# Patient Record
Sex: FEMALE | Race: WHITE | NOT HISPANIC OR LATINO
[De-identification: names, ages, dates, MRNs, and addresses within clinical notes are randomized per-mention and may not be internally consistent; named-entity substitution may affect disease eponyms.]

---

## 2017-02-14 ENCOUNTER — CHART COPY (OUTPATIENT)
Age: 82
End: 2017-02-14

## 2017-02-17 ENCOUNTER — APPOINTMENT (OUTPATIENT)
Dept: SURGERY | Facility: CLINIC | Age: 82
End: 2017-02-17

## 2017-02-17 VITALS
DIASTOLIC BLOOD PRESSURE: 74 MMHG | OXYGEN SATURATION: 100 % | HEART RATE: 66 BPM | TEMPERATURE: 98 F | SYSTOLIC BLOOD PRESSURE: 144 MMHG | RESPIRATION RATE: 15 BRPM

## 2017-02-17 DIAGNOSIS — L91.0 HYPERTROPHIC SCAR: ICD-10-CM

## 2017-02-17 DIAGNOSIS — S51.801S UNSPECIFIED OPEN WOUND OF RIGHT FOREARM, SEQUELA: ICD-10-CM

## 2017-02-17 DIAGNOSIS — Q78.2 OSTEOPETROSIS: ICD-10-CM

## 2017-02-17 DIAGNOSIS — E03.9 HYPOTHYROIDISM, UNSPECIFIED: ICD-10-CM

## 2017-02-17 DIAGNOSIS — I10 ESSENTIAL (PRIMARY) HYPERTENSION: ICD-10-CM

## 2017-02-17 DIAGNOSIS — E78.5 HYPERLIPIDEMIA, UNSPECIFIED: ICD-10-CM

## 2017-02-21 ENCOUNTER — APPOINTMENT (OUTPATIENT)
Dept: OPHTHALMOLOGY | Facility: CLINIC | Age: 82
End: 2017-02-21

## 2017-06-02 ENCOUNTER — APPOINTMENT (OUTPATIENT)
Dept: OPHTHALMOLOGY | Facility: CLINIC | Age: 82
End: 2017-06-02

## 2017-06-02 DIAGNOSIS — H01.006 UNSPECIFIED BLEPHARITIS RIGHT EYE, UNSPECIFIED EYELID: ICD-10-CM

## 2017-06-02 DIAGNOSIS — H01.003 UNSPECIFIED BLEPHARITIS RIGHT EYE, UNSPECIFIED EYELID: ICD-10-CM

## 2017-06-07 ENCOUNTER — OUTPATIENT (OUTPATIENT)
Dept: OUTPATIENT SERVICES | Facility: HOSPITAL | Age: 82
LOS: 1 days | End: 2017-06-07
Payer: MEDICARE

## 2017-06-07 VITALS
WEIGHT: 139.99 LBS | HEIGHT: 59 IN | SYSTOLIC BLOOD PRESSURE: 116 MMHG | RESPIRATION RATE: 16 BRPM | DIASTOLIC BLOOD PRESSURE: 73 MMHG | TEMPERATURE: 98 F | OXYGEN SATURATION: 96 % | HEART RATE: 82 BPM

## 2017-06-07 DIAGNOSIS — Z01.818 ENCOUNTER FOR OTHER PREPROCEDURAL EXAMINATION: ICD-10-CM

## 2017-06-07 DIAGNOSIS — K59.4 ANAL SPASM: ICD-10-CM

## 2017-06-07 DIAGNOSIS — I10 ESSENTIAL (PRIMARY) HYPERTENSION: ICD-10-CM

## 2017-06-07 DIAGNOSIS — Z87.19 PERSONAL HISTORY OF OTHER DISEASES OF THE DIGESTIVE SYSTEM: Chronic | ICD-10-CM

## 2017-06-07 LAB
ANION GAP SERPL CALC-SCNC: 13 MMOL/L — SIGNIFICANT CHANGE UP (ref 5–17)
BUN SERPL-MCNC: 33 MG/DL — HIGH (ref 7–23)
CALCIUM SERPL-MCNC: 9.7 MG/DL — SIGNIFICANT CHANGE UP (ref 8.4–10.5)
CHLORIDE SERPL-SCNC: 101 MMOL/L — SIGNIFICANT CHANGE UP (ref 96–108)
CO2 SERPL-SCNC: 24 MMOL/L — SIGNIFICANT CHANGE UP (ref 22–31)
CREAT SERPL-MCNC: 1.22 MG/DL — SIGNIFICANT CHANGE UP (ref 0.5–1.3)
GLUCOSE SERPL-MCNC: 106 MG/DL — HIGH (ref 70–99)
HCT VFR BLD CALC: 37.9 % — SIGNIFICANT CHANGE UP (ref 34.5–45)
HGB BLD-MCNC: 12.2 G/DL — SIGNIFICANT CHANGE UP (ref 11.5–15.5)
MCHC RBC-ENTMCNC: 31.9 PG — SIGNIFICANT CHANGE UP (ref 27–34)
MCHC RBC-ENTMCNC: 32.2 GM/DL — SIGNIFICANT CHANGE UP (ref 32–36)
MCV RBC AUTO: 99 FL — SIGNIFICANT CHANGE UP (ref 80–100)
PLATELET # BLD AUTO: 283 K/UL — SIGNIFICANT CHANGE UP (ref 150–400)
POTASSIUM SERPL-MCNC: 4.7 MMOL/L — SIGNIFICANT CHANGE UP (ref 3.5–5.3)
POTASSIUM SERPL-SCNC: 4.7 MMOL/L — SIGNIFICANT CHANGE UP (ref 3.5–5.3)
RBC # BLD: 3.83 M/UL — SIGNIFICANT CHANGE UP (ref 3.8–5.2)
RBC # FLD: 14 % — SIGNIFICANT CHANGE UP (ref 10.3–14.5)
SODIUM SERPL-SCNC: 138 MMOL/L — SIGNIFICANT CHANGE UP (ref 135–145)
WBC # BLD: 7.27 K/UL — SIGNIFICANT CHANGE UP (ref 3.8–10.5)
WBC # FLD AUTO: 7.27 K/UL — SIGNIFICANT CHANGE UP (ref 3.8–10.5)

## 2017-06-07 PROCEDURE — G0463: CPT

## 2017-06-07 PROCEDURE — 85027 COMPLETE CBC AUTOMATED: CPT

## 2017-06-07 PROCEDURE — 93010 ELECTROCARDIOGRAM REPORT: CPT | Mod: NC

## 2017-06-07 PROCEDURE — 93005 ELECTROCARDIOGRAM TRACING: CPT

## 2017-06-07 PROCEDURE — 80048 BASIC METABOLIC PNL TOTAL CA: CPT

## 2017-06-07 RX ORDER — SODIUM CHLORIDE 9 MG/ML
3 INJECTION INTRAMUSCULAR; INTRAVENOUS; SUBCUTANEOUS EVERY 8 HOURS
Qty: 0 | Refills: 0 | Status: DISCONTINUED | OUTPATIENT
Start: 2017-06-28 | End: 2017-07-13

## 2017-06-07 RX ORDER — LIDOCAINE HCL 20 MG/ML
0.2 VIAL (ML) INJECTION ONCE
Qty: 0 | Refills: 0 | Status: DISCONTINUED | OUTPATIENT
Start: 2017-06-28 | End: 2017-07-13

## 2017-06-07 NOTE — H&P PST ADULT - PSH
After-Cataract of Both Eyes    Anterior Cervical discectomy    History of  Hip surgery with hardware    History of Tonsillectomy    left Elbow surgey secondary to injury    left eye Retinal tear repair After-Cataract of Both Eyes    Anterior Cervical discectomy    H/O gastroesophageal reflux (GERD)    History of  Hip surgery with hardware    History of Tonsillectomy    left Elbow surgey secondary to injury    left eye Retinal tear repair

## 2017-06-07 NOTE — H&P PST ADULT - NSANTHOSAYNRD_GEN_A_CORE
No. MERLINE screening performed.  STOP BANG Legend: 0-2 = LOW Risk; 3-4 = INTERMEDIATE Risk; 5-8 = HIGH Risk

## 2017-06-07 NOTE — H&P PST ADULT - PMH
Anal spasm    Chronic anal fissure    Dyslipidemia    H/O: Hypothyroidism    Hip fracture  Right hip- 1995  Hypertension    mild Dementia    Osteoporosis Anal spasm    Chronic anal fissure    Dyslipidemia    H/O: Hypothyroidism    Hip fracture  Right hip- 1995  Hypertension    mild Dementia    Osteoporosis    Skin cancer  lower extremities

## 2017-06-08 ENCOUNTER — APPOINTMENT (OUTPATIENT)
Dept: SURGERY | Facility: HOSPITAL | Age: 82
End: 2017-06-08

## 2017-06-28 ENCOUNTER — OUTPATIENT (OUTPATIENT)
Dept: OUTPATIENT SERVICES | Facility: HOSPITAL | Age: 82
LOS: 1 days | End: 2017-06-28
Payer: MEDICARE

## 2017-06-28 ENCOUNTER — APPOINTMENT (OUTPATIENT)
Dept: SURGERY | Facility: HOSPITAL | Age: 82
End: 2017-06-28

## 2017-06-28 ENCOUNTER — CHART COPY (OUTPATIENT)
Age: 82
End: 2017-06-28

## 2017-06-28 ENCOUNTER — TRANSCRIPTION ENCOUNTER (OUTPATIENT)
Age: 82
End: 2017-06-28

## 2017-06-28 VITALS
WEIGHT: 139.99 LBS | SYSTOLIC BLOOD PRESSURE: 165 MMHG | HEIGHT: 59 IN | RESPIRATION RATE: 16 BRPM | OXYGEN SATURATION: 98 % | TEMPERATURE: 98 F | DIASTOLIC BLOOD PRESSURE: 73 MMHG | HEART RATE: 82 BPM

## 2017-06-28 VITALS
TEMPERATURE: 97 F | RESPIRATION RATE: 14 BRPM | DIASTOLIC BLOOD PRESSURE: 76 MMHG | OXYGEN SATURATION: 100 % | HEART RATE: 44 BPM | SYSTOLIC BLOOD PRESSURE: 150 MMHG

## 2017-06-28 DIAGNOSIS — Z87.19 PERSONAL HISTORY OF OTHER DISEASES OF THE DIGESTIVE SYSTEM: Chronic | ICD-10-CM

## 2017-06-28 DIAGNOSIS — K59.4 ANAL SPASM: ICD-10-CM

## 2017-06-28 PROCEDURE — 46080 SPHNCTROTMY ANAL DIV SPHNCTR: CPT

## 2017-06-28 RX ORDER — ACETAMINOPHEN 500 MG
1000 TABLET ORAL ONCE
Qty: 0 | Refills: 0 | Status: DISCONTINUED | OUTPATIENT
Start: 2017-06-28 | End: 2017-07-13

## 2017-06-28 RX ORDER — ONDANSETRON 8 MG/1
4 TABLET, FILM COATED ORAL ONCE
Qty: 0 | Refills: 0 | Status: DISCONTINUED | OUTPATIENT
Start: 2017-06-28 | End: 2017-07-13

## 2017-06-28 RX ORDER — CELECOXIB 200 MG/1
200 CAPSULE ORAL ONCE
Qty: 0 | Refills: 0 | Status: DISCONTINUED | OUTPATIENT
Start: 2017-06-28 | End: 2017-07-13

## 2017-06-28 RX ORDER — SODIUM CHLORIDE 9 MG/ML
1000 INJECTION, SOLUTION INTRAVENOUS
Qty: 0 | Refills: 0 | Status: DISCONTINUED | OUTPATIENT
Start: 2017-06-28 | End: 2017-07-13

## 2017-06-28 NOTE — ASU PATIENT PROFILE, ADULT - VISION (WITH CORRECTIVE LENSES IF THE PATIENT USUALLY WEARS THEM):
Normal vision: sees adequately in most situations; can see medication labels, newsprint Normal vision: sees adequately in most situations; can see medication labels, newsprint/reading

## 2017-06-28 NOTE — ASU PATIENT PROFILE, ADULT - PMH
Anal spasm    Chronic anal fissure    Dyslipidemia    H/O: Hypothyroidism    Hip fracture  Right hip- 1995  Hypertension    mild Dementia    Osteoporosis    Skin cancer  lower extremities

## 2017-06-28 NOTE — ASU PATIENT PROFILE, ADULT - PSH
After-Cataract of Both Eyes    Anterior Cervical discectomy    H/O gastroesophageal reflux (GERD)    History of  Hip surgery with hardware    History of Tonsillectomy    left Elbow surgey secondary to injury    left eye Retinal tear repair

## 2017-07-14 ENCOUNTER — APPOINTMENT (OUTPATIENT)
Dept: SURGERY | Facility: CLINIC | Age: 82
End: 2017-07-14

## 2017-07-14 VITALS
TEMPERATURE: 97.8 F | HEART RATE: 62 BPM | RESPIRATION RATE: 16 BRPM | OXYGEN SATURATION: 96 % | SYSTOLIC BLOOD PRESSURE: 126 MMHG | DIASTOLIC BLOOD PRESSURE: 76 MMHG

## 2017-07-14 DIAGNOSIS — K60.1 CHRONIC ANAL FISSURE: ICD-10-CM

## 2017-07-14 RX ORDER — NYSTATIN 100000 [USP'U]/G
100000 CREAM TOPICAL
Qty: 30 | Refills: 0 | Status: DISCONTINUED | COMMUNITY
Start: 2016-10-18

## 2017-07-14 RX ORDER — MUPIROCIN 20 MG/G
2 OINTMENT TOPICAL
Qty: 22 | Refills: 0 | Status: DISCONTINUED | COMMUNITY
Start: 2017-05-27

## 2017-07-14 RX ORDER — CEFUROXIME AXETIL 250 MG/1
250 TABLET ORAL
Qty: 14 | Refills: 0 | Status: DISCONTINUED | COMMUNITY
Start: 2017-03-01

## 2017-07-22 ENCOUNTER — INPATIENT (INPATIENT)
Facility: HOSPITAL | Age: 82
LOS: 5 days | Discharge: HOME | DRG: 262 | End: 2017-07-28
Attending: INTERNAL MEDICINE | Admitting: INTERNAL MEDICINE
Payer: COMMERCIAL

## 2017-07-22 VITALS
RESPIRATION RATE: 16 BRPM | OXYGEN SATURATION: 98 % | HEART RATE: 80 BPM | SYSTOLIC BLOOD PRESSURE: 122 MMHG | DIASTOLIC BLOOD PRESSURE: 76 MMHG

## 2017-07-22 DIAGNOSIS — Z87.19 PERSONAL HISTORY OF OTHER DISEASES OF THE DIGESTIVE SYSTEM: Chronic | ICD-10-CM

## 2017-07-22 DIAGNOSIS — R55 SYNCOPE AND COLLAPSE: ICD-10-CM

## 2017-07-22 LAB
ALBUMIN SERPL ELPH-MCNC: 3.9 G/DL — SIGNIFICANT CHANGE UP (ref 3.3–5)
ALP SERPL-CCNC: 35 U/L — LOW (ref 40–120)
ALT FLD-CCNC: 26 U/L RC — SIGNIFICANT CHANGE UP (ref 10–45)
ANION GAP SERPL CALC-SCNC: 12 MMOL/L — SIGNIFICANT CHANGE UP (ref 5–17)
APPEARANCE UR: CLEAR — SIGNIFICANT CHANGE UP
AST SERPL-CCNC: 43 U/L — HIGH (ref 10–40)
BACTERIA # UR AUTO: ABNORMAL /HPF
BILIRUB SERPL-MCNC: 0.5 MG/DL — SIGNIFICANT CHANGE UP (ref 0.2–1.2)
BILIRUB UR-MCNC: NEGATIVE — SIGNIFICANT CHANGE UP
BUN SERPL-MCNC: 23 MG/DL — SIGNIFICANT CHANGE UP (ref 7–23)
CALCIUM SERPL-MCNC: 9.4 MG/DL — SIGNIFICANT CHANGE UP (ref 8.4–10.5)
CHLORIDE SERPL-SCNC: 104 MMOL/L — SIGNIFICANT CHANGE UP (ref 96–108)
CO2 SERPL-SCNC: 24 MMOL/L — SIGNIFICANT CHANGE UP (ref 22–31)
COLOR SPEC: YELLOW — SIGNIFICANT CHANGE UP
CREAT SERPL-MCNC: 1.2 MG/DL — SIGNIFICANT CHANGE UP (ref 0.5–1.3)
DIFF PNL FLD: NEGATIVE — SIGNIFICANT CHANGE UP
EPI CELLS # UR: SIGNIFICANT CHANGE UP /HPF
GLUCOSE SERPL-MCNC: 93 MG/DL — SIGNIFICANT CHANGE UP (ref 70–99)
GLUCOSE UR QL: NEGATIVE — SIGNIFICANT CHANGE UP
HCT VFR BLD CALC: 35.3 % — SIGNIFICANT CHANGE UP (ref 34.5–45)
HGB BLD-MCNC: 11.9 G/DL — SIGNIFICANT CHANGE UP (ref 11.5–15.5)
KETONES UR-MCNC: NEGATIVE — SIGNIFICANT CHANGE UP
LEUKOCYTE ESTERASE UR-ACNC: ABNORMAL
MCHC RBC-ENTMCNC: 33.7 GM/DL — SIGNIFICANT CHANGE UP (ref 32–36)
MCHC RBC-ENTMCNC: 34.7 PG — HIGH (ref 27–34)
MCV RBC AUTO: 103 FL — HIGH (ref 80–100)
NITRITE UR-MCNC: NEGATIVE — SIGNIFICANT CHANGE UP
PH UR: 6.5 — SIGNIFICANT CHANGE UP (ref 5–8)
PLATELET # BLD AUTO: 317 K/UL — SIGNIFICANT CHANGE UP (ref 150–400)
POTASSIUM SERPL-MCNC: 4.9 MMOL/L — SIGNIFICANT CHANGE UP (ref 3.5–5.3)
POTASSIUM SERPL-SCNC: 4.9 MMOL/L — SIGNIFICANT CHANGE UP (ref 3.5–5.3)
PROT SERPL-MCNC: 6.6 G/DL — SIGNIFICANT CHANGE UP (ref 6–8.3)
PROT UR-MCNC: NEGATIVE — SIGNIFICANT CHANGE UP
RBC # BLD: 3.43 M/UL — LOW (ref 3.8–5.2)
RBC # FLD: 12.1 % — SIGNIFICANT CHANGE UP (ref 10.3–14.5)
RBC CASTS # UR COMP ASSIST: SIGNIFICANT CHANGE UP /HPF (ref 0–2)
SODIUM SERPL-SCNC: 140 MMOL/L — SIGNIFICANT CHANGE UP (ref 135–145)
SP GR SPEC: 1.02 — SIGNIFICANT CHANGE UP (ref 1.01–1.02)
TROPONIN T SERPL-MCNC: <0.01 NG/ML — SIGNIFICANT CHANGE UP (ref 0–0.06)
UROBILINOGEN FLD QL: NEGATIVE — SIGNIFICANT CHANGE UP
WBC # BLD: 6.8 K/UL — SIGNIFICANT CHANGE UP (ref 3.8–10.5)
WBC # FLD AUTO: 6.8 K/UL — SIGNIFICANT CHANGE UP (ref 3.8–10.5)
WBC UR QL: ABNORMAL /HPF (ref 0–5)

## 2017-07-22 PROCEDURE — 70450 CT HEAD/BRAIN W/O DYE: CPT | Mod: 26

## 2017-07-22 PROCEDURE — 71010: CPT | Mod: 26

## 2017-07-22 PROCEDURE — 93010 ELECTROCARDIOGRAM REPORT: CPT

## 2017-07-22 PROCEDURE — 73590 X-RAY EXAM OF LOWER LEG: CPT | Mod: 26,50

## 2017-07-22 PROCEDURE — 99285 EMERGENCY DEPT VISIT HI MDM: CPT | Mod: 25

## 2017-07-22 PROCEDURE — 72170 X-RAY EXAM OF PELVIS: CPT | Mod: 26

## 2017-07-22 RX ORDER — LOSARTAN POTASSIUM 100 MG/1
50 TABLET, FILM COATED ORAL DAILY
Qty: 0 | Refills: 0 | Status: DISCONTINUED | OUTPATIENT
Start: 2017-07-22 | End: 2017-07-28

## 2017-07-22 RX ORDER — OXYCODONE AND ACETAMINOPHEN 5; 325 MG/1; MG/1
1 TABLET ORAL EVERY 4 HOURS
Qty: 0 | Refills: 0 | Status: DISCONTINUED | OUTPATIENT
Start: 2017-07-22 | End: 2017-07-28

## 2017-07-22 RX ORDER — RANITIDINE HYDROCHLORIDE 150 MG/1
0 TABLET, FILM COATED ORAL
Qty: 0 | Refills: 0 | COMMUNITY

## 2017-07-22 RX ORDER — POLYETHYLENE GLYCOL 3350 17 G/17G
17 POWDER, FOR SOLUTION ORAL DAILY
Qty: 0 | Refills: 0 | Status: DISCONTINUED | OUTPATIENT
Start: 2017-07-22 | End: 2017-07-28

## 2017-07-22 RX ORDER — TRAMADOL HYDROCHLORIDE 50 MG/1
50 TABLET ORAL ONCE
Qty: 0 | Refills: 0 | Status: DISCONTINUED | OUTPATIENT
Start: 2017-07-22 | End: 2017-07-22

## 2017-07-22 RX ORDER — TETANUS TOXOID, REDUCED DIPHTHERIA TOXOID AND ACELLULAR PERTUSSIS VACCINE, ADSORBED 5; 2.5; 8; 8; 2.5 [IU]/.5ML; [IU]/.5ML; UG/.5ML; UG/.5ML; UG/.5ML
0.5 SUSPENSION INTRAMUSCULAR ONCE
Qty: 0 | Refills: 0 | Status: COMPLETED | OUTPATIENT
Start: 2017-07-22 | End: 2017-07-22

## 2017-07-22 RX ORDER — LEVOTHYROXINE SODIUM 125 MCG
100 TABLET ORAL DAILY
Qty: 0 | Refills: 0 | Status: DISCONTINUED | OUTPATIENT
Start: 2017-07-22 | End: 2017-07-28

## 2017-07-22 RX ORDER — SIMVASTATIN 20 MG/1
40 TABLET, FILM COATED ORAL AT BEDTIME
Qty: 0 | Refills: 0 | Status: DISCONTINUED | OUTPATIENT
Start: 2017-07-22 | End: 2017-07-28

## 2017-07-22 RX ORDER — METRONIDAZOLE 500 MG
1 TABLET ORAL
Qty: 0 | Refills: 0 | COMMUNITY
Start: 2017-07-22 | End: 2017-08-01

## 2017-07-22 RX ORDER — HEPARIN SODIUM 5000 [USP'U]/ML
5000 INJECTION INTRAVENOUS; SUBCUTANEOUS EVERY 12 HOURS
Qty: 0 | Refills: 0 | Status: DISCONTINUED | OUTPATIENT
Start: 2017-07-22 | End: 2017-07-28

## 2017-07-22 RX ORDER — MOXIFLOXACIN HYDROCHLORIDE TABLETS, 400 MG 400 MG/1
1 TABLET, FILM COATED ORAL
Qty: 0 | Refills: 0 | COMMUNITY
Start: 2017-07-22 | End: 2017-08-01

## 2017-07-22 RX ORDER — SODIUM CHLORIDE 9 MG/ML
1000 INJECTION INTRAMUSCULAR; INTRAVENOUS; SUBCUTANEOUS ONCE
Qty: 0 | Refills: 0 | Status: COMPLETED | OUTPATIENT
Start: 2017-07-22 | End: 2017-07-22

## 2017-07-22 RX ORDER — ACETAMINOPHEN 500 MG
1000 TABLET ORAL ONCE
Qty: 0 | Refills: 0 | Status: COMPLETED | OUTPATIENT
Start: 2017-07-22 | End: 2017-07-22

## 2017-07-22 RX ORDER — OLMESARTAN MEDOXOMIL 5 MG/1
10 TABLET, FILM COATED ORAL
Qty: 0 | Refills: 0 | COMMUNITY

## 2017-07-22 RX ORDER — PANTOPRAZOLE SODIUM 20 MG/1
40 TABLET, DELAYED RELEASE ORAL
Qty: 0 | Refills: 0 | Status: DISCONTINUED | OUTPATIENT
Start: 2017-07-22 | End: 2017-07-28

## 2017-07-22 RX ADMIN — TRAMADOL HYDROCHLORIDE 50 MILLIGRAM(S): 50 TABLET ORAL at 16:47

## 2017-07-22 RX ADMIN — OXYCODONE AND ACETAMINOPHEN 1 TABLET(S): 5; 325 TABLET ORAL at 23:46

## 2017-07-22 RX ADMIN — SIMVASTATIN 40 MILLIGRAM(S): 20 TABLET, FILM COATED ORAL at 22:31

## 2017-07-22 RX ADMIN — Medication 400 MILLIGRAM(S): at 14:40

## 2017-07-22 RX ADMIN — Medication 1000 MILLIGRAM(S): at 15:30

## 2017-07-22 RX ADMIN — TETANUS TOXOID, REDUCED DIPHTHERIA TOXOID AND ACELLULAR PERTUSSIS VACCINE, ADSORBED 0.5 MILLILITER(S): 5; 2.5; 8; 8; 2.5 SUSPENSION INTRAMUSCULAR at 14:07

## 2017-07-22 RX ADMIN — SODIUM CHLORIDE 1000 MILLILITER(S): 9 INJECTION INTRAMUSCULAR; INTRAVENOUS; SUBCUTANEOUS at 14:08

## 2017-07-22 RX ADMIN — OXYCODONE AND ACETAMINOPHEN 1 TABLET(S): 5; 325 TABLET ORAL at 22:31
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
## 2017-07-22 NOTE — H&P ADULT - NSHPLABSRESULTS_GEN_ALL_CORE
11.9   6.8   )-----------( 317      ( 2017 14:09 )             35.3           140  |  104  |  23  ----------------------------<  93  4.9   |  24  |  1.20    Ca    9.4      2017 14:09    TPro  6.6  /  Alb  3.9  /  TBili  0.5  /  DBili  x   /  AST  43<H>  /  ALT  26  /  AlkPhos  35<L>                Urinalysis Basic - ( 2017 16:55 )    Color: Yellow / Appearance: Clear / S.020 / pH: x  Gluc: x / Ketone: Negative  / Bili: Negative / Urobili: Negative   Blood: x / Protein: Negative / Nitrite: Negative   Leuk Esterase: Small / RBC: 0-2 /HPF / WBC 5-10 /HPF   Sq Epi: x / Non Sq Epi: Few /HPF / Bacteria: Few /HPF            Lactate Trend      CARDIAC MARKERS ( 2017 14:09 )  x     / <0.01 ng/mL / 82 U/L / x     / x          < from: CT Head No Cont (17 @ 15:02) >    EXAM:  CT BRAIN                            PROCEDURE DATE:  2017            INTERPRETATION:  CLINICAL INFORMATION: Altered mental status.    TECHNIQUE: Multiple axial CT images of the head were obtained without   contrast. Coronal and sagittal reformats were obtained.    COMPARISON: Head CT on 2007.    FINDINGS:  There is no acute intracranial hemorrhage, mass effect, midline shift or   hydrocephalus. The gray-white matter differentiation is preserved.     Mild patchy hypodensities without mass effect is noted in the   periventricular white matter which most likely represents chronic   microvascular ischemic changes given the patient's age. Age appropriate   involutional changes present.     There is no displaced calvarial fracture. The visualized portions of the   paranasal sinuses and mastoid air cells are clear.     IMPRESSION:  No mass effect, hemorrhage or evidence of acute intracranial pathology.       < end of copied text > 11.9   6.8   )-----------( 317      ( 2017 14:09 )             35.3           140  |  104  |  23  ----------------------------<  93  4.9   |  24  |  1.20    Ca    9.4      2017 14:09    TPro  6.6  /  Alb  3.9  /  TBili  0.5  /  DBili  x   /  AST  43<H>  /  ALT  26  /  AlkPhos  35<L>                Urinalysis Basic - ( 2017 16:55 )    Color: Yellow / Appearance: Clear / S.020 / pH: x  Gluc: x / Ketone: Negative  / Bili: Negative / Urobili: Negative   Blood: x / Protein: Negative / Nitrite: Negative   Leuk Esterase: Small / RBC: 0-2 /HPF / WBC 5-10 /HPF   Sq Epi: x / Non Sq Epi: Few /HPF / Bacteria: Few /HPF            Lactate Trend      CARDIAC MARKERS ( 2017 14:09 )  x     / <0.01 ng/mL / 82 U/L / x     / x          < from: CT Head No Cont (17 @ 15:02) >    EXAM:  CT BRAIN                            PROCEDURE DATE:  2017            INTERPRETATION:  CLINICAL INFORMATION: Altered mental status.    TECHNIQUE: Multiple axial CT images of the head were obtained without   contrast. Coronal and sagittal reformats were obtained.    COMPARISON: Head CT on 2007.    FINDINGS:  There is no acute intracranial hemorrhage, mass effect, midline shift or   hydrocephalus. The gray-white matter differentiation is preserved.     Mild patchy hypodensities without mass effect is noted in the   periventricular white matter which most likely represents chronic   microvascular ischemic changes given the patient's age. Age appropriate   involutional changes present.     There is no displaced calvarial fracture. The visualized portions of the   paranasal sinuses and mastoid air cells are clear.     IMPRESSION:  No mass effect, hemorrhage or evidence of acute intracranial pathology.       < end of copied text >  EKG SB nstt/t
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
## 2017-07-22 NOTE — ED SUB INTERN NOTE - FAMILY HISTORY
Father  Still living? No  Family history of heart disease, Age at diagnosis: Age Unknown     Mother  Still living? Unknown  Family history of heart disease, Age at diagnosis: Age Unknown
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
## 2017-07-22 NOTE — ED SUB INTERN NOTE - PHYSICAL EXAMINATION
GENERAL: Alert, awake, no acute distress.  HEENT: Normocephalic, atraumatic.  Respiratory: Breath sounds clear to auscultation bilaterally.  Cardiovascular: Regular rate and rhythm. Normal sounding S1 and S2. No murmurs, rubs, or gallops.  Abdomen: Soft, nontender, nondistended. Bowel sounds present.  Extremities: No obvious bony deformity. Peripheral pulses full and symmetric. RLE: 3cm crescent-shaped superficial skin tear of the lateral right lower leg. LLE: 2cm skin tear of the lateral knee, 3cm skin tear of the lateral aspect of the lateral lower leg, just inferior to the skin tear on the knee. Scant bleeding.  Skin: Chronic ecchymoses on the bilateral lower extremities.  Neuro: Alert and oriented to person, place, time, and situation.

## 2017-07-22 NOTE — ED ADULT NURSE NOTE - CHPI ED SYMPTOMS NEG
no decreased eating/drinking/no headache/no disorientation/no neck tenderness/no dizziness/no back pain
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
## 2017-07-22 NOTE — H&P ADULT - NSHPSOCIALHISTORY_GEN_ALL_CORE
Social History:    Marital Status:  (   )    (   ) Single    (   )    ( x )   Occupation:   Lives with: ( x ) alone  (  ) children   (  ) spouse   (  ) parents  (  ) other    Substance Use (street drugs): (x  ) never used  (  ) other:  Tobacco Usage:  ( x  ) never smoked   (   ) former smoker   (   ) current smoker  (     ) pack years  (        ) last cigarette date  Alcohol Usage: denies    (     ) Advanced Directives: (     ) None    (      ) DNR    (     ) DNI    (     ) Health Care Proxy:

## 2017-07-22 NOTE — H&P ADULT - NSHPPHYSICALEXAM_GEN_ALL_CORE
PHYSICAL EXAMINATION:  Vital Signs Last 24 Hrs  T(C): 36.6 (22 Jul 2017 17:51), Max: 36.9 (22 Jul 2017 14:01)  T(F): 97.9 (22 Jul 2017 17:51), Max: 98.4 (22 Jul 2017 14:01)  HR: 71 (22 Jul 2017 17:51) (60 - 80)  BP: 148/71 (22 Jul 2017 17:51) (116/69 - 148/71)  BP(mean): --  RR: 20 (22 Jul 2017 17:51) (16 - 20)  SpO2: 96% (22 Jul 2017 17:51) (96% - 98%)  CAPILLARY BLOOD GLUCOSE          GENERAL: NAD, well-groomed, well-developed  HEAD:  atraumatic, normocephalic  EYES: sclera anicteric  ENMT: mucous membranes moist  NECK: supple, No JVD  CHEST/LUNG: clear to auscultation bilaterally; no rales, rhonchi, or wheezing b/l  HEART: normal S1, S2  ABDOMEN: BS+, soft, ND, NT   EXTREMITIES:  pulses palpable; no clubbing, cyanosis, or edema b/l LEs Bilateral legs abrasions  NEURO: awake, alert, interactive; moves all extremities  SKIN: no rashes or lesions
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
## 2017-07-23 DIAGNOSIS — R55 SYNCOPE AND COLLAPSE: ICD-10-CM
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
## 2017-07-23 LAB
ALBUMIN SERPL ELPH-MCNC: 3.6 G/DL — SIGNIFICANT CHANGE UP (ref 3.3–5)
ALP SERPL-CCNC: 34 U/L — LOW (ref 40–120)
ALT FLD-CCNC: 27 U/L RC — SIGNIFICANT CHANGE UP (ref 10–45)
ANION GAP SERPL CALC-SCNC: 11 MMOL/L — SIGNIFICANT CHANGE UP (ref 5–17)
AST SERPL-CCNC: 43 U/L — HIGH (ref 10–40)
BILIRUB SERPL-MCNC: 0.9 MG/DL — SIGNIFICANT CHANGE UP (ref 0.2–1.2)
BUN SERPL-MCNC: 18 MG/DL — SIGNIFICANT CHANGE UP (ref 7–23)
CALCIUM SERPL-MCNC: 8.6 MG/DL — SIGNIFICANT CHANGE UP (ref 8.4–10.5)
CHLORIDE SERPL-SCNC: 105 MMOL/L — SIGNIFICANT CHANGE UP (ref 96–108)
CK MB BLD-MCNC: 3.4 % — SIGNIFICANT CHANGE UP (ref 0–3.5)
CK MB CFR SERPL CALC: 5 NG/ML — HIGH (ref 0–3.8)
CK SERPL-CCNC: 145 U/L — SIGNIFICANT CHANGE UP (ref 25–170)
CO2 SERPL-SCNC: 22 MMOL/L — SIGNIFICANT CHANGE UP (ref 22–31)
CREAT SERPL-MCNC: 0.97 MG/DL — SIGNIFICANT CHANGE UP (ref 0.5–1.3)
ERYTHROCYTE [SEDIMENTATION RATE] IN BLOOD: 12 MM/HR — SIGNIFICANT CHANGE UP (ref 0–20)
FOLATE SERPL-MCNC: >20 NG/ML — SIGNIFICANT CHANGE UP (ref 4.8–24.2)
GLUCOSE SERPL-MCNC: 101 MG/DL — HIGH (ref 70–99)
HCT VFR BLD CALC: 34.4 % — LOW (ref 34.5–45)
HGB BLD-MCNC: 11.5 G/DL — SIGNIFICANT CHANGE UP (ref 11.5–15.5)
MCHC RBC-ENTMCNC: 33.4 GM/DL — SIGNIFICANT CHANGE UP (ref 32–36)
MCHC RBC-ENTMCNC: 34.4 PG — HIGH (ref 27–34)
MCV RBC AUTO: 103 FL — HIGH (ref 80–100)
PLATELET # BLD AUTO: 294 K/UL — SIGNIFICANT CHANGE UP (ref 150–400)
POTASSIUM SERPL-MCNC: 4.9 MMOL/L — SIGNIFICANT CHANGE UP (ref 3.5–5.3)
POTASSIUM SERPL-SCNC: 4.9 MMOL/L — SIGNIFICANT CHANGE UP (ref 3.5–5.3)
PROT SERPL-MCNC: 6.3 G/DL — SIGNIFICANT CHANGE UP (ref 6–8.3)
RBC # BLD: 3.34 M/UL — LOW (ref 3.8–5.2)
RBC # FLD: 11.9 % — SIGNIFICANT CHANGE UP (ref 10.3–14.5)
SODIUM SERPL-SCNC: 138 MMOL/L — SIGNIFICANT CHANGE UP (ref 135–145)
T3 SERPL-MCNC: 69 NG/DL — LOW (ref 80–200)
T4 AB SER-ACNC: 6.9 UG/DL — SIGNIFICANT CHANGE UP (ref 4.6–12)
TROPONIN T SERPL-MCNC: <0.01 NG/ML — SIGNIFICANT CHANGE UP (ref 0–0.06)
TROPONIN T SERPL-MCNC: <0.01 NG/ML — SIGNIFICANT CHANGE UP (ref 0–0.06)
TSH SERPL-MCNC: 2.38 UIU/ML — SIGNIFICANT CHANGE UP (ref 0.27–4.2)
TSH SERPL-MCNC: 2.59 UIU/ML — SIGNIFICANT CHANGE UP (ref 0.27–4.2)
VIT B12 SERPL-MCNC: 1112 PG/ML — HIGH (ref 243–894)
WBC # BLD: 7.8 K/UL — SIGNIFICANT CHANGE UP (ref 3.8–10.5)
WBC # FLD AUTO: 7.8 K/UL — SIGNIFICANT CHANGE UP (ref 3.8–10.5)

## 2017-07-23 RX ORDER — ACETAMINOPHEN 500 MG
650 TABLET ORAL EVERY 6 HOURS
Qty: 0 | Refills: 0 | Status: DISCONTINUED | OUTPATIENT
Start: 2017-07-23 | End: 2017-07-28

## 2017-07-23 RX ORDER — ZOLPIDEM TARTRATE 10 MG/1
5 TABLET ORAL ONCE
Qty: 0 | Refills: 0 | Status: DISCONTINUED | OUTPATIENT
Start: 2017-07-23 | End: 2017-07-23

## 2017-07-23 RX ADMIN — Medication 650 MILLIGRAM(S): at 17:24

## 2017-07-23 RX ADMIN — ZOLPIDEM TARTRATE 5 MILLIGRAM(S): 10 TABLET ORAL at 21:56

## 2017-07-23 RX ADMIN — LOSARTAN POTASSIUM 50 MILLIGRAM(S): 100 TABLET, FILM COATED ORAL at 05:35

## 2017-07-23 RX ADMIN — Medication 100 MICROGRAM(S): at 05:35

## 2017-07-23 RX ADMIN — HEPARIN SODIUM 5000 UNIT(S): 5000 INJECTION INTRAVENOUS; SUBCUTANEOUS at 05:35

## 2017-07-23 RX ADMIN — OXYCODONE AND ACETAMINOPHEN 1 TABLET(S): 5; 325 TABLET ORAL at 06:54

## 2017-07-23 RX ADMIN — HEPARIN SODIUM 5000 UNIT(S): 5000 INJECTION INTRAVENOUS; SUBCUTANEOUS at 18:21

## 2017-07-23 RX ADMIN — OXYCODONE AND ACETAMINOPHEN 1 TABLET(S): 5; 325 TABLET ORAL at 05:33

## 2017-07-23 RX ADMIN — SIMVASTATIN 40 MILLIGRAM(S): 20 TABLET, FILM COATED ORAL at 21:56

## 2017-07-23 RX ADMIN — Medication 650 MILLIGRAM(S): at 16:42

## 2017-07-23 RX ADMIN — POLYETHYLENE GLYCOL 3350 17 GRAM(S): 17 POWDER, FOR SOLUTION ORAL at 21:56

## 2017-07-23 RX ADMIN — PANTOPRAZOLE SODIUM 40 MILLIGRAM(S): 20 TABLET, DELAYED RELEASE ORAL at 05:35

## 2017-07-23 NOTE — PROGRESS NOTE ADULT - SUBJECTIVE AND OBJECTIVE BOX
Subjective: Patient seen and examined. No new events except as noted.     No new complaints since admission.    Does report that in past few weeks (at home), she has been feeling more tired and laying down on the couch to sleep in the daytime more than usual.    MEDICATIONS:  MEDICATIONS  (STANDING):  heparin  Injectable 5000 Unit(s) SubCutaneous every 12 hours  losartan 50 milliGRAM(s) Oral daily  simvastatin 40 milliGRAM(s) Oral at bedtime  polyethylene glycol 3350 17 Gram(s) Oral daily  pantoprazole    Tablet 40 milliGRAM(s) Oral before breakfast  levothyroxine 100 MICROGram(s) Oral daily      PHYSICAL EXAM:  T(F): 98.1 (07-23-17 @ 11:40), Max: 99.1 (07-23-17 @ 03:44)  HR: 61 (07-23-17 @ 11:40) (51 - 80)  BP: 125/68 (07-23-17 @ 11:40) (116/69 - 176/79)  RR: 18 (07-23-17 @ 11:40) (16 - 20)  SpO2: 97% (07-23-17 @ 11:40) (96% - 99%)  I&O's Summary    22 Jul 2017 07:01  -  23 Jul 2017 07:00  --------------------------------------------------------  IN: 240 mL / OUT: 0 mL / NET: 240 mL      Height (cm): 149.86 (07-22 @ 18:56)  Weight (kg): 63.1 (07-22 @ 18:56)  BMI (kg/m2): 28.1 (07-22 @ 18:56)  BSA (m2): 1.58 (07-22 @ 18:56)    TELEMETRY: 	 SB 50-60, up to 80s, no ectopy       Appearance: Elderly female in NAD	  HEENT:   Normal oral mucosa, PERRL, EOMI. No evidence of tongue-biting	  Lymphatic: No lymphadenopathy  Cardiovascular: Normal S1 S2, No JVD, No murmurs, No edema  Respiratory: Lungs clear to auscultation	  Psychiatry: A & O x 3, Mood & affect appropriate  Gastrointestinal:  Soft, Non-tender, + BS	  Skin: dressings C/D/I  Neurologic: Non-focal, grossly  Extremities: Normal range of motion, No clubbing, cyanosis or edema  Vascular: Peripheral pulses palpable 2+ bilaterally      LABS:    CARDIAC MARKERS:  CARDIAC MARKERS ( 23 Jul 2017 04:22 )  x     / <0.01 ng/mL / 145 U/L / x     / 5.0 ng/mL  CARDIAC MARKERS ( 23 Jul 2017 02:56 )  x     / <0.01 ng/mL / x     / x     / x      CARDIAC MARKERS ( 22 Jul 2017 14:09 )  x     / <0.01 ng/mL / 82 U/L / x     / x                                    11.5   7.8   )-----------( 294      ( 23 Jul 2017 05:54 )             34.4     07-23    138  |  105  |  18  ----------------------------<  101<H>  4.9   |  22  |  0.97    Ca    8.6      23 Jul 2017 05:54    TPro  6.3  /  Alb  3.6  /  TBili  0.9  /  DBili  x   /  AST  43<H>  /  ALT  27  /  AlkPhos  34<L>  07-23    proBNP:   Lipid Profile:   HgA1c:   TSH: Thyroid Stimulating Hormone, Serum: 2.59 uIU/mL (07-23 @ 09:11)  Thyroid Stimulating Hormone, Serum: 2.38 uIU/mL (07-23 @ 08:22)    Sedimentation Rate, Erythrocyte (07.23.17 @ 08:22)    Sedimentation Rate, Erythrocyte: 12 mm/hr    Vitamin B12, Serum: 1112 pg/mL (07.23.17 @ 09:11)      Echo: Pending
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
## 2017-07-23 NOTE — PROGRESS NOTE ADULT - PROBLEM SELECTOR PLAN 1
Await TTE to r/o structural heart disease  Monitor on tele  Cardiac enzymes negative x3  TSH, B12, and ESR all normal  Neuro consult noted, await MRI/A and EEG  Carotid Dopplers ordered

## 2017-07-23 NOTE — CONSULT NOTE ADULT - PROBLEM SELECTOR RECOMMENDATION 9
1) Continue medical/cardiac work up  2) MRI of Brain and MRA of H and N to assess for Mass or vascular lesion  3) EEG Monitoring  4)
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
## 2017-07-24 LAB — OB PNL STL: NEGATIVE — SIGNIFICANT CHANGE UP

## 2017-07-24 PROCEDURE — 93880 EXTRACRANIAL BILAT STUDY: CPT | Mod: 26

## 2017-07-24 PROCEDURE — 99223 1ST HOSP IP/OBS HIGH 75: CPT

## 2017-07-24 PROCEDURE — 70551 MRI BRAIN STEM W/O DYE: CPT | Mod: 26

## 2017-07-24 RX ORDER — ZOLPIDEM TARTRATE 10 MG/1
5 TABLET ORAL AT BEDTIME
Qty: 0 | Refills: 0 | Status: DISCONTINUED | OUTPATIENT
Start: 2017-07-24 | End: 2017-07-28

## 2017-07-24 RX ORDER — ZOLPIDEM TARTRATE 10 MG/1
5 TABLET ORAL ONCE
Qty: 0 | Refills: 0 | Status: DISCONTINUED | OUTPATIENT
Start: 2017-07-24 | End: 2017-07-24

## 2017-07-24 RX ADMIN — ZOLPIDEM TARTRATE 5 MILLIGRAM(S): 10 TABLET ORAL at 02:10

## 2017-07-24 RX ADMIN — POLYETHYLENE GLYCOL 3350 17 GRAM(S): 17 POWDER, FOR SOLUTION ORAL at 21:21

## 2017-07-24 RX ADMIN — Medication 100 MICROGRAM(S): at 08:52

## 2017-07-24 RX ADMIN — PANTOPRAZOLE SODIUM 40 MILLIGRAM(S): 20 TABLET, DELAYED RELEASE ORAL at 08:52

## 2017-07-24 RX ADMIN — Medication 650 MILLIGRAM(S): at 01:46

## 2017-07-24 RX ADMIN — HEPARIN SODIUM 5000 UNIT(S): 5000 INJECTION INTRAVENOUS; SUBCUTANEOUS at 17:06

## 2017-07-24 RX ADMIN — SIMVASTATIN 40 MILLIGRAM(S): 20 TABLET, FILM COATED ORAL at 21:21

## 2017-07-24 RX ADMIN — HEPARIN SODIUM 5000 UNIT(S): 5000 INJECTION INTRAVENOUS; SUBCUTANEOUS at 08:52

## 2017-07-24 RX ADMIN — Medication 650 MILLIGRAM(S): at 01:16

## 2017-07-24 RX ADMIN — ZOLPIDEM TARTRATE 5 MILLIGRAM(S): 10 TABLET ORAL at 21:21

## 2017-07-24 RX ADMIN — LOSARTAN POTASSIUM 50 MILLIGRAM(S): 100 TABLET, FILM COATED ORAL at 08:52

## 2017-07-24 RX ADMIN — ZOLPIDEM TARTRATE 5 MILLIGRAM(S): 10 TABLET ORAL at 22:40
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
## 2017-07-24 NOTE — PHYSICAL THERAPY INITIAL EVALUATION ADULT - CRITERIA FOR SKILLED THERAPEUTIC INTERVENTIONS
home w/ home PT for progressive ambulation training, transfers, bed mobs, and safety assessment and rolling walker aide services/impairments found/anticipated discharge recommendation

## 2017-07-24 NOTE — PROGRESS NOTE ADULT - SUBJECTIVE AND OBJECTIVE BOX
Patient seen and examined  Chart Reviewed  Patient complains of right sided headache overnight. No new episodes of syncope or dizziness    Vital Signs Last 24 Hrs  T(C): 36.6 (24 Jul 2017 04:35), Max: 37.1 (23 Jul 2017 20:44)  T(F): 97.9 (24 Jul 2017 04:35), Max: 98.7 (23 Jul 2017 20:44)  HR: 60 (24 Jul 2017 04:35) (60 - 65)  BP: 129/71 (24 Jul 2017 04:35) (125/68 - 152/71)  BP(mean): --  RR: 18 (24 Jul 2017 04:35) (18 - 18)  SpO2: 96% (24 Jul 2017 04:35) (95% - 97%)      Physical exam unchanged

## 2017-07-24 NOTE — PROGRESS NOTE ADULT - SUBJECTIVE AND OBJECTIVE BOX
Patient is a 89y old  Female who presents with a chief complaint of LOC (22 Jul 2017 18:06)      SUBJECTIVE / OVERNIGHT EVENTS: c/o cough/ irritative symptoms in pharynx area  Review of Systems  chest pain no  palpitations no  sob no  nausea no  headache no    MEDICATIONS  (STANDING):  heparin  Injectable 5000 Unit(s) SubCutaneous every 12 hours  losartan 50 milliGRAM(s) Oral daily  simvastatin 40 milliGRAM(s) Oral at bedtime  polyethylene glycol 3350 17 Gram(s) Oral daily  pantoprazole    Tablet 40 milliGRAM(s) Oral before breakfast  levothyroxine 100 MICROGram(s) Oral daily    MEDICATIONS  (PRN):  oxyCODONE    5 mG/acetaminophen 325 mG 1 Tablet(s) Oral every 4 hours PRN Severe Pain (7 - 10)  acetaminophen   Tablet. 650 milliGRAM(s) Oral every 6 hours PRN Mild Pain (1 - 3)          PHYSICAL EXAM:  GENERAL: NAD, well-developed  HEAD:  Atraumatic, Normocephalic  EYES: EOMI, PERRLA, conjunctiva and sclera clear  NECK: Supple, No JVD  CHEST/LUNG: Clear to auscultation bilaterally; No wheeze  HEART: Regular rate and rhythm; No murmurs, rubs, or gallops  ABDOMEN: Soft, Nontender, Nondistended; Bowel sounds present  EXTREMITIES:  2+ Peripheral Pulses, No clubbing, cyanosis, or edema  PSYCH: AAOx3  NEUROLOGY: non-focal  SKIN: No rashes or lesions    LABS:                        11.5   7.8   )-----------( 294      ( 23 Jul 2017 05:54 )             34.4     07-23    138  |  105  |  18  ----------------------------<  101<H>  4.9   |  22  |  0.97    Ca    8.6      23 Jul 2017 05:54    TPro  6.3  /  Alb  3.6  /  TBili  0.9  /  DBili  x   /  AST  43<H>  /  ALT  27  /  AlkPhos  34<L>  07-23      CARDIAC MARKERS ( 23 Jul 2017 04:22 )  x     / <0.01 ng/mL / 145 U/L / x     / 5.0 ng/mL  CARDIAC MARKERS ( 23 Jul 2017 02:56 )  x     / <0.01 ng/mL / x     / x     / x          Telemetry No arythmia    RADIOLOGY & ADDITIONAL TESTS:    Imaging Personally Reviewed:    Consultant(s) Notes Reviewed:      Care Discussed with Consultants/Other Providers:

## 2017-07-24 NOTE — PROGRESS NOTE ADULT - SUBJECTIVE AND OBJECTIVE BOX
Subjective: Patient seen and examined. No new events except as noted.     No new complaints.  States she is feeling better overall.    MEDICATIONS:  MEDICATIONS  (STANDING):  heparin  Injectable 5000 Unit(s) SubCutaneous every 12 hours  losartan 50 milliGRAM(s) Oral daily  simvastatin 40 milliGRAM(s) Oral at bedtime  polyethylene glycol 3350 17 Gram(s) Oral daily  pantoprazole    Tablet 40 milliGRAM(s) Oral before breakfast  levothyroxine 100 MICROGram(s) Oral daily      PHYSICAL EXAM:  T(F): 97.9 (07-24-17 @ 04:35), Max: 98.7 (07-23-17 @ 20:44)  HR: 60 (07-24-17 @ 04:35) (60 - 65)  BP: 129/71 (07-24-17 @ 04:35) (125/68 - 152/71)  RR: 18 (07-24-17 @ 04:35) (18 - 18)  SpO2: 96% (07-24-17 @ 04:35) (95% - 97%)  I&O's Summary    23 Jul 2017 07:01  -  24 Jul 2017 07:00  --------------------------------------------------------  IN: 1080 mL / OUT: 0 mL / NET: 1080 mL    TELEMETRY: SB/SR 50-80, no ectopy    Appearance: Elderly female in NAD	  HEENT:   Normal oral mucosa, PERRL, EOMI. No evidence of tongue-biting	  Lymphatic: No lymphadenopathy  Cardiovascular: Normal S1 S2, No JVD, No murmurs, No edema  Respiratory: Lungs clear to auscultation	  Psychiatry: A & O x 3, Mood & affect appropriate  Gastrointestinal:  Soft, Non-tender, + BS	  Skin: dressings C/D/I  Neurologic: Non-focal, grossly  Extremities: Normal range of motion, No clubbing, cyanosis or edema  Vascular: Peripheral pulses palpable 2+ bilaterally    LABS:    CARDIAC MARKERS:  CARDIAC MARKERS ( 23 Jul 2017 04:22 )  x     / <0.01 ng/mL / 145 U/L / x     / 5.0 ng/mL  CARDIAC MARKERS ( 23 Jul 2017 02:56 )  x     / <0.01 ng/mL / x     / x     / x      CARDIAC MARKERS ( 22 Jul 2017 14:09 )  x     / <0.01 ng/mL / 82 U/L / x     / x                                    11.5   7.8   )-----------( 294      ( 23 Jul 2017 05:54 )             34.4     07-23    138  |  105  |  18  ----------------------------<  101<H>  4.9   |  22  |  0.97    Ca    8.6      23 Jul 2017 05:54    TPro  6.3  /  Alb  3.6  /  TBili  0.9  /  DBili  x   /  AST  43<H>  /  ALT  27  /  AlkPhos  34<L>  07-23    	    ECG:  	    RADIOLOGY:     DIAGNOSTIC TESTING: All pending  Echocardiogram:   Carotid Doppler:  MRI/A:  EEG:
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
## 2017-07-24 NOTE — PROGRESS NOTE ADULT - PROBLEM SELECTOR PLAN 1
Await TTE to r/o structural heart disease  Monitor on tele  Cardiac enzymes negative x3  TSH, B12, and ESR all normal  Neuro consult noted, await MRI/A and EEG  Carotid Dopplers ordered.  Will have EPS consult to comment on work-up for symptomatic bradycardia, provided pending test results are negative.

## 2017-07-24 NOTE — PHYSICAL THERAPY INITIAL EVALUATION ADULT - GENERAL OBSERVATIONS, REHAB EVAL
Rec'd in Rec'd in sitting on edge of bed, + Iv lock, R shin and L thigh/ shin dressings w/ ecchymsis B/L LEs, NAD, agreeable to PT

## 2017-07-25 ENCOUNTER — TRANSCRIPTION ENCOUNTER (OUTPATIENT)
Age: 82
End: 2017-07-25

## 2017-07-25 DIAGNOSIS — J30.9 ALLERGIC RHINITIS, UNSPECIFIED: ICD-10-CM

## 2017-07-25 LAB
HCT VFR BLD CALC: 30.2 % — LOW (ref 34.5–45)
HGB BLD-MCNC: 10.1 G/DL — LOW (ref 11.5–15.5)
MCHC RBC-ENTMCNC: 32 PG — SIGNIFICANT CHANGE UP (ref 27–34)
MCHC RBC-ENTMCNC: 33.4 GM/DL — SIGNIFICANT CHANGE UP (ref 32–36)
MCV RBC AUTO: 95.6 FL — SIGNIFICANT CHANGE UP (ref 80–100)
PLATELET # BLD AUTO: 275 K/UL — SIGNIFICANT CHANGE UP (ref 150–400)
RBC # BLD: 3.16 M/UL — LOW (ref 3.8–5.2)
RBC # FLD: 14.3 % — SIGNIFICANT CHANGE UP (ref 10.3–14.5)
WBC # BLD: 6.54 K/UL — SIGNIFICANT CHANGE UP (ref 3.8–10.5)
WBC # FLD AUTO: 6.54 K/UL — SIGNIFICANT CHANGE UP (ref 3.8–10.5)

## 2017-07-25 PROCEDURE — 93306 TTE W/DOPPLER COMPLETE: CPT | Mod: 26

## 2017-07-25 RX ADMIN — HEPARIN SODIUM 5000 UNIT(S): 5000 INJECTION INTRAVENOUS; SUBCUTANEOUS at 17:39

## 2017-07-25 RX ADMIN — HEPARIN SODIUM 5000 UNIT(S): 5000 INJECTION INTRAVENOUS; SUBCUTANEOUS at 05:24

## 2017-07-25 RX ADMIN — SIMVASTATIN 40 MILLIGRAM(S): 20 TABLET, FILM COATED ORAL at 22:29

## 2017-07-25 RX ADMIN — POLYETHYLENE GLYCOL 3350 17 GRAM(S): 17 POWDER, FOR SOLUTION ORAL at 12:10

## 2017-07-25 RX ADMIN — LOSARTAN POTASSIUM 50 MILLIGRAM(S): 100 TABLET, FILM COATED ORAL at 05:24

## 2017-07-25 RX ADMIN — ZOLPIDEM TARTRATE 5 MILLIGRAM(S): 10 TABLET ORAL at 22:54

## 2017-07-25 RX ADMIN — Medication 100 MICROGRAM(S): at 05:24

## 2017-07-25 RX ADMIN — PANTOPRAZOLE SODIUM 40 MILLIGRAM(S): 20 TABLET, DELAYED RELEASE ORAL at 06:09

## 2017-07-25 NOTE — DISCHARGE NOTE ADULT - PATIENT PORTAL LINK FT
“You can access the [employer] Patient Portal, offered by [facility], by registering with the following website: [email or web address]”

## 2017-07-25 NOTE — PROGRESS NOTE ADULT - ASSESSMENT
89 f s/p syncopal episode  Telemetry  EPS evaluation  cardiology follow Dr. Mancera  Neurology follow Dr. Stearns group  ENT evaluation noted- Rx for allergic rhinitis with Flonase  EEG pending  PT  Art Carbajal MD pager [phone]

## 2017-07-25 NOTE — PROGRESS NOTE ADULT - SUBJECTIVE AND OBJECTIVE BOX
Patient is a 89y old  Female who presents with a chief complaint of LOC (22 Jul 2017 18:06)      NEUROLOGIC EXAM  Mental Status:     Oriented to time/place/person; Good eye contact ; follow simple commands ;  Age appropriate language  and fund of  knowledge.  speech fluent  Cranial Nerves:   PERRL, EOMI, no facial asymmetry , V1-V3 intact ,   tongue midline.   Muscle Strength:	5/5 in all extremities  Muscle Tone:	Normal tone  Deep Tendon Reflexes:         2+/4  : Biceps, Brachioradialis, Triceps Bilateral;  2+/4 : Pattelar, Ankle bilateral. No clonus.  Plantar Response:	Plantar reflexes flexion bilaterally  Sensation:		Intact to pain, light touch, temperature and vibration throughout.  Coordination/	No dysmetria in finger to nose test bilaterally  Cerebellum	  Tandem Gait/Romberg	deferred      Vital Signs Last 24 Hrs  T(C): 36.6 (25 Jul 2017 11:12), Max: 37.1 (24 Jul 2017 21:00)  T(F): 97.8 (25 Jul 2017 11:12), Max: 98.7 (24 Jul 2017 21:00)  HR: 52 (25 Jul 2017 11:12) (52 - 86)  BP: 115/73 (25 Jul 2017 11:12) (109/65 - 151/78)  BP(mean): --  RR: 18 (25 Jul 2017 11:12) (18 - 18)  SpO2: 98% (25 Jul 2017 11:12) (95% - 98%)  MEDICATIONS  (STANDING):  heparin  Injectable 5000 Unit(s) SubCutaneous every 12 hours  losartan 50 milliGRAM(s) Oral daily  simvastatin 40 milliGRAM(s) Oral at bedtime  polyethylene < from: MRI Head w/o Cont (07.24.17 @ 19:28) >  Impression: No evidence of acute hemorrhage, mass, mass effect or acute   territorial infarct seen.    < end of copied text >  glycol 3350 17 Gram(s) Oral daily  pantoprazole    Tablet 40 milliGRAM(s) Oral before breakfast  levothyroxine 100 MICROGram(s) Oral daily    MEDICATIONS  (PRN):  oxyCODONE    5 mG/acetaminophen 325 mG 1 Tablet(s) Oral every 4 hours PRN Severe Pain (7 - 10)  acetaminophen   Tablet. 650 milliGRAM(s) Oral every 6 hours PRN Mild Pain (1 - 3)  zolpidem 5 milliGRAM(s) Oral at bedtime PRN Insomnia  zolpidem 5 milliGRAM(s) Oral at bedtime PRN Insomnia
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
## 2017-07-25 NOTE — DISCHARGE NOTE ADULT - MEDICATION SUMMARY - MEDICATIONS TO TAKE
I will START or STAY ON the medications listed below when I get home from the hospital:    Vitamin B  -- 1 tab(s) by mouth once a day  -- Indication: For Supplement    Vitamin C  -- 1 tab(s) by mouth once a day  -- Indication: For Supplement    vitamin D  -- 1 tab(s) by mouth once a day  -- Indication: For Supplement    acetaminophen 325 mg oral tablet  -- 2 tab(s) by mouth every 6 hours, As needed, Mild Pain (1 - 3)  -- Indication: For pain    Percocet 5/325 oral tablet  -- 1 tab(s) by mouth , As Needed  -- Indication: For pain    losartan 50 mg oral tablet  -- 1 tab(s) by mouth once a day  -- Indication: For Hypertension    simvastatin 40 mg oral tablet  -- 1 tab(s) by mouth once a day (at bedtime)  -- Indication: For Hld    zolpidem 5 mg oral tablet  -- 1 tab(s) by mouth once a day (at bedtime), As needed, Insomnia  -- Indication: For insomnia    vitamin A & D topical ointment  -- 1 application on skin 2 times a day  -- Indication: For Skin    MiraLax oral powder for reconstitution  -- 17 gram(s) by mouth once a day  -- Indication: For gi    fluticasone 50 mcg/inh nasal spray  -- 3 spray(s) in each nostril once daily, as needed.   -- Indication: For Allergic rhinitis    PriLOSEC OTC 20 mg oral delayed release tablet  -- 1 tab(s) by mouth once a day  -- Indication: For gi    levothyroxine 100 mcg (0.1 mg) oral capsule  -- 1 cap(s) by mouth once a day  -- Indication: For Hypothyroid    multivitamin  -- 1 tab(s) by mouth once a day  -- Indication: For Supplement

## 2017-07-25 NOTE — PROGRESS NOTE ADULT - SUBJECTIVE AND OBJECTIVE BOX
Subjective: Patient seen and examined. No new events except as noted.     No new complaints    MEDICATIONS:  MEDICATIONS  (STANDING):  heparin  Injectable 5000 Unit(s) SubCutaneous every 12 hours  losartan 50 milliGRAM(s) Oral daily  simvastatin 40 milliGRAM(s) Oral at bedtime  polyethylene glycol 3350 17 Gram(s) Oral daily  pantoprazole    Tablet 40 milliGRAM(s) Oral before breakfast  levothyroxine 100 MICROGram(s) Oral daily      PHYSICAL EXAM:  T(F): 97.8 (07-25-17 @ 11:12), Max: 98.7 (07-24-17 @ 21:00)  HR: 52 (07-25-17 @ 11:12) (52 - 86)  BP: 115/73 (07-25-17 @ 11:12) (115/73 - 151/78)  RR: 18 (07-25-17 @ 11:12) (18 - 18)  SpO2: 98% (07-25-17 @ 11:12) (95% - 98%)  I&O's Summary    24 Jul 2017 07:01  -  25 Jul 2017 07:00  --------------------------------------------------------  IN: 480 mL / OUT: 0 mL / NET: 480 mL    25 Jul 2017 07:01  -  25 Jul 2017 14:16  --------------------------------------------------------  IN: 480 mL / OUT: 0 mL / NET: 480 mL    TELEMETRY: SB/SR , no ectopy    Appearance: Elderly female in NAD	  HEENT:   Normal oral mucosa, PERRL, EOMI. No evidence of tongue-biting	  Lymphatic: No lymphadenopathy  Cardiovascular: Normal S1 S2, No JVD, No murmurs, No edema  Respiratory: Lungs clear to auscultation	  Psychiatry: A & O x 3, Mood & affect appropriate  Gastrointestinal:  Soft, Non-tender, + BS	  Skin: dressings C/D/I  Neurologic: Non-focal, grossly  Extremities: Normal range of motion, No clubbing, cyanosis or edema  Vascular: Peripheral pulses palpable 2+ bilaterally    LABS:    CARDIAC MARKERS:  CARDIAC MARKERS ( 23 Jul 2017 04:22 )  x     / <0.01 ng/mL / 145 U/L / x     / 5.0 ng/mL  CARDIAC MARKERS ( 23 Jul 2017 02:56 )  x     / <0.01 ng/mL / x     / x     / x                                10.1   6.54  )-----------( 275      ( 25 Jul 2017 07:44 )             30.2     	  RADIOLOGY:    MRI Head w/o Cont (07.24.17 @ 19:28) >  Impression: No evidence of acute hemorrhage, mass, mass effect or acute   territorial infarct seen.      VA Duplex Carotid, Bilat (07.24.17 @ 15:27) >  IMPRESSION: No hemodynamically significant carotid artery stenoses.        DIAGNOSTIC TESTING:  Echocardiogram: Pending    EEG: Pending

## 2017-07-25 NOTE — DISCHARGE NOTE ADULT - PLAN OF CARE
Resolution of syncopal symptoms - Loop Recorder was placed. HOME CARE INSTRUCTIONS  Have someone stay with you until you feel stable.  Do not drive, operate machinery, or play sports until your caregiver says it is okay.  Keep all follow-up appointments as directed by your caregiver.   Lie down right away if you start feeling like you might faint. Breathe deeply and steadily. Wait until all the symptoms have passed.Drink enough fluids to keep your urine clear or pale yellow.  If you are taking blood pressure or heart medicine, get up slowly, taking several minutes to sit and then stand. This can reduce dizziness.  SEEK IMMEDIATE MEDICAL CARE IF:  You have a severe headache.  You have unusual pain in the chest, abdomen, or back.  You are bleeding from the mouth or rectum, or you have black or tarry stool.  You have an irregular or very fast heartbeat.  You have pain with breathing.  You have repeated fainting or seizure-like jerking during an episode.  You faint when sitting or lying down.  You have confusion.  You have difficulty walking.  You have severe weakness.  You have vision problems.  If you fainted, call your local emergency services (_____________________). Do not drive yourself to the hospital Take your medication as prescribed.  Follow up with your medical doctor for routine blood pressure monitoring, and to establish long term blood pressure treatment goals.  Low salt diet  Activity as tolerated.  Take all medication as prescribed.  Notify your doctor if you have any of the following symptoms:   Dizziness, Lightheadedness, Blurry vision, Headache, Chest pain, Shortness of breath Take your medication as prescribed.   Follow up with your medical doctor for routine blood  work monitoring, and to establish long term treatment goals. Follow up with your Primary Care MD within 1 week after your discharge from hospital.   Call to schedule appointment.

## 2017-07-25 NOTE — DISCHARGE NOTE ADULT - ADDITIONAL INSTRUCTIONS
Follow up with Dr. Mancera and Dr. Perales and your Primary Care MD within 1 week after your discharge from hospital. Follow up with Dr. Mancera and keep your loop recorder as directed by EP   F/U with Dr. Perales and your Primary Care MD within 1 week after your discharge from hospital.

## 2017-07-25 NOTE — DISCHARGE NOTE ADULT - CARE PROVIDER_API CALL
Víctor Mancera), Cardiology  [address]  [place] [address]  Phone: [phone]  Fax: [phone]    Howard Perales), Neurology  [address] [place] [address]  Phone: [phone]  Fax: [phone]

## 2017-07-25 NOTE — DISCHARGE NOTE ADULT - CARE PLAN
Principal Discharge DX:	Syncope, unspecified syncope type  Goal:	Resolution of syncopal symptoms - Loop Recorder was placed.  Instructions for follow-up, activity and diet:	HOME CARE INSTRUCTIONS  Have someone stay with you until you feel stable.  Do not drive, operate machinery, or play sports until your caregiver says it is okay.  Keep all follow-up appointments as directed by your caregiver.   Lie down right away if you start feeling like you might faint. Breathe deeply and steadily. Wait until all the symptoms have passed.Drink enough fluids to keep your urine clear or pale yellow.  If you are taking blood pressure or heart medicine, get up slowly, taking several minutes to sit and then stand. This can reduce dizziness.  SEEK IMMEDIATE MEDICAL CARE IF:  You have a severe headache.  You have unusual pain in the chest, abdomen, or back.  You are bleeding from the mouth or rectum, or you have black or tarry stool.  You have an irregular or very fast heartbeat.  You have pain with breathing.  You have repeated fainting or seizure-like jerking during an episode.  You faint when sitting or lying down.  You have confusion.  You have difficulty walking.  You have severe weakness.  You have vision problems.  If you fainted, call your local emergency services (_____________________). Do not drive yourself to the hospital  Secondary Diagnosis:	Hypertension  Instructions for follow-up, activity and diet:	Take your medication as prescribed.  Follow up with your medical doctor for routine blood pressure monitoring, and to establish long term blood pressure treatment goals.  Low salt diet  Activity as tolerated.  Take all medication as prescribed.  Notify your doctor if you have any of the following symptoms:   Dizziness, Lightheadedness, Blurry vision, Headache, Chest pain, Shortness of breath  Secondary Diagnosis:	Dyslipidemia  Instructions for follow-up, activity and diet:	Take your medication as prescribed.   Follow up with your medical doctor for routine blood  work monitoring, and to establish long term treatment goals.  Secondary Diagnosis:	Allergic rhinitis  Instructions for follow-up, activity and diet:	Take your medication as prescribed.   Follow up with your medical doctor for routine blood  work monitoring, and to establish long term treatment goals.  Secondary Diagnosis:	Mild dementia  Instructions for follow-up, activity and diet:	Follow up with your Primary Care MD within 1 week after your discharge from hospital.   Call to schedule appointment.
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
## 2017-07-25 NOTE — DISCHARGE NOTE ADULT - MEDICATION SUMMARY - MEDICATIONS TO STOP TAKING
I will STOP taking the medications listed below when I get home from the hospital:    Benicar 20 mg oral tablet  -- 1 tab(s) by mouth once a day    metroNIDAZOLE 250 mg oral tablet  -- 1 tab(s) by mouth every 6 hours for 10 days.    Cipro 500 mg oral tablet  -- 1 tab(s) by mouth every 12 hours for 10 days.
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
## 2017-07-25 NOTE — PROGRESS NOTE ADULT - PROBLEM SELECTOR PLAN 1
Await TTE to r/o structural heart disease  Continue to monitor on tele, might consider loop recorder  Cardiac enzymes negative x3  TSH, B12, and ESR all normal  Neuro consult noted, await  EEG, MRI and carotids negative  Noted EPS consult - favor EP study over ILR. If conduction system is diseased, PPM is indicated.
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
## 2017-07-26 LAB
HCT VFR BLD CALC: 31.9 % — LOW (ref 34.5–45)
HGB BLD-MCNC: 10.4 G/DL — LOW (ref 11.5–15.5)
MCHC RBC-ENTMCNC: 31.7 PG — SIGNIFICANT CHANGE UP (ref 27–34)
MCHC RBC-ENTMCNC: 32.6 GM/DL — SIGNIFICANT CHANGE UP (ref 32–36)
MCV RBC AUTO: 97.3 FL — SIGNIFICANT CHANGE UP (ref 80–100)
PLATELET # BLD AUTO: 304 K/UL — SIGNIFICANT CHANGE UP (ref 150–400)
RBC # BLD: 3.28 M/UL — LOW (ref 3.8–5.2)
RBC # FLD: 14.5 % — SIGNIFICANT CHANGE UP (ref 10.3–14.5)
WBC # BLD: 6.53 K/UL — SIGNIFICANT CHANGE UP (ref 3.8–10.5)
WBC # FLD AUTO: 6.53 K/UL — SIGNIFICANT CHANGE UP (ref 3.8–10.5)

## 2017-07-26 PROCEDURE — 99233 SBSQ HOSP IP/OBS HIGH 50: CPT

## 2017-07-26 PROCEDURE — 95957 EEG DIGITAL ANALYSIS: CPT | Mod: 26

## 2017-07-26 PROCEDURE — 95819 EEG AWAKE AND ASLEEP: CPT | Mod: 26

## 2017-07-26 RX ADMIN — OXYCODONE AND ACETAMINOPHEN 1 TABLET(S): 5; 325 TABLET ORAL at 20:50

## 2017-07-26 RX ADMIN — SIMVASTATIN 40 MILLIGRAM(S): 20 TABLET, FILM COATED ORAL at 21:56

## 2017-07-26 RX ADMIN — ZOLPIDEM TARTRATE 5 MILLIGRAM(S): 10 TABLET ORAL at 22:51

## 2017-07-26 RX ADMIN — HEPARIN SODIUM 5000 UNIT(S): 5000 INJECTION INTRAVENOUS; SUBCUTANEOUS at 18:10

## 2017-07-26 RX ADMIN — LOSARTAN POTASSIUM 50 MILLIGRAM(S): 100 TABLET, FILM COATED ORAL at 06:17

## 2017-07-26 RX ADMIN — Medication 1 APPLICATION(S): at 21:56

## 2017-07-26 RX ADMIN — PANTOPRAZOLE SODIUM 40 MILLIGRAM(S): 20 TABLET, DELAYED RELEASE ORAL at 06:17

## 2017-07-26 RX ADMIN — OXYCODONE AND ACETAMINOPHEN 1 TABLET(S): 5; 325 TABLET ORAL at 20:20

## 2017-07-26 RX ADMIN — Medication 100 MICROGRAM(S): at 06:17

## 2017-07-26 RX ADMIN — HEPARIN SODIUM 5000 UNIT(S): 5000 INJECTION INTRAVENOUS; SUBCUTANEOUS at 06:17

## 2017-07-26 NOTE — PROGRESS NOTE ADULT - ASSESSMENT
89 f s/p syncopal episode  Telemetry  EPS evaluation noted. For EP study.  cardiology follow Dr. Mancera  Neurology follow Dr. Stearns group  ENT evaluation noted- Rx for allergic rhinitis with Flonase  EEG pending  PT  Art Carbajal MD pager [phone]

## 2017-07-26 NOTE — PROGRESS NOTE ADULT - SUBJECTIVE AND OBJECTIVE BOX
Subjective: Patient seen and examined. No new events except as noted.     REVIEW OF SYSTEMS:    CONSTITUTIONAL: No weakness, fevers or chills  EYES/ENT: No visual changes;  No vertigo or throat pain   NECK: No pain or stiffness  RESPIRATORY: No cough, wheezing, hemoptysis; No shortness of breath  CARDIOVASCULAR: No chest pain or palpitations  GASTROINTESTINAL: No abdominal or epigastric pain. No nausea, vomiting, or hematemesis; No diarrhea or constipation. No melena or hematochezia.  GENITOURINARY: No dysuria, frequency or hematuria  NEUROLOGICAL: No numbness or weakness  SKIN: No itching, burning, rashes, or lesions   All other review of systems is negative unless indicated above.    MEDICATIONS:  MEDICATIONS  (STANDING):  heparin  Injectable 5000 Unit(s) SubCutaneous every 12 hours  losartan 50 milliGRAM(s) Oral daily  simvastatin 40 milliGRAM(s) Oral at bedtime  polyethylene glycol 3350 17 Gram(s) Oral daily  pantoprazole    Tablet 40 milliGRAM(s) Oral before breakfast  levothyroxine 100 MICROGram(s) Oral daily      PHYSICAL EXAM:  T(F): 97.7 (07-26-17 @ 05:43), Max: 98.1 (07-25-17 @ 20:58)  HR: 65 (07-26-17 @ 05:43) (62 - 65)  BP: 135/77 (07-26-17 @ 05:43) (135/77 - 159/84)  RR: 18 (07-26-17 @ 05:43) (18 - 18)  SpO2: 96% (07-26-17 @ 05:43) (96% - 98%)  I&O's Summary    25 Jul 2017 07:01  -  26 Jul 2017 07:00  --------------------------------------------------------  IN: 720 mL / OUT: 0 mL / NET: 720 mL          Appearance: Normal	  HEENT:   Normal oral mucosa, PERRL, EOMI	  Lymphatic: No lymphadenopathy , no edema  Cardiovascular: Normal S1 S2, No JVD, No murmurs , Peripheral pulses palpable 2+ bilaterally  Respiratory: Lungs clear to auscultation, normal effort 	  Gastrointestinal:  Soft, Non-tender, + BS	  Skin: No rashes, No ecchymoses, No cyanosis, warm to touch  Musculoskeletal: Normal range of motion, normal strength  Psychiatry:  Mood & affect appropriate  Ext: No edema      LABS:    CARDIAC MARKERS:                                10.4   6.53  )-----------( 304      ( 26 Jul 2017 07:14 )             31.9           proBNP:   Lipid Profile:   HgA1c:   TSH:           TELEMETRY: 	    ECG:  	  RADIOLOGY:   DIAGNOSTIC TESTING:  Echocardiogram:  Catheterization:  Stress Test:    OTHER: Subjective: Patient seen and examined. No new events except as noted.     No new complaints  Denies dizzy/LH, CP, SOB, or palpitations.    MEDICATIONS:  MEDICATIONS  (STANDING):  heparin  Injectable 5000 Unit(s) SubCutaneous every 12 hours  losartan 50 milliGRAM(s) Oral daily  simvastatin 40 milliGRAM(s) Oral at bedtime  polyethylene glycol 3350 17 Gram(s) Oral daily  pantoprazole    Tablet 40 milliGRAM(s) Oral before breakfast  levothyroxine 100 MICROGram(s) Oral daily      PHYSICAL EXAM:  T(F): 97.7 (07-26-17 @ 05:43), Max: 98.1 (07-25-17 @ 20:58)  HR: 65 (07-26-17 @ 05:43) (62 - 65)  BP: 135/77 (07-26-17 @ 05:43) (135/77 - 159/84)  RR: 18 (07-26-17 @ 05:43) (18 - 18)  SpO2: 96% (07-26-17 @ 05:43) (96% - 98%)      TELEMETRY: SB/SR , no ectopy	    Appearance: Elderly female in NAD	  HEENT:   Normal oral mucosa, PERRL, EOMI. No evidence of tongue-biting	  Lymphatic: No lymphadenopathy  Cardiovascular: Normal S1 S2, No JVD, No murmurs, No edema  Respiratory: Lungs clear to auscultation	  Psychiatry: A & O x 3, Mood & affect appropriate  Gastrointestinal:  Soft, Non-tender, + BS	  Skin: dressings C/D/I  Neurologic: Non-focal, grossly  Extremities: Normal range of motion, No clubbing, cyanosis or edema  Vascular: Peripheral pulses palpable 2+ bilaterally      LABS:                      10.4   6.53  )-----------( 304      ( 26 Jul 2017 07:14 )             31.9     RADIOLOGY:     DIAGNOSTIC TESTING:  EEG: Pending (not performed yet)    Echocardiogram:   Transthoracic Echocardiogram (07.25.17 @ 18:14) >  Conclusions:  1. Calcified trileaflet aortic valve with decreased  opening. Peak transaortic valve gradient equals 18 mm Hg,  mean transaortic valve gradient equals 7 mm Hg, estimated  aortic valve area equals 1.6 sqcm (by continuity equation),  aortic valve velocity time integral equals 43 cm,  consistent with mild aortic stenosis.  2. Normal left ventricular internal dimensions and wall  thicknesses.  3. Normal left ventricular systolic function. No segmental  wall motion abnormalities.
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
## 2017-07-26 NOTE — PROGRESS NOTE ADULT - PROBLEM SELECTOR PLAN 1
Await TTE to r/o structural heart disease  Continue to monitor on tele, might consider loop recorder  Cardiac enzymes negative x3  TSH, B12, and ESR all normal  Neuro consult noted, await  EEG, MRI and carotids negative  Noted EPS consult - favor EP study over ILR. If conduction system is diseased, PPM is indicated. TTE structurally normal heart with only mild AS.  Continue to monitor on tele  Plan for EP/conduction study, pending EEG.   would wait on EEG results on small chance that there is seizure activity on the study.
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
## 2017-07-27 LAB
ANION GAP SERPL CALC-SCNC: 16 MMOL/L — SIGNIFICANT CHANGE UP (ref 5–17)
BUN SERPL-MCNC: 17 MG/DL — SIGNIFICANT CHANGE UP (ref 7–23)
CALCIUM SERPL-MCNC: 9 MG/DL — SIGNIFICANT CHANGE UP (ref 8.4–10.5)
CHLORIDE SERPL-SCNC: 102 MMOL/L — SIGNIFICANT CHANGE UP (ref 96–108)
CO2 SERPL-SCNC: 22 MMOL/L — SIGNIFICANT CHANGE UP (ref 22–31)
CREAT SERPL-MCNC: 1.02 MG/DL — SIGNIFICANT CHANGE UP (ref 0.5–1.3)
GLUCOSE SERPL-MCNC: 151 MG/DL — HIGH (ref 70–99)
HCT VFR BLD CALC: 33.2 % — LOW (ref 34.5–45)
HGB BLD-MCNC: 10.8 G/DL — LOW (ref 11.5–15.5)
MCHC RBC-ENTMCNC: 32 PG — SIGNIFICANT CHANGE UP (ref 27–34)
MCHC RBC-ENTMCNC: 32.5 GM/DL — SIGNIFICANT CHANGE UP (ref 32–36)
MCV RBC AUTO: 98.2 FL — SIGNIFICANT CHANGE UP (ref 80–100)
PLATELET # BLD AUTO: 318 K/UL — SIGNIFICANT CHANGE UP (ref 150–400)
POTASSIUM SERPL-MCNC: 4.2 MMOL/L — SIGNIFICANT CHANGE UP (ref 3.5–5.3)
POTASSIUM SERPL-SCNC: 4.2 MMOL/L — SIGNIFICANT CHANGE UP (ref 3.5–5.3)
RBC # BLD: 3.38 M/UL — LOW (ref 3.8–5.2)
RBC # FLD: 14.8 % — HIGH (ref 10.3–14.5)
SODIUM SERPL-SCNC: 140 MMOL/L — SIGNIFICANT CHANGE UP (ref 135–145)
WBC # BLD: 5.33 K/UL — SIGNIFICANT CHANGE UP (ref 3.8–10.5)
WBC # FLD AUTO: 5.33 K/UL — SIGNIFICANT CHANGE UP (ref 3.8–10.5)

## 2017-07-27 PROCEDURE — 93620 COMP EP EVL R AT VEN PAC&REC: CPT | Mod: 26

## 2017-07-27 PROCEDURE — 33282: CPT | Mod: 59

## 2017-07-27 RX ORDER — ACETAMINOPHEN 500 MG
1 TABLET ORAL
Qty: 0 | Refills: 0 | DISCHARGE
Start: 2017-07-27

## 2017-07-27 RX ORDER — ZOLPIDEM TARTRATE 10 MG/1
1 TABLET ORAL
Qty: 0 | Refills: 0 | COMMUNITY
Start: 2017-07-27

## 2017-07-27 RX ORDER — ZOLPIDEM TARTRATE 10 MG/1
1 TABLET ORAL
Qty: 0 | Refills: 0 | COMMUNITY

## 2017-07-27 RX ORDER — LOSARTAN POTASSIUM 100 MG/1
1 TABLET, FILM COATED ORAL
Qty: 0 | Refills: 0 | COMMUNITY
Start: 2017-07-27

## 2017-07-27 RX ORDER — ACETAMINOPHEN 500 MG
2 TABLET ORAL
Qty: 0 | Refills: 0 | COMMUNITY
Start: 2017-07-27

## 2017-07-27 RX ORDER — OLMESARTAN MEDOXOMIL 5 MG/1
1 TABLET, FILM COATED ORAL
Qty: 0 | Refills: 0 | COMMUNITY

## 2017-07-27 RX ADMIN — HEPARIN SODIUM 5000 UNIT(S): 5000 INJECTION INTRAVENOUS; SUBCUTANEOUS at 06:48

## 2017-07-27 RX ADMIN — POLYETHYLENE GLYCOL 3350 17 GRAM(S): 17 POWDER, FOR SOLUTION ORAL at 17:15

## 2017-07-27 RX ADMIN — LOSARTAN POTASSIUM 50 MILLIGRAM(S): 100 TABLET, FILM COATED ORAL at 06:52

## 2017-07-27 RX ADMIN — SIMVASTATIN 40 MILLIGRAM(S): 20 TABLET, FILM COATED ORAL at 22:27

## 2017-07-27 RX ADMIN — ZOLPIDEM TARTRATE 5 MILLIGRAM(S): 10 TABLET ORAL at 22:27

## 2017-07-27 RX ADMIN — HEPARIN SODIUM 5000 UNIT(S): 5000 INJECTION INTRAVENOUS; SUBCUTANEOUS at 17:15

## 2017-07-27 RX ADMIN — PANTOPRAZOLE SODIUM 40 MILLIGRAM(S): 20 TABLET, DELAYED RELEASE ORAL at 06:48

## 2017-07-27 RX ADMIN — Medication 1 APPLICATION(S): at 06:56

## 2017-07-27 RX ADMIN — Medication 100 MICROGRAM(S): at 06:48

## 2017-07-27 RX ADMIN — Medication 1 APPLICATION(S): at 17:16

## 2017-07-27 NOTE — PROGRESS NOTE ADULT - PROBLEM SELECTOR PLAN 1
TTE reveals structurally normal heart with only mild AS.  Bradycardia noted  EEG negative for siezure activity.  EP/conduction study negative, no indication for PPM  ILR implanted  No objection to discharge home with f/u in the office in 1-2 weeks.   EP clinic f/u for loop recorder monitoring monthly.

## 2017-07-27 NOTE — PROGRESS NOTE ADULT - SUBJECTIVE AND OBJECTIVE BOX
Patient is a 89y old  Female who presents with a chief complaint of LOC (25 Jul 2017 11:53)      SUBJECTIVE / OVERNIGHT EVENTS: Comfortable without new complaints. Forgetfull.  Review of Systems  chest pain no  palpitations no  sob no  nausea no  headache no    MEDICATIONS  (STANDING):  heparin  Injectable 5000 Unit(s) SubCutaneous every 12 hours  losartan 50 milliGRAM(s) Oral daily  simvastatin 40 milliGRAM(s) Oral at bedtime  polyethylene glycol 3350 17 Gram(s) Oral daily  pantoprazole    Tablet 40 milliGRAM(s) Oral before breakfast  levothyroxine 100 MICROGram(s) Oral daily  vitamin A &amp; D Ointment 1 Application(s) Topical two times a day    MEDICATIONS  (PRN):  oxyCODONE    5 mG/acetaminophen 325 mG 1 Tablet(s) Oral every 4 hours PRN Severe Pain (7 - 10)  acetaminophen   Tablet. 650 milliGRAM(s) Oral every 6 hours PRN Mild Pain (1 - 3)  zolpidem 5 milliGRAM(s) Oral at bedtime PRN Insomnia  zolpidem 5 milliGRAM(s) Oral at bedtime PRN Insomnia          PHYSICAL EXAM:  GENERAL: NAD, well-developed  HEAD:  Atraumatic, Normocephalic  EYES: EOMI, PERRLA, conjunctiva and sclera clear  NECK: Supple, No JVD  CHEST/LUNG: Clear to auscultation bilaterally; No wheeze  HEART: Regular rate and rhythm; No murmurs, rubs, or gallops  ABDOMEN: Soft, Nontender, Nondistended; Bowel sounds present  EXTREMITIES:  2+ Peripheral Pulses, No clubbing, cyanosis, or edema  PSYCH: AAOx3, forgetfull.  NEUROLOGY: non-focal  SKIN: No rashes or lesions    LABS:                        10.4   6.53  )-----------( 304      ( 26 Jul 2017 07:14 )             31.9           EEG Classification / Summary:  Normal Routine EEG Study in the awake and drowsy states.    Clinical Impression:  There were no epileptiform abnormalities recorded.              RADIOLOGY & ADDITIONAL TESTS:    Imaging Personally Reviewed:    Consultant(s) Notes Reviewed:      Care Discussed with Consultants/Other Providers:

## 2017-07-27 NOTE — PROGRESS NOTE ADULT - SUBJECTIVE AND OBJECTIVE BOX
Subjective: Patient seen and examined. No new events except as noted.     no new complaints    MEDICATIONS:  MEDICATIONS  (STANDING):  heparin  Injectable 5000 Unit(s) SubCutaneous every 12 hours  losartan 50 milliGRAM(s) Oral daily  simvastatin 40 milliGRAM(s) Oral at bedtime  polyethylene glycol 3350 17 Gram(s) Oral daily  pantoprazole    Tablet 40 milliGRAM(s) Oral before breakfast  levothyroxine 100 MICROGram(s) Oral daily  vitamin A &amp; D Ointment 1 Application(s) Topical two times a day      PHYSICAL EXAM:  T(F): 97.6 (07-27-17 @ 11:30), Max: 98.1 (07-26-17 @ 21:20)  HR: 50 (07-27-17 @ 12:45) (50 - 59)  BP: 158/62 (07-27-17 @ 12:45) (119/66 - 158/65)  RR: 16 (07-27-17 @ 12:45) (16 - 18)  SpO2: 98% (07-27-17 @ 12:45) (97% - 98%)  I&O's Summary    26 Jul 2017 07:01  -  27 Jul 2017 07:00  --------------------------------------------------------  IN: 420 mL / OUT: 0 mL / NET: 420 mL    27 Jul 2017 07:01  -  27 Jul 2017 14:15  --------------------------------------------------------  IN: 360 mL / OUT: 0 mL / NET: 360 mL    TELEMETRY: 	SB/SR 45 - 70, no ectopy     Appearance: Elderly female in NAD	  HEENT:   Normal oral mucosa, PERRL, EOMI. No evidence of tongue-biting	  Lymphatic: No lymphadenopathy  Cardiovascular: Normal S1 S2, No JVD, No murmurs, No edema  Respiratory: Lungs clear to auscultation	  Psychiatry: A & O x 3, Mood & affect appropriate  Gastrointestinal:  Soft, Non-tender, + BS	  Skin: dressings C/D/I  Neurologic: Non-focal, grossly  Extremities: Normal range of motion, No clubbing, cyanosis or edema  Vascular: Peripheral pulses palpable 2+ bilaterally    LABS:                          10.4   6.53  )-----------( 304      ( 26 Jul 2017 07:14 )             31.9       ECG:  	    DIAGNOSTIC TESTING:     OTHER: EP study, negative for conduction disease, loop recorder implanted

## 2017-07-28 ENCOUNTER — CHART COPY (OUTPATIENT)
Age: 82
End: 2017-07-28

## 2017-07-28 ENCOUNTER — APPOINTMENT (OUTPATIENT)
Dept: SURGERY | Facility: CLINIC | Age: 82
End: 2017-07-28

## 2017-07-28 VITALS
RESPIRATION RATE: 18 BRPM | TEMPERATURE: 98 F | OXYGEN SATURATION: 95 % | HEART RATE: 68 BPM | SYSTOLIC BLOOD PRESSURE: 108 MMHG | DIASTOLIC BLOOD PRESSURE: 66 MMHG

## 2017-07-28 LAB
ANION GAP SERPL CALC-SCNC: 12 MMOL/L — SIGNIFICANT CHANGE UP (ref 5–17)
BUN SERPL-MCNC: 18 MG/DL — SIGNIFICANT CHANGE UP (ref 7–23)
CALCIUM SERPL-MCNC: 9.4 MG/DL — SIGNIFICANT CHANGE UP (ref 8.4–10.5)
CHLORIDE SERPL-SCNC: 104 MMOL/L — SIGNIFICANT CHANGE UP (ref 96–108)
CO2 SERPL-SCNC: 24 MMOL/L — SIGNIFICANT CHANGE UP (ref 22–31)
CREAT SERPL-MCNC: 0.98 MG/DL — SIGNIFICANT CHANGE UP (ref 0.5–1.3)
GLUCOSE SERPL-MCNC: 85 MG/DL — SIGNIFICANT CHANGE UP (ref 70–99)
HCT VFR BLD CALC: 34.8 % — SIGNIFICANT CHANGE UP (ref 34.5–45)
HGB BLD-MCNC: 11 G/DL — LOW (ref 11.5–15.5)
MAGNESIUM SERPL-MCNC: 2.2 MG/DL — SIGNIFICANT CHANGE UP (ref 1.6–2.6)
MCHC RBC-ENTMCNC: 31.7 GM/DL — LOW (ref 32–36)
MCHC RBC-ENTMCNC: 32.5 PG — SIGNIFICANT CHANGE UP (ref 27–34)
MCV RBC AUTO: 102 FL — HIGH (ref 80–100)
PHOSPHATE SERPL-MCNC: 3.3 MG/DL — SIGNIFICANT CHANGE UP (ref 2.5–4.5)
PLATELET # BLD AUTO: 283 K/UL — SIGNIFICANT CHANGE UP (ref 150–400)
POTASSIUM SERPL-MCNC: 4.3 MMOL/L — SIGNIFICANT CHANGE UP (ref 3.5–5.3)
POTASSIUM SERPL-SCNC: 4.3 MMOL/L — SIGNIFICANT CHANGE UP (ref 3.5–5.3)
RBC # BLD: 3.4 M/UL — LOW (ref 3.8–5.2)
RBC # FLD: 12.3 % — SIGNIFICANT CHANGE UP (ref 10.3–14.5)
SODIUM SERPL-SCNC: 140 MMOL/L — SIGNIFICANT CHANGE UP (ref 135–145)
WBC # BLD: 6.1 K/UL — SIGNIFICANT CHANGE UP (ref 3.8–10.5)
WBC # FLD AUTO: 6.1 K/UL — SIGNIFICANT CHANGE UP (ref 3.8–10.5)

## 2017-07-28 PROCEDURE — 84484 ASSAY OF TROPONIN QUANT: CPT

## 2017-07-28 PROCEDURE — 97110 THERAPEUTIC EXERCISES: CPT

## 2017-07-28 PROCEDURE — 72170 X-RAY EXAM OF PELVIS: CPT

## 2017-07-28 PROCEDURE — 84100 ASSAY OF PHOSPHORUS: CPT

## 2017-07-28 PROCEDURE — 93306 TTE W/DOPPLER COMPLETE: CPT

## 2017-07-28 PROCEDURE — 70450 CT HEAD/BRAIN W/O DYE: CPT

## 2017-07-28 PROCEDURE — 90471 IMMUNIZATION ADMIN: CPT

## 2017-07-28 PROCEDURE — 84443 ASSAY THYROID STIM HORMONE: CPT

## 2017-07-28 PROCEDURE — 84436 ASSAY OF TOTAL THYROXINE: CPT

## 2017-07-28 PROCEDURE — C1894: CPT

## 2017-07-28 PROCEDURE — 82553 CREATINE MB FRACTION: CPT

## 2017-07-28 PROCEDURE — 82746 ASSAY OF FOLIC ACID SERUM: CPT

## 2017-07-28 PROCEDURE — 95819 EEG AWAKE AND ASLEEP: CPT

## 2017-07-28 PROCEDURE — 80053 COMPREHEN METABOLIC PANEL: CPT

## 2017-07-28 PROCEDURE — 84480 ASSAY TRIIODOTHYRONINE (T3): CPT

## 2017-07-28 PROCEDURE — C1730: CPT

## 2017-07-28 PROCEDURE — 97116 GAIT TRAINING THERAPY: CPT

## 2017-07-28 PROCEDURE — 93880 EXTRACRANIAL BILAT STUDY: CPT

## 2017-07-28 PROCEDURE — 82550 ASSAY OF CK (CPK): CPT

## 2017-07-28 PROCEDURE — 33282: CPT | Mod: 59

## 2017-07-28 PROCEDURE — 80048 BASIC METABOLIC PNL TOTAL CA: CPT

## 2017-07-28 PROCEDURE — 70551 MRI BRAIN STEM W/O DYE: CPT

## 2017-07-28 PROCEDURE — 90715 TDAP VACCINE 7 YRS/> IM: CPT

## 2017-07-28 PROCEDURE — 82607 VITAMIN B-12: CPT

## 2017-07-28 PROCEDURE — 85652 RBC SED RATE AUTOMATED: CPT

## 2017-07-28 PROCEDURE — 99285 EMERGENCY DEPT VISIT HI MDM: CPT | Mod: 25

## 2017-07-28 PROCEDURE — 71045 X-RAY EXAM CHEST 1 VIEW: CPT

## 2017-07-28 PROCEDURE — 97161 PT EVAL LOW COMPLEX 20 MIN: CPT

## 2017-07-28 PROCEDURE — 95957 EEG DIGITAL ANALYSIS: CPT

## 2017-07-28 PROCEDURE — 73590 X-RAY EXAM OF LOWER LEG: CPT

## 2017-07-28 PROCEDURE — C1764: CPT

## 2017-07-28 PROCEDURE — 93620 COMP EP EVL R AT VEN PAC&REC: CPT

## 2017-07-28 PROCEDURE — 93005 ELECTROCARDIOGRAM TRACING: CPT

## 2017-07-28 PROCEDURE — 81001 URINALYSIS AUTO W/SCOPE: CPT

## 2017-07-28 PROCEDURE — 83735 ASSAY OF MAGNESIUM: CPT

## 2017-07-28 PROCEDURE — 82272 OCCULT BLD FECES 1-3 TESTS: CPT

## 2017-07-28 PROCEDURE — 85027 COMPLETE CBC AUTOMATED: CPT

## 2017-07-28 PROCEDURE — 96374 THER/PROPH/DIAG INJ IV PUSH: CPT

## 2017-07-28 RX ORDER — LOSARTAN POTASSIUM 100 MG/1
1 TABLET, FILM COATED ORAL
Qty: 30 | Refills: 0 | OUTPATIENT
Start: 2017-07-28 | End: 2017-08-27

## 2017-07-28 RX ADMIN — ZOLPIDEM TARTRATE 5 MILLIGRAM(S): 10 TABLET ORAL at 00:27

## 2017-07-28 RX ADMIN — Medication 100 MICROGRAM(S): at 05:58

## 2017-07-28 RX ADMIN — Medication 1 APPLICATION(S): at 05:58

## 2017-07-28 RX ADMIN — PANTOPRAZOLE SODIUM 40 MILLIGRAM(S): 20 TABLET, DELAYED RELEASE ORAL at 06:01

## 2017-07-28 RX ADMIN — HEPARIN SODIUM 5000 UNIT(S): 5000 INJECTION INTRAVENOUS; SUBCUTANEOUS at 05:58

## 2017-07-28 RX ADMIN — LOSARTAN POTASSIUM 50 MILLIGRAM(S): 100 TABLET, FILM COATED ORAL at 05:58
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
## 2017-07-28 NOTE — PROGRESS NOTE ADULT - PROBLEM SELECTOR PLAN 1
TTE reveals structurally normal heart with only mild AS.  Bradycardia noted  EEG negative for siezure activity.  EP/conduction study negative, no indication for PPM  ILR implanted  No significant rash or ecchymosis. No signs of infection at implant site.  No objection to discharge home with f/u in the office in 1-2 weeks.   EP clinic f/u for loop recorder monitoring monthly.

## 2017-07-28 NOTE — PROGRESS NOTE ADULT - PROBLEM SELECTOR PROBLEM 1
Syncope, unspecified syncope type

## 2017-07-28 NOTE — CHART NOTE - NSCHARTNOTEFT_GEN_A_CORE
Request from Dr. Carbajal to facilitate patient discharge to home with24 hour aide today.  Medication reconciliation reviewed, revised, and resolved with Dr Carbajal who has medically cleared patient for discharge and follow ups as advised.  Patient/family states understanding of discharge instruction and follow ups.  Please refer to discharge note for detailed hospital course.

## 2017-07-28 NOTE — PROGRESS NOTE ADULT - SUBJECTIVE AND OBJECTIVE BOX
Patient is a 89y old  Female who presents with a chief complaint of Loss of Consciousness. (25 Jul 2017 11:53)      SUBJECTIVE / OVERNIGHT EVENTS: Comfortable without new complaints.   Review of Systems  chest pain no  palpitations no  sob no  nausea no  headache no    MEDICATIONS  (STANDING):  heparin  Injectable 5000 Unit(s) SubCutaneous every 12 hours  losartan 50 milliGRAM(s) Oral daily  simvastatin 40 milliGRAM(s) Oral at bedtime  polyethylene glycol 3350 17 Gram(s) Oral daily  pantoprazole    Tablet 40 milliGRAM(s) Oral before breakfast  levothyroxine 100 MICROGram(s) Oral daily  vitamin A &amp; D Ointment 1 Application(s) Topical two times a day    MEDICATIONS  (PRN):  oxyCODONE    5 mG/acetaminophen 325 mG 1 Tablet(s) Oral every 4 hours PRN Severe Pain (7 - 10)  acetaminophen   Tablet. 650 milliGRAM(s) Oral every 6 hours PRN Mild Pain (1 - 3)  zolpidem 5 milliGRAM(s) Oral at bedtime PRN Insomnia  zolpidem 5 milliGRAM(s) Oral at bedtime PRN Insomnia          PHYSICAL EXAM:  GENERAL: NAD, well-developed  HEAD:  Atraumatic, Normocephalic  EYES: EOMI, PERRLA, conjunctiva and sclera clear  NECK: Supple, No JVD  CHEST/LUNG: Clear to auscultation bilaterally; No wheeze Loop recorder area clean.  HEART: Regular rate and rhythm; No murmurs, rubs, or gallops  ABDOMEN: Soft, Nontender, Nondistended; Bowel sounds present  EXTREMITIES:  2+ Peripheral Pulses, No clubbing, cyanosis, or edema  PSYCH: AAOx3  NEUROLOGY: non-focal  SKIN: No rashes or lesions    LABS:                        11.0   6.1   )-----------( 283      ( 28 Jul 2017 07:02 )             34.8     07-28    140  |  104  |  18  ----------------------------<  85  4.3   |  24  |  0.98    Ca    9.4      28 Jul 2017 07:02  Phos  3.3     07-28  Mg     2.2     07-28                RADIOLOGY & ADDITIONAL TESTS:    Imaging Personally Reviewed:    Consultant(s) Notes Reviewed:      Care Discussed with Consultants/Other Providers:

## 2017-07-28 NOTE — PROGRESS NOTE ADULT - ASSESSMENT
89 f s/p syncopal episode  Telemetry  EPS evaluation noted. For EP study negative. Patient with loop recorder placed.  cardiology follow Dr. Mancera  Neurology follow Dr. Jinny ruiz  ENT evaluation noted- Rx for allergic rhinitis with Flonase  PT  DC home with aid. Follow with Cardiology/PMD/EPS in 3 days. QA  Art Carbajal MD pager [phone] 89 f s/p syncopal episode  Telemetry  EPS evaluation noted. For EP study negative. Patient with loop recorder placed.  cardiology follow Dr. Mancera  Neurology follow Dr. Jinny ruiz  ENT evaluation noted- Rx for allergic rhinitis with Flonase  PT  DC home with aid. Follow with Cardiology/PMD/EPS in 3 days. QA No driving.  Art Carbajal MD pager [phone]

## 2017-07-28 NOTE — PROGRESS NOTE ADULT - SUBJECTIVE AND OBJECTIVE BOX
Subjective: Patient seen and examined. No new events except as noted.     c/o rash on anterior chest and breast    MEDICATIONS:  MEDICATIONS  (STANDING):  heparin  Injectable 5000 Unit(s) SubCutaneous every 12 hours  losartan 50 milliGRAM(s) Oral daily  simvastatin 40 milliGRAM(s) Oral at bedtime  polyethylene glycol 3350 17 Gram(s) Oral daily  pantoprazole    Tablet 40 milliGRAM(s) Oral before breakfast  levothyroxine 100 MICROGram(s) Oral daily  vitamin A &amp; D Ointment 1 Application(s) Topical two times a day      PHYSICAL EXAM:  T(F): 98.3 (07-28-17 @ 12:25), Max: 98.3 (07-28-17 @ 12:25)  HR: 68 (07-28-17 @ 12:25) (63 - 69)  BP: 108/66 (07-28-17 @ 12:25) (108/66 - 143/56)  RR: 18 (07-28-17 @ 12:25) (18 - 18)  SpO2: 95% (07-28-17 @ 12:25) (95% - 98%)  I&O's Summary    27 Jul 2017 07:01  -  28 Jul 2017 07:00  --------------------------------------------------------  IN: 890 mL / OUT: 0 mL / NET: 890 mL    28 Jul 2017 07:01  -  28 Jul 2017 14:53  --------------------------------------------------------  IN: 480 mL / OUT: 0 mL / NET: 480 mL    TELEMETRY: SB/SR 50-80, no ectopy	    Appearance: Elderly female in NAD	  HEENT:   Normal oral mucosa, PERRL, EOMI. No evidence of tongue-biting	  Lymphatic: No lymphadenopathy  Cardiovascular: Normal S1 S2, No JVD, No murmurs, No edema  Chest: Mild erythema and echymosis near implanted device site but no swelling or tenderness to touch/palpation  Respiratory: Lungs clear to auscultation	  Psychiatry: A & O x 3, Mood & affect appropriate  Gastrointestinal:  Soft, Non-tender, + BS	  Neurologic: Non-focal, grossly  Extremities: Normal range of motion, No clubbing, cyanosis or edema  Vascular: Peripheral pulses palpable 2+ bilaterally      LABS:                 11.0   6.1   )-----------( 283      ( 28 Jul 2017 07:02 )             34.8     07-28    140  |  104  |  18  ----------------------------<  85  4.3   |  24  |  0.98    Ca    9.4      28 Jul 2017 07:02  Phos  3.3     07-28  Mg     2.2     07-28

## 2017-07-28 NOTE — PROGRESS NOTE ADULT - ATTENDING COMMENTS
Víctor Mancera MD  [phone]
Víctor Mancera MD  [phone]
Víctor Mancera MD  [phone]
Víctor Mancera MD  [phone]
Víctor Mancera MD  [phone]

## 2017-08-14 ENCOUNTER — APPOINTMENT (OUTPATIENT)
Dept: ELECTROPHYSIOLOGY | Facility: CLINIC | Age: 82
End: 2017-08-14

## 2017-08-23 ENCOUNTER — APPOINTMENT (OUTPATIENT)
Dept: ELECTROPHYSIOLOGY | Facility: CLINIC | Age: 82
End: 2017-08-23

## 2017-09-18 ENCOUNTER — APPOINTMENT (OUTPATIENT)
Dept: SURGERY | Facility: CLINIC | Age: 82
End: 2017-09-18
Payer: MEDICARE

## 2017-09-18 VITALS
RESPIRATION RATE: 15 BRPM | DIASTOLIC BLOOD PRESSURE: 81 MMHG | HEART RATE: 70 BPM | TEMPERATURE: 98.8 F | OXYGEN SATURATION: 95 % | SYSTOLIC BLOOD PRESSURE: 136 MMHG

## 2017-09-18 DIAGNOSIS — Z01.818 ENCOUNTER FOR OTHER PREPROCEDURAL EXAMINATION: ICD-10-CM

## 2017-09-18 PROCEDURE — 99024 POSTOP FOLLOW-UP VISIT: CPT

## 2017-09-18 RX ORDER — OXYCODONE AND ACETAMINOPHEN 5; 325 MG/1; MG/1
5-325 TABLET ORAL
Qty: 28 | Refills: 0 | Status: DISCONTINUED | COMMUNITY
Start: 2017-06-28 | End: 2017-09-18

## 2017-09-18 RX ORDER — METRONIDAZOLE 250 MG/1
250 TABLET ORAL EVERY 6 HOURS
Qty: 40 | Refills: 3 | Status: DISCONTINUED | COMMUNITY
Start: 2017-07-14 | End: 2017-09-18

## 2017-09-18 RX ORDER — CIPROFLOXACIN HYDROCHLORIDE 500 MG/1
500 TABLET, FILM COATED ORAL
Qty: 20 | Refills: 3 | Status: DISCONTINUED | COMMUNITY
Start: 2017-07-14 | End: 2017-09-18

## 2017-09-18 RX ORDER — LOSARTAN POTASSIUM 50 MG/1
50 TABLET, FILM COATED ORAL
Qty: 30 | Refills: 0 | Status: DISCONTINUED | COMMUNITY
Start: 2017-07-28

## 2017-09-19 ENCOUNTER — OUTPATIENT (OUTPATIENT)
Dept: OUTPATIENT SERVICES | Facility: HOSPITAL | Age: 82
LOS: 1 days | End: 2017-09-19
Payer: MEDICARE

## 2017-09-19 ENCOUNTER — APPOINTMENT (OUTPATIENT)
Dept: CT IMAGING | Facility: IMAGING CENTER | Age: 82
End: 2017-09-19
Payer: MEDICARE

## 2017-09-19 DIAGNOSIS — K62.89 OTHER SPECIFIED DISEASES OF ANUS AND RECTUM: ICD-10-CM

## 2017-09-19 DIAGNOSIS — Z87.19 PERSONAL HISTORY OF OTHER DISEASES OF THE DIGESTIVE SYSTEM: Chronic | ICD-10-CM

## 2017-09-19 PROCEDURE — 82565 ASSAY OF CREATININE: CPT

## 2017-09-19 PROCEDURE — 72193 CT PELVIS W/DYE: CPT

## 2017-09-19 PROCEDURE — 72193 CT PELVIS W/DYE: CPT | Mod: 26

## 2017-09-20 ENCOUNTER — APPOINTMENT (OUTPATIENT)
Dept: CT IMAGING | Facility: IMAGING CENTER | Age: 82
End: 2017-09-20

## 2017-09-20 ENCOUNTER — CHART COPY (OUTPATIENT)
Age: 82
End: 2017-09-20

## 2017-09-20 ENCOUNTER — EMERGENCY (EMERGENCY)
Facility: HOSPITAL | Age: 82
LOS: 1 days | Discharge: HOME | End: 2017-09-20
Attending: EMERGENCY MEDICINE | Admitting: EMERGENCY MEDICINE
Payer: MEDICARE

## 2017-09-20 VITALS
HEART RATE: 77 BPM | DIASTOLIC BLOOD PRESSURE: 73 MMHG | TEMPERATURE: 97 F | RESPIRATION RATE: 15 BRPM | OXYGEN SATURATION: 100 % | SYSTOLIC BLOOD PRESSURE: 145 MMHG

## 2017-09-20 VITALS
HEART RATE: 80 BPM | TEMPERATURE: 98 F | RESPIRATION RATE: 16 BRPM | SYSTOLIC BLOOD PRESSURE: 140 MMHG | OXYGEN SATURATION: 99 % | DIASTOLIC BLOOD PRESSURE: 76 MMHG

## 2017-09-20 DIAGNOSIS — K62.89 OTHER SPECIFIED DISEASES OF ANUS AND RECTUM: ICD-10-CM

## 2017-09-20 DIAGNOSIS — Z87.19 PERSONAL HISTORY OF OTHER DISEASES OF THE DIGESTIVE SYSTEM: Chronic | ICD-10-CM

## 2017-09-20 DIAGNOSIS — K59.4 ANAL SPASM: ICD-10-CM

## 2017-09-20 PROCEDURE — 99284 EMERGENCY DEPT VISIT MOD MDM: CPT | Mod: 25

## 2017-09-20 PROCEDURE — 70450 CT HEAD/BRAIN W/O DYE: CPT | Mod: 26
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
## 2017-09-20 NOTE — ED PROVIDER NOTE - MEDICAL DECISION MAKING DETAILS
pt with mechanical fall at home from stair chair.  Head CT is negative.  Will local wound care for skin tear.  Can dc home in ambulette

## 2017-09-20 NOTE — ED PROVIDER NOTE - CARE PLAN
Principal Discharge DX:	Skin tear of forearm without complication, right, initial encounter  Secondary Diagnosis:	Fall, initial encounter
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
## 2017-09-28 ENCOUNTER — APPOINTMENT (OUTPATIENT)
Dept: ELECTROPHYSIOLOGY | Facility: CLINIC | Age: 82
End: 2017-09-28
Payer: MEDICARE

## 2017-09-28 PROCEDURE — 93298 REM INTERROG DEV EVAL SCRMS: CPT

## 2017-11-07 ENCOUNTER — APPOINTMENT (OUTPATIENT)
Dept: ELECTROPHYSIOLOGY | Facility: CLINIC | Age: 82
End: 2017-11-07

## 2017-11-15 ENCOUNTER — APPOINTMENT (OUTPATIENT)
Dept: ULTRASOUND IMAGING | Facility: IMAGING CENTER | Age: 82
End: 2017-11-15

## 2017-11-15 ENCOUNTER — APPOINTMENT (OUTPATIENT)
Dept: MAMMOGRAPHY | Facility: IMAGING CENTER | Age: 82
End: 2017-11-15

## 2018-03-19 ENCOUNTER — APPOINTMENT (OUTPATIENT)
Dept: ELECTROPHYSIOLOGY | Facility: CLINIC | Age: 83
End: 2018-03-19
Payer: MEDICARE

## 2018-03-19 PROCEDURE — 93299: CPT

## 2018-03-19 PROCEDURE — 93298 REM INTERROG DEV EVAL SCRMS: CPT

## 2018-04-04 ENCOUNTER — APPOINTMENT (OUTPATIENT)
Dept: MAMMOGRAPHY | Facility: IMAGING CENTER | Age: 83
End: 2018-04-04
Payer: MEDICARE

## 2018-04-04 ENCOUNTER — OUTPATIENT (OUTPATIENT)
Dept: OUTPATIENT SERVICES | Facility: HOSPITAL | Age: 83
LOS: 1 days | End: 2018-04-04
Payer: MEDICARE

## 2018-04-04 ENCOUNTER — APPOINTMENT (OUTPATIENT)
Dept: ULTRASOUND IMAGING | Facility: IMAGING CENTER | Age: 83
End: 2018-04-04
Payer: MEDICARE

## 2018-04-04 DIAGNOSIS — Z87.19 PERSONAL HISTORY OF OTHER DISEASES OF THE DIGESTIVE SYSTEM: Chronic | ICD-10-CM

## 2018-04-04 DIAGNOSIS — Z00.8 ENCOUNTER FOR OTHER GENERAL EXAMINATION: ICD-10-CM

## 2018-04-04 PROCEDURE — 76641 ULTRASOUND BREAST COMPLETE: CPT

## 2018-04-04 PROCEDURE — 77067 SCR MAMMO BI INCL CAD: CPT | Mod: 26

## 2018-04-04 PROCEDURE — 76641 ULTRASOUND BREAST COMPLETE: CPT | Mod: 26,50

## 2018-04-04 PROCEDURE — 77063 BREAST TOMOSYNTHESIS BI: CPT | Mod: 26

## 2018-04-04 PROCEDURE — 77067 SCR MAMMO BI INCL CAD: CPT

## 2018-04-04 PROCEDURE — 77063 BREAST TOMOSYNTHESIS BI: CPT

## 2018-04-24 ENCOUNTER — APPOINTMENT (OUTPATIENT)
Dept: ELECTROPHYSIOLOGY | Facility: CLINIC | Age: 83
End: 2018-04-24

## 2018-04-26 ENCOUNTER — RX RENEWAL (OUTPATIENT)
Age: 83
End: 2018-04-26
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
## 2018-05-25 ENCOUNTER — APPOINTMENT (OUTPATIENT)
Dept: OPHTHALMOLOGY | Facility: CLINIC | Age: 83
End: 2018-05-25
Payer: MEDICARE

## 2018-05-25 DIAGNOSIS — Z96.1 PRESENCE OF INTRAOCULAR LENS: ICD-10-CM

## 2018-05-25 DIAGNOSIS — H18.423 BAND KERATOPATHY, BILATERAL: ICD-10-CM

## 2018-05-25 DIAGNOSIS — H04.123 DRY EYE SYNDROME OF BILATERAL LACRIMAL GLANDS: ICD-10-CM

## 2018-05-25 PROCEDURE — 92014 COMPRE OPH EXAM EST PT 1/>: CPT

## 2018-06-05 ENCOUNTER — APPOINTMENT (OUTPATIENT)
Dept: ELECTROPHYSIOLOGY | Facility: CLINIC | Age: 83
End: 2018-06-05
Payer: MEDICARE

## 2018-06-05 DIAGNOSIS — Z00.00 ENCOUNTER FOR GENERAL ADULT MEDICAL EXAMINATION W/OUT ABNORMAL FINDINGS: ICD-10-CM

## 2018-06-05 PROCEDURE — 93299: CPT

## 2018-06-05 PROCEDURE — 93298 REM INTERROG DEV EVAL SCRMS: CPT

## 2018-06-11 ENCOUNTER — RX RENEWAL (OUTPATIENT)
Age: 83
End: 2018-06-11

## 2018-07-31 PROBLEM — K60.1 CHRONIC ANAL FISSURE: Chronic | Status: ACTIVE | Noted: 2017-06-07

## 2018-07-31 PROBLEM — M81.0 AGE-RELATED OSTEOPOROSIS WITHOUT CURRENT PATHOLOGICAL FRACTURE: Chronic | Status: ACTIVE | Noted: 2017-06-07

## 2018-07-31 PROBLEM — C44.90 UNSPECIFIED MALIGNANT NEOPLASM OF SKIN, UNSPECIFIED: Chronic | Status: ACTIVE | Noted: 2017-06-07

## 2018-07-31 PROBLEM — K59.4 ANAL SPASM: Chronic | Status: ACTIVE | Noted: 2017-06-07

## 2018-07-31 PROBLEM — S72.009A FRACTURE OF UNSPECIFIED PART OF NECK OF UNSPECIFIED FEMUR, INITIAL ENCOUNTER FOR CLOSED FRACTURE: Chronic | Status: ACTIVE | Noted: 2017-06-07

## 2018-08-06 ENCOUNTER — APPOINTMENT (OUTPATIENT)
Dept: ELECTROPHYSIOLOGY | Facility: CLINIC | Age: 83
End: 2018-08-06
Payer: MEDICARE

## 2018-08-06 PROCEDURE — 93299: CPT

## 2018-08-06 PROCEDURE — 93298 REM INTERROG DEV EVAL SCRMS: CPT

## 2018-08-09 ENCOUNTER — CHART COPY (OUTPATIENT)
Age: 83
End: 2018-08-09

## 2018-12-04 ENCOUNTER — APPOINTMENT (OUTPATIENT)
Dept: ELECTROPHYSIOLOGY | Facility: CLINIC | Age: 83
End: 2018-12-04
Payer: MEDICARE

## 2018-12-04 DIAGNOSIS — R55 SYNCOPE AND COLLAPSE: ICD-10-CM

## 2018-12-04 PROCEDURE — 93298 REM INTERROG DEV EVAL SCRMS: CPT

## 2018-12-04 PROCEDURE — 93299: CPT

## 2018-12-10 PROBLEM — R55 SYNCOPE: Status: ACTIVE | Noted: 2018-08-09

## 2019-01-10 ENCOUNTER — APPOINTMENT (OUTPATIENT)
Dept: ELECTROPHYSIOLOGY | Facility: CLINIC | Age: 84
End: 2019-01-10

## 2019-04-09 ENCOUNTER — OUTPATIENT (OUTPATIENT)
Dept: OUTPATIENT SERVICES | Facility: HOSPITAL | Age: 84
LOS: 1 days | End: 2019-04-09
Payer: MEDICARE

## 2019-04-09 ENCOUNTER — APPOINTMENT (OUTPATIENT)
Dept: CT IMAGING | Facility: CLINIC | Age: 84
End: 2019-04-09
Payer: MEDICARE

## 2019-04-09 DIAGNOSIS — Z87.19 PERSONAL HISTORY OF OTHER DISEASES OF THE DIGESTIVE SYSTEM: Chronic | ICD-10-CM

## 2019-04-09 DIAGNOSIS — Z00.8 ENCOUNTER FOR OTHER GENERAL EXAMINATION: ICD-10-CM

## 2019-04-09 PROCEDURE — 71275 CT ANGIOGRAPHY CHEST: CPT

## 2019-04-09 PROCEDURE — 71275 CT ANGIOGRAPHY CHEST: CPT | Mod: 26

## 2019-04-09 PROCEDURE — 82565 ASSAY OF CREATININE: CPT

## 2019-05-02 ENCOUNTER — INPATIENT (INPATIENT)
Facility: HOSPITAL | Age: 84
LOS: 3 days | Discharge: HOME | DRG: 684 | End: 2019-05-06
Attending: INTERNAL MEDICINE | Admitting: INTERNAL MEDICINE
Payer: MEDICARE

## 2019-05-02 VITALS — DIASTOLIC BLOOD PRESSURE: 60 MMHG | SYSTOLIC BLOOD PRESSURE: 110 MMHG

## 2019-05-02 DIAGNOSIS — Z87.19 PERSONAL HISTORY OF OTHER DISEASES OF THE DIGESTIVE SYSTEM: Chronic | ICD-10-CM

## 2019-05-02 DIAGNOSIS — N17.9 ACUTE KIDNEY FAILURE, UNSPECIFIED: ICD-10-CM

## 2019-05-02 LAB
ALBUMIN SERPL ELPH-MCNC: 3.5 G/DL — SIGNIFICANT CHANGE UP (ref 3.3–5)
ALP SERPL-CCNC: 50 U/L — SIGNIFICANT CHANGE UP (ref 40–120)
ALT FLD-CCNC: 19 U/L — SIGNIFICANT CHANGE UP (ref 10–45)
ANION GAP SERPL CALC-SCNC: 12 MMOL/L — SIGNIFICANT CHANGE UP (ref 5–17)
APPEARANCE UR: CLEAR — SIGNIFICANT CHANGE UP
APTT BLD: 22.4 SEC — LOW (ref 27.5–36.3)
AST SERPL-CCNC: 38 U/L — SIGNIFICANT CHANGE UP (ref 10–40)
BASOPHILS # BLD AUTO: 0 K/UL — SIGNIFICANT CHANGE UP (ref 0–0.2)
BASOPHILS NFR BLD AUTO: 0 % — SIGNIFICANT CHANGE UP (ref 0–2)
BILIRUB SERPL-MCNC: 0.6 MG/DL — SIGNIFICANT CHANGE UP (ref 0.2–1.2)
BILIRUB UR-MCNC: NEGATIVE — SIGNIFICANT CHANGE UP
BUN SERPL-MCNC: 36 MG/DL — HIGH (ref 7–23)
CALCIUM SERPL-MCNC: 9.7 MG/DL — SIGNIFICANT CHANGE UP (ref 8.4–10.5)
CHLORIDE SERPL-SCNC: 108 MMOL/L — SIGNIFICANT CHANGE UP (ref 96–108)
CK MB BLD-MCNC: 1.5 % — SIGNIFICANT CHANGE UP (ref 0–3.5)
CK MB BLD-MCNC: 1.6 % — SIGNIFICANT CHANGE UP (ref 0–3.5)
CK MB CFR SERPL CALC: 15.2 NG/ML — HIGH (ref 0–3.8)
CK MB CFR SERPL CALC: 2.9 NG/ML — SIGNIFICANT CHANGE UP (ref 0–3.8)
CK SERPL-CCNC: 193 U/L — HIGH (ref 25–170)
CK SERPL-CCNC: 926 U/L — HIGH (ref 25–170)
CO2 SERPL-SCNC: 21 MMOL/L — LOW (ref 22–31)
COLOR SPEC: YELLOW — SIGNIFICANT CHANGE UP
CREAT SERPL-MCNC: 1.58 MG/DL — HIGH (ref 0.5–1.3)
DIFF PNL FLD: NEGATIVE — SIGNIFICANT CHANGE UP
EOSINOPHIL # BLD AUTO: 0 K/UL — SIGNIFICANT CHANGE UP (ref 0–0.5)
EOSINOPHIL NFR BLD AUTO: 0.1 % — SIGNIFICANT CHANGE UP (ref 0–6)
GAS PNL BLDV: SIGNIFICANT CHANGE UP
GLUCOSE SERPL-MCNC: 149 MG/DL — HIGH (ref 70–99)
GLUCOSE UR QL: NEGATIVE — SIGNIFICANT CHANGE UP
HCT VFR BLD CALC: 31.8 % — LOW (ref 34.5–45)
HGB BLD-MCNC: 11.1 G/DL — LOW (ref 11.5–15.5)
INR BLD: 1.14 RATIO — SIGNIFICANT CHANGE UP (ref 0.88–1.16)
KETONES UR-MCNC: NEGATIVE — SIGNIFICANT CHANGE UP
LEUKOCYTE ESTERASE UR-ACNC: NEGATIVE — SIGNIFICANT CHANGE UP
LYMPHOCYTES # BLD AUTO: 0.3 K/UL — LOW (ref 1–3.3)
LYMPHOCYTES # BLD AUTO: 3.3 % — LOW (ref 13–44)
MCHC RBC-ENTMCNC: 34.8 PG — HIGH (ref 27–34)
MCHC RBC-ENTMCNC: 35.1 GM/DL — SIGNIFICANT CHANGE UP (ref 32–36)
MCV RBC AUTO: 99.2 FL — SIGNIFICANT CHANGE UP (ref 80–100)
MONOCYTES # BLD AUTO: 0.3 K/UL — SIGNIFICANT CHANGE UP (ref 0–0.9)
MONOCYTES NFR BLD AUTO: 3.3 % — SIGNIFICANT CHANGE UP (ref 2–14)
NEUTROPHILS # BLD AUTO: 9.1 K/UL — HIGH (ref 1.8–7.4)
NEUTROPHILS NFR BLD AUTO: 93.3 % — HIGH (ref 43–77)
NITRITE UR-MCNC: NEGATIVE — SIGNIFICANT CHANGE UP
PH UR: 6 — SIGNIFICANT CHANGE UP (ref 5–8)
PLATELET # BLD AUTO: 252 K/UL — SIGNIFICANT CHANGE UP (ref 150–400)
POTASSIUM SERPL-MCNC: 4.7 MMOL/L — SIGNIFICANT CHANGE UP (ref 3.5–5.3)
POTASSIUM SERPL-SCNC: 4.7 MMOL/L — SIGNIFICANT CHANGE UP (ref 3.5–5.3)
PROT SERPL-MCNC: 6.4 G/DL — SIGNIFICANT CHANGE UP (ref 6–8.3)
PROT UR-MCNC: ABNORMAL
PROTHROM AB SERPL-ACNC: 13.1 SEC — HIGH (ref 10–12.9)
RBC # BLD: 3.21 M/UL — LOW (ref 3.8–5.2)
RBC # FLD: 12.6 % — SIGNIFICANT CHANGE UP (ref 10.3–14.5)
SODIUM SERPL-SCNC: 141 MMOL/L — SIGNIFICANT CHANGE UP (ref 135–145)
SP GR SPEC: 1.03 — HIGH (ref 1.01–1.02)
T3FREE SERPL-MCNC: 1.62 PG/ML — LOW (ref 1.8–4.6)
TROPONIN T, HIGH SENSITIVITY RESULT: 40 NG/L — SIGNIFICANT CHANGE UP (ref 0–51)
TROPONIN T, HIGH SENSITIVITY RESULT: 40 NG/L — SIGNIFICANT CHANGE UP (ref 0–51)
TROPONIN T, HIGH SENSITIVITY RESULT: 46 NG/L — SIGNIFICANT CHANGE UP (ref 0–51)
TSH SERPL-MCNC: 0.62 UIU/ML — SIGNIFICANT CHANGE UP (ref 0.27–4.2)
UROBILINOGEN FLD QL: NEGATIVE — SIGNIFICANT CHANGE UP
WBC # BLD: 9.8 K/UL — SIGNIFICANT CHANGE UP (ref 3.8–10.5)
WBC # FLD AUTO: 9.8 K/UL — SIGNIFICANT CHANGE UP (ref 3.8–10.5)

## 2019-05-02 PROCEDURE — 71250 CT THORAX DX C-: CPT | Mod: 26

## 2019-05-02 PROCEDURE — 70450 CT HEAD/BRAIN W/O DYE: CPT | Mod: 26

## 2019-05-02 PROCEDURE — 72170 X-RAY EXAM OF PELVIS: CPT | Mod: 26

## 2019-05-02 PROCEDURE — 71045 X-RAY EXAM CHEST 1 VIEW: CPT | Mod: 26

## 2019-05-02 PROCEDURE — 72125 CT NECK SPINE W/O DYE: CPT | Mod: 26

## 2019-05-02 PROCEDURE — 93010 ELECTROCARDIOGRAM REPORT: CPT | Mod: GC

## 2019-05-02 PROCEDURE — 99285 EMERGENCY DEPT VISIT HI MDM: CPT | Mod: GC,25

## 2019-05-02 RX ORDER — ASPIRIN/CALCIUM CARB/MAGNESIUM 324 MG
81 TABLET ORAL DAILY
Qty: 0 | Refills: 0 | Status: DISCONTINUED | OUTPATIENT
Start: 2019-05-02 | End: 2019-05-06

## 2019-05-02 RX ORDER — LOSARTAN POTASSIUM 100 MG/1
50 TABLET, FILM COATED ORAL DAILY
Qty: 0 | Refills: 0 | Status: DISCONTINUED | OUTPATIENT
Start: 2019-05-02 | End: 2019-05-06

## 2019-05-02 RX ORDER — SODIUM CHLORIDE 9 MG/ML
1000 INJECTION INTRAMUSCULAR; INTRAVENOUS; SUBCUTANEOUS
Qty: 0 | Refills: 0 | Status: DISCONTINUED | OUTPATIENT
Start: 2019-05-02 | End: 2019-05-03

## 2019-05-02 RX ORDER — FLUTICASONE PROPIONATE 50 MCG
3 SPRAY, SUSPENSION NASAL
Qty: 0 | Refills: 0 | COMMUNITY

## 2019-05-02 RX ORDER — PANTOPRAZOLE SODIUM 20 MG/1
40 TABLET, DELAYED RELEASE ORAL
Qty: 0 | Refills: 0 | Status: DISCONTINUED | OUTPATIENT
Start: 2019-05-02 | End: 2019-05-06

## 2019-05-02 RX ORDER — ASCORBIC ACID 60 MG
1 TABLET,CHEWABLE ORAL
Qty: 0 | Refills: 0 | COMMUNITY

## 2019-05-02 RX ORDER — HEPARIN SODIUM 5000 [USP'U]/ML
5000 INJECTION INTRAVENOUS; SUBCUTANEOUS EVERY 12 HOURS
Qty: 0 | Refills: 0 | Status: DISCONTINUED | OUTPATIENT
Start: 2019-05-02 | End: 2019-05-06

## 2019-05-02 RX ORDER — FLUTICASONE PROPIONATE 50 MCG
2 SPRAY, SUSPENSION NASAL DAILY
Qty: 0 | Refills: 0 | Status: DISCONTINUED | OUTPATIENT
Start: 2019-05-02 | End: 2019-05-06

## 2019-05-02 RX ORDER — LEVOTHYROXINE SODIUM 125 MCG
100 TABLET ORAL DAILY
Qty: 0 | Refills: 0 | Status: DISCONTINUED | OUTPATIENT
Start: 2019-05-02 | End: 2019-05-06

## 2019-05-02 RX ORDER — OMEPRAZOLE 10 MG/1
1 CAPSULE, DELAYED RELEASE ORAL
Qty: 0 | Refills: 0 | COMMUNITY

## 2019-05-02 RX ORDER — SODIUM CHLORIDE 9 MG/ML
3000 INJECTION INTRAMUSCULAR; INTRAVENOUS; SUBCUTANEOUS ONCE
Qty: 0 | Refills: 0 | Status: COMPLETED | OUTPATIENT
Start: 2019-05-02 | End: 2019-05-02

## 2019-05-02 RX ORDER — SIMVASTATIN 20 MG/1
40 TABLET, FILM COATED ORAL AT BEDTIME
Qty: 0 | Refills: 0 | Status: DISCONTINUED | OUTPATIENT
Start: 2019-05-02 | End: 2019-05-06

## 2019-05-02 RX ORDER — AZTREONAM 2 G
2000 VIAL (EA) INJECTION ONCE
Qty: 0 | Refills: 0 | Status: COMPLETED | OUTPATIENT
Start: 2019-05-02 | End: 2019-05-02

## 2019-05-02 RX ORDER — LEVOTHYROXINE SODIUM 125 MCG
1 TABLET ORAL
Qty: 0 | Refills: 0 | COMMUNITY

## 2019-05-02 RX ORDER — ACETAMINOPHEN 500 MG
650 TABLET ORAL EVERY 6 HOURS
Qty: 0 | Refills: 0 | Status: DISCONTINUED | OUTPATIENT
Start: 2019-05-02 | End: 2019-05-06

## 2019-05-02 RX ORDER — POLYETHYLENE GLYCOL 3350 17 G/17G
17 POWDER, FOR SOLUTION ORAL DAILY
Qty: 0 | Refills: 0 | Status: DISCONTINUED | OUTPATIENT
Start: 2019-05-02 | End: 2019-05-06

## 2019-05-02 RX ORDER — ACETAMINOPHEN 500 MG
975 TABLET ORAL ONCE
Qty: 0 | Refills: 0 | Status: COMPLETED | OUTPATIENT
Start: 2019-05-02 | End: 2019-05-02

## 2019-05-02 RX ADMIN — SODIUM CHLORIDE 3000 MILLILITER(S): 9 INJECTION INTRAMUSCULAR; INTRAVENOUS; SUBCUTANEOUS at 17:49

## 2019-05-02 RX ADMIN — HEPARIN SODIUM 5000 UNIT(S): 5000 INJECTION INTRAVENOUS; SUBCUTANEOUS at 23:04

## 2019-05-02 RX ADMIN — SODIUM CHLORIDE 50 MILLILITER(S): 9 INJECTION INTRAMUSCULAR; INTRAVENOUS; SUBCUTANEOUS at 22:40

## 2019-05-02 RX ADMIN — SODIUM CHLORIDE 1000 MILLILITER(S): 9 INJECTION INTRAMUSCULAR; INTRAVENOUS; SUBCUTANEOUS at 14:31

## 2019-05-02 RX ADMIN — SIMVASTATIN 40 MILLIGRAM(S): 20 TABLET, FILM COATED ORAL at 23:04

## 2019-05-02 RX ADMIN — Medication 975 MILLIGRAM(S): at 17:49

## 2019-05-02 RX ADMIN — Medication 100 MILLIGRAM(S): at 18:30

## 2019-05-02 RX ADMIN — Medication 975 MILLIGRAM(S): at 14:31

## 2019-05-02 NOTE — H&P ADULT - NSHPPHYSICALEXAM_GEN_ALL_CORE
PHYSICAL EXAMINATION:  Vital Signs Last 24 Hrs  T(C): 36.4 (02 May 2019 21:07), Max: 39.1 (02 May 2019 13:49)  T(F): 97.6 (02 May 2019 21:07), Max: 102.4 (02 May 2019 13:49)  HR: 56 (02 May 2019 21:07) (56 - 91)  BP: 115/66 (02 May 2019 21:07) (101/58 - 127/54)  BP(mean): --  RR: 18 (02 May 2019 21:07) (18 - 18)  SpO2: 96% (02 May 2019 21:07) (92% - 99%)  CAPILLARY BLOOD GLUCOSE      POCT Blood Glucose.: 144 mg/dL (02 May 2019 13:50)      GENERAL: NAD, well-groomed, well-developed  HEAD:  atraumatic, normocephalic  EYES: sclera anicteric  ENMT: mucous membranes moist  NECK: supple, No JVD  CHEST/LUNG: clear to auscultation bilaterally; no rales, rhonchi, or wheezing b/l  HEART: normal S1, S2  ABDOMEN: BS+, soft, ND, NT   EXTREMITIES:  pulses palpable; no clubbing, cyanosis, or edema b/l LEs  NEURO: awake, alert, interactive; moves all extremities  SKIN: no rashes or lesions Multiple bruises arms and legs

## 2019-05-02 NOTE — H&P ADULT - NSHPLABSRESULTS_GEN_ALL_CORE
11.1   9.8   )-----------( 252      ( 02 May 2019 14:03 )             31.8           141  |  108  |  36<H>  ----------------------------<  149<H>  4.7   |  21<L>  |  1.58<H>    Ca    9.7      02 May 2019 14:03    TPro  6.4  /  Alb  3.5  /  TBili  0.6  /  DBili  x   /  AST  38  /  ALT  19  /  AlkPhos  50                Urinalysis Basic - ( 02 May 2019 16:53 )    Color: Yellow / Appearance: Clear / S.027 / pH: x  Gluc: x / Ketone: Negative  / Bili: Negative / Urobili: Negative   Blood: x / Protein: Trace / Nitrite: Negative   Leuk Esterase: Negative / RBC: 1 /hpf / WBC 1 /HPF   Sq Epi: x / Non Sq Epi: 2 /hpf / Bacteria: Negative        PT/INR - ( 02 May 2019 14:03 )   PT: 13.1 sec;   INR: 1.14 ratio         PTT - ( 02 May 2019 14:03 )  PTT:22.4 sec    Lactate Trend      CARDIAC MARKERS ( 02 May 2019 14:03 )  x     / x     / 193 U/L / x     / 2.9 ng/mL        CAPILLARY BLOOD GLUCOSE      POCT Blood Glucose.: 144 mg/dL (02 May 2019 13:50)        < from: CT Chest No Cont (19 @ 19:13) >        < end of copied text >    EKG SR NSST/T

## 2019-05-02 NOTE — H&P ADULT - ASSESSMENT
89 f with   Syncope  - telemetry  - cardiac enzymes  - echo  - cardiology evaluation Dr. Francisco    Fever  - panculture  - empiric antibiotics    Dehydration  - gentle IVF    HTN control    Hypothyroidism  - follow TSH    Abnormal C Spine  - NSx evaluation    Dementia  - supportive care   - B12, Folate    PT  Further action as per clinical course   Art Carbajal MD pager [phone]
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
## 2019-05-02 NOTE — ED ADULT NURSE REASSESSMENT NOTE - NS ED NURSE REASSESS COMMENT FT1
Straight cath performed with 2 RN's present while using sterile technique as per MD order. urine drained clear/yellow 200 cc

## 2019-05-02 NOTE — H&P ADULT - NSHPREVIEWOFSYSTEMS_GEN_ALL_CORE
REVIEW OF SYSTEMS:    CONSTITUTIONAL: + weakness, + fevers no chills  EYES/ENT: No visual changes;  No vertigo or throat pain   NECK: No pain or stiffness  RESPIRATORY: No cough, wheezing, hemoptysis; No shortness of breath  CARDIOVASCULAR: No chest pain or palpitations  GASTROINTESTINAL: No abdominal or epigastric pain. No nausea, vomiting, or hematemesis; No diarrhea or constipation. No melena or hematochezia.  GENITOURINARY: No dysuria, frequency or hematuria  NEUROLOGICAL: No numbness or weakness LOC?  SKIN: No itching, burning, rashes, or lesions   All other review of systems is negative unless indicated above.

## 2019-05-02 NOTE — ED ADULT NURSE NOTE - CARDIO ASSESSMENT
WDL DR Bloch- Patient examined,c/o chest pressure, intermittent, @ food, burping,  HTN, smoker, etoh, though denies alchoholism or DTs. WEll appearing NAD, HEENT nml Lungs clear, abd soft nontender, heart sounds nml, no CVA tenderness- denies drinking today , but seems intox- Plan GI coctail, Cardiac enzymes, ETOH level

## 2019-05-02 NOTE — H&P ADULT - NSICDXPASTMEDICALHX_GEN_ALL_CORE_FT
PAST MEDICAL HISTORY:  Anal spasm     Chronic anal fissure     Dyslipidemia     H/O: Hypothyroidism     Hip fracture Right hip- 1995    Hypertension     mild Dementia     Osteoporosis     Skin cancer lower extremities

## 2019-05-02 NOTE — ED PROVIDER NOTE - CARE PLAN
Principal Discharge DX:	ADRIANO (acute kidney injury)  Secondary Diagnosis:	Fever  Secondary Diagnosis:	Syncope and collapse

## 2019-05-02 NOTE — H&P ADULT - NSHPSOCIALHISTORY_GEN_ALL_CORE
Social History:    Marital Status:  (   )    (   ) Single    (   )    ( x )   Occupation:   Lives with: ( x ) alone  (  ) children   (  ) spouse   (  ) parents  (  ) other    Substance Use (street drugs): ( x ) never used  (  ) other:  Tobacco Usage:  (  x ) never smoked   (   ) former smoker   (   ) current smoker  (     ) pack years  (        ) last cigarette date  Alcohol Usage: denies    (     ) Advanced Directives: (     ) None    (      ) DNR    (     ) DNI    (     ) Health Care Proxy:

## 2019-05-02 NOTE — ED PROVIDER NOTE - OBJECTIVE STATEMENT
89 yo F pmh dementia, htn, hld bibems  from home with c/o fall. pt lives at home alone and receives meals on wheels, when they arrived no one answered at home, meals on wheels went to the neighbor's showing concern, neighbor called 911. EMS found patient inside home on the floor in between the toilet and door, EMS stated 'the house was very hot, about 100 degrees'. As per EMS, neighbor stated 'I saw her yesterday'. unknown LOC. pt shows no sign of distress/pain, no sign of CP, SOB, nausea/vomiting, numbness/tingling, cough, chills, dizziness, headache, blurred vision, neuro intact. pt alert to name and time, not alert to place.    pmd dr carol byers admits to dr guzman

## 2019-05-02 NOTE — H&P ADULT - NSICDXPASTSURGICALHX_GEN_ALL_CORE_FT
PAST SURGICAL HISTORY:  After-Cataract of Both Eyes     Anterior Cervical discectomy     H/O gastroesophageal reflux (GERD)     History of  Hip surgery with hardware     History of Tonsillectomy     left Elbow surgey secondary to injury     left eye Retinal tear repair

## 2019-05-02 NOTE — CHART NOTE - NSCHARTNOTEFT_GEN_A_CORE
CT cervical reviewed. No acute neurosurgical intervention given age, lack of pain on movement, lack of point tenderness.

## 2019-05-03 DIAGNOSIS — R06.00 DYSPNEA, UNSPECIFIED: ICD-10-CM

## 2019-05-03 DIAGNOSIS — R55 SYNCOPE AND COLLAPSE: ICD-10-CM

## 2019-05-03 DIAGNOSIS — R05 COUGH: ICD-10-CM

## 2019-05-03 LAB
ANION GAP SERPL CALC-SCNC: 7 MMOL/L — SIGNIFICANT CHANGE UP (ref 5–17)
BUN SERPL-MCNC: 30 MG/DL — HIGH (ref 7–23)
CALCIUM SERPL-MCNC: 7.8 MG/DL — LOW (ref 8.4–10.5)
CHLORIDE SERPL-SCNC: 114 MMOL/L — HIGH (ref 96–108)
CO2 SERPL-SCNC: 21 MMOL/L — LOW (ref 22–31)
CREAT SERPL-MCNC: 1.17 MG/DL — SIGNIFICANT CHANGE UP (ref 0.5–1.3)
CULTURE RESULTS: NO GROWTH — SIGNIFICANT CHANGE UP
FOLATE SERPL-MCNC: >20 NG/ML — SIGNIFICANT CHANGE UP
GLUCOSE SERPL-MCNC: 95 MG/DL — SIGNIFICANT CHANGE UP (ref 70–99)
HCT VFR BLD CALC: 28.1 % — LOW (ref 34.5–45)
HGB BLD-MCNC: 8.9 G/DL — LOW (ref 11.5–15.5)
MCHC RBC-ENTMCNC: 31.7 GM/DL — LOW (ref 32–36)
MCHC RBC-ENTMCNC: 32.4 PG — SIGNIFICANT CHANGE UP (ref 27–34)
MCV RBC AUTO: 102.2 FL — HIGH (ref 80–100)
PLATELET # BLD AUTO: 180 K/UL — SIGNIFICANT CHANGE UP (ref 150–400)
POTASSIUM SERPL-MCNC: 4 MMOL/L — SIGNIFICANT CHANGE UP (ref 3.5–5.3)
POTASSIUM SERPL-SCNC: 4 MMOL/L — SIGNIFICANT CHANGE UP (ref 3.5–5.3)
RBC # BLD: 2.75 M/UL — LOW (ref 3.8–5.2)
RBC # FLD: 14.3 % — SIGNIFICANT CHANGE UP (ref 10.3–14.5)
SODIUM SERPL-SCNC: 142 MMOL/L — SIGNIFICANT CHANGE UP (ref 135–145)
SPECIMEN SOURCE: SIGNIFICANT CHANGE UP
TROPONIN T, HIGH SENSITIVITY RESULT: 36 NG/L — SIGNIFICANT CHANGE UP (ref 0–51)
TSH SERPL-MCNC: 0.45 UIU/ML — SIGNIFICANT CHANGE UP (ref 0.27–4.2)
VIT B12 SERPL-MCNC: 588 PG/ML — SIGNIFICANT CHANGE UP (ref 232–1245)
WBC # BLD: 7.77 K/UL — SIGNIFICANT CHANGE UP (ref 3.8–10.5)
WBC # FLD AUTO: 7.77 K/UL — SIGNIFICANT CHANGE UP (ref 3.8–10.5)

## 2019-05-03 PROCEDURE — 93306 TTE W/DOPPLER COMPLETE: CPT | Mod: 26

## 2019-05-03 RX ORDER — ACETAMINOPHEN 500 MG
975 TABLET ORAL ONCE
Qty: 0 | Refills: 0 | Status: DISCONTINUED | OUTPATIENT
Start: 2019-05-03 | End: 2019-05-03

## 2019-05-03 RX ORDER — IPRATROPIUM/ALBUTEROL SULFATE 18-103MCG
3 AEROSOL WITH ADAPTER (GRAM) INHALATION EVERY 6 HOURS
Qty: 0 | Refills: 0 | Status: DISCONTINUED | OUTPATIENT
Start: 2019-05-03 | End: 2019-05-06

## 2019-05-03 RX ADMIN — SIMVASTATIN 40 MILLIGRAM(S): 20 TABLET, FILM COATED ORAL at 21:35

## 2019-05-03 RX ADMIN — Medication 650 MILLIGRAM(S): at 23:35

## 2019-05-03 RX ADMIN — PANTOPRAZOLE SODIUM 40 MILLIGRAM(S): 20 TABLET, DELAYED RELEASE ORAL at 06:00

## 2019-05-03 RX ADMIN — Medication 81 MILLIGRAM(S): at 12:04

## 2019-05-03 RX ADMIN — HEPARIN SODIUM 5000 UNIT(S): 5000 INJECTION INTRAVENOUS; SUBCUTANEOUS at 17:03

## 2019-05-03 RX ADMIN — Medication 100 MICROGRAM(S): at 06:00

## 2019-05-03 RX ADMIN — LOSARTAN POTASSIUM 50 MILLIGRAM(S): 100 TABLET, FILM COATED ORAL at 06:00

## 2019-05-03 RX ADMIN — HEPARIN SODIUM 5000 UNIT(S): 5000 INJECTION INTRAVENOUS; SUBCUTANEOUS at 06:00

## 2019-05-03 NOTE — CONSULT NOTE ADULT - PROBLEM SELECTOR RECOMMENDATION 9
with associated cough with white phlegm   -Lung exam is clear and CT chest without evidence of volume overload or PNA  -Duoneb PRN with associated cough with white phlegm   -Lung exam is clear and CT chest without evidence of volume overload or PNA  -Duoneb PRN for wheeze

## 2019-05-03 NOTE — CONSULT NOTE ADULT - SUBJECTIVE AND OBJECTIVE BOX
PULMONARY CONSULT    Initial HPI on admission:  HPI:  89 yo F pmh dementia, htn, hld bibems  from home with c/o fall. pt lives at home alone and receives meals on wheels, when they arrived no one answered at home, meals on wheels went to the neighbor's showing concern, neighbor called 911. EMS found patient inside home on the floor in between the toilet and door, EMS stated 'the house was very hot, about 100 degrees'. As per EMS, neighbor stated 'I saw her yesterday'. unknown LOC. pt shows no sign of distress/pain, no sign of CP, SOB, nausea/vomiting, numbness/tingling, cough, chills, dizziness, headache, blurred vision, neuro intact. pt alert to name and time, not alert to place. (02 May 2019 22:11)      BRIEF HOSPITAL COURSE: ***    PAST MEDICAL & SURGICAL HISTORY:  Skin cancer: lower extremities  Hip fracture: Right hip-   Osteoporosis  Chronic anal fissure  Anal spasm  mild Dementia  Dyslipidemia  Hypertension  H/O: Hypothyroidism  H/O gastroesophageal reflux (GERD)  Anterior Cervical discectomy  left eye Retinal tear repair  After-Cataract of Both Eyes  left Elbow surgey secondary to injury  History of Tonsillectomy  History of  Hip surgery with hardware    Allergies    penicillin (Rash)    Intolerances      FAMILY HISTORY:    Social history:     Review of Systems:  CONSTITUTIONAL: No fever, chills, or fatigue  EYES: No eye pain, visual disturbances, or discharge  ENMT:  No difficulty hearing, tinnitus, vertigo; No sinus or throat pain  NECK: No pain or stiffness  RESPIRATORY: Per above  CARDIOVASCULAR: No chest pain, palpitations, dizziness, or leg swelling  GASTROINTESTINAL: No abdominal or epigastric pain. No nausea, vomiting, or hematemesis; No diarrhea or constipation. No melena or hematochezia.  GENITOURINARY: No dysuria, frequency, hematuria, or incontinence  NEUROLOGICAL: No headaches, memory loss, loss of strength, numbness, or tremors  SKIN: No itching, burning, rashes, or lesions   MUSCULOSKELETAL: No joint pain or swelling; No muscle, back, or extremity pain  PSYCHIATRIC: No depression, anxiety, mood swings, or difficulty sleeping      Medications:  MEDICATIONS  (STANDING):  aspirin  chewable 81 milliGRAM(s) Oral daily  fluticasone propionate 50 MICROgram(s)/spray Nasal Spray 2 Spray(s) Both Nostrils daily  heparin  Injectable 5000 Unit(s) SubCutaneous every 12 hours  levothyroxine 100 MICROGram(s) Oral daily  losartan 50 milliGRAM(s) Oral daily  pantoprazole    Tablet 40 milliGRAM(s) Oral before breakfast  polyethylene glycol 3350 17 Gram(s) Oral daily  simvastatin 40 milliGRAM(s) Oral at bedtime  sodium chloride 0.9%. 1000 milliLiter(s) (50 mL/Hr) IV Continuous <Continuous>    MEDICATIONS  (PRN):  acetaminophen   Tablet .. 650 milliGRAM(s) Oral every 6 hours PRN Temp greater or equal to 38.5C (101.3F), Mild Pain (1 - 3)            Vital Signs Last 24 Hrs  T(C): 36.3 (03 May 2019 11:43), Max: 36.9 (02 May 2019 17:41)  T(F): 97.4 (03 May 2019 11:43), Max: 98.4 (02 May 2019 17:41)  HR: 55 (03 May 2019 11:43) (54 - 60)  BP: 105/56 (03 May 2019 11:43) (101/58 - 116/68)  BP(mean): --  RR: 18 (03 May 2019 11:43) (18 - 18)  SpO2: 98% (03 May 2019 13:34) (95% - 99%)      VBG pH 7.44 05-02 @ 14:00  VBG pCO2 36 05-02 @ 14:00  VBG O2 sat 86 05-02 @ 14:00  VBG lactate 1.8 05-02 @ 14:00              LABS:                        8.9    7.77  )-----------( 180      ( 03 May 2019 07:55 )             28.1     05-    142  |  114<H>  |  30<H>  ----------------------------<  95  4.0   |  21<L>  |  1.17    Ca    7.8<L>      03 May 2019 06:31    TPro  6.4  /  Alb  3.5  /  TBili  0.6  /  DBili  x   /  AST  38  /  ALT  19  /  AlkPhos  50  05-02      CARDIAC MARKERS ( 02 May 2019 23:02 )  x     / x     / 926 U/L / x     / 15.2 ng/mL  CARDIAC MARKERS ( 02 May 2019 14:03 )  x     / x     / 193 U/L / x     / 2.9 ng/mL      CAPILLARY BLOOD GLUCOSE      POCT Blood Glucose.: 144 mg/dL (02 May 2019 13:50)    PT/INR - ( 02 May 2019 14:03 )   PT: 13.1 sec;   INR: 1.14 ratio         PTT - ( 02 May 2019 14:03 )  PTT:22.4 sec  Urinalysis Basic - ( 02 May 2019 16:53 )    Color: Yellow / Appearance: Clear / S.027 / pH: x  Gluc: x / Ketone: Negative  / Bili: Negative / Urobili: Negative   Blood: x / Protein: Trace / Nitrite: Negative   Leuk Esterase: Negative / RBC: 1 /hpf / WBC 1 /HPF   Sq Epi: x / Non Sq Epi: 2 /hpf / Bacteria: Negative                    CULTURES: (if applicable)        Physical Examination:    General: No acute distress.      HEENT: Pupils equal, reactive to light.  Symmetric.    PULM: Clear to auscultation bilaterally, no significant sputum production    CVS: S1, S2    ABD: Soft, nondistended, nontender, normoactive bowel sounds, no masses    EXT: No edema, nontender    SKIN: Warm and well perfused, no rashes noted.    NEURO: Alert, oriented, interactive, nonfocal    RADIOLOGY REVIEWED  CXR:    CT chest:    TTE: PULMONARY CONSULT    HPI: 89 y/o F with PMH of dementia, htn, hld bibems  from home with c/o fall. pt lives at home alone and receives meals on wheels, when they arrived no one answered at home, meals on wheels went to the neighbor's showing concern, neighbor called 911. EMS found patient inside home on the floor in between the toilet and door, EMS stated 'the house was very hot, about 100 degrees'.       PAST MEDICAL & SURGICAL HISTORY:  Skin cancer: lower extremities  Hip fracture: Right hip-   Osteoporosis  Chronic anal fissure  Anal spasm  mild Dementia  Dyslipidemia  Hypertension  H/O: Hypothyroidism  H/O gastroesophageal reflux (GERD)  Anterior Cervical discectomy  left eye Retinal tear repair  After-Cataract of Both Eyes  left Elbow surgey secondary to injury  History of Tonsillectomy  History of  Hip surgery with hardware    Allergies    penicillin (Rash)    Intolerances      FAMILY HISTORY: Non-contributory     Social history: Former smoker     Review of Systems:  CONSTITUTIONAL: No fever, chills, or fatigue  EYES: No eye pain, visual disturbances, or discharge  ENMT:  No difficulty hearing, tinnitus, vertigo; No sinus or throat pain  NECK: No pain or stiffness  RESPIRATORY: Per above  CARDIOVASCULAR: No chest pain, palpitations, dizziness, or leg swelling  GASTROINTESTINAL: No abdominal or epigastric pain. No nausea, vomiting, or hematemesis; No diarrhea or constipation. No melena or hematochezia.  GENITOURINARY: No dysuria, frequency, hematuria, or incontinence  NEUROLOGICAL: No headaches, memory loss, loss of strength, numbness, or tremors  SKIN: No itching, burning, rashes, or lesions   MUSCULOSKELETAL: No joint pain or swelling; No muscle, back, or extremity pain  PSYCHIATRIC: No depression, anxiety, mood swings, or difficulty sleeping      Medications:  MEDICATIONS  (STANDING):  aspirin  chewable 81 milliGRAM(s) Oral daily  fluticasone propionate 50 MICROgram(s)/spray Nasal Spray 2 Spray(s) Both Nostrils daily  heparin  Injectable 5000 Unit(s) SubCutaneous every 12 hours  levothyroxine 100 MICROGram(s) Oral daily  losartan 50 milliGRAM(s) Oral daily  pantoprazole    Tablet 40 milliGRAM(s) Oral before breakfast  polyethylene glycol 3350 17 Gram(s) Oral daily  simvastatin 40 milliGRAM(s) Oral at bedtime  sodium chloride 0.9%. 1000 milliLiter(s) (50 mL/Hr) IV Continuous <Continuous>    MEDICATIONS  (PRN):  acetaminophen   Tablet .. 650 milliGRAM(s) Oral every 6 hours PRN Temp greater or equal to 38.5C (101.3F), Mild Pain (1 - 3)            Vital Signs Last 24 Hrs  T(C): 36.3 (03 May 2019 11:43), Max: 36.9 (02 May 2019 17:41)  T(F): 97.4 (03 May 2019 11:43), Max: 98.4 (02 May 2019 17:41)  HR: 55 (03 May 2019 11:43) (54 - 60)  BP: 105/56 (03 May 2019 11:43) (101/58 - 116/68)  BP(mean): --  RR: 18 (03 May 2019 11:43) (18 - 18)  SpO2: 98% (03 May 2019 13:34) (95% - 99%) on RA      VBG pH 7.44 05- @ 14:00  VBG pCO2 36 05- @ 14:00  VBG O2 sat 86 - @ 14:00  VBG lactate 1.8 - @ 14:00              LABS:                        8.9    7.77  )-----------( 180      ( 03 May 2019 07:55 )             28.1     05-03    142  |  114<H>  |  30<H>  ----------------------------<  95  4.0   |  21<L>  |  1.17    Ca    7.8<L>      03 May 2019 06:31    TPro  6.4  /  Alb  3.5  /  TBili  0.6  /  DBili  x   /  AST  38  /  ALT  19  /  AlkPhos  50  05-02      CARDIAC MARKERS ( 02 May 2019 23:02 )  x     / x     / 926 U/L / x     / 15.2 ng/mL  CARDIAC MARKERS ( 02 May 2019 14:03 )  x     / x     / 193 U/L / x     / 2.9 ng/mL      CAPILLARY BLOOD GLUCOSE      POCT Blood Glucose.: 144 mg/dL (02 May 2019 13:50)    PT/INR - ( 02 May 2019 14:03 )   PT: 13.1 sec;   INR: 1.14 ratio         PTT - ( 02 May 2019 14:03 )  PTT:22.4 sec  Urinalysis Basic - ( 02 May 2019 16:53 )    Color: Yellow / Appearance: Clear / S.027 / pH: x  Gluc: x / Ketone: Negative  / Bili: Negative / Urobili: Negative   Blood: x / Protein: Trace / Nitrite: Negative   Leuk Esterase: Negative / RBC: 1 /hpf / WBC 1 /HPF   Sq Epi: x / Non Sq Epi: 2 /hpf / Bacteria: Negative            Physical Examination:    General: No acute distress.      HEENT: Pupils equal, reactive to light.  Symmetric.    PULM: Clear to auscultation bilaterally, no significant sputum production    CVS: S1, S2    ABD: Soft, nondistended, nontender, normoactive bowel sounds, no masses    EXT: No edema, nontender    SKIN: Warm and well perfused, no rashes noted.    NEURO: Alert, oriented, interactive, nonfocal    RADIOLOGY REVIEWED  CT chest: < from: CT Chest No Cont (19 @ 19:13) >  FINDINGS:     Tubes/Lines: None.    Lungs And Airways: Unchanged bilateral calcified pulmonary nodules. The   airways are unremarkable.      Pleura: No pleural effusion.    Mediastinum: There are no enlarged chest lymph nodes. The visualized   portion of the thyroid gland is unremarkable.       Heart and Vasculature: The heart is normal in size.  There is no   pericardial effusion.   Coronary artery disease with calcified plaque involving the right and   left main coronary arteries and the left anterior descending coronary   artery. Aortic valve calcification. Atheromatous calcification of the   aorta.    Upper Abdomen: Partially imaged right renal hypodensities.    Bones And Soft Tissues: Degenerative changes of the spine. Subacute   fractures of the right fourth through sixth ribs and left fourth and   fifth ribs. C5-C6 screws with disc spacerin place. Left chest wall loop   recorder.      IMPRESSION:     1.  Unchanged bilateral calcified pulmonary nodules. Otherwise, the lungs   are clear.    < end of copied text > PULMONARY CONSULT    HPI: 89 y/o F with PMH of dementia, htn, hld bibems  from home with c/o fall. pt lives at home alone and receives meals on wheels, when they arrived no one answered at home, meals on wheels went to the neighbor's showing concern, neighbor called 911. EMS found patient inside home on the floor in between the toilet and door, EMS stated 'the house was very hot, about 100 degrees'. Called to eval for dyspnea x several days +cough with white sputum per patient, feels it is post nasal gtt. Currently 97% on RA.       PAST MEDICAL & SURGICAL HISTORY:  Skin cancer: lower extremities  Hip fracture: Right hip-   Osteoporosis  Chronic anal fissure  Anal spasm  mild Dementia  Dyslipidemia  Hypertension  H/O: Hypothyroidism  H/O gastroesophageal reflux (GERD)  Anterior Cervical discectomy  left eye Retinal tear repair  After-Cataract of Both Eyes  left Elbow surgey secondary to injury  History of Tonsillectomy  History of  Hip surgery with hardware    Allergies    penicillin (Rash)    Intolerances      FAMILY HISTORY: Non-contributory     Social history: Former smoker     Review of Systems:  CONSTITUTIONAL: No fever, chills, or fatigue  EYES: No eye pain, visual disturbances, or discharge  ENMT:  No difficulty hearing, tinnitus, vertigo; No sinus or throat pain  NECK: No pain or stiffness  RESPIRATORY: Per above  CARDIOVASCULAR: No chest pain, palpitations, dizziness, or leg swelling  GASTROINTESTINAL: No abdominal or epigastric pain. No nausea, vomiting, or hematemesis; No diarrhea or constipation. No melena or hematochezia.  GENITOURINARY: No dysuria, frequency, hematuria, or incontinence  NEUROLOGICAL: No headaches, memory loss, loss of strength, numbness, or tremors  SKIN: No itching, burning, rashes, or lesions   MUSCULOSKELETAL: No joint pain or swelling; No muscle, back, or extremity pain  PSYCHIATRIC: No depression, anxiety, mood swings, or difficulty sleeping      Medications:  MEDICATIONS  (STANDING):  aspirin  chewable 81 milliGRAM(s) Oral daily  fluticasone propionate 50 MICROgram(s)/spray Nasal Spray 2 Spray(s) Both Nostrils daily  heparin  Injectable 5000 Unit(s) SubCutaneous every 12 hours  levothyroxine 100 MICROGram(s) Oral daily  losartan 50 milliGRAM(s) Oral daily  pantoprazole    Tablet 40 milliGRAM(s) Oral before breakfast  polyethylene glycol 3350 17 Gram(s) Oral daily  simvastatin 40 milliGRAM(s) Oral at bedtime  sodium chloride 0.9%. 1000 milliLiter(s) (50 mL/Hr) IV Continuous <Continuous>    MEDICATIONS  (PRN):  acetaminophen   Tablet .. 650 milliGRAM(s) Oral every 6 hours PRN Temp greater or equal to 38.5C (101.3F), Mild Pain (1 - 3)            Vital Signs Last 24 Hrs  T(C): 36.3 (03 May 2019 11:43), Max: 36.9 (02 May 2019 17:41)  T(F): 97.4 (03 May 2019 11:43), Max: 98.4 (02 May 2019 17:41)  HR: 55 (03 May 2019 11:43) (54 - 60)  BP: 105/56 (03 May 2019 11:43) (101/58 - 116/68)  BP(mean): --  RR: 18 (03 May 2019 11:43) (18 - 18)  SpO2: 98% (03 May 2019 13:34) (95% - 99%) on RA      VBG pH 7.44 05- @ 14:00  VBG pCO2 36 05-02 @ 14:00  VBG O2 sat 86 05-02 @ 14:00  VBG lactate 1.8 05- @ 14:00              LABS:                        8.9    7.77  )-----------( 180      ( 03 May 2019 07:55 )             28.1     05-03    142  |  114<H>  |  30<H>  ----------------------------<  95  4.0   |  21<L>  |  1.17    Ca    7.8<L>      03 May 2019 06:31    TPro  6.4  /  Alb  3.5  /  TBili  0.6  /  DBili  x   /  AST  38  /  ALT  19  /  AlkPhos  50  05-02      CARDIAC MARKERS ( 02 May 2019 23:02 )  x     / x     / 926 U/L / x     / 15.2 ng/mL  CARDIAC MARKERS ( 02 May 2019 14:03 )  x     / x     / 193 U/L / x     / 2.9 ng/mL      CAPILLARY BLOOD GLUCOSE      POCT Blood Glucose.: 144 mg/dL (02 May 2019 13:50)    PT/INR - ( 02 May 2019 14:03 )   PT: 13.1 sec;   INR: 1.14 ratio         PTT - ( 02 May 2019 14:03 )  PTT:22.4 sec  Urinalysis Basic - ( 02 May 2019 16:53 )    Color: Yellow / Appearance: Clear / S.027 / pH: x  Gluc: x / Ketone: Negative  / Bili: Negative / Urobili: Negative   Blood: x / Protein: Trace / Nitrite: Negative   Leuk Esterase: Negative / RBC: 1 /hpf / WBC 1 /HPF   Sq Epi: x / Non Sq Epi: 2 /hpf / Bacteria: Negative            Physical Examination:    General: No acute distress.      HEENT: Pupils equal, reactive to light.  Symmetric.    PULM: Clear to auscultation bilaterally, no significant sputum production    CVS: S1, S2    ABD: Soft, nondistended, nontender, normoactive bowel sounds, no masses    EXT: No edema, nontender    SKIN: Warm and well perfused, no rashes noted.    NEURO: Alert, oriented, interactive, nonfocal    RADIOLOGY REVIEWED  CT chest: < from: CT Chest No Cont (19 @ 19:13) >  FINDINGS:     Tubes/Lines: None.    Lungs And Airways: Unchanged bilateral calcified pulmonary nodules. The   airways are unremarkable.      Pleura: No pleural effusion.    Mediastinum: There are no enlarged chest lymph nodes. The visualized   portion of the thyroid gland is unremarkable.       Heart and Vasculature: The heart is normal in size.  There is no   pericardial effusion.   Coronary artery disease with calcified plaque involving the right and   left main coronary arteries and the left anterior descending coronary   artery. Aortic valve calcification. Atheromatous calcification of the   aorta.    Upper Abdomen: Partially imaged right renal hypodensities.    Bones And Soft Tissues: Degenerative changes of the spine. Subacute   fractures of the right fourth through sixth ribs and left fourth and   fifth ribs. C5-C6 screws with disc spacerin place. Left chest wall loop   recorder.      IMPRESSION:     1.  Unchanged bilateral calcified pulmonary nodules. Otherwise, the lungs   are clear.    < end of copied text >

## 2019-05-03 NOTE — PROGRESS NOTE ADULT - ASSESSMENT
89 f with   Syncope  - telemetry  - echo noted  - cardiology evaluation noted  - ILR interogated    Fever  - panculture  - empiric antibiotics    SOB  - Pulmonary evaluation Dr. Haley    Dehydration  - s/p gentle IVF    HTN control    Hypothyroidism  - follow TSH    Abnormal C Spine  - NSx evaluation    Dementia  - supportive care     PT  Art Carbajal MD pager [phone] 89 f with   Syncope  - telemetry  - echo noted  - cardiology evaluation noted  - ILR interogated    Fever  - panculture  - empiric antibiotics    SOB  - Pulmonary evaluation Dr. Haley    Dehydration  - s/p gentle IVF    Rhabdomyolisis  - follow    HTN control    Hypothyroidism  - follow TSH    Abnormal C Spine  - NSx evaluation    Dementia  - supportive care     PT  Art Carbajal MD pager [phone] 89 f with   Syncope  - telemetry  - echo noted  - cardiology evaluation noted  - ILR interogated    Fever  - panculture  - observe off antibiotics    SOB  - Pulmonary evaluation Dr. Haley    Dehydration  - s/p gentle IVF    Rhabdomyolisis  - follow    HTN control    Hypothyroidism  - follow TSH    Abnormal C Spine  - NSx evaluation    Dementia  - supportive care     PT  Art Carbajal MD pager [phone]

## 2019-05-03 NOTE — CONSULT NOTE ADULT - SUBJECTIVE AND OBJECTIVE BOX
CARDIOLOGY ATTENDING    History: She is an extremely pleasant 89 year old female with a past medical history of hypertension, who was admitted with unexplained syncope; therefore, Dr. Santana Dewey implanted a Medtronic loop recorder given her underlying sinus bradycardia. Obviously there is a clinical concern for an offset pause from an atrial arrhythmia such as atrial fibrillation. Interrogation of her loop recorder in the office demonstrates no arrhythmias to date. She denies any further palpitations nor angina. Echo in Nov 2018 normal. She is now readmitted with syncope. ILR interrogation pending.    PMHX: As above, asymptomatic sick sinus syndrome, hypothyroidism, hyperlipidemia,hypertension, and GERD.  PSHX: Medtronic implantable loop recorder.  Social HX: No tobacco, alcohol, or drugs.  Allergies: She has no known drug allergies.  Home Medications: Synthroid 100 mcg daily, Zocor 40 mg daily, Benicar 10 mg daily, and Protonix 40 mg daily.  Review of Systems: No nausea, vomiting, diarrhea, fever, abdominal pain, rashes, weakness, nor angina, + syncope                          8.9    7.77  )-----------( 180      ( 03 May 2019 07:55 )             28.1       05-03    142  |  114<H>  |  30<H>  ----------------------------<  95  4.0   |  21<L>  |  1.17    Ca    7.8<L>      03 May 2019 06:31    TPro  6.4  /  Alb  3.5  /  TBili  0.6  /  DBili  x   /  AST  38  /  ALT  19  /  AlkPhos  50  05-02      CARDIAC MARKERS ( 02 May 2019 23:02 )  x     / x     / 926 U/L / x     / 15.2 ng/mL  CARDIAC MARKERS ( 02 May 2019 14:03 )  x     / x     / 193 U/L / x     / 2.9 ng/mL        T(C): 36.3 (05-03-19 @ 04:11), Max: 39.1 (05-02-19 @ 13:49)  HR: 54 (05-03-19 @ 04:11) (54 - 91)  BP: 110/63 (05-03-19 @ 04:11) (101/58 - 127/54)  RR: 18 (05-03-19 @ 04:11) (18 - 18)  SpO2: 95% (05-03-19 @ 04:11) (92% - 99%)  Wt(kg): --    no JVD  ella, no murmurs  CTAB  soft nt/nd  no c/c/e    tele/ekg sinus ella  Nov 2018 echo normal      A/P) She is an extremely pleasant 89 year old female with a past medical history of hypertension, who was admitted with unexplained syncope; therefore, Dr. Santana Dewey implanted a [employer] loop recorder given her underlying sinus bradycardia. Obviously there is a clinical concern for an offset pause from an atrial arrhythmia such as atrial fibrillation. Interrogation of her loop recorder in the office demonstrates no arrhythmias to date. She denies any further palpitations nor angina. Echo in Nov 2018 normal. She is now readmitted with syncope. ILR interrogation pending.    -have EP NPs interrogate ILR  -if no events on ILR then no further cardiac workup needed  -f/u with Jaime Mancera after discharge on Monday May 13th at 10am  -if ILR shows a malignant pause then obviously she'll need a pacemaker  -final reccs pending ILR interrogation

## 2019-05-03 NOTE — CHART NOTE - NSCHARTNOTEFT_GEN_A_CORE
Loop Recorder Interrogatin  Syncope/Fall on 5/2/19  R/O Arrhythmia  Medtronic [id number]    Battery status good  Loop recorder interrogation revealed five tachycardia episodes, 4 occurred between March 12, 2019 to April 30, 2019 and one occurred last March 12, 2018  Review of all 4 EGMs this year were c/w SVT at 171-188 bpm, duration 17-47 secs  No arrhythmia episodes to correlate with syncope from yesterday  AF burden 0%  Follow up with Dr Nathan

## 2019-05-03 NOTE — CONSULT NOTE ADULT - ASSESSMENT
89 y/o F with PMH of HTN, HLD, s/p ILR, dementia, brought in by EMS after fall at home, found down by neighbor. Called to eval for dyspnea today 89 y/o F with PMH of HTN, HLD, s/p ILR, dementia, brought in by EMS after syncopal fall at home, found down by neighbor. Called to eval for dyspnea today with productive cough, white sputum. Currently 97% on RA.

## 2019-05-04 ENCOUNTER — TRANSCRIPTION ENCOUNTER (OUTPATIENT)
Age: 84
End: 2019-05-04

## 2019-05-04 LAB
ANION GAP SERPL CALC-SCNC: 9 MMOL/L — SIGNIFICANT CHANGE UP (ref 5–17)
BUN SERPL-MCNC: 33 MG/DL — HIGH (ref 7–23)
CALCIUM SERPL-MCNC: 8.6 MG/DL — SIGNIFICANT CHANGE UP (ref 8.4–10.5)
CHLORIDE SERPL-SCNC: 112 MMOL/L — HIGH (ref 96–108)
CK SERPL-CCNC: 1427 U/L — HIGH (ref 25–170)
CO2 SERPL-SCNC: 21 MMOL/L — LOW (ref 22–31)
CREAT SERPL-MCNC: 1.12 MG/DL — SIGNIFICANT CHANGE UP (ref 0.5–1.3)
GLUCOSE SERPL-MCNC: 85 MG/DL — SIGNIFICANT CHANGE UP (ref 70–99)
HCT VFR BLD CALC: 27.2 % — LOW (ref 34.5–45)
HGB BLD-MCNC: 8.7 G/DL — LOW (ref 11.5–15.5)
MCHC RBC-ENTMCNC: 32 GM/DL — SIGNIFICANT CHANGE UP (ref 32–36)
MCHC RBC-ENTMCNC: 32.3 PG — SIGNIFICANT CHANGE UP (ref 27–34)
MCV RBC AUTO: 101.1 FL — HIGH (ref 80–100)
PLATELET # BLD AUTO: 196 K/UL — SIGNIFICANT CHANGE UP (ref 150–400)
POTASSIUM SERPL-MCNC: 4.2 MMOL/L — SIGNIFICANT CHANGE UP (ref 3.5–5.3)
POTASSIUM SERPL-SCNC: 4.2 MMOL/L — SIGNIFICANT CHANGE UP (ref 3.5–5.3)
RBC # BLD: 2.69 M/UL — LOW (ref 3.8–5.2)
RBC # FLD: 14.4 % — SIGNIFICANT CHANGE UP (ref 10.3–14.5)
SODIUM SERPL-SCNC: 142 MMOL/L — SIGNIFICANT CHANGE UP (ref 135–145)
WBC # BLD: 7.27 K/UL — SIGNIFICANT CHANGE UP (ref 3.8–10.5)
WBC # FLD AUTO: 7.27 K/UL — SIGNIFICANT CHANGE UP (ref 3.8–10.5)

## 2019-05-04 RX ORDER — ZOLPIDEM TARTRATE 10 MG/1
5 TABLET ORAL ONCE
Qty: 0 | Refills: 0 | Status: DISCONTINUED | OUTPATIENT
Start: 2019-05-04 | End: 2019-05-04

## 2019-05-04 RX ADMIN — Medication 100 MICROGRAM(S): at 06:19

## 2019-05-04 RX ADMIN — HEPARIN SODIUM 5000 UNIT(S): 5000 INJECTION INTRAVENOUS; SUBCUTANEOUS at 18:08

## 2019-05-04 RX ADMIN — SIMVASTATIN 40 MILLIGRAM(S): 20 TABLET, FILM COATED ORAL at 21:08

## 2019-05-04 RX ADMIN — Medication 2 SPRAY(S): at 13:03

## 2019-05-04 RX ADMIN — LOSARTAN POTASSIUM 50 MILLIGRAM(S): 100 TABLET, FILM COATED ORAL at 06:19

## 2019-05-04 RX ADMIN — Medication 3 MILLILITER(S): at 22:48

## 2019-05-04 RX ADMIN — ZOLPIDEM TARTRATE 5 MILLIGRAM(S): 10 TABLET ORAL at 22:00

## 2019-05-04 RX ADMIN — Medication 81 MILLIGRAM(S): at 13:03

## 2019-05-04 RX ADMIN — Medication 650 MILLIGRAM(S): at 00:05

## 2019-05-04 RX ADMIN — PANTOPRAZOLE SODIUM 40 MILLIGRAM(S): 20 TABLET, DELAYED RELEASE ORAL at 06:19

## 2019-05-04 RX ADMIN — HEPARIN SODIUM 5000 UNIT(S): 5000 INJECTION INTRAVENOUS; SUBCUTANEOUS at 06:19

## 2019-05-04 NOTE — DISCHARGE NOTE NURSING/CASE MANAGEMENT/SOCIAL WORK - NSDCDPATPORTLINK_GEN_ALL_CORE
You can access the [facility] Patient Portal, offered by [facility], by registering with the following website: [email or web address]

## 2019-05-04 NOTE — PHYSICAL THERAPY INITIAL EVALUATION ADULT - ADDITIONAL COMMENTS
Pt resides in private home, alone, 4 steps to enter with handrail, chair lift within. PTA independent with mobility and ADL's, ambulatory with RW outdoors, ambulatory without AD or occasionally cane inside home, pt also owns shower chair,

## 2019-05-04 NOTE — PROGRESS NOTE ADULT - ASSESSMENT
89 f with   Syncope  - telemetry  - echo noted  - cardiology evaluation noted  - ILR interogated    Fever  - panculture  - observe off antibiotics    SOB  - Pulmonary evaluation Dr. Haley    Dehydration  - s/p gentle IVF    Rhabdomyolisis  - follow    HTN control    Hypothyroidism  - follow TSH    Abnormal C Spine  - NSx evaluation    Dementia  - supportive care     PT    DCP in progress. Patient lives alone and requesting help. Social work evaluation.  Art Carbajal MD pager [phone]

## 2019-05-04 NOTE — PHYSICAL THERAPY INITIAL EVALUATION ADULT - GAIT DEVIATIONS NOTED, PT EVAL
decreased robert/decreased step length/increased time in double stance/decreased velocity of limb motion/decreased weight-shifting ability

## 2019-05-04 NOTE — PROGRESS NOTE ADULT - ATTENDING COMMENTS
CARDIOLOGY ATTENDING    Agree with above. Echo and ILR interrogation unremarkable. Therefore no further inpatient cardiac workup needed. Syncope is obviously not related to an underlying arrhythmia. D/C planning as per primary team, and f/u with Jaime Mancera on Monday May 13th at 10am

## 2019-05-04 NOTE — PHYSICAL THERAPY INITIAL EVALUATION ADULT - PRECAUTIONS/LIMITATIONS, REHAB EVAL
CT CHEST: Unchanged bilateral calcified pulmonary nodules. Otherwise, the lungs are clear./fall precautions

## 2019-05-04 NOTE — DISCHARGE NOTE NURSING/CASE MANAGEMENT/SOCIAL WORK - NSDPLANG ASIS_GEN_ALL_CORE
You have been given a medication that may cause drowsiness, do not drive or operate heavy machinery with this medication. Return to the Emergency department for any worsening or failure to improve, otherwise follow up with your primary care provider.    No

## 2019-05-04 NOTE — PHYSICAL THERAPY INITIAL EVALUATION ADULT - DISCHARGE DISPOSITION, PT EVAL
home w/ home PT/home w/ assist/home/home with home PT for improved strength balance & endurance for activity, return to baseline, home safety assessment; pt owns RW & cane, assist for functional mobility and ADL's as needed, pt states that dtr & son in law will be staying with her for a couple of days upon DC from hospital

## 2019-05-05 RX ORDER — SODIUM CHLORIDE 9 MG/ML
1000 INJECTION INTRAMUSCULAR; INTRAVENOUS; SUBCUTANEOUS
Qty: 0 | Refills: 0 | Status: DISCONTINUED | OUTPATIENT
Start: 2019-05-05 | End: 2019-05-06

## 2019-05-05 RX ORDER — ONDANSETRON 8 MG/1
4 TABLET, FILM COATED ORAL ONCE
Qty: 0 | Refills: 0 | Status: DISCONTINUED | OUTPATIENT
Start: 2019-05-05 | End: 2019-05-06

## 2019-05-05 RX ORDER — ACETAMINOPHEN 500 MG
650 TABLET ORAL ONCE
Qty: 0 | Refills: 0 | Status: COMPLETED | OUTPATIENT
Start: 2019-05-05 | End: 2019-05-05

## 2019-05-05 RX ORDER — ZOLPIDEM TARTRATE 10 MG/1
5 TABLET ORAL ONCE
Qty: 0 | Refills: 0 | Status: DISCONTINUED | OUTPATIENT
Start: 2019-05-05 | End: 2019-05-05

## 2019-05-05 RX ADMIN — PANTOPRAZOLE SODIUM 40 MILLIGRAM(S): 20 TABLET, DELAYED RELEASE ORAL at 05:48

## 2019-05-05 RX ADMIN — LOSARTAN POTASSIUM 50 MILLIGRAM(S): 100 TABLET, FILM COATED ORAL at 05:47

## 2019-05-05 RX ADMIN — HEPARIN SODIUM 5000 UNIT(S): 5000 INJECTION INTRAVENOUS; SUBCUTANEOUS at 17:36

## 2019-05-05 RX ADMIN — POLYETHYLENE GLYCOL 3350 17 GRAM(S): 17 POWDER, FOR SOLUTION ORAL at 12:21

## 2019-05-05 RX ADMIN — SIMVASTATIN 40 MILLIGRAM(S): 20 TABLET, FILM COATED ORAL at 21:55

## 2019-05-05 RX ADMIN — HEPARIN SODIUM 5000 UNIT(S): 5000 INJECTION INTRAVENOUS; SUBCUTANEOUS at 05:47

## 2019-05-05 RX ADMIN — Medication 2 SPRAY(S): at 12:21

## 2019-05-05 RX ADMIN — Medication 650 MILLIGRAM(S): at 21:56

## 2019-05-05 RX ADMIN — Medication 100 MICROGRAM(S): at 05:47

## 2019-05-05 RX ADMIN — Medication 81 MILLIGRAM(S): at 12:21

## 2019-05-05 NOTE — CHART NOTE - NSCHARTNOTEFT_GEN_A_CORE
CPK noted to be uptrending.   ECHO and labs reviewed.   Gentle IVF started.   Will trend CPK.     Nicole Ocasio ANP-BC  [phone].

## 2019-05-05 NOTE — PROVIDER CONTACT NOTE (OTHER) - SITUATION
pt coughing, vomiting, SOB; pt states she has so much phlegm that it is making her gag and vomit small amount of phlegm and possibly soup from last nights dinner

## 2019-05-05 NOTE — PROGRESS NOTE ADULT - ASSESSMENT
89 f with   Syncope  - telemetry  - echo noted  - cardiology evaluation noted No further cardiac work up suggested  - ILR interogated    Fever  - panculture  - observe off antibiotics    SOB  - Pulmonary evaluation Dr. Haley    Dehydration  - s/p gentle IVF    Rhabdomyolysis  - follow CPK    HTN control    Hypothyroidism  - follow TSH    Abnormal C Spine  - NSx evaluation    Dementia  - supportive care     PT    DCP in progress. Patient lives alone and requesting help. Social work evaluation.  Art Carbajal MD pager [phone]

## 2019-05-06 ENCOUNTER — TRANSCRIPTION ENCOUNTER (OUTPATIENT)
Age: 84
End: 2019-05-06

## 2019-05-06 VITALS
OXYGEN SATURATION: 95 % | DIASTOLIC BLOOD PRESSURE: 71 MMHG | RESPIRATION RATE: 17 BRPM | HEART RATE: 55 BPM | TEMPERATURE: 98 F | SYSTOLIC BLOOD PRESSURE: 134 MMHG

## 2019-05-06 DIAGNOSIS — E03.9 HYPOTHYROIDISM, UNSPECIFIED: ICD-10-CM

## 2019-05-06 LAB
ANION GAP SERPL CALC-SCNC: 11 MMOL/L — SIGNIFICANT CHANGE UP (ref 5–17)
BUN SERPL-MCNC: 26 MG/DL — HIGH (ref 7–23)
CALCIUM SERPL-MCNC: 8.1 MG/DL — LOW (ref 8.4–10.5)
CHLORIDE SERPL-SCNC: 113 MMOL/L — HIGH (ref 96–108)
CK SERPL-CCNC: 703 U/L — HIGH (ref 25–170)
CO2 SERPL-SCNC: 18 MMOL/L — LOW (ref 22–31)
CREAT SERPL-MCNC: 0.9 MG/DL — SIGNIFICANT CHANGE UP (ref 0.5–1.3)
GLUCOSE SERPL-MCNC: 79 MG/DL — SIGNIFICANT CHANGE UP (ref 70–99)
HCT VFR BLD CALC: 26.3 % — LOW (ref 34.5–45)
HGB BLD-MCNC: 9.4 G/DL — LOW (ref 11.5–15.5)
MCHC RBC-ENTMCNC: 35.9 GM/DL — SIGNIFICANT CHANGE UP (ref 32–36)
MCHC RBC-ENTMCNC: 36.6 PG — HIGH (ref 27–34)
MCV RBC AUTO: 102 FL — HIGH (ref 80–100)
PLATELET # BLD AUTO: 201 K/UL — SIGNIFICANT CHANGE UP (ref 150–400)
POTASSIUM SERPL-MCNC: 4.3 MMOL/L — SIGNIFICANT CHANGE UP (ref 3.5–5.3)
POTASSIUM SERPL-SCNC: 4.3 MMOL/L — SIGNIFICANT CHANGE UP (ref 3.5–5.3)
RBC # BLD: 2.58 M/UL — LOW (ref 3.8–5.2)
RBC # FLD: 12.6 % — SIGNIFICANT CHANGE UP (ref 10.3–14.5)
SODIUM SERPL-SCNC: 142 MMOL/L — SIGNIFICANT CHANGE UP (ref 135–145)
WBC # BLD: 5 K/UL — SIGNIFICANT CHANGE UP (ref 3.8–10.5)
WBC # FLD AUTO: 5 K/UL — SIGNIFICANT CHANGE UP (ref 3.8–10.5)

## 2019-05-06 PROCEDURE — 93005 ELECTROCARDIOGRAM TRACING: CPT | Mod: XU

## 2019-05-06 PROCEDURE — 83605 ASSAY OF LACTIC ACID: CPT

## 2019-05-06 PROCEDURE — 84132 ASSAY OF SERUM POTASSIUM: CPT

## 2019-05-06 PROCEDURE — 82435 ASSAY OF BLOOD CHLORIDE: CPT

## 2019-05-06 PROCEDURE — 84481 FREE ASSAY (FT-3): CPT

## 2019-05-06 PROCEDURE — 99285 EMERGENCY DEPT VISIT HI MDM: CPT | Mod: 25

## 2019-05-06 PROCEDURE — 85730 THROMBOPLASTIN TIME PARTIAL: CPT

## 2019-05-06 PROCEDURE — 87040 BLOOD CULTURE FOR BACTERIA: CPT

## 2019-05-06 PROCEDURE — 82746 ASSAY OF FOLIC ACID SERUM: CPT

## 2019-05-06 PROCEDURE — 82330 ASSAY OF CALCIUM: CPT

## 2019-05-06 PROCEDURE — 71250 CT THORAX DX C-: CPT

## 2019-05-06 PROCEDURE — 82607 VITAMIN B-12: CPT

## 2019-05-06 PROCEDURE — 84443 ASSAY THYROID STIM HORMONE: CPT

## 2019-05-06 PROCEDURE — 51701 INSERT BLADDER CATHETER: CPT

## 2019-05-06 PROCEDURE — 97161 PT EVAL LOW COMPLEX 20 MIN: CPT

## 2019-05-06 PROCEDURE — 94640 AIRWAY INHALATION TREATMENT: CPT

## 2019-05-06 PROCEDURE — 82947 ASSAY GLUCOSE BLOOD QUANT: CPT

## 2019-05-06 PROCEDURE — 72170 X-RAY EXAM OF PELVIS: CPT

## 2019-05-06 PROCEDURE — 71045 X-RAY EXAM CHEST 1 VIEW: CPT

## 2019-05-06 PROCEDURE — 96360 HYDRATION IV INFUSION INIT: CPT | Mod: XU

## 2019-05-06 PROCEDURE — 85014 HEMATOCRIT: CPT

## 2019-05-06 PROCEDURE — 70450 CT HEAD/BRAIN W/O DYE: CPT

## 2019-05-06 PROCEDURE — 82550 ASSAY OF CK (CPK): CPT

## 2019-05-06 PROCEDURE — 80053 COMPREHEN METABOLIC PANEL: CPT

## 2019-05-06 PROCEDURE — 87086 URINE CULTURE/COLONY COUNT: CPT

## 2019-05-06 PROCEDURE — 80048 BASIC METABOLIC PNL TOTAL CA: CPT

## 2019-05-06 PROCEDURE — 82553 CREATINE MB FRACTION: CPT

## 2019-05-06 PROCEDURE — 85610 PROTHROMBIN TIME: CPT

## 2019-05-06 PROCEDURE — 81001 URINALYSIS AUTO W/SCOPE: CPT

## 2019-05-06 PROCEDURE — 96361 HYDRATE IV INFUSION ADD-ON: CPT | Mod: XU

## 2019-05-06 PROCEDURE — 93306 TTE W/DOPPLER COMPLETE: CPT

## 2019-05-06 PROCEDURE — 84295 ASSAY OF SERUM SODIUM: CPT

## 2019-05-06 PROCEDURE — 82962 GLUCOSE BLOOD TEST: CPT

## 2019-05-06 PROCEDURE — 82803 BLOOD GASES ANY COMBINATION: CPT

## 2019-05-06 PROCEDURE — 84484 ASSAY OF TROPONIN QUANT: CPT

## 2019-05-06 PROCEDURE — 72125 CT NECK SPINE W/O DYE: CPT

## 2019-05-06 PROCEDURE — 85027 COMPLETE CBC AUTOMATED: CPT

## 2019-05-06 RX ORDER — NEOMYCIN/POLYMYXIN B/HYDROCORT
1 SUSPENSION, DROPS(FINAL DOSAGE FORM)(ML) OTIC (EAR)
Qty: 0 | Refills: 0 | COMMUNITY

## 2019-05-06 RX ORDER — FLUTICASONE PROPIONATE 50 MCG
2 SPRAY, SUSPENSION NASAL
Qty: 0 | Refills: 0 | DISCHARGE
Start: 2019-05-06

## 2019-05-06 RX ORDER — MOMETASONE FUROATE 50 UG/1
1 SPRAY NASAL
Qty: 0 | Refills: 0 | COMMUNITY

## 2019-05-06 RX ADMIN — Medication 2 SPRAY(S): at 12:01

## 2019-05-06 RX ADMIN — PANTOPRAZOLE SODIUM 40 MILLIGRAM(S): 20 TABLET, DELAYED RELEASE ORAL at 05:04

## 2019-05-06 RX ADMIN — Medication 100 MICROGRAM(S): at 05:03

## 2019-05-06 RX ADMIN — Medication 81 MILLIGRAM(S): at 12:00

## 2019-05-06 RX ADMIN — LOSARTAN POTASSIUM 50 MILLIGRAM(S): 100 TABLET, FILM COATED ORAL at 05:03

## 2019-05-06 RX ADMIN — HEPARIN SODIUM 5000 UNIT(S): 5000 INJECTION INTRAVENOUS; SUBCUTANEOUS at 05:03

## 2019-05-06 NOTE — CHART NOTE - NSCHARTNOTEFT_GEN_A_CORE
24 hour HHA arranged by family. Daughter and son in law at bedside. Request from Dr. Carbajal to facilitate patient discharge. Medication reconciliation reviewed, revised, and resolved with Dr. Carbajal who had medically cleared patient for discharge with follow-up as advised. Please refer to discharge note for detailed hospital course. Patient is currently stable for discharge to home with home care at this time.    Contact # [phone]

## 2019-05-06 NOTE — PROGRESS NOTE ADULT - SUBJECTIVE AND OBJECTIVE BOX
pt seen and examined, no complaints, ROS - .     Tele NSR . SB  50-60     acetaminophen   Tablet .. 650 milliGRAM(s) Oral every 6 hours PRN  ALBUTerol/ipratropium for Nebulization 3 milliLiter(s) Nebulizer every 6 hours PRN  aspirin  chewable 81 milliGRAM(s) Oral daily  fluticasone propionate 50 MICROgram(s)/spray Nasal Spray 2 Spray(s) Both Nostrils daily  heparin  Injectable 5000 Unit(s) SubCutaneous every 12 hours  levothyroxine 100 MICROGram(s) Oral daily  losartan 50 milliGRAM(s) Oral daily  pantoprazole    Tablet 40 milliGRAM(s) Oral before breakfast  polyethylene glycol 3350 17 Gram(s) Oral daily  simvastatin 40 milliGRAM(s) Oral at bedtime                            8.9    7.77  )-----------( 180      ( 03 May 2019 07:55 )             28.1       Hemoglobin: 8.9 g/dL (05-03 @ 07:55)  Hemoglobin: 11.1 g/dL (05-02 @ 14:03)      05-04    142  |  112<H>  |  33<H>  ----------------------------<  85  4.2   |  21<L>  |  1.12    Ca    8.6      04 May 2019 06:40    TPro  6.4  /  Alb  3.5  /  TBili  0.6  /  DBili  x   /  AST  38  /  ALT  19  /  AlkPhos  50  05-02    Creatinine Trend: 1.12<--, 1.17<--, 1.58<--    COAGS:     CARDIAC MARKERS ( 04 May 2019 06:40 )  x     / x     / 1427 U/L / x     / x      CARDIAC MARKERS ( 02 May 2019 23:02 )  x     / x     / 926 U/L / x     / 15.2 ng/mL  CARDIAC MARKERS ( 02 May 2019 14:03 )  x     / x     / 193 U/L / x     / 2.9 ng/mL        T(C): 36.3 (05-04-19 @ 04:33), Max: 37.2 (05-03-19 @ 20:55)  HR: 55 (05-04-19 @ 04:33) (55 - 58)  BP: 131/62 (05-04-19 @ 04:33) (105/56 - 131/62)  RR: 18 (05-04-19 @ 04:33) (18 - 18)  SpO2: 93% (05-04-19 @ 04:33) (93% - 98%)  Wt(kg): --    I&O's Summary    03 May 2019 07:01  -  04 May 2019 07:00  --------------------------------------------------------  IN: 540 mL / OUT: 0 mL / NET: 540 mL      no JVD  ella, no murmurs  CTAB  soft nt/nd  no c/c/e    tele/ekg sinus ella  Nov 2018 echo normal      A/P) She is an extremely pleasant 89 year old female with a past medical history of hypertension, who was admitted with unexplained syncope; therefore, Dr. Santana Dewey implanted a [employer] loop recorder given her underlying sinus bradycardia. Obviously there is a clinical concern for an offset pause from an atrial arrhythmia such as atrial fibrillation. Interrogation of her loop recorder in the office demonstrates no arrhythmias to date. She denies any further palpitations nor angina. Echo in Nov 2018 normal. She is now readmitted with syncope     - Loop recorder shows: All 4 EGMs this year were c/w SVT at 171-188 bpm, duration 17-47 secs, No arrhythmia episodes to correlate with syncope from yesterday  AF burden 0%  - ASA , statin   - BP stable on ACE   - Hold on BB at present due to marked Bradycardia on tele   -f/u with Jaime Mancera after discharge on Monday May 13th at 10am  D/W Dr Nathan
Patient is a 89y old  Female who presents with a chief complaint of syncope (03 May 2019 15:31)      SUBJECTIVE / OVERNIGHT EVENTS: feels better  Review of Systems  chest pain no  palpitations no  sob ocassional  nausea no  headache no    MEDICATIONS  (STANDING):  aspirin  chewable 81 milliGRAM(s) Oral daily  fluticasone propionate 50 MICROgram(s)/spray Nasal Spray 2 Spray(s) Both Nostrils daily  heparin  Injectable 5000 Unit(s) SubCutaneous every 12 hours  levothyroxine 100 MICROGram(s) Oral daily  losartan 50 milliGRAM(s) Oral daily  pantoprazole    Tablet 40 milliGRAM(s) Oral before breakfast  polyethylene glycol 3350 17 Gram(s) Oral daily  simvastatin 40 milliGRAM(s) Oral at bedtime  sodium chloride 0.9%. 1000 milliLiter(s) (50 mL/Hr) IV Continuous <Continuous>    MEDICATIONS  (PRN):  acetaminophen   Tablet .. 650 milliGRAM(s) Oral every 6 hours PRN Temp greater or equal to 38.5C (101.3F), Mild Pain (1 - 3)  ALBUTerol/ipratropium for Nebulization 3 milliLiter(s) Nebulizer every 6 hours PRN Shortness of Breath and/or Wheezing      Vital Signs Last 24 Hrs  T(C): 36.3 (03 May 2019 11:43), Max: 36.4 (02 May 2019 19:34)  T(F): 97.4 (03 May 2019 11:43), Max: 97.6 (02 May 2019 19:34)  HR: 55 (03 May 2019 11:43) (54 - 59)  BP: 105/56 (03 May 2019 11:43) (105/56 - 116/68)  BP(mean): --  RR: 18 (03 May 2019 11:43) (18 - 18)  SpO2: 98% (03 May 2019 13:34) (95% - 98%)    PHYSICAL EXAM:  GENERAL: NAD, well-developed  HEAD:  Atraumatic, Normocephalic  EYES: EOMI, PERRLA, conjunctiva and sclera clear  NECK: Supple, No JVD  CHEST/LUNG: Clear to auscultation bilaterally; No wheeze  HEART: Regular rate and rhythm; No murmurs, rubs, or gallops  ABDOMEN: Soft, Nontender, Nondistended; Bowel sounds present  EXTREMITIES:  2+ Peripheral Pulses, No clubbing, cyanosis, or edema  PSYCH: AAOx3  NEUROLOGY: non-focal  SKIN: No rashes or lesions multiple ecchymosis on arms and legs    LABS:                        8.9    7.77  )-----------( 180      ( 03 May 2019 07:55 )             28.1         142  |  114<H>  |  30<H>  ----------------------------<  95  4.0   |  21<L>  |  1.17    Ca    7.8<L>      03 May 2019 06:31    TPro  6.4  /  Alb  3.5  /  TBili  0.6  /  DBili  x   /  AST  38  /  ALT  19  /  AlkPhos  50  05    PT/INR - ( 02 May 2019 14:03 )   PT: 13.1 sec;   INR: 1.14 ratio         PTT - ( 02 May 2019 14:03 )  PTT:22.4 sec  CARDIAC MARKERS ( 02 May 2019 23:02 )  x     / x     / 926 U/L / x     / 15.2 ng/mL  CARDIAC MARKERS ( 02 May 2019 14:03 )  x     / x     / 193 U/L / x     / 2.9 ng/mL      Urinalysis Basic - ( 02 May 2019 16:53 )    Color: Yellow / Appearance: Clear / S.027 / pH: x  Gluc: x / Ketone: Negative  / Bili: Negative / Urobili: Negative   Blood: x / Protein: Trace / Nitrite: Negative   Leuk Esterase: Negative / RBC: 1 /hpf / WBC 1 /HPF   Sq Epi: x / Non Sq Epi: 2 /hpf / Bacteria: Negative        Culture - Urine (collected 02 May 2019 22:25)  Source: .Urine  Final Report (03 May 2019 17:50):    No growth    Culture - Blood (collected 02 May 2019 17:08)  Source: .Blood  Preliminary Report (03 May 2019 18:01):    No growth to date.    Culture - Blood (collected 02 May 2019 17:08)  Source: .Blood  Preliminary Report (03 May 2019 18:01):    No growth to date.        RADIOLOGY & ADDITIONAL TESTS:    Imaging Personally Reviewed:    Consultant(s) Notes Reviewed:      Care Discussed with Consultants/Other Providers:
Patient is a 89y old  Female who presents with a chief complaint of syncope (04 May 2019 09:11)      SUBJECTIVE / OVERNIGHT EVENTS: No new complaints.   Review of Systems  chest pain no  palpitations no  sob no  nausea no  headache no    MEDICATIONS  (STANDING):  aspirin  chewable 81 milliGRAM(s) Oral daily  fluticasone propionate 50 MICROgram(s)/spray Nasal Spray 2 Spray(s) Both Nostrils daily  heparin  Injectable 5000 Unit(s) SubCutaneous every 12 hours  levothyroxine 100 MICROGram(s) Oral daily  losartan 50 milliGRAM(s) Oral daily  pantoprazole    Tablet 40 milliGRAM(s) Oral before breakfast  polyethylene glycol 3350 17 Gram(s) Oral daily  simvastatin 40 milliGRAM(s) Oral at bedtime    MEDICATIONS  (PRN):  acetaminophen   Tablet .. 650 milliGRAM(s) Oral every 6 hours PRN Temp greater or equal to 38.5C (101.3F), Mild Pain (1 - 3)  ALBUTerol/ipratropium for Nebulization 3 milliLiter(s) Nebulizer every 6 hours PRN Shortness of Breath and/or Wheezing      Vital Signs Last 24 Hrs  T(C): 36.8 (04 May 2019 11:45), Max: 37.2 (03 May 2019 20:55)  T(F): 98.3 (04 May 2019 11:45), Max: 98.9 (03 May 2019 20:55)  HR: 50 (04 May 2019 11:45) (50 - 59)  BP: 146/66 (04 May 2019 11:45) (126/78 - 146/66)  BP(mean): --  RR: 18 (04 May 2019 11:45) (18 - 18)  SpO2: 99% (04 May 2019 11:45) (93% - 99%)    PHYSICAL EXAM:  GENERAL: NAD, well-developed  HEAD:  Atraumatic, Normocephalic  EYES: EOMI, PERRLA, conjunctiva and sclera clear  NECK: Supple, No JVD  CHEST/LUNG: Clear to auscultation bilaterally; No wheeze  HEART: Regular rate and rhythm; No murmurs, rubs, or gallops  ABDOMEN: Soft, Nontender, Nondistended; Bowel sounds present  EXTREMITIES:  2+ Peripheral Pulses, No clubbing, cyanosis, or edema  PSYCH: AAOx3  NEUROLOGY: non-focal  SKIN: No rashes or lesions Multiple ecchymosis on arms and legs.    LABS:                        8.7    7.27  )-----------( 196      ( 04 May 2019 09:28 )             27.2     05-    142  |  112<H>  |  33<H>  ----------------------------<  85  4.2   |  21<L>  |  1.12    Ca    8.6      04 May 2019 06:40        CARDIAC MARKERS ( 04 May 2019 06:40 )  x     / x     / 1427 U/L / x     / x      CARDIAC MARKERS ( 02 May 2019 23:02 )  x     / x     / 926 U/L / x     / 15.2 ng/mL      Urinalysis Basic - ( 02 May 2019 16:53 )    Color: Yellow / Appearance: Clear / S.027 / pH: x  Gluc: x / Ketone: Negative  / Bili: Negative / Urobili: Negative   Blood: x / Protein: Trace / Nitrite: Negative   Leuk Esterase: Negative / RBC: 1 /hpf / WBC 1 /HPF   Sq Epi: x / Non Sq Epi: 2 /hpf / Bacteria: Negative        Culture - Urine (collected 02 May 2019 22:25)  Source: .Urine  Final Report (03 May 2019 17:50):    No growth    Culture - Blood (collected 02 May 2019 17:08)  Source: .Blood  Preliminary Report (03 May 2019 18:01):    No growth to date.    Culture - Blood (collected 02 May 2019 17:08)  Source: .Blood  Preliminary Report (03 May 2019 18:01):    No growth to date.        RADIOLOGY & ADDITIONAL TESTS:    Imaging Personally Reviewed:    Consultant(s) Notes Reviewed:      Care Discussed with Consultants/Other Providers:
Patient is a 89y old  Female who presents with a chief complaint of syncope (05 May 2019 08:50)      SUBJECTIVE / OVERNIGHT EVENTS: No new complaints.   Review of Systems  chest pain no  palpitations no  sob no  nausea no  headache no    MEDICATIONS  (STANDING):  aspirin  chewable 81 milliGRAM(s) Oral daily  fluticasone propionate 50 MICROgram(s)/spray Nasal Spray 2 Spray(s) Both Nostrils daily  guaiFENesin  milliGRAM(s) Oral once  heparin  Injectable 5000 Unit(s) SubCutaneous every 12 hours  levothyroxine 100 MICROGram(s) Oral daily  losartan 50 milliGRAM(s) Oral daily  ondansetron Injectable 4 milliGRAM(s) IV Push once  pantoprazole    Tablet 40 milliGRAM(s) Oral before breakfast  polyethylene glycol 3350 17 Gram(s) Oral daily  simvastatin 40 milliGRAM(s) Oral at bedtime    MEDICATIONS  (PRN):  acetaminophen   Tablet .. 650 milliGRAM(s) Oral every 6 hours PRN Temp greater or equal to 38.5C (101.3F), Mild Pain (1 - 3)  ALBUTerol/ipratropium for Nebulization 3 milliLiter(s) Nebulizer every 6 hours PRN Shortness of Breath and/or Wheezing      Vital Signs Last 24 Hrs  T(C): 37.1 (05 May 2019 11:13), Max: 37.4 (05 May 2019 04:03)  T(F): 98.8 (05 May 2019 11:13), Max: 99.4 (05 May 2019 04:03)  HR: 70 (05 May 2019 11:13) (60 - 81)  BP: 155/71 (05 May 2019 11:13) (146/66 - 155/82)  BP(mean): --  RR: 17 (05 May 2019 11:13) (17 - 18)  SpO2: 95% (05 May 2019 11:13) (95% - 100%)    PHYSICAL EXAM:  GENERAL: NAD, well-developed  HEAD:  Atraumatic, Normocephalic  EYES: EOMI, PERRLA, conjunctiva and sclera clear  NECK: Supple, No JVD  CHEST/LUNG: Clear to auscultation bilaterally; No wheeze  HEART: Regular rate and rhythm; No murmurs, rubs, or gallops  ABDOMEN: Soft, Nontender, Nondistended; Bowel sounds present  EXTREMITIES:  2+ Peripheral Pulses, No clubbing, cyanosis, or edema ecchymosis improving   PSYCH: AAOx3  NEUROLOGY: non-focal  SKIN: No rashes or lesions    LABS:                        8.7    7.27  )-----------( 196      ( 04 May 2019 09:28 )             27.2     05-04    142  |  112<H>  |  33<H>  ----------------------------<  85  4.2   |  21<L>  |  1.12    Ca    8.6      04 May 2019 06:40        CARDIAC MARKERS ( 04 May 2019 06:40 )  x     / x     / 1427 U/L / x     / x              Culture - Urine (collected 02 May 2019 22:25)  Source: .Urine  Final Report (03 May 2019 17:50):    No growth        RADIOLOGY & ADDITIONAL TESTS:    Imaging Personally Reviewed:    Consultant(s) Notes Reviewed:      Care Discussed with Consultants/Other Providers:
Patient is a 89y old  Female who presents with a chief complaint of syncope (06 May 2019 13:49)      SUBJECTIVE / OVERNIGHT EVENTS: Comfortable without new complaints.   Review of Systems  chest pain no  palpitations no  sob no  nausea no  headache no    MEDICATIONS  (STANDING):  aspirin  chewable 81 milliGRAM(s) Oral daily  fluticasone propionate 50 MICROgram(s)/spray Nasal Spray 2 Spray(s) Both Nostrils daily  guaiFENesin  milliGRAM(s) Oral once  heparin  Injectable 5000 Unit(s) SubCutaneous every 12 hours  levothyroxine 100 MICROGram(s) Oral daily  losartan 50 milliGRAM(s) Oral daily  ondansetron Injectable 4 milliGRAM(s) IV Push once  pantoprazole    Tablet 40 milliGRAM(s) Oral before breakfast  polyethylene glycol 3350 17 Gram(s) Oral daily  simvastatin 40 milliGRAM(s) Oral at bedtime  sodium chloride 0.9%. 1000 milliLiter(s) (75 mL/Hr) IV Continuous <Continuous>    MEDICATIONS  (PRN):  acetaminophen   Tablet .. 650 milliGRAM(s) Oral every 6 hours PRN Temp greater or equal to 38.5C (101.3F), Mild Pain (1 - 3)  ALBUTerol/ipratropium for Nebulization 3 milliLiter(s) Nebulizer every 6 hours PRN Shortness of Breath and/or Wheezing      Vital Signs Last 24 Hrs  T(C): 36.8 (06 May 2019 10:59), Max: 36.9 (06 May 2019 04:02)  T(F): 98.3 (06 May 2019 10:59), Max: 98.4 (06 May 2019 04:02)  HR: 55 (06 May 2019 10:59) (55 - 66)  BP: 134/71 (06 May 2019 10:59) (134/71 - 153/76)  BP(mean): --  RR: 17 (06 May 2019 10:59) (17 - 18)  SpO2: 95% (06 May 2019 10:59) (94% - 98%)    PHYSICAL EXAM:  GENERAL: NAD, well-developed  HEAD:  Atraumatic, Normocephalic  EYES: EOMI, PERRLA, conjunctiva and sclera clear  NECK: Supple, No JVD  CHEST/LUNG: Clear to auscultation bilaterally; No wheeze  HEART: Regular rate and rhythm; No murmurs, rubs, or gallops  ABDOMEN: Soft, Nontender, Nondistended; Bowel sounds present  EXTREMITIES:  2+ Peripheral Pulses, No clubbing, cyanosis, or edema  PSYCH: AAOx3  NEUROLOGY: non-focal  SKIN: ecchymosis improving     LABS:                        9.4    5.0   )-----------( 201      ( 06 May 2019 06:40 )             26.3     05-06    142  |  113<H>  |  26<H>  ----------------------------<  79  4.3   |  18<L>  |  0.90    Ca    8.1<L>      06 May 2019 06:30        CARDIAC MARKERS ( 06 May 2019 06:30 )  x     / x     / 703 U/L / x     / x                RADIOLOGY & ADDITIONAL TESTS:    Imaging Personally Reviewed:    Consultant(s) Notes Reviewed:      Care Discussed with Consultants/Other Providers:
pt seen and examined, no complaints, ROS - .     Tele NSR .    acetaminophen   Tablet .. 650 milliGRAM(s) Oral every 6 hours PRN  ALBUTerol/ipratropium for Nebulization 3 milliLiter(s) Nebulizer every 6 hours PRN  aspirin  chewable 81 milliGRAM(s) Oral daily  fluticasone propionate 50 MICROgram(s)/spray Nasal Spray 2 Spray(s) Both Nostrils daily  guaiFENesin  milliGRAM(s) Oral once  heparin  Injectable 5000 Unit(s) SubCutaneous every 12 hours  levothyroxine 100 MICROGram(s) Oral daily  losartan 50 milliGRAM(s) Oral daily  ondansetron Injectable 4 milliGRAM(s) IV Push once  pantoprazole    Tablet 40 milliGRAM(s) Oral before breakfast  polyethylene glycol 3350 17 Gram(s) Oral daily  simvastatin 40 milliGRAM(s) Oral at bedtime                            8.7    7.27  )-----------( 196      ( 04 May 2019 09:28 )             27.2       Hemoglobin: 8.7 g/dL (05-04 @ 09:28)  Hemoglobin: 8.9 g/dL (05-03 @ 07:55)  Hemoglobin: 11.1 g/dL (05-02 @ 14:03)      05-04    142  |  112<H>  |  33<H>  ----------------------------<  85  4.2   |  21<L>  |  1.12    Ca    8.6      04 May 2019 06:40      Creatinine Trend: 1.12<--, 1.17<--, 1.58<--    COAGS:     CARDIAC MARKERS ( 04 May 2019 06:40 )  x     / x     / 1427 U/L / x     / x      CARDIAC MARKERS ( 02 May 2019 23:02 )  x     / x     / 926 U/L / x     / 15.2 ng/mL  CARDIAC MARKERS ( 02 May 2019 14:03 )  x     / x     / 193 U/L / x     / 2.9 ng/mL        T(C): 37.4 (05-05-19 @ 04:03), Max: 37.4 (05-05-19 @ 04:03)  HR: 81 (05-05-19 @ 04:03) (50 - 81)  BP: 146/66 (05-05-19 @ 04:03) (133/86 - 155/82)  RR: 18 (05-05-19 @ 04:03) (18 - 18)  SpO2: 96% (05-05-19 @ 04:03) (96% - 100%)  Wt(kg): --    I&O's Summary    04 May 2019 07:01  -  05 May 2019 07:00  --------------------------------------------------------  IN: 924 mL / OUT: 0 mL / NET: 924 mL        no JVD  ella, no murmurs  CTAB  soft nt/nd  no c/c/e    tele/ekg sinus ella  Nov 2018 echo normal      A/P) She is an extremely pleasant 89 year old female with a past medical history of hypertension, who was admitted with unexplained syncope; therefore, Dr. Santana Dewey implanted a [employer] loop recorder given her underlying sinus bradycardia. Obviously there is a clinical concern for an offset pause from an atrial arrhythmia such as atrial fibrillation. Interrogation of her loop recorder in the office demonstrates no arrhythmias to date. She denies any further palpitations nor angina. Echo in Nov 2018 normal. She is now readmitted with syncope     - Loop recorder interrogation noted , no further arrythmia on tele   - ASA , statin   - BP stable on ACE   - Hold on BB at present due to marked Bradycardia on tele   -f/u with Jaime Mancera after discharge on Monday May 13th at 10am  D/W Dr Nathan
pt seen and examined, no complaints, ROS - .     Tele NSR .  acetaminophen   Tablet .. 650 milliGRAM(s) Oral every 6 hours PRN  ALBUTerol/ipratropium for Nebulization 3 milliLiter(s) Nebulizer every 6 hours PRN  aspirin  chewable 81 milliGRAM(s) Oral daily  fluticasone propionate 50 MICROgram(s)/spray Nasal Spray 2 Spray(s) Both Nostrils daily  guaiFENesin  milliGRAM(s) Oral once  heparin  Injectable 5000 Unit(s) SubCutaneous every 12 hours  levothyroxine 100 MICROGram(s) Oral daily  losartan 50 milliGRAM(s) Oral daily  ondansetron Injectable 4 milliGRAM(s) IV Push once  pantoprazole    Tablet 40 milliGRAM(s) Oral before breakfast  polyethylene glycol 3350 17 Gram(s) Oral daily  simvastatin 40 milliGRAM(s) Oral at bedtime  sodium chloride 0.9%. 1000 milliLiter(s) IV Continuous <Continuous>                            8.7    7.27  )-----------( 196      ( 04 May 2019 09:28 )             27.2       Hemoglobin: 8.7 g/dL (05-04 @ 09:28)  Hemoglobin: 8.9 g/dL (05-03 @ 07:55)  Hemoglobin: 11.1 g/dL (05-02 @ 14:03)      05-04    142  |  112<H>  |  33<H>  ----------------------------<  85  4.2   |  21<L>  |  1.12    Ca    8.6      04 May 2019 06:40      Creatinine Trend: 1.12<--, 1.17<--, 1.58<--    COAGS:     CARDIAC MARKERS ( 04 May 2019 06:40 )  x     / x     / 1427 U/L / x     / x            T(C): 36.5 (05-06-19 @ 00:09), Max: 37.4 (05-05-19 @ 04:03)  HR: 57 (05-06-19 @ 00:09) (57 - 81)  BP: 150/70 (05-06-19 @ 00:09) (146/66 - 155/71)  RR: 18 (05-06-19 @ 00:09) (17 - 18)  SpO2: 95% (05-06-19 @ 00:09) (95% - 98%)  Wt(kg): --    I&O's Summary    04 May 2019 07:01  -  05 May 2019 07:00  --------------------------------------------------------  IN: 924 mL / OUT: 0 mL / NET: 924 mL    05 May 2019 07:01  -  06 May 2019 02:02  --------------------------------------------------------  IN: 1140 mL / OUT: 0 mL / NET: 1140 mL        no JVD  ella, no murmurs  CTAB  soft nt/nd  no c/c/e    ECHO : < from: Transthoracic Echocardiogram (05.03.19 @ 11:14) >  Conclusions:  Normal left ventricular systolic function. No segmental  wall motion abnormalities.  Mild pulmonary hypertension.  Atherosclerosis noted in the descending thoracic aorta.    < end of copied text >  < from: Transthoracic Echocardiogram (05.03.19 @ 11:14) >  Conclusions:  Normal left ventricular systolic function. No segmental  wall motion abnormalities.  Mild pulmonary hypertension.  Atherosclerosis noted in the descending thoracic aorta.    < end of copied text >    tele/ekg sinus ella  Nov 2018 echo normal      A/P) She is an extremely pleasant 89 year old female with a past medical history of hypertension, who was admitted with unexplained syncope; therefore, Dr. Santana Dewey implanted a [employer] loop recorder given her underlying sinus bradycardia. Obviously there is a clinical concern for an offset pause from an atrial arrhythmia such as atrial fibrillation. Interrogation of her loop recorder in the office demonstrates no arrhythmias to date. She denies any further palpitations nor angina. Echo in Nov 2018 normal. She is now readmitted with syncope     - Loop recorder interrogation noted , no further arrythmia on tele   - ASA , statin   - BP stable on ACE   - repeat echo with normal LV fx   - Hold on BB at present due to marked Bradycardia on tele   -f/u with Jaime Mancera after discharge on Monday May 13th at 10am  D/W Dr Nathan

## 2019-05-06 NOTE — DISCHARGE NOTE PROVIDER - NSDCCPTREATMENT_GEN_ALL_CORE_FT
PRINCIPAL PROCEDURE  Procedure: Transthoracic echocardiogram  Findings and Treatment: Normal left ventricular systolic function. No segmental  wall motion abnormalities.  Mild pulmonary hypertension.  Atherosclerosis noted in the descending thoracic aorta.  EF 70%

## 2019-05-06 NOTE — DISCHARGE NOTE PROVIDER - HOSPITAL COURSE
89 yo F pmh dementia, htn, hld bibems  from home with c/o fall. pt lives at home alone and receives meals on wheels, when they arrived no one answered at home, meals on wheels went to the neighbor's showing concern, neighbor called 911. EMS found patient inside home on the floor in between the toilet and door, EMS stated 'the house was very hot, about 100 degrees'. Found to be dehydration with ADRIANO and Rhabdomyolysis- s/p gentle IVF. Now improved    She denies any further palpitations nor angina. Loop recorder interrogation no further arrythmia on tele. Repeat echo with normal LV fx. Hold on BB at present due to marked Bradycardia on tele Continue ASA , statin BP stable on ACE     Follow up with cardiologist Jaime Mancera after discharge on Monday May 13th at 10am    Pulm evaluated for dyspnea with productive cough, white sputum. Currently 97% on RA. Lung exam is clear and CT chest without evidence of volume overload or PNA. No further cough. Flonase BID. 89 yo F pmh dementia, htn, hld bibems  from home with c/o fall. pt lives at home alone and receives meals on wheels, when they arrived no one answered at home, meals on wheels went to the neighbor's showing concern, neighbor called 911. EMS found patient inside home on the floor in between the toilet and door, EMS stated 'the house was very hot, about 100 degrees'. Found to be dehydration with ADRIANO and Rhabdomyolysis- s/p IVF. Now improved. Febrile on arrival - 5/2: Azectam X 1 in ED, s/p NS 3L in ED. Infectious work up negative, bloo    Pancultures negative. Lungs clear    She denies any further palpitations nor angina. Loop recorder interrogation no further arrythmia on tele. Repeat echo with normal LV fx. Hold on BB at present due to marked Bradycardia on tele Continue ASA , statin BP stable on ACE     Follow up with cardiologist Jaime Mancera after discharge on Monday May 13th at 10am    Pulm evaluated for dyspnea with productive cough, white sputum. Currently 97% on RA. Lung exam is clear and CT chest without evidence of volume overload or PNA. No further cough. Flonase BID.

## 2019-05-06 NOTE — DISCHARGE NOTE PROVIDER - NSDCCPCAREPLAN_GEN_ALL_CORE_FT
PRINCIPAL DISCHARGE DIAGNOSIS  Diagnosis: Syncope and collapse  Assessment and Plan of Treatment:       SECONDARY DISCHARGE DIAGNOSES  Diagnosis: ADRIANO (acute kidney injury)  Assessment and Plan of Treatment:     Diagnosis: Traumatic rhabdomyolysis, initial encounter  Assessment and Plan of Treatment:     Diagnosis: Cough  Assessment and Plan of Treatment: resolved    Diagnosis: Fever  Assessment and Plan of Treatment: Infection work up negative    Diagnosis: Hypertension  Assessment and Plan of Treatment:     Diagnosis: Hypothyroidism  Assessment and Plan of Treatment: PRINCIPAL DISCHARGE DIAGNOSIS  Diagnosis: Syncope and collapse  Assessment and Plan of Treatment: Farheen due to dehydration  Stay well hydrated  Make arrangement with [employer] to upgrade the system   HOME CARE INSTRUCTIONS  Have someone stay with you until you feel stable.  Do not drive, operate machinery, or play sports until your caregiver says it is okay.  Keep all follow-up appointments as directed by your caregiver.   Lie down right away if you start feeling like you might faint. Breathe deeply and steadily. Wait until all the symptoms have passed.Drink enough fluids to keep your urine clear or pale yellow.  If you are taking blood pressure or heart medicine, get up slowly, taking several minutes to sit and then stand. This can reduce dizziness.        SECONDARY DISCHARGE DIAGNOSES  Diagnosis: ADRIANO (acute kidney injury)  Assessment and Plan of Treatment: Due to dehydration. Keep well hydrated  Now resolved    Diagnosis: Traumatic rhabdomyolysis, initial encounter  Assessment and Plan of Treatment: Due to Syncope and dehydration    Diagnosis: Cough  Assessment and Plan of Treatment: resolved    Diagnosis: Fever  Assessment and Plan of Treatment: Infection work up negative    Diagnosis: Hypertension  Assessment and Plan of Treatment: Take medications for your blood pressure as recommended.  Eat a heart healthy diet that is low in saturated fats and salt, and includes whole grains, fruits, vegetables and lean protein   Exercise regularly (consult with your physician or cardiologist first); maintain a heart healthy weight.   Continue to follow with your primary physician or cardiologist.   Seek medical help for dizziness, Lightheadedness, Blurry vision, Headache, Chest pain, Shortness of breath  Follow up with your medical doctor to establish long term blood pressure treatment goals.      Diagnosis: Hypothyroidism  Assessment and Plan of Treatment: you do not make enough thyroid hormone  signs & symptoms of low levels of thyroid hormone - tired, getting cold easily, coarse or thin hair, constipation, shortness of breath, swelling, irregular periods  your doctor will do thyroid hormone blood tests at least once a year to monitor if medication dose is adequate  take your thyroid medicine as directed by your doctor & on empty stomach

## 2019-05-06 NOTE — DISCHARGE NOTE PROVIDER - CARE PROVIDER_API CALL
Víctor Mancera)  Cardiology Medicine  [address] [facility], [address]  [place] [address]  Phone: [phone]  Fax: [phone]  Follow Up Time: 1 week

## 2019-05-06 NOTE — PROGRESS NOTE ADULT - ASSESSMENT
89 f with   Syncope  - cardiology evaluation noted No further cardiac work up suggested  - ILR interogated    Fever  - observe off antibiotics    SOB  - Pulmonary evaluation Dr. Haley noted    Dehydration  - s/p gentle IVF    Rhabdomyolysis resolving    HTN control    Hypothyroidism  - follow TSH    Abnormal C Spine  - NSx evaluation noted    Dementia  - supportive care     PT    DC home. As per family patient will have 24 hours aid.    Follow with PMD/ Cardiology in 2-3 days  Art Carbajal MD pager [phone]

## 2019-05-06 NOTE — PROGRESS NOTE ADULT - ATTENDING COMMENTS
CARDIOLOGY ATTENDING    Patient seen and examined. Agree with above. Echo and ILR interrogation unremarkable. Therefore no further inpatient cardiac workup needed. Syncope is obviously not related to an underlying arrhythmia. D/C planning as per primary team, and f/u with Jaime Mancera on Monday May 13th at 10am

## 2019-05-07 LAB
CULTURE RESULTS: SIGNIFICANT CHANGE UP
CULTURE RESULTS: SIGNIFICANT CHANGE UP
SPECIMEN SOURCE: SIGNIFICANT CHANGE UP
SPECIMEN SOURCE: SIGNIFICANT CHANGE UP

## 2019-07-24 NOTE — PROGRESS NOTE ADULT - ASSESSMENT
Sent patient her FMLA forms. Never received an Authorization.   89 y/o female with no prior cardiac history admitted with syncopal episode, unwitnessed, while driving. Syncope work-up so far negative.

## 2019-08-30 NOTE — PATIENT PROFILE ADULT. - TEACHING/LEARNING FACTORS INFLUENCE READINESS TO LEARN
change and fever. HENT: Positive for sore throat. Negative for congestion, ear pain and rhinorrhea. Eyes: Negative for discharge and redness. Respiratory: Positive for cough. Negative for shortness of breath, wheezing and stridor. Cardiovascular: Negative for chest pain. Gastrointestinal: Negative for abdominal pain, blood in stool, diarrhea, nausea and vomiting. Genitourinary: Negative for difficulty urinating and dysuria. Positives and Pertinent negatives as per HPI. Except as noted abovein the ROS, all other systems were reviewed and negative. PAST MEDICAL HISTORY   History reviewed. No pertinent past medical history. SURGICAL HISTORY   History reviewed. No pertinent surgical history. CURRENTMEDICATIONS       Previous Medications    No medications on file         ALLERGIES     Patient has no known allergies. FAMILYHISTORY     History reviewed. No pertinent family history.        SOCIAL HISTORY       Social History     Socioeconomic History    Marital status: Single     Spouse name: None    Number of children: None    Years of education: None    Highest education level: None   Occupational History    None   Social Needs    Financial resource strain: None    Food insecurity:     Worry: None     Inability: None    Transportation needs:     Medical: None     Non-medical: None   Tobacco Use    Smoking status: Never Smoker    Smokeless tobacco: Never Used   Substance and Sexual Activity    Alcohol use: Never     Frequency: Never    Drug use: Never    Sexual activity: None   Lifestyle    Physical activity:     Days per week: None     Minutes per session: None    Stress: None   Relationships    Social connections:     Talks on phone: None     Gets together: None     Attends [employer] service: None     Active member of club or organization: None     Attends meetings of clubs or organizations: None     Relationship status: None    Intimate partner violence:     Fear
none

## 2020-01-27 NOTE — ED PROVIDER NOTE - NEUROLOGICAL, MLM
CC: positive BC, unknown source  HPI: This pt is a 65 yo [place] woman with DR, ESRD on HD admitted 1/21 with chief complaint of CP,SOB, pa;pitations,  feeling cold and shivering.  Her WBC was markedly elevated and lactic acid was 7.0. She was given aztreonam plus vanco in the ED.  She had had a UA on 1/17 (I am not sure why) which showed 1-5 WBC and few bacteria. The culture grew 60-100K E coli sensitive only to pip tazo, cefepime, aztreonam, meropenem, gentamicin, Cipro and bactrim. I do not know if she received any treatment for this.  On 1/21, two sets of BC were drawn, both peripherally and 1 of 2 has grown a pansensitive Klebseilla pneumoniae at 22 hours. There have been no BC drawn from graft from the right elbow.  A UA on that day showed 1-5 WBC and few bacteria. Unfortunately, there was no urine culture on admission.   The pts abx were changed to meropenem alone.   Temp on admission 101.7 and WBC 25,400 with 70S. WBC is now down to 8800 with 66S.  Other labs included troponin 0.08-0.10  PCT 1.89  US of liver/GB no acute change  CXR no PNA, but mild congestion, small effusion on L, LLL atelectasis.   PMH:DM, BDR, vitreous hemorrhage OS, cataracts, anemia, GI bleed, gastric and duodenal ulcers, hep B,hep C, CKD--> ESRD, r elbow graft for HD, proteinuria, hypertensive emergency resultant pulm edema and effusion. 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, we did not feel her graft was involved and she was rx with IV cefepime  Allergies none  Current Facility-Administered Medications   Medication   • sodium citrate anticoagulant 4 % flush 3 mL   • sodium chloride (NORMAL SALINE) 0.9 % bolus 100-200 mL   • entecavir (BARACLUDE) tablet 1 mg   • meropenem (MERREM) 1 g in sodium chloride 0.9 % 100 mL IVPB   • amLODIPine (NORVASC) tablet 5 mg   • doxazosin (CARDURA) tablet 2 mg   • ferrous sulfate (65 mg Fe per 325 mg) tablet 325 mg   • furosemide (LASIX) tablet 20 mg   • hydrALAZINE (APRESOLINE) tablet  50 mg   • isosorbide mononitrate (IMDUR) ER tablet 60 mg   • metoCLOPramide (REGLAN) tablet 5 mg   • metoPROLOL tartrate (LOPRESSOR) tablet 100 mg   • pantoprazole (PROTONIX) EC tablet 40 mg   • sucralfate (CARAFATE) tablet 1 g   • sodium chloride 0.9 % flush bag 25 mL   • sodium chloride (PF) 0.9 % injection 2 mL   • dextrose 50 % injection 25 g   • dextrose 50 % injection 12.5 g   • dextrose 5 % infusion   • glucagon (GLUCAGEN) injection 1 mg   • dextrose (GLUTOSE) 40 % gel 15 g   • dextrose (GLUTOSE) 40 % gel 30 g   • heparin (porcine) injection 5,000 Units   • insulin lispro (HumaLOG) sliding scale injection   • potassium CHLORIDE (KLOR-CON M) caleb ER tablet 20 mEq   • potassium CHLORIDE (KLOR-CON) packet 20 mEq   • potassium CHLORIDE 20 mEq/100mL IVPB premix   • potassium CHLORIDE (KLOR-CON M) caleb ER tablet 40 mEq   • potassium CHLORIDE (KLOR-CON) packet 40 mEq   • potassium CHLORIDE 20 mEq/100mL IVPB premix   • magnesium sulfate 1 g in dextrose 5% 100 mL IVPB premix   • magnesium sulfate 2 g in 50 mL premix IVPB   • magnesium sulfate 2 g in 50 mL premix IVPB   • ondansetron (ZOFRAN) injection 4 mg   • acetaminophen (TYLENOL) tablet 650 mg   • polyethylene glycol (GLYCOLAX, MIRALAX) packet 17 g   • docusate sodium-sennosides (SENOKOT S) 50-8.6 MG 2 tablet   • labetalol (NORMODYNE) injection 20 mg   SH and FH nothing to add from October 2019. Does not smoke or drink or use drugs. Lives with family.  PE: alert and answers simple question reliably in my opinion  Blood pressure 183/85, pulse 78, temperature 98.2 °F (36.8 °C), temperature source Oral, resp. rate 18, height 4' 9\" (1.448 m), weight 44.7 kg, SpO2 96 %.  Skin no rash splinters janeways oslers or petechiae  HEENT NCAT EOMI no conj hemorrhage or scleral icterus   Neck not stiff, no KELLY  Lungs few basilar rales, no wz, nl effort  Heart RRR no M  Abdomen soft pos BS NT  Ext no CCE, R elbow graft with fair thrill  CNS no focal deficits.  WBC (K/mcL)   Date  Value   01/27/2020 8.8   01/26/2020 7.7   01/25/2020 7.5      HCT (%)   Date Value   01/27/2020 27.8 (L)   01/26/2020 29.3 (L)   01/25/2020 29.2 (L)      HGB (g/dL)   Date Value   01/27/2020 8.4 (L)   01/26/2020 8.9 (L)   01/25/2020 9.0 (L)      PLT (K/mcL)   Date Value   01/27/2020 185   01/26/2020 190   01/25/2020 191      Creatinine (mg/dL)   Date Value   01/27/2020 3.29 (H)   01/26/2020 3.30 (H)   01/25/2020 2.48 (H)     C-REACTIVE PROTEIN (mg/dL)   Date Value   08/05/2019 1.7 (H)   03/20/2018 2.6 (H)      DIRECT BILIRUBIN (mg/dL)   Date Value   01/25/2020 0.1   06/21/2017 0.2   12/31/2015 0.3 (H)      Albumin (g/dL)   Date Value   01/26/2020 2.7 (L)   01/25/2020 2.7 (L)   12/31/2019 2.3 (L)     TOTAL BILIRUBIN (mg/dL)   Date Value   01/26/2020 0.3   01/25/2020 0.3   12/31/2019 1.1 (H)     AST/SGOT (Units/L)   Date Value   01/26/2020 19   01/25/2020 21   12/31/2019 39 (H)     ALT/SGPT (Units/L)   Date Value   01/26/2020 21   01/25/2020 20   12/31/2019 24     ALK PHOSPHATASE (Units/L)   Date Value   01/26/2020 157 (H)   01/25/2020 154 (H)   12/31/2019 309 (H)     Sodium (mmol/L)   Date Value   01/27/2020 138   01/26/2020 138   01/25/2020 137      Potassium (mmol/L)   Date Value   01/27/2020 5.1   01/26/2020 4.2   01/25/2020 3.8      IMP:   65 yo on HD with fever, marked leucocytosis, and Klebsiella pne in 1/2 peripheral BC. Possible sources include    UTI however there is no urine culture to confimr   Graft but appears benign on exam   Biliary tract however US neg   Lungs, not necessarily seen on admitting CXR   Unlikely to be endocarditis with a single pos BC and because this is a gram negative infection.  REC:   Repeat CXR   if negative would treat with 14 days total of abx.  Could use Cipro in renal doses to complete the 14 days ( ie through Feb 3)   Alert and oriented, no focal deficits, no motor or sensory deficits.

## 2020-06-10 ENCOUNTER — INPATIENT (INPATIENT)
Facility: HOSPITAL | Age: 85
LOS: 2 days | Discharge: HOME | DRG: 176 | End: 2020-06-13
Attending: INTERNAL MEDICINE | Admitting: INTERNAL MEDICINE
Payer: MEDICARE

## 2020-06-10 VITALS
RESPIRATION RATE: 22 BRPM | OXYGEN SATURATION: 99 % | WEIGHT: 115.08 LBS | HEART RATE: 76 BPM | HEIGHT: 56 IN | SYSTOLIC BLOOD PRESSURE: 149 MMHG | TEMPERATURE: 98 F | DIASTOLIC BLOOD PRESSURE: 73 MMHG

## 2020-06-10 DIAGNOSIS — E78.5 HYPERLIPIDEMIA, UNSPECIFIED: ICD-10-CM

## 2020-06-10 DIAGNOSIS — I10 ESSENTIAL (PRIMARY) HYPERTENSION: ICD-10-CM

## 2020-06-10 DIAGNOSIS — K21.9 GASTRO-ESOPHAGEAL REFLUX DISEASE WITHOUT ESOPHAGITIS: ICD-10-CM

## 2020-06-10 DIAGNOSIS — Z79.899 OTHER LONG TERM (CURRENT) DRUG THERAPY: ICD-10-CM

## 2020-06-10 DIAGNOSIS — I26.99 OTHER PULMONARY EMBOLISM WITHOUT ACUTE COR PULMONALE: ICD-10-CM

## 2020-06-10 DIAGNOSIS — N18.2 CHRONIC KIDNEY DISEASE, STAGE 2 (MILD): ICD-10-CM

## 2020-06-10 DIAGNOSIS — Z87.19 PERSONAL HISTORY OF OTHER DISEASES OF THE DIGESTIVE SYSTEM: Chronic | ICD-10-CM

## 2020-06-10 DIAGNOSIS — Z71.89 OTHER SPECIFIED COUNSELING: ICD-10-CM

## 2020-06-10 LAB
ALBUMIN SERPL ELPH-MCNC: 4.2 G/DL — SIGNIFICANT CHANGE UP (ref 3.3–5)
ALP SERPL-CCNC: 46 U/L — SIGNIFICANT CHANGE UP (ref 40–120)
ALT FLD-CCNC: 23 U/L — SIGNIFICANT CHANGE UP (ref 10–45)
ANION GAP SERPL CALC-SCNC: 12 MMOL/L — SIGNIFICANT CHANGE UP (ref 5–17)
APTT BLD: 26.4 SEC — LOW (ref 27.5–36.3)
AST SERPL-CCNC: 32 U/L — SIGNIFICANT CHANGE UP (ref 10–40)
BASE EXCESS BLDV CALC-SCNC: 5.3 MMOL/L — HIGH (ref -2–2)
BASOPHILS # BLD AUTO: 0.05 K/UL — SIGNIFICANT CHANGE UP (ref 0–0.2)
BASOPHILS NFR BLD AUTO: 0.5 % — SIGNIFICANT CHANGE UP (ref 0–2)
BILIRUB SERPL-MCNC: 0.4 MG/DL — SIGNIFICANT CHANGE UP (ref 0.2–1.2)
BUN SERPL-MCNC: 23 MG/DL — SIGNIFICANT CHANGE UP (ref 7–23)
CA-I SERPL-SCNC: 1.23 MMOL/L — SIGNIFICANT CHANGE UP (ref 1.12–1.3)
CALCIUM SERPL-MCNC: 9.6 MG/DL — SIGNIFICANT CHANGE UP (ref 8.4–10.5)
CHLORIDE BLDV-SCNC: 102 MMOL/L — SIGNIFICANT CHANGE UP (ref 96–108)
CHLORIDE SERPL-SCNC: 98 MMOL/L — SIGNIFICANT CHANGE UP (ref 96–108)
CO2 BLDV-SCNC: 35 MMOL/L — HIGH (ref 22–30)
CO2 SERPL-SCNC: 28 MMOL/L — SIGNIFICANT CHANGE UP (ref 22–31)
CREAT SERPL-MCNC: 1.23 MG/DL — SIGNIFICANT CHANGE UP (ref 0.5–1.3)
EOSINOPHIL # BLD AUTO: 0.04 K/UL — SIGNIFICANT CHANGE UP (ref 0–0.5)
EOSINOPHIL NFR BLD AUTO: 0.4 % — SIGNIFICANT CHANGE UP (ref 0–6)
GAS PNL BLDV: 136 MMOL/L — SIGNIFICANT CHANGE UP (ref 135–145)
GAS PNL BLDV: SIGNIFICANT CHANGE UP
GAS PNL BLDV: SIGNIFICANT CHANGE UP
GLUCOSE BLDV-MCNC: 111 MG/DL — HIGH (ref 70–99)
GLUCOSE SERPL-MCNC: 117 MG/DL — HIGH (ref 70–99)
HCO3 BLDV-SCNC: 33 MMOL/L — HIGH (ref 21–29)
HCT VFR BLD CALC: 42.1 % — SIGNIFICANT CHANGE UP (ref 34.5–45)
HCT VFR BLDA CALC: 42 % — SIGNIFICANT CHANGE UP (ref 39–50)
HGB BLD CALC-MCNC: 13.7 G/DL — SIGNIFICANT CHANGE UP (ref 11.5–15.5)
HGB BLD-MCNC: 13.3 G/DL — SIGNIFICANT CHANGE UP (ref 11.5–15.5)
HOROWITZ INDEX BLDV+IHG-RTO: SIGNIFICANT CHANGE UP
IMM GRANULOCYTES NFR BLD AUTO: 0.9 % — SIGNIFICANT CHANGE UP (ref 0–1.5)
INR BLD: 0.97 RATIO — SIGNIFICANT CHANGE UP (ref 0.88–1.16)
LACTATE BLDV-MCNC: 2 MMOL/L — SIGNIFICANT CHANGE UP (ref 0.7–2)
LYMPHOCYTES # BLD AUTO: 1.91 K/UL — SIGNIFICANT CHANGE UP (ref 1–3.3)
LYMPHOCYTES # BLD AUTO: 18 % — SIGNIFICANT CHANGE UP (ref 13–44)
MCHC RBC-ENTMCNC: 31.6 GM/DL — LOW (ref 32–36)
MCHC RBC-ENTMCNC: 32 PG — SIGNIFICANT CHANGE UP (ref 27–34)
MCV RBC AUTO: 101.4 FL — HIGH (ref 80–100)
MONOCYTES # BLD AUTO: 1.01 K/UL — HIGH (ref 0–0.9)
MONOCYTES NFR BLD AUTO: 9.5 % — SIGNIFICANT CHANGE UP (ref 2–14)
NEUTROPHILS # BLD AUTO: 7.48 K/UL — HIGH (ref 1.8–7.4)
NEUTROPHILS NFR BLD AUTO: 70.7 % — SIGNIFICANT CHANGE UP (ref 43–77)
NRBC # BLD: 0 /100 WBCS — SIGNIFICANT CHANGE UP (ref 0–0)
NT-PROBNP SERPL-SCNC: 413 PG/ML — HIGH (ref 0–300)
PCO2 BLDV: 63 MMHG — HIGH (ref 35–50)
PH BLDV: 7.34 — LOW (ref 7.35–7.45)
PLATELET # BLD AUTO: 243 K/UL — SIGNIFICANT CHANGE UP (ref 150–400)
PO2 BLDV: 33 MMHG — SIGNIFICANT CHANGE UP (ref 25–45)
POTASSIUM BLDV-SCNC: 4.5 MMOL/L — SIGNIFICANT CHANGE UP (ref 3.5–5.3)
POTASSIUM SERPL-MCNC: 4.6 MMOL/L — SIGNIFICANT CHANGE UP (ref 3.5–5.3)
POTASSIUM SERPL-SCNC: 4.6 MMOL/L — SIGNIFICANT CHANGE UP (ref 3.5–5.3)
PROT SERPL-MCNC: 6.7 G/DL — SIGNIFICANT CHANGE UP (ref 6–8.3)
PROTHROM AB SERPL-ACNC: 11.1 SEC — SIGNIFICANT CHANGE UP (ref 10–12.9)
RBC # BLD: 4.15 M/UL — SIGNIFICANT CHANGE UP (ref 3.8–5.2)
RBC # FLD: 13.8 % — SIGNIFICANT CHANGE UP (ref 10.3–14.5)
SAO2 % BLDV: 56 % — LOW (ref 67–88)
SARS-COV-2 RNA SPEC QL NAA+PROBE: SIGNIFICANT CHANGE UP
SODIUM SERPL-SCNC: 138 MMOL/L — SIGNIFICANT CHANGE UP (ref 135–145)
TROPONIN T, HIGH SENSITIVITY RESULT: 27 NG/L — SIGNIFICANT CHANGE UP (ref 0–51)
TROPONIN T, HIGH SENSITIVITY RESULT: 27 NG/L — SIGNIFICANT CHANGE UP (ref 0–51)
WBC # BLD: 10.59 K/UL — HIGH (ref 3.8–10.5)
WBC # FLD AUTO: 10.59 K/UL — HIGH (ref 3.8–10.5)

## 2020-06-10 PROCEDURE — 71275 CT ANGIOGRAPHY CHEST: CPT | Mod: 26

## 2020-06-10 PROCEDURE — 93308 TTE F-UP OR LMTD: CPT | Mod: 26

## 2020-06-10 PROCEDURE — 99497 ADVNCD CARE PLAN 30 MIN: CPT

## 2020-06-10 PROCEDURE — 99285 EMERGENCY DEPT VISIT HI MDM: CPT | Mod: CS

## 2020-06-10 PROCEDURE — 99223 1ST HOSP IP/OBS HIGH 75: CPT | Mod: 25

## 2020-06-10 PROCEDURE — 71045 X-RAY EXAM CHEST 1 VIEW: CPT | Mod: 26

## 2020-06-10 PROCEDURE — 93010 ELECTROCARDIOGRAM REPORT: CPT

## 2020-06-10 RX ORDER — LACTOBACILLUS ACIDOPHILUS 100MM CELL
1 CAPSULE ORAL
Refills: 0 | Status: DISCONTINUED | OUTPATIENT
Start: 2020-06-10 | End: 2020-06-13

## 2020-06-10 RX ORDER — LEVOTHYROXINE SODIUM 125 MCG
100 TABLET ORAL DAILY
Refills: 0 | Status: DISCONTINUED | OUTPATIENT
Start: 2020-06-10 | End: 2020-06-13

## 2020-06-10 RX ORDER — RANITIDINE HYDROCHLORIDE 150 MG/1
1 TABLET, FILM COATED ORAL
Qty: 0 | Refills: 0 | DISCHARGE

## 2020-06-10 RX ORDER — ALBUTEROL 90 UG/1
2 AEROSOL, METERED ORAL ONCE
Refills: 0 | Status: COMPLETED | OUTPATIENT
Start: 2020-06-10 | End: 2020-06-10

## 2020-06-10 RX ORDER — SIMVASTATIN 20 MG/1
1 TABLET, FILM COATED ORAL
Qty: 0 | Refills: 0 | DISCHARGE

## 2020-06-10 RX ORDER — ENOXAPARIN SODIUM 100 MG/ML
50 INJECTION SUBCUTANEOUS ONCE
Refills: 0 | Status: COMPLETED | OUTPATIENT
Start: 2020-06-10 | End: 2020-06-10

## 2020-06-10 RX ORDER — LANOLIN ALCOHOL/MO/W.PET/CERES
3 CREAM (GRAM) TOPICAL ONCE
Refills: 0 | Status: COMPLETED | OUTPATIENT
Start: 2020-06-10 | End: 2020-06-10

## 2020-06-10 RX ORDER — POLYETHYLENE GLYCOL 3350 17 G/17G
17 POWDER, FOR SOLUTION ORAL
Qty: 0 | Refills: 0 | DISCHARGE

## 2020-06-10 RX ORDER — ENOXAPARIN SODIUM 100 MG/ML
50 INJECTION SUBCUTANEOUS EVERY 12 HOURS
Refills: 0 | Status: DISCONTINUED | OUTPATIENT
Start: 2020-06-10 | End: 2020-06-10

## 2020-06-10 RX ORDER — ENOXAPARIN SODIUM 100 MG/ML
50 INJECTION SUBCUTANEOUS DAILY
Refills: 0 | Status: DISCONTINUED | OUTPATIENT
Start: 2020-06-11 | End: 2020-06-13

## 2020-06-10 RX ORDER — PANTOPRAZOLE SODIUM 20 MG/1
40 TABLET, DELAYED RELEASE ORAL
Refills: 0 | Status: DISCONTINUED | OUTPATIENT
Start: 2020-06-10 | End: 2020-06-13

## 2020-06-10 RX ORDER — CHOLECALCIFEROL (VITAMIN D3) 125 MCG
4000 CAPSULE ORAL DAILY
Refills: 0 | Status: DISCONTINUED | OUTPATIENT
Start: 2020-06-10 | End: 2020-06-13

## 2020-06-10 RX ORDER — FUROSEMIDE 40 MG
20 TABLET ORAL ONCE
Refills: 0 | Status: COMPLETED | OUTPATIENT
Start: 2020-06-10 | End: 2020-06-10

## 2020-06-10 RX ORDER — SIMVASTATIN 20 MG/1
20 TABLET, FILM COATED ORAL AT BEDTIME
Refills: 0 | Status: DISCONTINUED | OUTPATIENT
Start: 2020-06-10 | End: 2020-06-13

## 2020-06-10 RX ADMIN — ALBUTEROL 2 PUFF(S): 90 AEROSOL, METERED ORAL at 13:37

## 2020-06-10 RX ADMIN — Medication 3 MILLIGRAM(S): at 23:19

## 2020-06-10 RX ADMIN — SIMVASTATIN 20 MILLIGRAM(S): 20 TABLET, FILM COATED ORAL at 23:19

## 2020-06-10 RX ADMIN — ENOXAPARIN SODIUM 50 MILLIGRAM(S): 100 INJECTION SUBCUTANEOUS at 18:17

## 2020-06-10 RX ADMIN — Medication 20 MILLIGRAM(S): at 16:24

## 2020-06-10 NOTE — ED PROVIDER NOTE - CLINICAL SUMMARY MEDICAL DECISION MAKING FREE TEXT BOX
Attending MD Hager: 89F presenting with dyspnea x 1 day, patient arrives slightly tachypneic, bibasilar rales, borderline O2 sat in low 90s RA, exam with peripheral edema, no CHF history documented. Ddx includes PNA, CHF, ACS, less likely PE but will consider if no alternative diagnosis apparent

## 2020-06-10 NOTE — H&P ADULT - NSHPPHYSICALEXAM_GEN_ALL_CORE
PHYSICAL EXAM:    ICU Vital Signs Last 24 Hrs  T(C): 36.6 (10 Pb 2020 20:34), Max: 37.4 (10 Pb 2020 12:50)  T(F): 97.9 (10 Pb 2020 20:34), Max: 99.4 (10 Pb 2020 12:50)  HR: 61 (10 Pb 2020 20:34) (61 - 84)  BP: 141/72 (10 Pb 2020 20:34) (138/63 - 155/80)  BP(mean): 99 (10 Pb 2020 18:19) (95 - 99)  ABP: --  ABP(mean): --  RR: 18 (10 Pb 2020 20:34) (18 - 26)  SpO2: 97% (10 Pb 2020 20:34) (94% - 100%)      GENERAL: NAD, well-groomed, well-developed  HEAD:  Atraumatic, Normocephalic  EYES: EOMI, PERRLA, conjunctiva and sclera clear  ENMT: No tonsillar erythema, exudates, or enlargement; Moist mucous membranes, Good dentition, No lesions  NECK: Supple, No JVD, Normal thyroid  CHEST/LUNG: Clear to percussion bilaterally; No rales, rhonchi, wheezing, or rubs  HEART: Regular rate and rhythm; No murmurs, rubs, or gallops  ABDOMEN: Soft, Nontender, Nondistended; Bowel sounds present  EXTREMITIES:  2+ Peripheral Pulses, No clubbing, cyanosis, or edema  LYMPH: No lymphadenopathy noted  SKIN: No rashes or lesions  NERVOUS SYSTEM:  Alert & Oriented X3, Good concentration; Motor Strength 5/5 B/L upper and lower extremities; DTRs 2+ intact and symmetric PHYSICAL EXAM:    ICU Vital Signs Last 24 Hrs  T(C): 36.6 (10 Pb 2020 20:34), Max: 37.4 (10 Pb 2020 12:50)  T(F): 97.9 (10 Pb 2020 20:34), Max: 99.4 (10 Pb 2020 12:50)  HR: 61 (10 Pb 2020 20:34) (61 - 84)  BP: 141/72 (10 Pb 2020 20:34) (138/63 - 155/80)  BP(mean): 99 (10 Pb 2020 18:19) (95 - 99)  ABP: --  ABP(mean): --  RR: 18 (10 Pb 2020 20:34) (18 - 26)  SpO2: 97% (10 Pb 2020 20:34) (94% - 100%)      GENERAL: NAD, well-groomed, well-developed  HEAD:  Atraumatic, Normocephalic  EYES: EOMI, PERRLA, conjunctiva and sclera clear  ENMT: No tonsillar erythema, exudates, or enlargement; Moist mucous membranes, Good dentition, No lesions  NECK: Supple, No JVD, Normal thyroid  CHEST/LUNG: Clear to percussion bilaterally; No rales, rhonchi, wheezing, or rubs no resp distress or accessory muscle usage.   HEART: Regular rate and rhythm; No murmurs, rubs, or gallops, no sig le edema.   ABDOMEN: Soft, Nontender, Nondistended; Bowel sounds present, no rebound/guarding  EXTREMITIES:  2+ Peripheral Pulses, No clubbing, cyanosis  LYMPH: No lymphadenopathy noted, no lymphangitis  SKIN: No rashes. +large superficial ecchymosis of RUE, nontender.   NERVOUS SYSTEM:  Alert & Oriented X2-3, Good concentration; Motor Strength 5/5 B/L upper and lower extremities. no facial droop no dysarthria.

## 2020-06-10 NOTE — ED ADULT NURSE REASSESSMENT NOTE - NS ED NURSE REASSESS COMMENT FT1
Patient A7Ox3, resting comfrtbaly in bed, no complaints at this time. SPO2 99% on 2L NC. Will continue to monitor.

## 2020-06-10 NOTE — ED PROVIDER NOTE - ATTENDING CONTRIBUTION TO CARE
Attending MD Hager:   I personally have seen and examined this patient.  Physician assistant note reviewed and agree on plan of care and except where noted.  See HPI, PE, and MDM for details.

## 2020-06-10 NOTE — ED PROVIDER NOTE - OBJECTIVE STATEMENT
89 yo female PMhx HTN, HLD presents to the ED c/o shortness of breath that began this morning. Pt woke up w/ symptoms, feels short of breath at rest and w/ exertion, no change. Never had symptoms like this before. No pleuritic or exertional component. Lives at home w/ full time aid. Denies chest pain, cough, fever/chills, hemoptysis, recent sick contacts, abdominal pain, n/v/d, urinary urgency frequency, dysuria, flank/back pain. 89 yo female PMhx HTN, HLD presents to the ED c/o shortness of breath that began this morning. Pt woke up w/ symptoms, feels short of breath at rest and w/ exertion, no change. Never had symptoms like this before. No pleuritic or exertional component. Lives at home w/ full time aid. Denies chest pain, cough, fever/chills, hemoptysis, recent sick contacts, abdominal pain, n/v/d, urinary urgency frequency, dysuria, flank/back pain.  PMD: David Jacobo

## 2020-06-10 NOTE — H&P ADULT - PROBLEM SELECTOR PLAN 1
-dyspnea x few days; VSS mostly no significant O2 requirement at present. CXR with atelectasis otherwise nondx. CTA with LLL age indeterminate PE  -s/p lovenox 50 subq x 1.  Will c/w lovenox 50 bid. Pt educated on s/s of bleeding  -check TTE  -HST delta neg

## 2020-06-10 NOTE — H&P ADULT - PROBLEM SELECTOR PLAN 7
-pt unsure of all meds, pharmacy [facility] has started med rec but will try to reach out to pharmacy in am. pt denies aspirin use will hold as we are starting full dose a/c .

## 2020-06-10 NOTE — H&P ADULT - HISTORY OF PRESENT ILLNESS
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    VS: 98.4, 84, 155/80, 22, 100% 2LNC.  Labs: wbc 10.6, stable macrocytosis, plt wnl, no anemia, coags unrevealing, HST delta neg 27 --> 27, cmp with stable ckd3, lactate wnl, mild hypercapnia pco2 63, covid19 pcr neg.  CTA chest +age indeterminate LLL PE.  POCUS: no pericardia eff, preserved LVSF. CXR: +RLL atelectasis, no acute findings otherwise, +ILR in place. IN ER pt received therapeutic Lovenox 50 mg subq x 1, lasix 20 iv x 1, albuterol prior to medicine team involvement. 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 Pt says onset of dyspnea has been going on for a "few days." Lives alone, has HHA which she has asked to stay around for longer hours this past week b/c she has just not felt comfortable.  +MCKEON +sob when talking has to take breaks more often. Otherwise denies overt sob at rest. Denies LE edema. Denies orthopnea.  Denies cp, f/c, n/v/d, dysuria, cough, myalgias.  Denies pleuritic cp. No prior hx of vte, no family clotting disorders.     VS: 98.4, 84, 155/80, 22, 100% 2LNC.  Labs: wbc 10.6, stable macrocytosis, plt wnl, no anemia, coags unrevealing, HST delta neg 27 --> 27, cmp with stable ckd3, lactate wnl, mild hypercapnia pco2 63, covid19 pcr neg.  CTA chest +age indeterminate LLL PE.  POCUS: no pericardia eff, preserved LVSF. CXR: +RLL atelectasis, no acute findings otherwise, +ILR in place. IN ER pt received therapeutic Lovenox 50 mg subq x 1, lasix 20 iv x 1, albuterol prior to medicine team involvement.

## 2020-06-10 NOTE — H&P ADULT - NSHPREVIEWOFSYSTEMS_GEN_ALL_CORE
REVIEW OF SYSTEMS:  CONSTITUTIONAL: No weakness. No fevers. No chills. No weight loss. Good appetite.  EYES: No visual changes. No eye pain.  ENT: No hearing difficulty. No vertigo. No dysphagia. No Sinusitis/rhinorrhea.  NECK: No pain. No stiffness/rigidity.  CARDIAC: No chest pain. No palpitations.  RESPIRATORY: No cough. +SOB. No hemoptysis.  GASTROINTESTINAL: No abdominal pain. No nausea. No vomiting. No hematemesis. No diarrhea. No constipation. No melena. No hematochezia.  GENITOURINARY: No dysuria. No frequency. No hesitancy. No hematuria.  NEUROLOGICAL: No numbness. No focal weakness. No incontinence. No headache.  BACK: No back pain.  EXTREMITIES: No lower extremity edema. Full ROM.  SKIN: No rashes. No itching. No other lesions.  PSYCHIATRIC: No depression. No anxiety. No SI/HI.  ALLERGIC: No lip swelling. No hives.  All other review of systems is negative unless indicated above.

## 2020-06-10 NOTE — H&P ADULT - PROBLEM SELECTOR PLAN 6
-16 minutes spent clarifying advanced directives with patient  at bedside  -At this time, patient demonstrates capacity with insight into condition, and chooses to be DNR/DNI  -All questions answered at bedside.   -MOLST filled out  -order placed in [facility].

## 2020-06-10 NOTE — H&P ADULT - NSHPLABSRESULTS_GEN_ALL_CORE
Labs personally reviewed  wbc 10.6, stable macrocytosis, plt wnl, no anemia, coags unrevealing, HST delta neg 27 --> 27, cmp with stable ckd3, lactate wnl, mild hypercapnia pco2 63, covid19 pcr neg.  Imaging personally reviewed   CTA chest +age indeterminate LLL PE.  POCUS: no pericardia eff, preserved LVSF. CXR: +RLL atelectasis, no acute findings otherwise, +ILR in place.  EKG personally reviewed

## 2020-06-10 NOTE — ED ADULT NURSE NOTE - OBJECTIVE STATEMENT
89y/o female presented to the ED from home via EMS with complaint of SOB. A&Ox3, ambulatory at [facility] with assistance. PMH- HTN, HLD, Skin intact. No rash. CA. Patient reports SOB that started this morning, that has become progressively worse. Patient reports discomfort when taking in a deep breath. crackles hear bilaterally up0on ascultation. Labored breathing noted, RR 26/min,.  EMS states patient was found with labored breathing, SPO2 93% on room air. Patient placed on 3L NC SPO2 improved to 98%. Denies recent illness/sick contacts. Denies chest pain, abdominal pain, Fever, chills, N/V/D. Former smoker x20 years, stopped 15 years ago.

## 2020-06-10 NOTE — ED PROVIDER NOTE - CARE PLAN
Principal Discharge DX:	Pulmonary embolism  Secondary Diagnosis:	Shortness of breath  Secondary Diagnosis:	Hypoxia

## 2020-06-10 NOTE — H&P ADULT - NSHPSOCIALHISTORY_GEN_ALL_CORE
Social History:    Occupation: retired  Lives with: (x  ) alone  (  ) children   (  ) spouse   (  ) parents  (  ) other    Substance Use (street drugs): ( x ) never used  (  ) other:  Tobacco Usage:  (  x ) never smoked   (   ) former smoker   (   ) current smoker  (     ) pack year  (        ) last cigarette date  Alcohol Usage: denies

## 2020-06-10 NOTE — ED PROVIDER NOTE - PROGRESS NOTE DETAILS
CTA w/ age indeterminate non-occlusive left lower lobar thrombus. D/w attending, will start lovenox. Page sent out to PMD Dr. Jacobo. - Arley Grant PA-C D/w Dr. Jacobo, requested admission to Dr. Carbajal, spoke w/ Dr. Carbajal, made aware, accepted pt under his service. - IRINEO ButcherC

## 2020-06-10 NOTE — H&P ADULT - ATTENDING COMMENTS
Care to be assumed by Dr. Carbajal at 8 am.  This patient was assigned to me by the hospitalist in charge; my involvement in this case has consisted of the initial history, physical, chart review, and management plan.  This patient was previously unknown to me.

## 2020-06-11 LAB
ALBUMIN SERPL ELPH-MCNC: 3.6 G/DL — SIGNIFICANT CHANGE UP (ref 3.3–5)
ALP SERPL-CCNC: 36 U/L — LOW (ref 40–120)
ALT FLD-CCNC: 16 U/L — SIGNIFICANT CHANGE UP (ref 10–45)
ANION GAP SERPL CALC-SCNC: 11 MMOL/L — SIGNIFICANT CHANGE UP (ref 5–17)
AST SERPL-CCNC: 26 U/L — SIGNIFICANT CHANGE UP (ref 10–40)
BASOPHILS # BLD AUTO: 0.05 K/UL — SIGNIFICANT CHANGE UP (ref 0–0.2)
BASOPHILS NFR BLD AUTO: 0.6 % — SIGNIFICANT CHANGE UP (ref 0–2)
BILIRUB SERPL-MCNC: 0.4 MG/DL — SIGNIFICANT CHANGE UP (ref 0.2–1.2)
BUN SERPL-MCNC: 25 MG/DL — HIGH (ref 7–23)
CALCIUM SERPL-MCNC: 9.5 MG/DL — SIGNIFICANT CHANGE UP (ref 8.4–10.5)
CHLORIDE SERPL-SCNC: 98 MMOL/L — SIGNIFICANT CHANGE UP (ref 96–108)
CO2 SERPL-SCNC: 31 MMOL/L — SIGNIFICANT CHANGE UP (ref 22–31)
CREAT SERPL-MCNC: 1.48 MG/DL — HIGH (ref 0.5–1.3)
EOSINOPHIL # BLD AUTO: 0.04 K/UL — SIGNIFICANT CHANGE UP (ref 0–0.5)
EOSINOPHIL NFR BLD AUTO: 0.5 % — SIGNIFICANT CHANGE UP (ref 0–6)
GLUCOSE SERPL-MCNC: 82 MG/DL — SIGNIFICANT CHANGE UP (ref 70–99)
HCT VFR BLD CALC: 37.7 % — SIGNIFICANT CHANGE UP (ref 34.5–45)
HGB BLD-MCNC: 11.9 G/DL — SIGNIFICANT CHANGE UP (ref 11.5–15.5)
IMM GRANULOCYTES NFR BLD AUTO: 0.7 % — SIGNIFICANT CHANGE UP (ref 0–1.5)
LYMPHOCYTES # BLD AUTO: 2.43 K/UL — SIGNIFICANT CHANGE UP (ref 1–3.3)
LYMPHOCYTES # BLD AUTO: 30.3 % — SIGNIFICANT CHANGE UP (ref 13–44)
MAGNESIUM SERPL-MCNC: 2.1 MG/DL — SIGNIFICANT CHANGE UP (ref 1.6–2.6)
MCHC RBC-ENTMCNC: 31.6 GM/DL — LOW (ref 32–36)
MCHC RBC-ENTMCNC: 32.3 PG — SIGNIFICANT CHANGE UP (ref 27–34)
MCV RBC AUTO: 102.4 FL — HIGH (ref 80–100)
MONOCYTES # BLD AUTO: 1 K/UL — HIGH (ref 0–0.9)
MONOCYTES NFR BLD AUTO: 12.5 % — SIGNIFICANT CHANGE UP (ref 2–14)
NEUTROPHILS # BLD AUTO: 4.44 K/UL — SIGNIFICANT CHANGE UP (ref 1.8–7.4)
NEUTROPHILS NFR BLD AUTO: 55.4 % — SIGNIFICANT CHANGE UP (ref 43–77)
NRBC # BLD: 0 /100 WBCS — SIGNIFICANT CHANGE UP (ref 0–0)
PHOSPHATE SERPL-MCNC: 4.6 MG/DL — HIGH (ref 2.5–4.5)
PLATELET # BLD AUTO: 245 K/UL — SIGNIFICANT CHANGE UP (ref 150–400)
POTASSIUM SERPL-MCNC: 4 MMOL/L — SIGNIFICANT CHANGE UP (ref 3.5–5.3)
POTASSIUM SERPL-SCNC: 4 MMOL/L — SIGNIFICANT CHANGE UP (ref 3.5–5.3)
PROT SERPL-MCNC: 5.9 G/DL — LOW (ref 6–8.3)
RBC # BLD: 3.68 M/UL — LOW (ref 3.8–5.2)
RBC # FLD: 13.8 % — SIGNIFICANT CHANGE UP (ref 10.3–14.5)
SODIUM SERPL-SCNC: 140 MMOL/L — SIGNIFICANT CHANGE UP (ref 135–145)
WBC # BLD: 8.02 K/UL — SIGNIFICANT CHANGE UP (ref 3.8–10.5)
WBC # FLD AUTO: 8.02 K/UL — SIGNIFICANT CHANGE UP (ref 3.8–10.5)

## 2020-06-11 PROCEDURE — 93306 TTE W/DOPPLER COMPLETE: CPT | Mod: 26

## 2020-06-11 PROCEDURE — 93285 PRGRMG DEV EVAL SCRMS IP: CPT | Mod: 26

## 2020-06-11 RX ORDER — LANOLIN ALCOHOL/MO/W.PET/CERES
3 CREAM (GRAM) TOPICAL ONCE
Refills: 0 | Status: COMPLETED | OUTPATIENT
Start: 2020-06-11 | End: 2020-06-11

## 2020-06-11 RX ORDER — BUDESONIDE, MICRONIZED 100 %
0.5 POWDER (GRAM) MISCELLANEOUS EVERY 12 HOURS
Refills: 0 | Status: DISCONTINUED | OUTPATIENT
Start: 2020-06-11 | End: 2020-06-13

## 2020-06-11 RX ORDER — IPRATROPIUM/ALBUTEROL SULFATE 18-103MCG
3 AEROSOL WITH ADAPTER (GRAM) INHALATION EVERY 6 HOURS
Refills: 0 | Status: DISCONTINUED | OUTPATIENT
Start: 2020-06-11 | End: 2020-06-13

## 2020-06-11 RX ADMIN — Medication 1 TABLET(S): at 11:33

## 2020-06-11 RX ADMIN — Medication 1 TABLET(S): at 17:39

## 2020-06-11 RX ADMIN — Medication 100 MICROGRAM(S): at 05:16

## 2020-06-11 RX ADMIN — Medication 3 MILLILITER(S): at 17:39

## 2020-06-11 RX ADMIN — Medication 1 TABLET(S): at 05:16

## 2020-06-11 RX ADMIN — ENOXAPARIN SODIUM 50 MILLIGRAM(S): 100 INJECTION SUBCUTANEOUS at 11:32

## 2020-06-11 RX ADMIN — Medication 4000 UNIT(S): at 11:33

## 2020-06-11 RX ADMIN — Medication 3 MILLILITER(S): at 23:24

## 2020-06-11 RX ADMIN — Medication 0.5 MILLIGRAM(S): at 17:39

## 2020-06-11 RX ADMIN — SIMVASTATIN 20 MILLIGRAM(S): 20 TABLET, FILM COATED ORAL at 21:27

## 2020-06-11 RX ADMIN — Medication 3 MILLIGRAM(S): at 23:22

## 2020-06-11 RX ADMIN — PANTOPRAZOLE SODIUM 40 MILLIGRAM(S): 20 TABLET, DELAYED RELEASE ORAL at 05:16

## 2020-06-11 RX ADMIN — Medication 1 TABLET(S): at 11:34

## 2020-06-11 NOTE — PHYSICAL THERAPY INITIAL EVALUATION ADULT - STRENGTHENING, PT EVAL
Increase to 4/5 throughout to assist with safe bed mobility, transfers, amb and functional activities.

## 2020-06-11 NOTE — PHYSICAL THERAPY INITIAL EVALUATION ADULT - PLANNED THERAPY INTERVENTIONS, PT EVAL
transfer training/stairs: Pt will negotiate up/down 3 steps, w/ SUP assist, w/ Right rails and appropriate AD, w/ step-to pattern in 2 weeks/gait training/strengthening/balance training/bed mobility training

## 2020-06-11 NOTE — CHART NOTE - NSCHARTNOTEFT_GEN_A_CORE
ILR Interrogation:    Device: [employer] Reveal Linq [id number]  Battery Status: Good       -Normal device function.  No events to correlate with recent falls.   -AT/AF <0.1%   -Review of stored data revealed two atrial high rate episodes on 3/10/20 and 5/11/20, EGM consistent with AFib lasting 2minutes each episode.   -changes made: none  -Discussed findings with [facility] Medicine NP    #[id number]

## 2020-06-11 NOTE — CONSULT NOTE ADULT - SUBJECTIVE AND OBJECTIVE BOX
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 The patient comes to the ER with several weeks of worsening shortness of breath associated with at least two falls. Recent ECHO with Dr. Mancera revealed normal LV function and no valvular heart disease. The patient has been found to have an age indeterminate left lower lobe non-occlusive pulmonary embolism    PMHX:  Dementia  Hyperlipidemia  Hypertension  Chronic kidney disease  Skin cancer  Osteoporosis  Chronic anal fissure  Anal spasm  Hypothyroidism  Gastroesophageal reflux     PSHX:    Anterior Cervical discectomy  Retinal tear repair - left eye   Cataract extraction - bilateral  Elbow surgery - left  Tonsillectomy  Hip fracture repair with hardware      FAMILY HISTORY:  No pertinent family history in first degree relatives      SOCIAL HISTORY:  lives alone with HHA several hours per day    Pulmonary Medications:       Antimicrobials:      Cardiology:      Other:  calcium carbonate 1250 mG  + Vitamin D (OsCal 500 + D) 1 Tablet(s) Oral daily  cholecalciferol 4000 Unit(s) Oral daily  enoxaparin Injectable 50 milliGRAM(s) SubCutaneous daily  lactobacillus acidophilus 1 Tablet(s) Oral two times a day  levothyroxine 100 MICROGram(s) Oral daily  multivitamin 1 Tablet(s) Oral daily  pantoprazole    Tablet 40 milliGRAM(s) Oral before breakfast  simvastatin 20 milliGRAM(s) Oral at bedtime  vitamin B complex with vitamin C 1 Tablet(s) Oral daily      Prn:  MEDICATIONS  (PRN):      Allergies    penicillin (Rash)    Intolerances        HOME MEDICATIONS: see  H & P    REVIEW OF SYSTEMS:  Constitutional: As per HPI  HEENT: Within normal limits  CV: As per HPI  Resp: As per HPI  GI: Within normal limits   : Within normal limits  Musculoskeletal: Within normal limits  Skin: Within normal limits  Neurological: Within normal limits  Psychiatric: Within normal limits  Endocrine: Within normal limits  Hematologic/Lymphatic: Within normal limits  Allergic/Immunologic: Within normal limits    [x] All other systems negative    OBJECTIVE:    I&O's Detail    10 Pb 2020 07:01  -  11 Jun 2020 07:00  --------------------------------------------------------  IN:    Oral Fluid: 240 mL  Total IN: 240 mL    OUT:  Total OUT: 0 mL    Total NET: 240 mL      11 Jun 2020 07:01  -  11 Jun 2020 14:55  --------------------------------------------------------  IN:    Oral Fluid: 440 mL  Total IN: 440 mL    OUT:  Total OUT: 0 mL    Total NET: 440 mL        Daily     Daily       PHYSICAL EXAM:  ICU Vital Signs Last 24 Hrs  T(C): 36.6 (11 Jun 2020 12:26), Max: 37.1 (10 Bp 2020 16:26)  T(F): 97.9 (11 Jun 2020 12:26), Max: 98.7 (10 Pb 2020 16:26)  HR: 63 (11 Jun 2020 12:26) (56 - 84)  BP: 108/70 (11 Jun 2020 14:51) (108/70 - 155/80)  BP(mean): 99 (10 Pb 2020 18:19) (95 - 99)  ABP: --  ABP(mean): --  RR: 19 (11 Jun 2020 12:26) (18 - 24)  SpO2: 100% (11 Jun 2020 14:51) (97% - 100%)    General: Awake. Alert. Cooperative. No distress. Appears stated age 	  HEENT:   Atraumatic. Normocephalic. Anicteric. Normal oral mucosa. PERRL. EOMI.  Neck: Supple. Trachea midline. Thyroid without enlargement/tenderness/nodules. No carotid bruit. No JVD.	  Cardiovascular: Regular rate and rhythm. S1 S2 normal. No murmurs, rubs or gallops.  Respiratory: Respirations unlabored. Clear to auscultation and percussion bilaterally. No curvature.  Abdomen: Soft. Non-tender. Non-distended. No organomegaly. No masses. Normal bowel sounds.  Extremities: Warm to touch. No clubbing or cyanosis. No pedal edema.  Pulses: 2+ peripheral pulses all extremities.	  Skin: Normal skin color. No rashes or lesions. No ecchymoses. No cyanosis. Warm to touch.  Lymph Nodes: Cervical, supraclavicular and axillary nodes normal  Neurological: Motor and sensory examination equal and normal. A and O x 3  Psychiatry: Appropriate mood and affect.      LABS:                          11.9   8.02  )-----------( 245      ( 11 Jun 2020 06:47 )             37.7     CBC    WBC  8.02 <==, 10.59 <==    Hemoglobin  11.9 <<==, 13.3 <<==    Hematocrit  37.7 <==, 42.1 <==    Platelets  245 <==, 243 <==      140  |  98  |  25<H>  ----------------------------<  82    06-11  4.0   |  31  |  1.48<H>    LYTES    sodium  140 <==, 138 <==    potassium   4.0 <==, 4.6 <==    chloride  98 <==, 98 <==    carbon dioxide  31 <==, 28 <==    =============================================================================================  RENAL FUNCTION:    Creatinine:   1.48  <<==, 1.23  <<==    BUN:   25 <==, 23 <==    ============================================================================================    calcium   9.5 <==, 9.6 <==    phos   4.6 <==    mag   2.1 <==    ============================================================================================  LFTs    AST:   26 <== , 32 <==     ALT:  16  <== , 23  <==     AP:  36  <=, 46  <=    Bili:  0.4  <=, 0.4  <=    PT/INR - ( 10 Pb 2020 13:03 )   PT: 11.1 sec;   INR: 0.97 ratio      PTT - ( 10 Pb 2020 13:03 )  PTT: 26.4 sec    Venous Blood Gas:  06-10 @ 13:03  7.34/63/33/33/56  VBG Lactate: 2.0    Serum Pro-Brain Natriuretic Peptide: 413 pg/mL (06-10 @ 13:03)    CARDIAC MARKERS ( 10 Pb 2020 16:41 )  CPK x     /CKMB x     /CKMB Units x        troponin 27 ng/L    CARDIAC MARKERS ( 10 Pb 2020 13:03 )  CPK x     /CKMB x     /CKMB Units x        troponin 27 ng/L        MICROBIOLOGY:       RADIOLOGY:  [x ] Chest radiographs reviewed and interpreted by me [facility] - [facility] - CONSULT NOTE    HPI: 89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 The patient comes to the ER with several weeks of worsening shortness of breath associated with at least two falls. She has no cough or sputum production but reports chest congestion and wheeze. No fevers, chills or sweats. No chest pain/pressure or palpitations. Recent ECHO with Dr. Mancera revealed normal LV function and no valvular heart disease. The patient has been found to have an age indeterminate left lower lobe non-occlusive pulmonary embolism. Her left leg is swollen. Asked to evaluate.    PMHX:  Dementia  Hyperlipidemia  Hypertension  Chronic kidney disease  Skin cancer  Osteoporosis  Chronic anal fissure  Anal spasm  Hypothyroidism  Gastroesophageal reflux     PSHX:    Anterior Cervical discectomy  Retinal tear repair - left eye   Cataract extraction - bilateral  Elbow surgery - left  Tonsillectomy  Hip fracture repair with hardware      FAMILY HISTORY:  No pertinent family history in first degree relatives      SOCIAL HISTORY:  lives alone with HHA several hours per day; former smoker;     Pulmonary Medications:       Antimicrobials:      Cardiology:      Other:  calcium carbonate 1250 mG  + Vitamin D (OsCal 500 + D) 1 Tablet(s) Oral daily  cholecalciferol 4000 Unit(s) Oral daily  enoxaparin Injectable 50 milliGRAM(s) SubCutaneous daily  lactobacillus acidophilus 1 Tablet(s) Oral two times a day  levothyroxine 100 MICROGram(s) Oral daily  multivitamin 1 Tablet(s) Oral daily  pantoprazole    Tablet 40 milliGRAM(s) Oral before breakfast  simvastatin 20 milliGRAM(s) Oral at bedtime  vitamin B complex with vitamin C 1 Tablet(s) Oral daily      Prn:  MEDICATIONS  (PRN):      Allergies    penicillin (Rash)    HOME MEDICATIONS: see  H & P    REVIEW OF SYSTEMS:  Constitutional: As per HPI  HEENT: Within normal limits  CV: As per HPI  Resp: As per HPI  GI: Within normal limits   : Within normal limits  Musculoskeletal: Within normal limits  Skin: Within normal limits  Neurological: dementia  Psychiatric: Within normal limits  Endocrine: Within normal limits  Hematologic/Lymphatic: Within normal limits  Allergic/Immunologic: Within normal limits    [x] All other systems negative    OBJECTIVE:    I&O's Detail    10 Pb 2020 07:01  -  11 Jun 2020 07:00  --------------------------------------------------------  IN:    Oral Fluid: 240 mL  Total IN: 240 mL    OUT:  Total OUT: 0 mL    Total NET: 240 mL      11 Jun 2020 07:01  -  11 Jun 2020 14:55  --------------------------------------------------------  IN:    Oral Fluid: 440 mL  Total IN: 440 mL    OUT:  Total OUT: 0 mL    Total NET: 440 mL    PHYSICAL EXAM:  ICU Vital Signs Last 24 Hrs  T(C): 36.6 (11 Jun 2020 12:26), Max: 37.1 (10 Pb 2020 16:26)  T(F): 97.9 (11 Jun 2020 12:26), Max: 98.7 (10 Pb 2020 16:26)  HR: 63 (11 Jun 2020 12:26) (56 - 84)  BP: 108/70 (11 Jun 2020 14:51) (108/70 - 155/80)  BP(mean): 99 (10 Pb 2020 18:19) (95 - 99)  ABP: --  ABP(mean): --  RR: 19 (11 Jun 2020 12:26) (18 - 24)  SpO2: 100% (11 Jun 2020 14:51) (97% - 100%) on room air    General: Awake. Alert. Cooperative. No distress. Appears stated age 	  HEENT: Atraumatic. Normocephalic. Anicteric. Normal oral mucosa. PERRL. EOMI.  Neck: Supple. Trachea midline. Thyroid without enlargement/tenderness/nodules. No carotid bruit. No JVD.	  Cardiovascular: Regular rate and rhythm. S1 S2 normal. No murmurs, rubs or gallops.  Respiratory: Respirations unlabored. Mild rhonchi and wheeze. Wheeze.  Abdomen: Soft. Non-tender. Non-distended. No organomegaly. No masses. Normal bowel sounds.  Extremities: Warm to touch. No clubbing or cyanosis. No pedal edema. LLE girth > RLE girth  Pulses: 2+ peripheral pulses all extremities.	  Skin: Multiple ecchymoses with venous stasis changes on both legs  Lymph Nodes: Cervical, supraclavicular and axillary nodes normal  Neurological: Motor and sensory examination equal and normal. A and O x 2  Psychiatry: Appropriate mood and affect.      LABS:                          11.9   8.02  )-----------( 245      ( 11 Jun 2020 06:47 )             37.7     CBC    WBC  8.02 <==, 10.59 <==    Hemoglobin  11.9 <<==, 13.3 <<==    Hematocrit  37.7 <==, 42.1 <==    Platelets  245 <==, 243 <==      140  |  98  |  25<H>  ----------------------------<  82    06-11  4.0   |  31  |  1.48<H>    LYTES    sodium  140 <==, 138 <==    potassium   4.0 <==, 4.6 <==    chloride  98 <==, 98 <==    carbon dioxide  31 <==, 28 <==    =============================================================================================  RENAL FUNCTION:    Creatinine:   1.48  <<==, 1.23  <<==    BUN:   25 <==, 23 <==    ============================================================================================    calcium   9.5 <==, 9.6 <==    phos   4.6 <==    mag   2.1 <==    ============================================================================================  LFTs    AST:   26 <== , 32 <==     ALT:  16  <== , 23  <==     AP:  36  <=, 46  <=    Bili:  0.4  <=, 0.4  <=    PT/INR - ( 10 Pb 2020 13:03 )   PT: 11.1 sec;   INR: 0.97 ratio      PTT - ( 10 Pb 2020 13:03 )  PTT: 26.4 sec    Venous Blood Gas:  06-10 @ 13:03  7.34/63/33/33/56  VBG Lactate: 2.0    Serum Pro-Brain Natriuretic Peptide: 413 pg/mL (06-10 @ 13:03)    CARDIAC MARKERS ( 10 Pb 2020 16:41 )  CPK x     /CKMB x     /CKMB Units x        troponin 27 ng/L    CARDIAC MARKERS ( 10 Pb 2020 13:03 )  CPK x     /CKMB x     /CKMB Units x        troponin 27 ng/L    MICROBIOLOGY:     COVID-19 PCR . (06.10.20 @ 13:37)    COVID-19 PCR: NotDetec: This test has been validated by [employer] to be accurate;  though it has not been FDA cleared/approved by the usual pathway.  As with all laboratory tests, results should be correlated with clinical  findings.  https://www.fda.gov/media/691796/download  https://www.fda.gov/media/236194/download    RADIOLOGY:  [x ] Chest radiographs reviewed and interpreted by me      EXAM:  XR CHEST PORTABLE URGENT 1V                          PROCEDURE DATE:  06/10/2020      INTERPRETATION:  A single chest x-ray was obtained on Ana 10, 2020.    Indication: Dyspnea.    Impression:    The heart is normal in size. Small right pleural effusion. It like atelectasis right lower lobe. Otherwise the lungs appear to be clear. No pneumothorax. A loop recorder is seen overlying the left hemithorax. No pneumothorax. No acute bony pathology could be identified. Calcified aortic knob.    PADMINI JUNIOR M.D., ATTENDING RADIOLOGIST  This document has been electronically signed. Pb 10 2020  1:52PM  ---------------------------------------------------------------------------------------------------------------    EXAM:  CT ANGIO CHEST (W)AW IC                          PROCEDURE DATE:  06/10/2020      INTERPRETATION:  CLINICAL INFORMATION: Dyspnea. Evaluate for pulmonary embolus.     COMPARISON: CT chest from 5/2/2019.    PROCEDURE:   CT Angiography of the Chest.  90 ml of Omnipaque 350 was injected intravenously. 10 ml were discarded.  Sagittal and coronal reformats were performed as well as 3D (MIP) reconstructions.    FINDINGS:    LUNGS AND AIRWAYS: Patent central airways.  Unchanged multiple bilateral calcified pulmonary nodules. Right middle and right lower lobe linear atelectasis.  PLEURA: No pleural effusion.  MEDIASTINUM AND MIGUEL A: No lymphadenopathy.  VESSELS: A focal filling defect within the left lower lobar branch (6:276). No main, central, right lobar, segmental or subsegmental pulmonary embolus. Atherosclerotic changes.  HEART: Heart size is normal. No pericardial effusion. Coronary artery calcifications.  CHEST WALL AND LOWER NECK: A loop recorder within the left chest wall.  VISUALIZED UPPER ABDOMEN: Cholelithiasis. Right renal cyst. Additional bilateral subcentimeter hypodense foci, small to characterize.  BONES: Anterior cervical spinal fixation. Degenerative changes. Bilateral healed chronic rib fracture deformities.    IMPRESSION:     Age-indeterminate left lower lobar nonocclusive thrombus.    Dr. Keller discussed these findings with Dr. Hughes on 6/10/2020 4:41 PM, with read back.    CHEY KELLER M.D., RADIOLOGY RESIDENT  This document has been electronically signed.  ZACK MEDINA M.D., ATTENDING RADIOLOGIST  This document has been electronically signed. Pb 10 2020  4:52PM    ---------------------------------------------------------------------------------------------------------------

## 2020-06-11 NOTE — CONSULT NOTE ADULT - SUBJECTIVE AND OBJECTIVE BOX
HISTORY OF PRESENT ILLNESS: HPI:    89 y.o. female, known to our office follows with Dr. 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 Pt says onset of dyspnea has been going on for a "few days." Per patient, she has been experiencing progressive sob over the past two weeks and at least 2 falls within the last two weeks.  Lives alone, has HHA which she has asked to stay around for longer hours this past week b/c she has just not felt comfortable.  +LYN +sob when talking has to take breaks more often. Otherwise denies overt sob at rest. Denies LE edema. Patient with negative delta trop 27 x2. CTA chest with age indeterminant LLL PE on lovenox.  POCUS: no pericardia eff, preserved LVSF. CXR: +RLL atelectasis, no acute findings otherwise, +ILR in place. Cardiology now called for further work up.       PAST MEDICAL & SURGICAL HISTORY:  Skin cancer: lower extremities  Hip fracture: Right hip- 1995  Osteoporosis  Chronic anal fissure  Anal spasm  mild Dementia  Dyslipidemia  Hypertension  H/O: Hypothyroidism  H/O gastroesophageal reflux (GERD)  Anterior Cervical discectomy  left eye Retinal tear repair  After-Cataract of Both Eyes  left Elbow surgey secondary to injury  History of Tonsillectomy  History of  Hip surgery with hardware      MEDICATIONS:  MEDICATIONS  (STANDING):  calcium carbonate 1250 mG  + Vitamin D (OsCal 500 + D) 1 Tablet(s) Oral daily  cholecalciferol 4000 Unit(s) Oral daily  enoxaparin Injectable 50 milliGRAM(s) SubCutaneous daily  lactobacillus acidophilus 1 Tablet(s) Oral two times a day  levothyroxine 100 MICROGram(s) Oral daily  multivitamin 1 Tablet(s) Oral daily  pantoprazole    Tablet 40 milliGRAM(s) Oral before breakfast  simvastatin 20 milliGRAM(s) Oral at bedtime  vitamin B complex with vitamin C 1 Tablet(s) Oral daily      Allergies    penicillin (Rash)    Intolerances        FAMILY HISTORY:  No pertinent family history in first degree relatives    Non-contributary for premature coronary disease or sudden cardiac death    SOCIAL HISTORY:    [ X] Non-smoker  [ ] Smoker  [ ] Alcohol    FLU VACCINE THIS YEAR STARTS IN AUGUST:  [ ] Yes    [ ] No    IF OVER 65 HAVE YOU EVER HAD A PNA VACCINE:  [ ] Yes    [ ] No       [X ] N/A      REVIEW OF SYSTEMS:  [ ]chest pain  [  X]shortness of breath   [  ]palpitations  [  ]syncope  [ ]near syncope [ ]upper extremity weakness   [X ] lower extremity weakness  [X] Falls  [  ]diplopia  [  ]altered mental status   [  ]fevers  [ ]chills [ ]nausea  [ ]vomitting  [  ]dysphagia    [ ]abdominal pain  [ ]melena  [ ]BRBPR    [  ]epistaxis  [  ]rash    [ ]lower extremity edema        [X ] All others negative	  [ ] Unable to obtain      LABS:	 	    CARDIAC MARKERS:                        11.9   8.02  )-----------( 245      ( 11 Jun 2020 06:47 )             37.7     Hb Trend: 11.9<--, 13.3<--    06-11    140  |  98  |  25<H>  ----------------------------<  82  4.0   |  31  |  1.48<H>    Ca    9.5      11 Jun 2020 06:46  Phos  4.6     06-11  Mg     2.1     06-11    TPro  5.9<L>  /  Alb  3.6  /  TBili  0.4  /  DBili  x   /  AST  26  /  ALT  16  /  AlkPhos  36<L>  06-11    Creatinine Trend: 1.48<--, 1.23<--    Coags:      proBNP: Serum Pro-Brain Natriuretic Peptide: 413 pg/mL (06-10 @ 13:03)    Lipid Profile:   HgA1c:   TSH:         PHYSICAL EXAM:  T(C): 36.6 (06-11-20 @ 04:42), Max: 37.4 (06-10-20 @ 12:50)  HR: 56 (06-11-20 @ 04:42) (56 - 84)  BP: 153/79 (06-11-20 @ 04:42) (138/63 - 155/80)  RR: 24 (06-11-20 @ 04:42) (18 - 26)  SpO2: 98% (06-11-20 @ 04:42) (94% - 100%)  Wt(kg): --   BMI (kg/m2): 25.8 (06-10-20 @ 12:24)  I&O's Summary    10 Pb 2020 07:01  -  11 Jun 2020 07:00  --------------------------------------------------------  IN: 240 mL / OUT: 0 mL / NET: 240 mL      Gen: Appears well in NAD  HEENT:  (-)icterus (-)pallor  CV: N S1 S2 1/6 KIN (+)2 Pulses B/l  Resp:  Clear to ausculatation B/L, normal effort  GI: (+) BS Soft, NT, ND  Lymph:  (-)Edema, (-)obvious lymphadenopathy  Skin: Warm to touch, Normal turgor  Psych: Appropriate mood and affect      TELEMETRY: 	Not on telemetry       ECG:  	< from: 12 Lead ECG (06.10.20 @ 12:51) >  NORMAL SINUS RHYTHM  NORMAL ECG    Confirmed by MARIELA NICOLE ([id number]) on 6/11/2020 11:10:55 AM    < end of copied text >      RADIOLOGY:         CXR:     < from: Xray Chest 1 View- PORTABLE-Urgent (06.10.20 @ 12:53) >  Impression:    The heart is normal in size. Small right pleural effusion. It like atelectasis right lower lobe. Otherwise the lungs appear to be clear. No pneumothorax. A loop recorder is seen overlying the left hemithorax. No pneumothorax. No acute bony pathology could be identified. Calcified aorticknob.    PADMINI JUNIOR M.D., ATTENDING RADIOLOGIST  This document has been electronically signed. Pb 10 2020  1:52PM        < end of copied text >    ECHO PENDING RESULTS TODAY       ASSESSMENT/PLAN: 	    89 y.o. female, known to our office follows with Dr. 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 Pt says onset of dyspnea has been going on for a "few days." Per patient, she has been experiencing progressive sob over the past two weeks and at least 2 falls within the last two weeks. She is found to have LLL PE on admit. We are following for further work up of patients sob/lyn.       -- pt without cp and improved sob on NC   -- echo done today, pending results  -- pt reports at least 2 falls last two weeks, check loop recorder, discussed with EP   -- check orthostatics given recent falls  -- +PE on CTA noted, was started on lovenox now d/c 2/2 cr clearance, consider pulm f/u   -- would start AC if no contraindications, and risk of bleeding is acceptable  -- check LE doppler r/o DVT   -- PT f/u apprecaited   -- further work up pending above   -- above discussed with covering PA        Thank you for allowing us to participate in the care of our mutual patient. Please do not hesitate to call with any questions.     Debo Felix, [facility]-BC   HISTORY OF PRESENT ILLNESS: HPI:    89 y.o. female, known to our office follows with Dr. 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 Pt says onset of dyspnea has been going on for a "few days." Per patient, she has been experiencing progressive sob over the past two weeks and at least 2 falls within the last two weeks.  Recent echo in our office March 2020 with normal LV systolic function, EF 60%. Lives alone, has HHA which she has asked to stay around for longer hours this past week b/c she has just not felt comfortable.  +LYN +sob when talking has to take breaks more often. Otherwise denies overt sob at rest. Denies LE edema. Patient with negative delta trop 27 x2. CTA chest with age indeterminant LLL PE on lovenox.  POCUS: no pericardia eff, preserved LVSF. CXR: +RLL atelectasis, no acute findings otherwise, +ILR in place. Cardiology now called for further work up.       PAST MEDICAL & SURGICAL HISTORY:  Skin cancer: lower extremities  Hip fracture: Right hip- 1995  Osteoporosis  Chronic anal fissure  Anal spasm  mild Dementia  Dyslipidemia  Hypertension  H/O: Hypothyroidism  H/O gastroesophageal reflux (GERD)  Anterior Cervical discectomy  left eye Retinal tear repair  After-Cataract of Both Eyes  left Elbow surgey secondary to injury  History of Tonsillectomy  History of  Hip surgery with hardware      MEDICATIONS:  MEDICATIONS  (STANDING):  calcium carbonate 1250 mG  + Vitamin D (OsCal 500 + D) 1 Tablet(s) Oral daily  cholecalciferol 4000 Unit(s) Oral daily  enoxaparin Injectable 50 milliGRAM(s) SubCutaneous daily  lactobacillus acidophilus 1 Tablet(s) Oral two times a day  levothyroxine 100 MICROGram(s) Oral daily  multivitamin 1 Tablet(s) Oral daily  pantoprazole    Tablet 40 milliGRAM(s) Oral before breakfast  simvastatin 20 milliGRAM(s) Oral at bedtime  vitamin B complex with vitamin C 1 Tablet(s) Oral daily      Allergies    penicillin (Rash)    Intolerances        FAMILY HISTORY:  No pertinent family history in first degree relatives    Non-contributary for premature coronary disease or sudden cardiac death    SOCIAL HISTORY:    [ X] Non-smoker  [ ] Smoker  [ ] Alcohol    FLU VACCINE THIS YEAR STARTS IN AUGUST:  [ ] Yes    [ ] No    IF OVER 65 HAVE YOU EVER HAD A PNA VACCINE:  [ ] Yes    [ ] No       [X ] N/A      REVIEW OF SYSTEMS:  [ ]chest pain  [  X]shortness of breath   [  ]palpitations  [  ]syncope  [ ]near syncope [ ]upper extremity weakness   [X ] lower extremity weakness  [X] Falls  [  ]diplopia  [  ]altered mental status   [  ]fevers  [ ]chills [ ]nausea  [ ]vomitting  [  ]dysphagia    [ ]abdominal pain  [ ]melena  [ ]BRBPR    [  ]epistaxis  [  ]rash    [ ]lower extremity edema        [X ] All others negative	  [ ] Unable to obtain      LABS:	 	    CARDIAC MARKERS:                        11.9   8.02  )-----------( 245      ( 11 Jun 2020 06:47 )             37.7     Hb Trend: 11.9<--, 13.3<--    06-11    140  |  98  |  25<H>  ----------------------------<  82  4.0   |  31  |  1.48<H>    Ca    9.5      11 Jun 2020 06:46  Phos  4.6     06-11  Mg     2.1     06-11    TPro  5.9<L>  /  Alb  3.6  /  TBili  0.4  /  DBili  x   /  AST  26  /  ALT  16  /  AlkPhos  36<L>  06-11    Creatinine Trend: 1.48<--, 1.23<--    Coags:      proBNP: Serum Pro-Brain Natriuretic Peptide: 413 pg/mL (06-10 @ 13:03)    Lipid Profile:   HgA1c:   TSH:         PHYSICAL EXAM:  T(C): 36.6 (06-11-20 @ 04:42), Max: 37.4 (06-10-20 @ 12:50)  HR: 56 (06-11-20 @ 04:42) (56 - 84)  BP: 153/79 (06-11-20 @ 04:42) (138/63 - 155/80)  RR: 24 (06-11-20 @ 04:42) (18 - 26)  SpO2: 98% (06-11-20 @ 04:42) (94% - 100%)  Wt(kg): --   BMI (kg/m2): 25.8 (06-10-20 @ 12:24)  I&O's Summary    10 Pb 2020 07:01  -  11 Jun 2020 07:00  --------------------------------------------------------  IN: 240 mL / OUT: 0 mL / NET: 240 mL      Gen: Appears well in NAD  HEENT:  (-)icterus (-)pallor  CV: N S1 S2 1/6 KIN (+)2 Pulses B/l  Resp:  Clear to ausculatation B/L, normal effort  GI: (+) BS Soft, NT, ND  Lymph:  (-)Edema, (-)obvious lymphadenopathy  Skin: Warm to touch, Normal turgor  Psych: Appropriate mood and affect      TELEMETRY: 	Not on telemetry       ECG:  	< from: 12 Lead ECG (06.10.20 @ 12:51) >  NORMAL SINUS RHYTHM  NORMAL ECG    Confirmed by MARIELA NICOLE ([id number]) on 6/11/2020 11:10:55 AM    < end of copied text >      RADIOLOGY:         CXR:     < from: Xray Chest 1 View- PORTABLE-Urgent (06.10.20 @ 12:53) >  Impression:    The heart is normal in size. Small right pleural effusion. It like atelectasis right lower lobe. Otherwise the lungs appear to be clear. No pneumothorax. A loop recorder is seen overlying the left hemithorax. No pneumothorax. No acute bony pathology could be identified. Calcified aorticknob.    PADMINI JUNIOR M.D., ATTENDING RADIOLOGIST  This document has been electronically signed. Pb 10 2020  1:52PM        < end of copied text >    ECHO PENDING RESULTS TODAY       ASSESSMENT/PLAN: 	    89 y.o. female, known to our office follows with Dr. 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 Pt says onset of dyspnea has been going on for a "few days." Per patient, she has been experiencing progressive sob over the past two weeks and at least 2 falls within the last two weeks. She is found to have LLL PE on admit. We are following for further work up of patients sob/lyn.       -- pt without cp and improved sob on NC   -- echo in our office March 2020 with normal LV systolic function, EF 60   -- echo done today, pending results  -- pt reports at least 2 falls last two weeks, check loop recorder, discussed with EP   -- check orthostatics given recent falls  -- +PE on CTA noted, was started on lovenox now d/c 2/2 cr clearance, consider pulm f/u   -- would start AC if no contraindications, and risk of bleeding is acceptable  -- check LE doppler r/o DVT   -- PT f/u apprecaited   -- further work up pending above   -- above discussed with covering PA        Thank you for allowing us to participate in the care of our mutual patient. Please do not hesitate to call with any questions.     Debo Felix, [facility]-BC

## 2020-06-11 NOTE — PROGRESS NOTE ADULT - ASSESSMENT
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     Pulmonary embolism, unspecified chronicity, unspecified pulmonary embolism type, unspecified whether acute cor pulmonale present.  - dyspnea x few days; VSS mostly no significant O2 requirement at present. CXR with atelectasis otherwise nondx. CTA with LLL age indeterminate PE  - AC with lovenox 50 subq   - check TTE  - Pulmonary evaluation Dr. Fuentes  - Cardiology evaluation  - PPM check     Essential hypertension.   - sbp at goal monitor off arb given meagan/ckd.    CKD (chronic kidney disease) stage 2, GFR 60-89 ml/min.  - stable ckd, trend bmp, avoid nephrotoxins.     Hyperlipidemia, unspecified hyperlipidemia type.    - c/w simvastatin 20.     Gastroesophageal reflux disease, esophagitis presence not specified.   - ppi.     DNR/DNI  -MOLST in chart Non-Graft Cartilage Fenestration Text: The cartilage was fenestrated with a 2mm punch biopsy to help facilitate healing.

## 2020-06-11 NOTE — PHYSICAL THERAPY INITIAL EVALUATION ADULT - PERTINENT HX OF CURRENT PROBLEM, REHAB EVAL
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 CT Angio Chest: Age-indeterminate left lower lobar nonocclusive thrombus.

## 2020-06-11 NOTE — PROGRESS NOTE ADULT - SUBJECTIVE AND OBJECTIVE BOX
Patient is a 89y old  Female who presents with a chief complaint of pulmonary embolism, dyspnea (11 Jun 2020 11:44)      SUBJECTIVE / OVERNIGHT EVENTS: Comfortable   Review of Systems  chest pain no  palpitations no  sob no  nausea no  headache no    MEDICATIONS  (STANDING):  albuterol/ipratropium for Nebulization 3 milliLiter(s) Nebulizer every 6 hours  buDESOnide    Inhalation Suspension 0.5 milliGRAM(s) Inhalation every 12 hours  calcium carbonate 1250 mG  + Vitamin D (OsCal 500 + D) 1 Tablet(s) Oral daily  cholecalciferol 4000 Unit(s) Oral daily  enoxaparin Injectable 50 milliGRAM(s) SubCutaneous daily  lactobacillus acidophilus 1 Tablet(s) Oral two times a day  levothyroxine 100 MICROGram(s) Oral daily  multivitamin 1 Tablet(s) Oral daily  pantoprazole    Tablet 40 milliGRAM(s) Oral before breakfast  simvastatin 20 milliGRAM(s) Oral at bedtime  vitamin B complex with vitamin C 1 Tablet(s) Oral daily    MEDICATIONS  (PRN):      Vital Signs Last 24 Hrs  T(C): 36.6 (11 Jun 2020 12:26), Max: 36.6 (10 Pb 2020 20:34)  T(F): 97.9 (11 Jun 2020 12:26), Max: 97.9 (10 Pb 2020 20:34)  HR: 63 (11 Jun 2020 12:26) (56 - 63)  BP: 108/70 (11 Jun 2020 14:51) (108/70 - 153/79)  BP(mean): --  RR: 19 (11 Jun 2020 12:26) (18 - 24)  SpO2: 100% (11 Jun 2020 14:51) (97% - 100%)    PHYSICAL EXAM:  GENERAL: NAD  HEAD:  Atraumatic, Normocephalic  EYES: EOMI, PERRLA, conjunctiva and sclera clear  NECK: Supple, No JVD  CHEST/LUNG: Clear to auscultation bilaterally; No wheeze  HEART: Regular rate and rhythm; No murmurs, rubs, or gallops  ABDOMEN: Soft, Nontender, Nondistended; Bowel sounds present  EXTREMITIES:  2+ Peripheral Pulses, No clubbing, cyanosis, or edema  PSYCH: AAOx3  NEUROLOGY: non-focal  SKIN: No rashes or lesions    LABS:                        11.9   8.02  )-----------( 245      ( 11 Jun 2020 06:47 )             37.7     06-11    140  |  98  |  25<H>  ----------------------------<  82  4.0   |  31  |  1.48<H>    Ca    9.5      11 Jun 2020 06:46  Phos  4.6     06-11  Mg     2.1     06-11    TPro  5.9<L>  /  Alb  3.6  /  TBili  0.4  /  DBili  x   /  AST  26  /  ALT  16  /  AlkPhos  36<L>  06-11    PT/INR - ( 10 Pb 2020 13:03 )   PT: 11.1 sec;   INR: 0.97 ratio         PTT - ( 10 Pb 2020 13:03 )  PTT:26.4 sec            RADIOLOGY & ADDITIONAL TESTS:    Imaging Personally Reviewed:    Consultant(s) Notes Reviewed:      Care Discussed with Consultants/Other Providers:

## 2020-06-11 NOTE — CONSULT NOTE ADULT - ATTENDING COMMENTS
Patient seen and examined, agree with above assessment and plan as transcribed above.    - echo  - No pertinent events on ILR  - AC per primary team    Mumtaz Dixon MD, [facility]  BEEPER [phone]

## 2020-06-11 NOTE — PHYSICAL THERAPY INITIAL EVALUATION ADULT - DISCHARGE DISPOSITION, PT EVAL
Home with Home PT to assess safety, increase strength and endurance assisting with functional activities and ADLs. Resume assist with mobility/ADLs from HHA.

## 2020-06-11 NOTE — CONSULT NOTE ADULT - ASSESSMENT
ASSESSMENT:    dyspnea without hypoxemia - evidence of hypercapnia on VBG    1) non-occlusive left lower lobe pulmonary embolism  2) likely underlying COPD with mild bronchospasm  3) restrictive lung disease due to kyphoscoliosis and respiratory muscle weakness -> atelectasis    PLAN/RECOMMENDATIONS:    stable oxygenation on room air  await ECHO  await lower extremity Duplex  lovenox 1mg/kg 2 times daily  albuterol/atrovent nebs q6h  pulmicort 0.5mg nebs q12h - give after duoneb - rinse mouth after use  GI/DVT prophylaxis    Thank you for the courtesy of this referral. Plan of care discussed with the patient at bedside     Ryan Fuentes MD, [facility]  [phone]  Pulmonary Medicine ASSESSMENT:    dyspnea without hypoxemia - evidence of hypercapnia on VBG    1) non-occlusive left lower lobe pulmonary embolism  2) likely underlying COPD with mild bronchospasm  3) restrictive lung disease due to kyphoscoliosis and respiratory muscle weakness -> atelectasis    PLAN/RECOMMENDATIONS:    stable oxygenation on room air  ABG  await ECHO  await lower extremity Duplex  lovenox 1mg/kg 2 times daily  albuterol/atrovent nebs q6h  pulmicort 0.5mg nebs q12h - give after duoneb - rinse mouth after use  GI/DVT prophylaxis    Thank you for the courtesy of this referral. Plan of care discussed with the patient at bedside     Rayn Fuentes MD, [facility]  [phone]  Pulmonary Medicine

## 2020-06-11 NOTE — PHYSICAL THERAPY INITIAL EVALUATION ADULT - BALANCE TRAINING, PT EVAL
Pt will demonstrate improved static/dynamic balance to fair+, in order to improve stability, decrease fall risk and increase independence with ADLs within 2 weeks

## 2020-06-11 NOTE — PHYSICAL THERAPY INITIAL EVALUATION ADULT - ADDITIONAL COMMENTS
Pt reports she lives in a  home with 3 MERVIN. Pt reports having a live in [facility] to assist with ADLs. PTA pt amb via RW and uses shower bench for bathing.

## 2020-06-12 ENCOUNTER — TRANSCRIPTION ENCOUNTER (OUTPATIENT)
Age: 85
End: 2020-06-12

## 2020-06-12 LAB
BASE EXCESS BLDA CALC-SCNC: 6.7 MMOL/L — HIGH (ref -2–2)
CO2 BLDA-SCNC: 33 MMOL/L — HIGH (ref 22–30)
GAS PNL BLDA: SIGNIFICANT CHANGE UP
HCO3 BLDA-SCNC: 32 MMOL/L — HIGH (ref 21–29)
PCO2 BLDA: 47 MMHG — HIGH (ref 32–46)
PH BLDA: 7.44 — SIGNIFICANT CHANGE UP (ref 7.35–7.45)
PO2 BLDA: 58 MMHG — LOW (ref 74–108)
SAO2 % BLDA: 90 % — LOW (ref 92–96)

## 2020-06-12 PROCEDURE — 93970 EXTREMITY STUDY: CPT | Mod: 26

## 2020-06-12 RX ORDER — APIXABAN 2.5 MG/1
1 TABLET, FILM COATED ORAL
Qty: 1 | Refills: 0
Start: 2020-06-12 | End: 2020-07-11

## 2020-06-12 RX ADMIN — Medication 4000 UNIT(S): at 12:41

## 2020-06-12 RX ADMIN — Medication 100 MICROGRAM(S): at 06:05

## 2020-06-12 RX ADMIN — Medication 3 MILLILITER(S): at 12:41

## 2020-06-12 RX ADMIN — Medication 1 TABLET(S): at 18:06

## 2020-06-12 RX ADMIN — Medication 1 TABLET(S): at 12:42

## 2020-06-12 RX ADMIN — Medication 1 TABLET(S): at 06:04

## 2020-06-12 RX ADMIN — Medication 3 MILLILITER(S): at 22:45

## 2020-06-12 RX ADMIN — Medication 0.5 MILLIGRAM(S): at 18:07

## 2020-06-12 RX ADMIN — Medication 0.5 MILLIGRAM(S): at 06:28

## 2020-06-12 RX ADMIN — ENOXAPARIN SODIUM 50 MILLIGRAM(S): 100 INJECTION SUBCUTANEOUS at 12:42

## 2020-06-12 RX ADMIN — Medication 3 MILLILITER(S): at 06:05

## 2020-06-12 RX ADMIN — PANTOPRAZOLE SODIUM 40 MILLIGRAM(S): 20 TABLET, DELAYED RELEASE ORAL at 06:05

## 2020-06-12 RX ADMIN — SIMVASTATIN 20 MILLIGRAM(S): 20 TABLET, FILM COATED ORAL at 22:44

## 2020-06-12 RX ADMIN — Medication 3 MILLILITER(S): at 18:06

## 2020-06-12 NOTE — PROGRESS NOTE ADULT - SUBJECTIVE AND OBJECTIVE BOX
CARDIOLOGY ATTENDING    no tele    she denies palpitations, syncope, nor angina, ROS otherwise -    albuterol/ipratropium for Nebulization 3 milliLiter(s) Nebulizer every 6 hours  buDESOnide    Inhalation Suspension 0.5 milliGRAM(s) Inhalation every 12 hours  calcium carbonate 1250 mG  + Vitamin D (OsCal 500 + D) 1 Tablet(s) Oral daily  cholecalciferol 4000 Unit(s) Oral daily  enoxaparin Injectable 50 milliGRAM(s) SubCutaneous daily  lactobacillus acidophilus 1 Tablet(s) Oral two times a day  levothyroxine 100 MICROGram(s) Oral daily  multivitamin 1 Tablet(s) Oral daily  pantoprazole    Tablet 40 milliGRAM(s) Oral before breakfast  simvastatin 20 milliGRAM(s) Oral at bedtime  vitamin B complex with vitamin C 1 Tablet(s) Oral daily                            11.9   8.02  )-----------( 245      ( 11 Jun 2020 06:47 )             37.7       06-11    140  |  98  |  25<H>  ----------------------------<  82  4.0   |  31  |  1.48<H>    Ca    9.5      11 Jun 2020 06:46  Phos  4.6     06-11  Mg     2.1     06-11    TPro  5.9<L>  /  Alb  3.6  /  TBili  0.4  /  DBili  x   /  AST  26  /  ALT  16  /  AlkPhos  36<L>  06-11      T(C): 36.7 (06-12-20 @ 05:30), Max: 36.7 (06-12-20 @ 05:30)  HR: 80 (06-11-20 @ 20:27) (63 - 80)  BP: 116/77 (06-11-20 @ 20:27) (108/70 - 150/73)  RR: 20 (06-12-20 @ 05:30) (18 - 20)  SpO2: 94% (06-12-20 @ 05:30) (94% - 100%)  Wt(kg): --    A&O x 3  neurologically intact  no JVD  RRR, no murmurs  CTAB  soft nt/nd  no c/c/e    echo normal  ILR: NSR, known PAF, no other events      A/P) 89 y/o female PMH PAF managed with eliquis, HTN, hyperlipidemia, CKD admitted with dyspnea and found to have a PE. Echo and ILR interrogation unremarkable (other than known PAF).    -please restart anticoagulation, dosing for PE as per medicine  -no further cardiac workup needed  -f/u with Dr. Mancera on Monday June 29th at 12:30pm

## 2020-06-12 NOTE — DISCHARGE NOTE PROVIDER - CARE PROVIDER_API CALL
carol byers  [address] [place] [address]  Phone: [phone]  Fax: (   )    -  Follow Up Time:     Víctor Mancera  CARDIOLOGY  [address]  [place] [address]  Phone: [phone]  Fax: [phone]  Scheduled Appointment: 06/29/2020 12:30 PM

## 2020-06-12 NOTE — PROGRESS NOTE ADULT - ASSESSMENT
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     Pulmonary embolism, unspecified chronicity, unspecified pulmonary embolism type, unspecified whether acute cor pulmonale present.  - dyspnea x few days; VSS mostly no significant O2 requirement at present. CXR with atelectasis otherwise nondx. CTA with LLL age indeterminate PE  - AC with lovenox 50 subq as per pharmacy. Change to Eliquis 2.5 bid in am.  - check TTE  - Pulmonary evaluation Dr. Fuentes noted  - Cardiology evaluation noted  - PPM check     Essential hypertension.   - sbp at goal monitor off arb given meagan/ckd.    CKD (chronic kidney disease) stage 2, GFR 60-89 ml/min.  - stable ckd, trend bmp, avoid nephrotoxins.     Hyperlipidemia, unspecified hyperlipidemia type.    - c/w simvastatin 20.     Gastroesophageal reflux disease, esophagitis presence not specified.   - ppi.     DNR/DNI  -MOLST in chart    DCP home    Art Carbajal MD pager [phone]

## 2020-06-12 NOTE — PROGRESS NOTE ADULT - ASSESSMENT
ASSESSMENT:    dyspnea without hypoxemia - evidence of mild hypercapnia on ABG    1) non-occlusive left lower lobe pulmonary embolism  2) likely underlying COPD with mild bronchospasm  3) restrictive lung disease due to kyphoscoliosis and respiratory muscle weakness -> atelectasis    PLAN/RECOMMENDATIONS:    stable oxygenation on room air  ECHO without RV/LV dysfunction or valvular heart disease  await lower extremity Duplex  lovenox 1mg/kg 2 times daily -> would switch to DOAC  albuterol/atrovent nebs q6h  pulmicort 0.5mg nebs q12h - give after duoneb - rinse mouth after use  GI/DVT prophylaxis    Will follow with you. Plan of care discussed with the patient at bedside    Please call over the weekend with questions or clinical changes. Dr. Clinton Mosley will be covering [facility] for our practice.    Ryan Fuentes MD  Pulmonary Medicine  [phone]

## 2020-06-12 NOTE — DISCHARGE NOTE PROVIDER - NSDCMRMEDTOKEN_GEN_ALL_CORE_FT
aspirin: prn  calcium + vitamin d3:   gabapentin 300 mg oral capsule: 1 cap(s) orally once a day (in the evening)  levothyroxine 100 mcg (0.1 mg) oral tablet: 1 tab(s) orally once a day  losartan 25 mg oral tablet: 1 tab(s) orally once a day  multivitamin: 1 tab(s) orally once a day  omeprazole 40 mg oral delayed release capsule: 1 cap(s) orally once a day  probiotic:   raNITIdine 150 mg oral tablet: 1 tab(s) orally 2 times a day  vitamin b-complex:   Vitamin C 1000 mg oral tablet:   Vitamin D3 5000 intl units (125 mcg) oral capsule: aspirin: prn  calcium + vitamin d3:   Eliquis Starter Pack for Treatment of DVT and PE 5 mg oral tablet: Take as directed per starter pack  [id number]  gabapentin 300 mg oral capsule: 1 cap(s) orally once a day (in the evening)  levothyroxine 100 mcg (0.1 mg) oral tablet: 1 tab(s) orally once a day  losartan 25 mg oral tablet: 1 tab(s) orally once a day  multivitamin: 1 tab(s) orally once a day  omeprazole 40 mg oral delayed release capsule: 1 cap(s) orally once a day  probiotic:   raNITIdine 150 mg oral tablet: 1 tab(s) orally 2 times a day  vitamin b-complex:   Vitamin C 1000 mg oral tablet:   Vitamin D3 5000 intl units (125 mcg) oral capsule: aspirin: prn  calcium + vitamin d3:   Eliquis Starter Pack for Treatment of DVT and PE 5 mg oral tablet: Take as directed per starter pack  [id number]  gabapentin 300 mg oral capsule: 1 cap(s) orally once a day (in the evening)  levothyroxine 100 mcg (0.1 mg) oral tablet: 1 tab(s) orally once a day  losartan 25 mg oral tablet: 1 tab(s) orally once a day  multivitamin: 1 tab(s) orally once a day  omeprazole 40 mg oral delayed release capsule: 1 cap(s) orally once a day  probiotic:   raNITIdine 150 mg oral tablet: 1 tab(s) orally 2 times a day  vitamin b-complex:   Vitamin C 1000 mg oral tablet:   Vitamin D3 5000 intl units (125 mcg) oral capsule: aspirin: prn  calcium + vitamin d3:   Eliquis Starter Pack for Treatment of DVT and PE 5 mg oral tablet: Take as directed per starter pack  [id number]  gabapentin 300 mg oral capsule: 1 cap(s) orally once a day (in the evening)  levothyroxine 100 mcg (0.1 mg) oral tablet: 1 tab(s) orally once a day  losartan 25 mg oral tablet: 1 tab(s) orally once a day  multivitamin: 1 tab(s) orally once a day  omeprazole 40 mg oral delayed release capsule: 1 cap(s) orally once a day  probiotic:   raNITIdine 150 mg oral tablet: 1 tab(s) orally 2 times a day  vitamin b-complex:   Vitamin C 1000 mg oral tablet:   Vitamin D3 5000 intl units (125 mcg) oral capsule: budesonide 0.5 mg/2 mL inhalation suspension: 2 milliliter(s) by nebulizer 2 times a day MDD:1 month supply  Eliquis Starter Pack for Treatment of DVT and PE 5 mg oral tablet: Take as directed per starter pack  [id number]  levothyroxine 100 mcg (0.1 mg) oral tablet: 1 tab(s) orally once a day  multivitamin: 1 tab(s) orally once a day  omeprazole 40 mg oral delayed release capsule: 1 cap(s) orally once a day  probiotic: 1 tab(s) orally 2 times a day  simvastatin 20 mg oral tablet: 1 tab(s) orally once a day (at bedtime)  Vitamin C 1000 mg oral tablet:   Vitamin D3 5000 intl units (125 mcg) oral capsule: budesonide 0.5 mg/2 mL inhalation suspension: 2 milliliter(s) by nebulizer 2 times a day MDD:1 month supply  Eliquis Starter Pack for Treatment of DVT and PE 5 mg oral tablet: Take as directed per starter pack  [id number]  levothyroxine 100 mcg (0.1 mg) oral tablet: 1 tab(s) orally once a day  multivitamin: 1 tab(s) orally once a day  omeprazole 40 mg oral delayed release capsule: 1 cap(s) orally once a day  probiotic: 1 tab(s) orally 2 times a day  simvastatin 20 mg oral tablet: 1 tab(s) orally once a day (at bedtime)  Vitamin C 1000 mg oral tablet:   Vitamin D3 5000 intl units (125 mcg) oral capsule: Eliquis Starter Pack for Treatment of DVT and PE 5 mg oral tablet: Take as directed per starter pack  [id number]  levothyroxine 100 mcg (0.1 mg) oral tablet: 1 tab(s) orally once a day  multivitamin: 1 tab(s) orally once a day  omeprazole 40 mg oral delayed release capsule: 1 cap(s) orally once a day  probiotic: 1 tab(s) orally 2 times a day  simvastatin 20 mg oral tablet: 1 tab(s) orally once a day (at bedtime)  Vitamin C 1000 mg oral tablet:   Vitamin D3 5000 intl units (125 mcg) oral capsule: Eliquis Starter Pack for Treatment of DVT and PE 5 mg oral tablet: Take as directed per starter pack  [id number]  levothyroxine 100 mcg (0.1 mg) oral tablet: 1 tab(s) orally once a day  multivitamin: 1 tab(s) orally once a day  omeprazole 40 mg oral delayed release capsule: 1 cap(s) orally once a day  probiotic: 1 tab(s) orally 2 times a day  simvastatin 20 mg oral tablet: 1 tab(s) orally once a day (at bedtime)  Vitamin C 1000 mg oral tablet:   Vitamin D3 5000 intl units (125 mcg) oral capsule: Eliquis Starter Pack for Treatment of DVT and PE 5 mg oral tablet: Take as directed per starter pack  [id number]  levothyroxine 100 mcg (0.1 mg) oral tablet: 1 tab(s) orally once a day  multivitamin: 1 tab(s) orally once a day  omeprazole 40 mg oral delayed release capsule: 1 cap(s) orally once a day  probiotic: 1 tab(s) orally 2 times a day  simvastatin 20 mg oral tablet: 1 tab(s) orally once a day (at bedtime)  Symbicort 80 mcg-4.5 mcg/inh inhalation aerosol: 2 puff(s) inhaled 2 times a day   Vitamin C 1000 mg oral tablet:   Vitamin D3 5000 intl units (125 mcg) oral capsule: Eliquis Starter Pack for Treatment of DVT and PE 5 mg oral tablet: Take as directed per starter pack  [id number]  levothyroxine 100 mcg (0.1 mg) oral tablet: 1 tab(s) orally once a day  multivitamin: 1 tab(s) orally once a day  omeprazole 40 mg oral delayed release capsule: 1 cap(s) orally once a day  probiotic: 1 tab(s) orally 2 times a day  simvastatin 20 mg oral tablet: 1 tab(s) orally once a day (at bedtime)  Vitamin C 1000 mg oral tablet:   Vitamin D3 5000 intl units (125 mcg) oral capsule:

## 2020-06-12 NOTE — PROGRESS NOTE ADULT - SUBJECTIVE AND OBJECTIVE BOX
[facility] - [facility] - PROGRESS NOTE    CHIEF COMPLAINT: pulmonary embolism; bronchospasm; dyspnea    INTERVAL HISTORY: still reports shortness of breath although looks comfortable sitting in the chair on room air; no cough, sputum production, chest congestion or wheeze; no fevers, chills or sweats; no chest pain/pressure or palpitations    REVIEW OF SYSTEMS:  Constitutional: As per interval history  HEENT: Within normal limits  CV: As per interval history  Resp: As per interval history  GI: Within normal limits   : Within normal limits  Musculoskeletal: Within normal limits  Skin: Within normal limits  Neurological: mild dementia  Psychiatric: Within normal limits  Endocrine: Within normal limits  Hematologic/Lymphatic: Within normal limits  Allergic/Immunologic: Within normal limits      MEDICATIONS:     Pulmonary "  albuterol/ipratropium for Nebulization 3 milliLiter(s) Nebulizer every 6 hours  buDESOnide    Inhalation Suspension 0.5 milliGRAM(s) Inhalation every 12 hours    Anti-microbials:    Cardiovascular:    Other:  calcium carbonate 1250 mG  + Vitamin D (OsCal 500 + D) 1 Tablet(s) Oral daily  cholecalciferol 4000 Unit(s) Oral daily  enoxaparin Injectable 50 milliGRAM(s) SubCutaneous daily  lactobacillus acidophilus 1 Tablet(s) Oral two times a day  levothyroxine 100 MICROGram(s) Oral daily  multivitamin 1 Tablet(s) Oral daily  pantoprazole    Tablet 40 milliGRAM(s) Oral before breakfast  simvastatin 20 milliGRAM(s) Oral at bedtime  vitamin B complex with vitamin C 1 Tablet(s) Oral daily    MEDICATIONS  (PRN):    OBJECTIVE:    I&O's Detail    11 Jun 2020 07:01  -  12 Jun 2020 07:00  --------------------------------------------------------  IN:    Oral Fluid: 1240 mL  Total IN: 1240 mL    OUT:  Total OUT: 0 mL    Total NET: 1240 mL    PHYSICAL EXAM:       ICU Vital Signs Last 24 Hrs  T(C): 36.7 (12 Jun 2020 05:30), Max: 36.7 (12 Jun 2020 05:30)  T(F): 98 (12 Jun 2020 05:30), Max: 98 (12 Jun 2020 05:30)  HR: 80 (11 Jun 2020 20:27) (63 - 80)  BP: 116/77 (11 Jun 2020 20:27) (108/70 - 150/73)  BP(mean): --  ABP: --  ABP(mean): --  RR: 20 (12 Jun 2020 05:30) (18 - 20)  SpO2: 94% (12 Jun 2020 05:30) (94% - 100%) on room air     General: Awake. Alert. Cooperative. No distress. Appears stated age 	  HEENT: Atraumatic. Normocephalic. Anicteric. Normal oral mucosa. PERRL. EOMI.  Neck: Supple. Trachea midline. Thyroid without enlargement/tenderness/nodules. No carotid bruit. No JVD.	  Cardiovascular: Regular rate and rhythm. S1 S2 normal. No murmurs, rubs or gallops.  Respiratory: Respirations unlabored. Clear to auscultation or percussion. Kyphosis.  Abdomen: Soft. Non-tender. Non-distended. No organomegaly. No masses. Normal bowel sounds.  Extremities: Warm to touch. No clubbing or cyanosis. No pedal edema. LLE girth > RLE girth  Pulses: 2+ peripheral pulses all extremities.	  Skin: Multiple ecchymoses with venous stasis changes on both legs  Lymph Nodes: Cervical, supraclavicular and axillary nodes normal  Neurological: Motor and sensory examination equal and normal. A and O x 2  Psychiatry: Appropriate mood and affect.    LABS:                          11.9   8.02  )-----------( 245      ( 11 Jun 2020 06:47 )             37.7     CBC    WBC  8.02 <==, 10.59 <==    Hemoglobin  11.9 <<==, 13.3 <<==    Hematocrit  37.7 <==, 42.1 <==    Platelets  245 <==, 243 <==      140  |  98  |  25<H>  ----------------------------<  82    06-11  4.0   |  31  |  1.48<H>      LYTES    sodium  140 <==, 138 <==    potassium   4.0 <==, 4.6 <==    chloride  98 <==, 98 <==    carbon dioxide  31 <==, 28 <==    =============================================================================================  RENAL FUNCTION:    Creatinine:   1.48  <<==, 1.23  <<==    BUN:   25 <==, 23 <==    ============================================================================================    calcium   9.5 <==, 9.6 <==    phos   4.6 <==    mag   2.1 <==    ============================================================================================  LFTs    AST:   26 <== , 32 <==     ALT:  16  <== , 23  <==     AP:  36  <=, 46  <=    Bili:  0.4  <=, 0.4  <=      PT/INR - ( 10 Pb 2020 13:03 )   PT: 11.1 sec;   INR: 0.97 ratio       PTT - ( 10 Pb 2020 13:03 )  PTT:26.4 sec    ABG - ( 12 Jun 2020 02:59 )  pH: 7.44  /  pCO2: 47    /  pO2: 58    / HCO3: 32    / Base Excess: 6.7   /  SaO2: 90        Venous Blood Gas:  06-10 @ 13:03  7.34/63/33/33/56  VBG Lactate: 2.0    Serum Pro-Brain Natriuretic Peptide: 413 pg/mL (06-10 @ 13:03)    CARDIAC MARKERS ( 10 Pb 2020 16:41 )  CPK x     /CKMB x     /CKMB Units x        troponin 27 ng/L    CARDIAC MARKERS ( 10 Pb 2020 13:03 )  CPK x     /CKMB x     /CKMB Units x        troponin 27 ng/L      Patient name: KEL DALTON  YOB: 1927   Age: 89 (F)   MR#: [id number]  Study Date: 6/11/2020  Location: [address] [place], [place] [id number]: T[facility] [[email or web address]]  Study quality: Technically good  Referring Physician: Art Carbajal MD  Blood Pressure: 144/77 mmHg  Height: 142 cm  Weight: 52 kg  BSA: 1.4 m2  ------------------------------------------------------------------------  PROCEDURE: Transthoracic echocardiogram with 2-D, M-Mode  and complete spectral and color flow Doppler.  INDICATION: Other pulmonary embolism without acute cor  pulmonale (I26.99)  ------------------------------------------------------------------------  Dimensions:    Normal Values:  LA:     3.7    2.0 - 4.0 cm  Ao:     2.6    2.0 - 3.8 cm  SEPTUM: 0.9 0.6 - 1.2 cm  PWT:    0.8    0.6 - 1.1 cm  LVIDd:  3.8    3.0 - 5.6 cm  LVIDs:  2.3    1.8 - 4.0 cm  Derived variables:  LVMI: 67 g/m2  RWT: 0.42  Fractional short: 39 %  EF (Teicholtz): 71 %  Doppler Peak Velocity (m/sec): AoV=2.1  ------------------------------------------------------------------------  Observations:  Mitral Valve: Mitral annular calcification, otherwise  normal mitral valve.  Aortic Valve/Aorta: Calcified trileaflet aortic valve with  decreased opening. Peak transaortic valvegradient equals  18 mm Hg, mean transaortic valve gradient equals 9 mm Hg,  estimated aortic valve area equals 1.6 sqcm (by continuity  equation), aortic valve velocity time integral equals 40  cm, consistent with mild aortic stenosis. Peak left  ventricular outflow tract gradient equals 4 mm Hg, mean  gradient is equal to 2 mm Hg, LVOT velocity time integral  equals 25 cm.  Aortic Root: 2.6 cm.  LVOT diameter: 2.1 cm.  Left Atrium: Normal left atrium.  Left Ventricle: Normal left ventricular systolic function.  No segmental wall motion abnormalities. Increased relative  wall thickness with normal left ventricular mass index,  consistent with concentric left ventricular remodeling.  Mild diastolic dysfunction (Stage I).  Right Heart: Normal right atrium. Normal right ventricular  size and function. Normal tricuspid valve. Mild tricuspid  regurgitation. Normal pulmonic valve. Minimal pulmonic  regurgitation.  Pericardium/Pleura: Normal pericardium with no pericardial  effusion.  Hemodynamic: Estimated right atrial pressure is 8 mm Hg.  Estimated right ventricular systolic pressure equals 37 mm  Hg, assuming right atrial pressure equals 8 mm Hg,  consistent with borderline pulmonary hypertension.  ------------------------------------------------------------------------  Conclusions:  1. Mitral annular calcification, otherwise normal mitral  valve.  2. Increased relative wall thickness with normal left  ventricular mass index, consistent with concentric left  ventricular remodeling.  3. Normal left ventricular systolic function. No segmental  wall motion abnormalities.  4. Normal right ventricular size and function.  ------------------------------------------------------------------------  Confirmed on  6/11/2020 - 17:52:49 by Elizabeth Linda M.D.  ------------------------------------------------------------------------  ---------------------------------------------------------------------------------------------------------------  MICROBIOLOGY:     COVID-19 PCR . (06.10.20 @ 13:37)    COVID-19 PCR: NotDetec: This test has been validated by [facility] to be accurate;  though it has not been FDA cleared/approved by the usual pathway.  As with all laboratory tests, results should be correlated with clinical  findings.  https://www.fda.gov/media/501037/download  https://www.fda.gov/media/328487/download    RADIOLOGY:  [x ] Chest radiographs reviewed and interpreted by me      EXAM:  XR CHEST PORTABLE URGENT 1V                          PROCEDURE DATE:  06/10/2020      INTERPRETATION:  A single chest x-ray was obtained on Ana 10, 2020.    Indication: Dyspnea.    Impression:    The heart is normal in size. Small right pleural effusion. It like atelectasis right lower lobe. Otherwise the lungs appear to be clear. No pneumothorax. A loop recorder is seen overlying the left hemithorax. No pneumothorax. No acute bony pathology could be identified. Calcified aortic knob.    PADMINI JUNIOR M.D., ATTENDING RADIOLOGIST  This document has been electronically signed. Pb 10 2020  1:52PM  ---------------------------------------------------------------------------------------------------------------    EXAM:  CT ANGIO CHEST (W)AW IC                          PROCEDURE DATE:  06/10/2020      INTERPRETATION:  CLINICAL INFORMATION: Dyspnea. Evaluate for pulmonary embolus.     COMPARISON: CT chest from 5/2/2019.    PROCEDURE:   CT Angiography of the Chest.  90 ml of Omnipaque 350 was injected intravenously. 10 ml were discarded.  Sagittal and coronal reformats were performed as well as 3D (MIP) reconstructions.    FINDINGS:    LUNGS AND AIRWAYS: Patent central airways.  Unchanged multiple bilateral calcified pulmonary nodules. Right middle and right lower lobe linear atelectasis.  PLEURA: No pleural effusion.  MEDIASTINUM AND MIGUEL A: No lymphadenopathy.  VESSELS: A focal filling defect within the left lower lobar branch (6:276). No main, central, right lobar, segmental or subsegmental pulmonary embolus. Atherosclerotic changes.  HEART: Heart size is normal. No pericardial effusion. Coronary artery calcifications.  CHEST WALL AND LOWER NECK: A loop recorder within the left chest wall.  VISUALIZED UPPER ABDOMEN: Cholelithiasis. Right renal cyst. Additional bilateral subcentimeter hypodense foci, small to characterize.  BONES: Anterior cervical spinal fixation. Degenerative changes. Bilateral healed chronic rib fracture deformities.    IMPRESSION:     Age-indeterminate left lower lobar nonocclusive thrombus.    Dr. Keller discussed these findings with Dr. Hughes on 6/10/2020 4:41 PM, with read back.    CHEY KELLER M.D., RADIOLOGY RESIDENT  This document has been electronically signed.  ZACK MEDINA M.D., ATTENDING RADIOLOGIST  This document has been electronically signed. Pb 10 2020  4:52PM    ---------------------------------------------------------------------------------------------------------------

## 2020-06-12 NOTE — DISCHARGE NOTE PROVIDER - NSDCFUADDINST_GEN_ALL_CORE_FT
Take your medications as prescribed  Call day after discharge on Monday to make an appt. with Dr. Jacobo to f/u  Plan to be seen within 7-10 days  You will need to get a new 30 day prescription for Eliquis so plan to have the prescription sent within 2 weeks after discharge

## 2020-06-12 NOTE — DISCHARGE NOTE NURSING/CASE MANAGEMENT/SOCIAL WORK - PATIENT PORTAL LINK FT
You can access the FollowMyHealth Patient Portal offered by [facility] by registering at the following website: [email or web address]. By joining [employer]’s FollowMyHealth portal, you will also be able to view your health information using other applications (apps) compatible with our system.

## 2020-06-12 NOTE — DISCHARGE NOTE PROVIDER - PROVIDER TOKENS
FREE:[LAST:[modesta],FIRST:[carol],PHONE:[phone]],FAX:[(   )    -],ADDRESS:[[address] [place] [address]]],PROVIDER:[TOKEN:[[id number]],SCHEDULEDAPPT:[06/29/2020],SCHEDULEDAPPTTIME:[12:30 PM]]

## 2020-06-12 NOTE — DISCHARGE NOTE PROVIDER - HOSPITAL COURSE
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 Pt says onset of dyspnea has been going on for a "few days." Lives alone, has HHA which she has asked to stay around for longer hours this past week b/c she has just not felt comfortable. Patient reported  +MCKEON and +SOB when talking and has to take breaks more often. Otherwise denies overt SOB at rest. Denies LE edema, orthopnea, cp, f/c, n/v/d, dysuria, cough, myalgias and pleuritic cp. The patient has no prior hx of vte and no family clotting disorders.         In the ED the patient's vitals were: Temp of 98.4, HR of 84, BP of 155/80, RR of 22, 100% O2 on 2LNC.  Labs: wbc 10.6, stable macrocytosis, platelets wnl, no anemia, coags unrevealing, HST delta neg 27 --> 27, CMP with stable ckd3, lactate wnl, mild hypercapnia pco2 63, covid19 pcr neg.          Imaging: CTA chest + age indeterminate LLL Pulmonary Embolism.     POCUS: no pericardial effusion, preserved LVSF.     CXR showed +RLL atelectasis, no acute findings otherwise, +ILR in place.         In the ED pt received therapeutic Lovenox 50 mg subq x 1, lasix 20 iv x 1, albuterol prior to medicine team involvement.         The patient had additional lab workup that showed a troponin wnl of 27 x 2 and a BNP of 413. The patient also had a TTE which demonstrated good Left and Right ventricle size and function. 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 Pt says onset of dyspnea has been going on for a "few days." Lives alone, has HHA which she has asked to stay around for longer hours this past week b/c she has just not felt comfortable. Patient reported  +MCKEON and +SOB when talking and has to take breaks more often. Otherwise denies overt SOB at rest. Denies LE edema, orthopnea, cp, f/c, n/v/d, dysuria, cough, myalgias and pleuritic cp. The patient has no prior hx of vte and no family clotting disorders.         In the ED the patient's vitals were: Temp of 98.4, HR of 84, BP of 155/80, RR of 22, 100% O2 on 2LNC.  Labs: wbc 10.6, stable macrocytosis, platelets wnl, no anemia, coags unrevealing, HST delta neg 27 --> 27, CMP with stable ckd3, lactate wnl, mild hypercapnia pco2 63, covid19 pcr neg.          Imaging: CTA chest + age indeterminate LLL Pulmonary Embolism.     POCUS: no pericardial effusion, preserved LVSF.     CXR showed +RLL atelectasis, no acute findings otherwise, +ILR in place.         In the ED pt received therapeutic Lovenox 50 mg subq x 1, lasix 20 iv x 1, albuterol prior to medicine team involvement.         The patient had additional lab workup that showed a troponin wnl of 27 x 2 and a BNP of 413. The patient also had a TTE which demonstrated good Left and Right ventricle size and function. 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 Pt says onset of dyspnea has been going on for a "few days." Lives alone, has HHA which she has asked to stay around for longer hours this past week b/c she has just not felt comfortable. Patient reported  +MCKENO and +SOB when talking and has to take breaks more often. Otherwise denies overt SOB at rest. Denies LE edema, orthopnea, cp, f/c, n/v/d, dysuria, cough, myalgias and pleuritic cp. The patient has no prior hx of vte and no family clotting disorders.         In the ED the patient's vitals were: Temp of 98.4, HR of 84, BP of 155/80, RR of 22, 100% O2 on 2LNC.  Labs: wbc 10.6, stable macrocytosis, platelets wnl, no anemia, coags unrevealing, HST delta neg 27 --> 27, CMP with stable ckd3, lactate wnl, mild hypercapnia pco2 63, covid19 pcr neg.          Imaging: CTA chest + age indeterminate LLL Pulmonary Embolism.     POCUS: no pericardial effusion, preserved LVSF.     CXR showed +RLL atelectasis, no acute findings otherwise, +ILR in place.         In the ED pt received therapeutic Lovenox 50 mg subq x 1, lasix 20 iv x 1, albuterol prior to medicine team involvement.         The patient had additional lab workup that showed a troponin wnl of 27 x 2 and a BNP of 413. The patient also had a TTE which demonstrated good Left and Right ventricle size and function. LLE + DVT Duplex         On 6/13  - pt. dc'd home after d/w Dr. Carbajal      89 y/o female PMH PAF managed with eliquis, HTN, hyperlipidemia, CKD as above admitted with dyspnea and found to have a PE. Echo and ILR interrogation unremarkable (other than known PAF).    A/C with lovenox initially and transitioned to PO eliquis  and f/u with Dr. Mancera on Monday June 29th at 12:30pm

## 2020-06-12 NOTE — DISCHARGE NOTE PROVIDER - NSDCCPCAREPLAN_GEN_ALL_CORE_FT
PRINCIPAL DISCHARGE DIAGNOSIS  Diagnosis: Pulmonary embolism  Assessment and Plan of Treatment: The patient should take her anti-coagualtion medication to prevent recurrnce of a PE.      SECONDARY DISCHARGE DIAGNOSES  Diagnosis: Hypoxia  Assessment and Plan of Treatment:     Diagnosis: Shortness of breath  Assessment and Plan of Treatment: PRINCIPAL DISCHARGE DIAGNOSIS  Diagnosis: Pulmonary embolism  Assessment and Plan of Treatment: The patient should take her anti-coagualtion medication to prevent recurrnce of a PE.      SECONDARY DISCHARGE DIAGNOSES  Diagnosis: DVT, lower extremity  Assessment and Plan of Treatment: Left leg- Plan as above. Follow up with PCP within 1 week  returnt to the hospital if have chest pain, difficuty breathing oe worsens    Diagnosis: Hypoxia  Assessment and Plan of Treatment: resolved

## 2020-06-12 NOTE — PROGRESS NOTE ADULT - SUBJECTIVE AND OBJECTIVE BOX
Patient is a 89y old  Female who presents with a chief complaint of pulmonary embolism, dyspnea (12 Jun 2020 11:49)      SUBJECTIVE / OVERNIGHT EVENTS: Comfortable without new complaints.   Review of Systems  chest pain no  palpitations no  sob improving   nausea no  headache no    MEDICATIONS  (STANDING):  albuterol/ipratropium for Nebulization 3 milliLiter(s) Nebulizer every 6 hours  buDESOnide    Inhalation Suspension 0.5 milliGRAM(s) Inhalation every 12 hours  calcium carbonate 1250 mG  + Vitamin D (OsCal 500 + D) 1 Tablet(s) Oral daily  cholecalciferol 4000 Unit(s) Oral daily  enoxaparin Injectable 50 milliGRAM(s) SubCutaneous daily  lactobacillus acidophilus 1 Tablet(s) Oral two times a day  levothyroxine 100 MICROGram(s) Oral daily  multivitamin 1 Tablet(s) Oral daily  pantoprazole    Tablet 40 milliGRAM(s) Oral before breakfast  simvastatin 20 milliGRAM(s) Oral at bedtime  vitamin B complex with vitamin C 1 Tablet(s) Oral daily    MEDICATIONS  (PRN):      Vital Signs Last 24 Hrs  T(C): 36.7 (12 Jun 2020 14:14), Max: 36.7 (12 Jun 2020 05:30)  T(F): 98.1 (12 Jun 2020 14:14), Max: 98.1 (12 Jun 2020 14:14)  HR: 86 (12 Jun 2020 14:14) (80 - 86)  BP: 142/78 (12 Jun 2020 14:14) (116/77 - 142/78)  BP(mean): --  RR: 18 (12 Jun 2020 14:14) (18 - 20)  SpO2: 95% (12 Jun 2020 14:14) (94% - 100%)    PHYSICAL EXAM:  GENERAL: NAD, well-developed  HEAD:  Atraumatic, Normocephalic  EYES: EOMI, PERRLA, conjunctiva and sclera clear  NECK: Supple, No JVD  CHEST/LUNG: Clear to auscultation bilaterally; No wheeze  HEART: Regular rate and rhythm; No murmurs, rubs, or gallops  ABDOMEN: Soft, Nontender, Nondistended; Bowel sounds present  EXTREMITIES:  2+ bipedal edema  PSYCH: AAOx3  NEUROLOGY: non-focal  SKIN: No rashes or lesions    LABS:                        11.9   8.02  )-----------( 245      ( 11 Jun 2020 06:47 )             37.7     06-11    140  |  98  |  25<H>  ----------------------------<  82  4.0   |  31  |  1.48<H>    Ca    9.5      11 Jun 2020 06:46  Phos  4.6     06-11  Mg     2.1     06-11    TPro  5.9<L>  /  Alb  3.6  /  TBili  0.4  /  DBili  x   /  AST  26  /  ALT  16  /  AlkPhos  36<L>  06-11                RADIOLOGY & ADDITIONAL TESTS:    Imaging Personally Reviewed:    Consultant(s) Notes Reviewed:      Care Discussed with Consultants/Other Providers:

## 2020-06-13 VITALS
DIASTOLIC BLOOD PRESSURE: 82 MMHG | RESPIRATION RATE: 20 BRPM | HEART RATE: 79 BPM | TEMPERATURE: 98 F | OXYGEN SATURATION: 97 % | SYSTOLIC BLOOD PRESSURE: 163 MMHG

## 2020-06-13 LAB
ANION GAP SERPL CALC-SCNC: 17 MMOL/L — SIGNIFICANT CHANGE UP (ref 5–17)
BUN SERPL-MCNC: 24 MG/DL — HIGH (ref 7–23)
CALCIUM SERPL-MCNC: 9.4 MG/DL — SIGNIFICANT CHANGE UP (ref 8.4–10.5)
CHLORIDE SERPL-SCNC: 93 MMOL/L — LOW (ref 96–108)
CO2 SERPL-SCNC: 22 MMOL/L — SIGNIFICANT CHANGE UP (ref 22–31)
CREAT SERPL-MCNC: 1.28 MG/DL — SIGNIFICANT CHANGE UP (ref 0.5–1.3)
GLUCOSE BLDC GLUCOMTR-MCNC: 92 MG/DL — SIGNIFICANT CHANGE UP (ref 70–99)
GLUCOSE SERPL-MCNC: 91 MG/DL — SIGNIFICANT CHANGE UP (ref 70–99)
POTASSIUM SERPL-MCNC: 4.3 MMOL/L — SIGNIFICANT CHANGE UP (ref 3.5–5.3)
POTASSIUM SERPL-SCNC: 4.3 MMOL/L — SIGNIFICANT CHANGE UP (ref 3.5–5.3)
SODIUM SERPL-SCNC: 132 MMOL/L — LOW (ref 135–145)

## 2020-06-13 PROCEDURE — 83605 ASSAY OF LACTIC ACID: CPT

## 2020-06-13 PROCEDURE — 82947 ASSAY GLUCOSE BLOOD QUANT: CPT

## 2020-06-13 PROCEDURE — 96374 THER/PROPH/DIAG INJ IV PUSH: CPT

## 2020-06-13 PROCEDURE — 80048 BASIC METABOLIC PNL TOTAL CA: CPT

## 2020-06-13 PROCEDURE — 83735 ASSAY OF MAGNESIUM: CPT

## 2020-06-13 PROCEDURE — 85730 THROMBOPLASTIN TIME PARTIAL: CPT

## 2020-06-13 PROCEDURE — 99285 EMERGENCY DEPT VISIT HI MDM: CPT | Mod: 25

## 2020-06-13 PROCEDURE — 71045 X-RAY EXAM CHEST 1 VIEW: CPT

## 2020-06-13 PROCEDURE — 93005 ELECTROCARDIOGRAM TRACING: CPT

## 2020-06-13 PROCEDURE — 36600 WITHDRAWAL OF ARTERIAL BLOOD: CPT

## 2020-06-13 PROCEDURE — 84132 ASSAY OF SERUM POTASSIUM: CPT

## 2020-06-13 PROCEDURE — 80053 COMPREHEN METABOLIC PANEL: CPT

## 2020-06-13 PROCEDURE — 84100 ASSAY OF PHOSPHORUS: CPT

## 2020-06-13 PROCEDURE — 93306 TTE W/DOPPLER COMPLETE: CPT

## 2020-06-13 PROCEDURE — 93970 EXTREMITY STUDY: CPT

## 2020-06-13 PROCEDURE — 84484 ASSAY OF TROPONIN QUANT: CPT

## 2020-06-13 PROCEDURE — 82330 ASSAY OF CALCIUM: CPT

## 2020-06-13 PROCEDURE — 85027 COMPLETE CBC AUTOMATED: CPT

## 2020-06-13 PROCEDURE — 96372 THER/PROPH/DIAG INJ SC/IM: CPT | Mod: XU

## 2020-06-13 PROCEDURE — 93308 TTE F-UP OR LMTD: CPT

## 2020-06-13 PROCEDURE — 94640 AIRWAY INHALATION TREATMENT: CPT

## 2020-06-13 PROCEDURE — 84295 ASSAY OF SERUM SODIUM: CPT

## 2020-06-13 PROCEDURE — 97161 PT EVAL LOW COMPLEX 20 MIN: CPT

## 2020-06-13 PROCEDURE — 82435 ASSAY OF BLOOD CHLORIDE: CPT

## 2020-06-13 PROCEDURE — 83880 ASSAY OF NATRIURETIC PEPTIDE: CPT

## 2020-06-13 PROCEDURE — 85014 HEMATOCRIT: CPT

## 2020-06-13 PROCEDURE — 85610 PROTHROMBIN TIME: CPT

## 2020-06-13 PROCEDURE — 82803 BLOOD GASES ANY COMBINATION: CPT

## 2020-06-13 PROCEDURE — 71275 CT ANGIOGRAPHY CHEST: CPT

## 2020-06-13 PROCEDURE — 82962 GLUCOSE BLOOD TEST: CPT

## 2020-06-13 RX ORDER — BUDESONIDE AND FORMOTEROL FUMARATE DIHYDRATE 160; 4.5 UG/1; UG/1
2 AEROSOL RESPIRATORY (INHALATION)
Qty: 1 | Refills: 0
Start: 2020-06-13 | End: 2020-07-12

## 2020-06-13 RX ORDER — RANITIDINE HYDROCHLORIDE 150 MG/1
1 TABLET, FILM COATED ORAL
Qty: 0 | Refills: 0 | DISCHARGE

## 2020-06-13 RX ORDER — SIMVASTATIN 20 MG/1
1 TABLET, FILM COATED ORAL
Qty: 30 | Refills: 0
Start: 2020-06-13 | End: 2020-07-12

## 2020-06-13 RX ORDER — LOSARTAN POTASSIUM 100 MG/1
1 TABLET, FILM COATED ORAL
Qty: 0 | Refills: 0 | DISCHARGE

## 2020-06-13 RX ORDER — ASPIRIN/CALCIUM CARB/MAGNESIUM 324 MG
0 TABLET ORAL
Qty: 0 | Refills: 0 | DISCHARGE

## 2020-06-13 RX ORDER — GABAPENTIN 400 MG/1
1 CAPSULE ORAL
Qty: 0 | Refills: 0 | DISCHARGE

## 2020-06-13 RX ORDER — BUDESONIDE, MICRONIZED 100 %
2 POWDER (GRAM) MISCELLANEOUS
Qty: 120 | Refills: 0
Start: 2020-06-13 | End: 2020-07-12

## 2020-06-13 RX ADMIN — Medication 1 TABLET(S): at 05:21

## 2020-06-13 RX ADMIN — Medication 100 MICROGRAM(S): at 05:21

## 2020-06-13 RX ADMIN — Medication 3 MILLILITER(S): at 12:12

## 2020-06-13 RX ADMIN — Medication 0.5 MILLIGRAM(S): at 05:20

## 2020-06-13 RX ADMIN — PANTOPRAZOLE SODIUM 40 MILLIGRAM(S): 20 TABLET, DELAYED RELEASE ORAL at 05:21

## 2020-06-13 RX ADMIN — Medication 1 TABLET(S): at 12:11

## 2020-06-13 RX ADMIN — ENOXAPARIN SODIUM 50 MILLIGRAM(S): 100 INJECTION SUBCUTANEOUS at 12:11

## 2020-06-13 RX ADMIN — Medication 1 TABLET(S): at 12:12

## 2020-06-13 RX ADMIN — Medication 3 MILLILITER(S): at 05:20

## 2020-06-13 RX ADMIN — Medication 4000 UNIT(S): at 12:11

## 2020-06-13 NOTE — PROGRESS NOTE ADULT - ATTENDING COMMENTS
CARDIOLOGY ATTENDING    Agree with above. No further inpatient cardiac workup needed. Recommend lifelong a/c for PAF

## 2020-06-13 NOTE — PROGRESS NOTE ADULT - ASSESSMENT
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     Pulmonary embolism, unspecified chronicity, unspecified pulmonary embolism type, unspecified whether acute cor pulmonale present.  - dyspnea x few days; VSS mostly no significant O2 requirement at present. CXR with atelectasis otherwise nondx. CTA with LLL age indeterminate PE  - AC with lovenox 50 subq as per pharmacy. Change to Eliquis 2.5 bid .  - check TTE  - Pulmonary evaluation Dr. Fuentes noted  - Cardiology evaluation noted  - PPM check     Essential hypertension.   - sbp at goal monitor off arb given meagan/ckd.    CKD (chronic kidney disease) stage 2, GFR 60-89 ml/min.  - stable ckd, trend bmp, avoid nephrotoxins.     Hyperlipidemia, unspecified hyperlipidemia type.    - c/w simvastatin 20.     Gastroesophageal reflux disease, esophagitis presence not specified.   - ppi.     DNR/DNI  -MOLST in chart    DC home. Follow with PMD/ Cardiology/ Pulmonary in 3-4 days. QA    Art Carbajal MD pager [phone]

## 2020-06-13 NOTE — PROGRESS NOTE ADULT - REASON FOR ADMISSION
pulmonary embolism, dyspnea

## 2020-06-13 NOTE — PROGRESS NOTE ADULT - SUBJECTIVE AND OBJECTIVE BOX
pt seen and examined, no complaints, ROS - .     no tele         albuterol/ipratropium for Nebulization 3 milliLiter(s) Nebulizer every 6 hours  buDESOnide    Inhalation Suspension 0.5 milliGRAM(s) Inhalation every 12 hours  calcium carbonate 1250 mG  + Vitamin D (OsCal 500 + D) 1 Tablet(s) Oral daily  cholecalciferol 4000 Unit(s) Oral daily  enoxaparin Injectable 50 milliGRAM(s) SubCutaneous daily  lactobacillus acidophilus 1 Tablet(s) Oral two times a day  levothyroxine 100 MICROGram(s) Oral daily  multivitamin 1 Tablet(s) Oral daily  pantoprazole    Tablet 40 milliGRAM(s) Oral before breakfast  simvastatin 20 milliGRAM(s) Oral at bedtime  vitamin B complex with vitamin C 1 Tablet(s) Oral daily          Hemoglobin: 11.9 g/dL (06-11 @ 06:47)  Hemoglobin: 13.3 g/dL (06-10 @ 13:03)            Creatinine Trend: 1.48<--, 1.23<--    COAGS:           T(C): 36.6 (06-13-20 @ 05:22), Max: 36.7 (06-12-20 @ 14:14)  HR: 76 (06-13-20 @ 05:22) (76 - 86)  BP: 153/72 (06-13-20 @ 05:22) (142/78 - 154/78)  RR: 20 (06-13-20 @ 05:22) (18 - 20)  SpO2: 95% (06-13-20 @ 05:22) (95% - 96%)  Wt(kg): --    I&O's Summary    12 Jun 2020 07:01  -  13 Jun 2020 07:00  --------------------------------------------------------  IN: 720 mL / OUT: 0 mL / NET: 720 mL    A&O x 3  neurologically intact  no JVD  RRR, no murmurs  CTAB  soft nt/nd  no c/c/e    echo normal  ILR: NSR, known PAF, no other events      A/P) 89 y/o female PMH PAF managed with eliquis, HTN, hyperlipidemia, CKD admitted with dyspnea and found to have a PE. Echo and ILR interrogation unremarkable (other than known PAF).    - A/C with lovenox at present , bridge to PO per medicine   -no further cardiac workup needed  -f/u with Dr. Mancera on Monday June 29th at 12:30pm

## 2020-06-13 NOTE — PROGRESS NOTE ADULT - SUBJECTIVE AND OBJECTIVE BOX
Patient is a 89y old  Female who presents with a chief complaint of pulmonary embolism, dyspnea (13 Jun 2020 07:50)      SUBJECTIVE / OVERNIGHT EVENTS: feels better  Review of Systems  chest pain no  palpitations no  sob no  nausea no  headache no    MEDICATIONS  (STANDING):  albuterol/ipratropium for Nebulization 3 milliLiter(s) Nebulizer every 6 hours  buDESOnide    Inhalation Suspension 0.5 milliGRAM(s) Inhalation every 12 hours  calcium carbonate 1250 mG  + Vitamin D (OsCal 500 + D) 1 Tablet(s) Oral daily  cholecalciferol 4000 Unit(s) Oral daily  enoxaparin Injectable 50 milliGRAM(s) SubCutaneous daily  lactobacillus acidophilus 1 Tablet(s) Oral two times a day  levothyroxine 100 MICROGram(s) Oral daily  multivitamin 1 Tablet(s) Oral daily  pantoprazole    Tablet 40 milliGRAM(s) Oral before breakfast  simvastatin 20 milliGRAM(s) Oral at bedtime  vitamin B complex with vitamin C 1 Tablet(s) Oral daily    MEDICATIONS  (PRN):      Vital Signs Last 24 Hrs  T(C): 36.6 (13 Jun 2020 05:22), Max: 36.7 (12 Jun 2020 14:14)  T(F): 97.9 (13 Jun 2020 05:22), Max: 98.1 (12 Jun 2020 14:14)  HR: 76 (13 Jun 2020 05:22) (76 - 86)  BP: 153/72 (13 Jun 2020 05:22) (142/78 - 154/78)  BP(mean): --  RR: 20 (13 Jun 2020 05:22) (18 - 20)  SpO2: 95% (13 Jun 2020 05:22) (95% - 96%)    PHYSICAL EXAM:  GENERAL: NAD  HEAD:  Atraumatic, Normocephalic  EYES: EOMI, PERRLA, conjunctiva and sclera clear  NECK: Supple, No JVD  CHEST/LUNG: Clear to auscultation bilaterally; No wheeze  HEART: Regular rate and rhythm; No murmurs, rubs, or gallops  ABDOMEN: Soft, Nontender, Nondistended; Bowel sounds present  EXTREMITIES:  2+ Peripheral Pulses, No clubbing, cyanosis, or edema  PSYCH: AAOx3  NEUROLOGY: non-focal  SKIN: No rashes or lesions    LABS:    06-13    132<L>  |  93<L>  |  24<H>  ----------------------------<  91  4.3   |  22  |  1.28    Ca    9.4      13 Jun 2020 07:11                  RADIOLOGY & ADDITIONAL TESTS:    Imaging Personally Reviewed:    Consultant(s) Notes Reviewed:      Care Discussed with Consultants/Other Providers:

## 2020-06-20 ENCOUNTER — INPATIENT (INPATIENT)
Facility: HOSPITAL | Age: 85
LOS: 1 days | Discharge: HOME | DRG: 204 | End: 2020-06-22
Attending: INTERNAL MEDICINE | Admitting: INTERNAL MEDICINE
Payer: MEDICARE

## 2020-06-20 VITALS
SYSTOLIC BLOOD PRESSURE: 165 MMHG | HEIGHT: 56 IN | DIASTOLIC BLOOD PRESSURE: 83 MMHG | OXYGEN SATURATION: 99 % | WEIGHT: 115.08 LBS | TEMPERATURE: 98 F | HEART RATE: 77 BPM | RESPIRATION RATE: 20 BRPM

## 2020-06-20 DIAGNOSIS — Z87.19 PERSONAL HISTORY OF OTHER DISEASES OF THE DIGESTIVE SYSTEM: Chronic | ICD-10-CM

## 2020-06-20 DIAGNOSIS — R06.02 SHORTNESS OF BREATH: ICD-10-CM

## 2020-06-20 LAB
ALBUMIN SERPL ELPH-MCNC: 4.2 G/DL — SIGNIFICANT CHANGE UP (ref 3.3–5)
ALP SERPL-CCNC: 43 U/L — SIGNIFICANT CHANGE UP (ref 40–120)
ALT FLD-CCNC: 25 U/L — SIGNIFICANT CHANGE UP (ref 10–45)
ANION GAP SERPL CALC-SCNC: 11 MMOL/L — SIGNIFICANT CHANGE UP (ref 5–17)
APTT BLD: 36.7 SEC — HIGH (ref 27.5–36.3)
AST SERPL-CCNC: 32 U/L — SIGNIFICANT CHANGE UP (ref 10–40)
BASOPHILS # BLD AUTO: 0.04 K/UL — SIGNIFICANT CHANGE UP (ref 0–0.2)
BASOPHILS NFR BLD AUTO: 0.6 % — SIGNIFICANT CHANGE UP (ref 0–2)
BILIRUB SERPL-MCNC: 0.7 MG/DL — SIGNIFICANT CHANGE UP (ref 0.2–1.2)
BUN SERPL-MCNC: 13 MG/DL — SIGNIFICANT CHANGE UP (ref 7–23)
CALCIUM SERPL-MCNC: 9.7 MG/DL — SIGNIFICANT CHANGE UP (ref 8.4–10.5)
CHLORIDE SERPL-SCNC: 98 MMOL/L — SIGNIFICANT CHANGE UP (ref 96–108)
CO2 SERPL-SCNC: 25 MMOL/L — SIGNIFICANT CHANGE UP (ref 22–31)
CREAT SERPL-MCNC: 1.14 MG/DL — SIGNIFICANT CHANGE UP (ref 0.5–1.3)
EOSINOPHIL # BLD AUTO: 0.08 K/UL — SIGNIFICANT CHANGE UP (ref 0–0.5)
EOSINOPHIL NFR BLD AUTO: 1.3 % — SIGNIFICANT CHANGE UP (ref 0–6)
GAS PNL BLDV: SIGNIFICANT CHANGE UP
GLUCOSE SERPL-MCNC: 102 MG/DL — HIGH (ref 70–99)
HCT VFR BLD CALC: 38.6 % — SIGNIFICANT CHANGE UP (ref 34.5–45)
HGB BLD-MCNC: 12.5 G/DL — SIGNIFICANT CHANGE UP (ref 11.5–15.5)
IMM GRANULOCYTES NFR BLD AUTO: 0.5 % — SIGNIFICANT CHANGE UP (ref 0–1.5)
INR BLD: 1.79 RATIO — HIGH (ref 0.88–1.16)
LYMPHOCYTES # BLD AUTO: 1.92 K/UL — SIGNIFICANT CHANGE UP (ref 1–3.3)
LYMPHOCYTES # BLD AUTO: 30.4 % — SIGNIFICANT CHANGE UP (ref 13–44)
MCHC RBC-ENTMCNC: 32.4 GM/DL — SIGNIFICANT CHANGE UP (ref 32–36)
MCHC RBC-ENTMCNC: 32.5 PG — SIGNIFICANT CHANGE UP (ref 27–34)
MCV RBC AUTO: 100.3 FL — HIGH (ref 80–100)
MONOCYTES # BLD AUTO: 0.75 K/UL — SIGNIFICANT CHANGE UP (ref 0–0.9)
MONOCYTES NFR BLD AUTO: 11.9 % — SIGNIFICANT CHANGE UP (ref 2–14)
NEUTROPHILS # BLD AUTO: 3.49 K/UL — SIGNIFICANT CHANGE UP (ref 1.8–7.4)
NEUTROPHILS NFR BLD AUTO: 55.3 % — SIGNIFICANT CHANGE UP (ref 43–77)
NRBC # BLD: 0 /100 WBCS — SIGNIFICANT CHANGE UP (ref 0–0)
NT-PROBNP SERPL-SCNC: 232 PG/ML — SIGNIFICANT CHANGE UP (ref 0–300)
PLATELET # BLD AUTO: 335 K/UL — SIGNIFICANT CHANGE UP (ref 150–400)
POTASSIUM SERPL-MCNC: 4.2 MMOL/L — SIGNIFICANT CHANGE UP (ref 3.5–5.3)
POTASSIUM SERPL-SCNC: 4.2 MMOL/L — SIGNIFICANT CHANGE UP (ref 3.5–5.3)
PROT SERPL-MCNC: 6.8 G/DL — SIGNIFICANT CHANGE UP (ref 6–8.3)
PROTHROM AB SERPL-ACNC: 20.9 SEC — HIGH (ref 10–12.9)
RBC # BLD: 3.85 M/UL — SIGNIFICANT CHANGE UP (ref 3.8–5.2)
RBC # FLD: 13.7 % — SIGNIFICANT CHANGE UP (ref 10.3–14.5)
SARS-COV-2 RNA SPEC QL NAA+PROBE: SIGNIFICANT CHANGE UP
SODIUM SERPL-SCNC: 134 MMOL/L — LOW (ref 135–145)
TROPONIN T, HIGH SENSITIVITY RESULT: 26 NG/L — SIGNIFICANT CHANGE UP (ref 0–51)
TROPONIN T, HIGH SENSITIVITY RESULT: 27 NG/L — SIGNIFICANT CHANGE UP (ref 0–51)
WBC # BLD: 6.31 K/UL — SIGNIFICANT CHANGE UP (ref 3.8–10.5)
WBC # FLD AUTO: 6.31 K/UL — SIGNIFICANT CHANGE UP (ref 3.8–10.5)

## 2020-06-20 PROCEDURE — 93010 ELECTROCARDIOGRAM REPORT: CPT

## 2020-06-20 PROCEDURE — 99285 EMERGENCY DEPT VISIT HI MDM: CPT | Mod: CS

## 2020-06-20 PROCEDURE — 71275 CT ANGIOGRAPHY CHEST: CPT | Mod: 26

## 2020-06-20 PROCEDURE — 71045 X-RAY EXAM CHEST 1 VIEW: CPT | Mod: 26

## 2020-06-20 RX ORDER — IPRATROPIUM/ALBUTEROL SULFATE 18-103MCG
3 AEROSOL WITH ADAPTER (GRAM) INHALATION EVERY 6 HOURS
Refills: 0 | Status: DISCONTINUED | OUTPATIENT
Start: 2020-06-20 | End: 2020-06-22

## 2020-06-20 RX ORDER — APIXABAN 2.5 MG/1
2.5 TABLET, FILM COATED ORAL EVERY 12 HOURS
Refills: 0 | Status: DISCONTINUED | OUTPATIENT
Start: 2020-06-21 | End: 2020-06-22

## 2020-06-20 RX ORDER — ACETAMINOPHEN 500 MG
650 TABLET ORAL EVERY 6 HOURS
Refills: 0 | Status: DISCONTINUED | OUTPATIENT
Start: 2020-06-20 | End: 2020-06-22

## 2020-06-20 RX ORDER — LEVOTHYROXINE SODIUM 125 MCG
100 TABLET ORAL DAILY
Refills: 0 | Status: DISCONTINUED | OUTPATIENT
Start: 2020-06-20 | End: 2020-06-22

## 2020-06-20 RX ORDER — SIMVASTATIN 20 MG/1
20 TABLET, FILM COATED ORAL AT BEDTIME
Refills: 0 | Status: DISCONTINUED | OUTPATIENT
Start: 2020-06-20 | End: 2020-06-22

## 2020-06-20 RX ORDER — PANTOPRAZOLE SODIUM 20 MG/1
40 TABLET, DELAYED RELEASE ORAL
Refills: 0 | Status: DISCONTINUED | OUTPATIENT
Start: 2020-06-20 | End: 2020-06-22

## 2020-06-20 RX ORDER — APIXABAN 2.5 MG/1
5 TABLET, FILM COATED ORAL ONCE
Refills: 0 | Status: COMPLETED | OUTPATIENT
Start: 2020-06-20 | End: 2020-06-20

## 2020-06-20 RX ADMIN — Medication 3 MILLILITER(S): at 23:48

## 2020-06-20 RX ADMIN — SIMVASTATIN 20 MILLIGRAM(S): 20 TABLET, FILM COATED ORAL at 23:48

## 2020-06-20 RX ADMIN — APIXABAN 5 MILLIGRAM(S): 2.5 TABLET, FILM COATED ORAL at 23:48

## 2020-06-20 NOTE — H&P ADULT - NSHPPHYSICALEXAM_GEN_ALL_CORE
PHYSICAL EXAMINATION:  Vital Signs Last 24 Hrs  T(C): 36.6 (20 Jun 2020 19:58), Max: 36.8 (20 Jun 2020 16:53)  T(F): 97.8 (20 Jun 2020 19:58), Max: 98.2 (20 Jun 2020 16:53)  HR: 69 (20 Jun 2020 19:58) (69 - 79)  BP: 150/70 (20 Jun 2020 19:58) (150/70 - 165/83)  BP(mean): --  RR: 23 (20 Jun 2020 19:58) (20 - 23)  SpO2: 97% (20 Jun 2020 19:58) (97% - 99%)  CAPILLARY BLOOD GLUCOSE          GENERAL: NAD, well-groomed, well-developed  HEAD:  atraumatic, normocephalic  EYES: sclera anicteric  ENMT: mucous membranes moist  NECK: supple, No JVD  CHEST/LUNG: clear to auscultation bilaterally; no rales, rhonchi, or wheezing b/l  HEART: normal S1, S2  ABDOMEN: BS+, soft, ND, NT   EXTREMITIES:  1-2+ b/l LEs L>R  NEURO: awake, alert, interactive; moves all extremities  SKIN: no rashes or lesions

## 2020-06-20 NOTE — ED ADULT NURSE NOTE - OBJECTIVE STATEMENT
89y/o F coming to the ED via EMS c/o of SOB. Pt states that she has been experiencing SOB x1week. Pt states that she experiencing SOB @ rest & on exertion, nothing makes it better or worse. Pt was d/c x1week ago after being dx with a PE and placed on Eliquis. Pt denies any CP/Fever/Chills/Cough/N/V. On exam, pt is able to speak full sentences but noted to be slightly tachypneic, saturating 97% RA. Heart monitor placed, NSR. LS crackles B/L. Abdomen soft, nontender, nondistended. IV placed. Labs collected and sent. Pt given call bell and educated on how to use. VS stable, pt in no acute distress.

## 2020-06-20 NOTE — H&P ADULT - NSHPLABSRESULTS_GEN_ALL_CORE
12.5   6.31  )-----------( 335      ( 20 Jun 2020 17:03 )             38.6       06-20    134<L>  |  98  |  13  ----------------------------<  102<H>  4.2   |  25  |  1.14    Ca    9.7      20 Jun 2020 17:03    TPro  6.8  /  Alb  4.2  /  TBili  0.7  /  DBili  x   /  AST  32  /  ALT  25  /  AlkPhos  43  06-20                  PT/INR - ( 20 Jun 2020 17:03 )   PT: 20.9 sec;   INR: 1.79 ratio         PTT - ( 20 Jun 2020 17:03 )  PTT:36.7 sec    < from: CT Angio Chest w/ IV Cont (06.20.20 @ 19:42) >      IMPRESSION:     No pulmonary embolism.    No acute parenchymal or pleural lung disease.    < end of copied text >

## 2020-06-20 NOTE — ED PROVIDER NOTE - ATTENDING CONTRIBUTION TO CARE
I performed a history and physical exam of the patient and discussed their management with the resident/ACP. I reviewed the resident/ACP's note and agree with the documented findings and plan of care.   89 F w/ PMHx as above recently discharged for unprovoked PE treated with Eliquis presenting with shortness of breath, non-positional, +dyspnea on exertion, states that it has been getting worse since her discharge, has 24/7 aide but unable to due much because she feels short of breath. Denies any chest pain/nausea/vomiting. Has been taking her Eliquis. Denies orthopnea. Denies any exacerbating/alleviating factors for the shortness of breath.    PHYSICAL EXAMINATION:  VITALS REVIEWED.  VS hypertensive, otherwise normal  GENERALIZED APPEARANCE:  Comfortable, no acute distress, lying in bed  SKIN:  Warm, dry, no cyanosis, mild chronic appearing bruises on lower extremities  HEAD:  No obvious scalp lesions  EYES:  Conjunctiva pink, no icterus  ENMT:  Mucus membranes moist, no stridor  NECK:  Supple, non-tender  CHEST AND RESPIRATORY:  Bibasilar crackles, speaking full sentences  HEART AND CARDIOVASCULAR:  Regular rate, no obvious murmur  ABDOMEN AND GI:  Soft, non-tender, non-distended.  No rebound, no guarding, no CVA-area tenderness  EXTREMITIES:  No deformity, edema, or calf tenderness  NEURO: AAOx3, gross motor and sensory intact    Impression:  Shortness of breath recently diagnosed with PE; r/o CHF, r/o ACS, r/o PNA, r/o electrolyte abnormalities; labs, imaging, monitoring, re-eval; appears comfortable but appears tachypneic during speech but without hypoxia

## 2020-06-20 NOTE — ED PROVIDER NOTE - NS ED ROS FT
ROS:  GENERAL: No fever, no chills  EYES: no change in vision  HEENT: no trouble swallowing, no trouble speaking  CARDIAC: no chest pain  PULMONARY: no cough, + shortness of breath  GI: no abdominal pain, no nausea, no vomiting, no diarrhea, no constipation  : No dysuria, no frequency, no change in appearance, or odor of urine  SKIN: no rashes  NEURO: no headache, no weakness  MSK: No joint pain  ~Brian Javed D.O. -Resident

## 2020-06-20 NOTE — ED PROVIDER NOTE - CLINICAL SUMMARY MEDICAL DECISION MAKING FREE TEXT BOX
89yo f PMHx of dementia, HTN, HLD, falls, CKD2/3, GERD, Osteoporosis, and hypothyroidism pw with worsening shortness of breath. recently admitted for pe and compliant with ac, will get cxr, labs, not hypoxic but concern for new heart failure vs covid will admit for further care

## 2020-06-20 NOTE — ED PROVIDER NOTE - PHYSICAL EXAMINATION
Physical Exam:  Gen: NAD, AOx3, non-toxic appearing  Head: normal appearing  HEENT: normal conjunctiva, oral mucosa moist  Lung:  no respiratory distress, speaking in full sentences, crackles to ascultation bilaterally     CV: regular rate and rhythm   Abd: soft, ND, NT  MSK: no visible deformities  Neuro: No focal deficits  Skin: Warm  Psych: normal affect  ~Brian Javed D.O. -Resident

## 2020-06-20 NOTE — ED PROVIDER NOTE - PROGRESS NOTE DETAILS
Brian Javed (DO): Spoke with son Damien, [phone], cell [phone]. Discussed plan. Agreed with plan for admission and further care for SOB sx. Discussed case with Dr. Bray. Would like CTA of chest to r/o worsening PE w/ sx of SOB worsening although pt is compliant on Elliquis. Will obtain CTA and admit to Dr. Bray afterwards. Pt agrees with plan and is in no apparent distress. Eating comfortably in bed.

## 2020-06-20 NOTE — ED ADULT NURSE NOTE - CHPI ED NUR SYMPTOMS NEG
no chest pain/no fever/no hemoptysis/no cough/no edema/no headache/no chills/no body aches/no diaphoresis

## 2020-06-20 NOTE — H&P ADULT - ASSESSMENT
89 f with    SOB  - supplement O2  - has hx of PE  - continue AC  - nebs  - RVP  - Pulmonary evaluation called  - observe on Telemetry  - cardiology evaluation    H/O: Hypothyroidism   - follow TSH    Hypertension   - control    mild Dementia  - B12, TSH  - supportive care     Further action as per clinical course     Art Carbajal MD pager [phone] 89 f with    SOB  - supplement O2  - has hx of PE  - continue AC  - nebs  - RVP  - Pulmonary evaluation called  - observe on Telemetry  - cardiology evaluation    H/O: Hypothyroidism   - follow TSH    Hypertension   - control    mild Dementia  - B12, TSH  - supportive care     Possible anxiety/ Depression  - observe    d/w patient and HCP over phone    Further action as per clinical course     Art Carbajal MD pager [phone]

## 2020-06-20 NOTE — H&P ADULT - HISTORY OF PRESENT ILLNESS
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 reports she was discharged on 6/13 and since then feeling worse. shortness of breath occuring all the time reports compliance with her elliquis.  has 24/7 aide. Denies LE edema, orthopnea, recent trauma, fevers, chills, headache, dizziness, nausea, vomiting, dysuria, freq, hematuria, diarrhea, constipation, chest pain, cough.

## 2020-06-21 LAB
ANION GAP SERPL CALC-SCNC: 14 MMOL/L — SIGNIFICANT CHANGE UP (ref 5–17)
APPEARANCE UR: CLEAR — SIGNIFICANT CHANGE UP
BILIRUB UR-MCNC: NEGATIVE — SIGNIFICANT CHANGE UP
BUN SERPL-MCNC: 10 MG/DL — SIGNIFICANT CHANGE UP (ref 7–23)
CALCIUM SERPL-MCNC: 10.2 MG/DL — SIGNIFICANT CHANGE UP (ref 8.4–10.5)
CHLORIDE SERPL-SCNC: 98 MMOL/L — SIGNIFICANT CHANGE UP (ref 96–108)
CO2 SERPL-SCNC: 25 MMOL/L — SIGNIFICANT CHANGE UP (ref 22–31)
COLOR SPEC: SIGNIFICANT CHANGE UP
CREAT SERPL-MCNC: 1.05 MG/DL — SIGNIFICANT CHANGE UP (ref 0.5–1.3)
DIFF PNL FLD: NEGATIVE — SIGNIFICANT CHANGE UP
GLUCOSE SERPL-MCNC: 101 MG/DL — HIGH (ref 70–99)
GLUCOSE UR QL: NEGATIVE — SIGNIFICANT CHANGE UP
KETONES UR-MCNC: NEGATIVE — SIGNIFICANT CHANGE UP
LEUKOCYTE ESTERASE UR-ACNC: ABNORMAL
NITRITE UR-MCNC: NEGATIVE — SIGNIFICANT CHANGE UP
PH UR: 7.5 — SIGNIFICANT CHANGE UP (ref 5–8)
POTASSIUM SERPL-MCNC: 4.6 MMOL/L — SIGNIFICANT CHANGE UP (ref 3.5–5.3)
POTASSIUM SERPL-SCNC: 4.6 MMOL/L — SIGNIFICANT CHANGE UP (ref 3.5–5.3)
PROT UR-MCNC: NEGATIVE — SIGNIFICANT CHANGE UP
RAPID RVP RESULT: SIGNIFICANT CHANGE UP
SODIUM SERPL-SCNC: 137 MMOL/L — SIGNIFICANT CHANGE UP (ref 135–145)
SP GR SPEC: 1.03 — HIGH (ref 1.01–1.02)
TSH SERPL-MCNC: 2 UIU/ML — SIGNIFICANT CHANGE UP (ref 0.27–4.2)
UROBILINOGEN FLD QL: NEGATIVE — SIGNIFICANT CHANGE UP
VIT B12 SERPL-MCNC: 1848 PG/ML — HIGH (ref 232–1245)

## 2020-06-21 RX ORDER — LANOLIN ALCOHOL/MO/W.PET/CERES
3 CREAM (GRAM) TOPICAL ONCE
Refills: 0 | Status: COMPLETED | OUTPATIENT
Start: 2020-06-21 | End: 2020-06-21

## 2020-06-21 RX ADMIN — Medication 100 MICROGRAM(S): at 05:26

## 2020-06-21 RX ADMIN — Medication 3 MILLIGRAM(S): at 22:20

## 2020-06-21 RX ADMIN — Medication 3 MILLILITER(S): at 11:41

## 2020-06-21 RX ADMIN — Medication 3 MILLILITER(S): at 05:26

## 2020-06-21 RX ADMIN — Medication 3 MILLILITER(S): at 17:08

## 2020-06-21 RX ADMIN — APIXABAN 2.5 MILLIGRAM(S): 2.5 TABLET, FILM COATED ORAL at 05:26

## 2020-06-21 RX ADMIN — PANTOPRAZOLE SODIUM 40 MILLIGRAM(S): 20 TABLET, DELAYED RELEASE ORAL at 05:26

## 2020-06-21 RX ADMIN — Medication 3 MILLILITER(S): at 22:09

## 2020-06-21 RX ADMIN — SIMVASTATIN 20 MILLIGRAM(S): 20 TABLET, FILM COATED ORAL at 21:02

## 2020-06-21 RX ADMIN — APIXABAN 2.5 MILLIGRAM(S): 2.5 TABLET, FILM COATED ORAL at 17:08

## 2020-06-21 NOTE — PHYSICAL THERAPY INITIAL EVALUATION ADULT - PERTINENT HX OF CURRENT PROBLEM, REHAB EVAL
Pt is a 89 y/o F w/ the below PMH p/w worsening shortness of breath, pt was discharged from the hospital on 6/13. CTA(-) for PE.

## 2020-06-21 NOTE — CONSULT NOTE ADULT - SUBJECTIVE AND OBJECTIVE BOX
[facility] - [facility]  CONSULT NOTE    CHIEF COMPLAINT:    HPI:    89yo history as below, inluding dementia, HTN, HLD, falls, CKD2/3, GERD, Osteoporosis, and hypothyroidism  who presents to the ED with worsening shortness of breath. The patient was discharged on  and since then feeling more shortness of breath.  At that time she had non occlusive emboli on her CTA. No pattern to dyspnea- at rest, supine, in chair,, etc.  she notes compliance with her elliquis which she is on for thromboembolic disease.  Denies LE edema, orthopnea, recent trauma, fevers, chills, headache, dizziness, nausea, vomiting, dysuria, freq, hematuria, diarrhea, constipation, chest pain, cough.  Feels better this morning        PMHX:  Skin cancer  Hip fracture  Osteoporosis  Chronic anal fissure  Anal spasm  mild Dementia  Dyslipidemia  Hypertension  H/O: Hypothyroidism      PSHX:  H/O gastroesophageal reflux (GERD)  Anterior Cervical discectomy  left eye Retinal tear repair  After-Cataract of Both Eyes  left Elbow surgey secondary to injury  History of Tonsillectomy  History of  Hip surgery with hardware      FAMILY HISTORY:  No pertinent family history in first degree relatives      SOCIAL HISTORY:    Pulmonary Medications:   albuterol/ipratropium for Nebulization 3 milliLiter(s) Nebulizer every 6 hours      Antimicrobials:      Cardiology:      Other:  acetaminophen   Tablet .. 650 milliGRAM(s) Oral every 6 hours PRN  apixaban 2.5 milliGRAM(s) Oral every 12 hours  levothyroxine 100 MICROGram(s) Oral daily  pantoprazole    Tablet 40 milliGRAM(s) Oral before breakfast  simvastatin 20 milliGRAM(s) Oral at bedtime      Allergies    penicillin (Rash)    Intolerances        HOME MEDICATIONS:    REVIEW OF SYSTEMS: (Negative unless otherwise delineated)     Constitutional: No fevers, chills, sweats. weight loss, fatigue, weakness, malaise, lethargy  Eyes: No itching or discharge from the eyes, visual change, blurred vision, double vision, yellow sclerae, eye pain.  ENT: No ear pain, ear discharge, tinnitus, change in hearing, nasal congestion, runny nose, post nasal drip, epistaxis, sinus pain, sore throat, globus sensation, dental problems, oral ulcers/lesions  CV: No chest pain, chest pressure, chest discomfort, palpitations, lightheadedness/dizziness, syncope, lower extremity edema, inability to lay flat to sleep, awakening from sleep unable to sleep, claudication.  Resp: +dyspnea at rest, +dyspnea on exertion, -chest congestion/wheeze, cough. stridor, sputum production, chest pain with respiration, hemoptysis  GI: No anorexia, nausea, vomiting, constipation, abdominal pain. blood in the stool, diarrhea, melena, change in bowel habits or stools, dysphagia, odynophagia, heartburn  : No burning on urination, urinary urgency, urinary frequency, urinary hesitancy, incontinence, blood in the urine, waking up at night to urinate, change in urine color, urinary retention.   Neurological: No headache, dizziness, syncope, paralysis, unsteady gait, muscle weakness, change in bowel or bladder control, numbness or tingling in the extremities, change in smell or taste, change in speech, tremor, memory change/loss, vertigo, frequent falls, confusion  MSK: No muscle pain, back pain, joint pain, joint stiffness, joint swelling   Hematologic: No anemia, bleeding, bruising, ecchymoses.   Lymphatics: No enlarged nodes, history of splenectomy  Psychiatric: No depression, anxiety, bipolar disorde, schizophrenia, hallucinations, suicidal/homicidal thoughts  Endocrinologic: No weight change, sweating, cold or heat intolerance, polyuria, polydipsia, polyphagia, abnormal hair growth, change in nails, tremor, neck pain or swelling.   Allergies: No hives, eczema, allergic rhinitis  Integumentary: No rash, new/growing/changing skin lesions, bruising, pruritis, decubiti                                                                                                                                                                                [ ]Unable to obtain    OBJECTIVE:      ICU Vital Signs Last 24 Hrs  T(C): 37.2 (2020 11:41), Max: 37.2 (2020 11:41)  T(F): 98.9 (2020 11:41), Max: 98.9 (2020 11:41)  HR: 65 (2020 11:41) (65 - 79)  BP: 125/76 (2020 11:41) (125/76 - 165/83)  BP(mean): --  ABP: --  ABP(mean): --  RR: 18 (2020 11:41) (18 - 24)  SpO2: 96% (2020 11:41) (95% - 99%)      I&O's Detail    2020 07:01  -  2020 07:00  --------------------------------------------------------  IN:  Total IN: 0 mL    OUT:    Voided: 150 mL  Total OUT: 150 mL    Total NET: -150 mL        Daily Height in cm: 142.24 (2020 02:35)    Daily   CAPILLARY BLOOD GLUCOSE          PHYSICAL EXAM:  General: Awake, alert, cooperative, kyphoscoliosis  Head: Atraumatic, normocephalic  Eyes: Anicteric, conjunctiva/corneas clear, EOM's intact, PERRL, both eyes               Ears: External examination WNL, both ears                Nose: Nares normal, septum midline, mucosa normal, no drainage or sinus tenderness  Throat:   No tonsillar or pharyngeal exudates. Lips, mucosa and tongue WNL; teeth and gums WNL  Neck: Supple, symmetrica, trachea midline; thyroid without enlargement/tenderness/nodules; no carotid bruit; no JVD  Back: Symmetric, no curvature, range of motion normal, no CVA tenderness  Chest wall: No tenderness or deformity  Respiratory: Respirations unlabored; Clear to auscultation and percussion bilaterally  Cardiovascular: Regular rate and rhythm, S1 S2 normal. No murmurs, rubs or gallops.   Abdomen: Soft, non-tender, non-distended. No organomegaly. No masses. Normal bowel sounds  Extremities: Warm to touch. No clubbing or cyanosis. No pedal edema.  Pulses: 2+ peripheral pulses all extremities  Skin: Normal skin color, texture and turgor. No rashes or lesions  Lymph Nodes: Cervical, supraclavicular and axillary nodes normal  Neurological: Motor and sensory examination equal and normal. A and O x 3  Psychiatry: Appropriate mood and affect.    LABS:                        12.5   6.31  )-----------( 335      ( 2020 17:03 )             38.6     06-21    137  |  98  |  10  ----------------------------<  101<H>  4.6   |  25  |  1.05    Ca    10.2      2020 06:16    TPro  6.8  /  Alb  4.2  /  TBili  0.7  /  DBili  x   /  AST  32  /  ALT  25  /  AlkPhos  43  06-20    PT/INR - ( 2020 17:03 )   PT: 20.9 sec;   INR: 1.79 ratio         PTT - ( 2020 17:03 )  PTT:36.7 sec  Urinalysis Basic - ( 2020 06:11 )    Color: Light Yellow / Appearance: Clear / S.028 / pH: x  Gluc: x / Ketone: Negative  / Bili: Negative / Urobili: Negative   Blood: x / Protein: Negative / Nitrite: Negative   Leuk Esterase: Small / RBC: 9 /hpf / WBC 4 /HPF   Sq Epi: x / Non Sq Epi: 0 /hpf / Bacteria: Negative      Venous Blood Gas:   @ 17:03  7.42/47/25/30/41  VBG Lactate: 1.3        MICROBIOLOGY:     RADIOLOGY:  [ ] Reviewed and interpreted by me  < from: CT Angio Chest w/ IV Cont (20 @ 19:42) >    EXAM:  CT ANGIO CHEST (W)AW IC                            PROCEDURE DATE:  2020            INTERPRETATION:  CLINICAL INFORMATION: Shortness of breath, on Eliquis, evaluate for pulmonary embolism    COMPARISON: CTA chest 6/10/2020    PROCEDURE:   CT Angiography of the Chest.  55 ml of Omnipaque 350 was injected intravenously. 45 ml were discarded.  Sagittal and coronal reformats were performed as well as 3D (MIP) reconstructions.    FINDINGS:    LUNGS AND AIRWAYS: Patent central airways. Elevated right hemidiaphragm with adjacent subsegmental atelectasis. No lung consolidation, mass, or suspicious nodule. Scattered calcified granulomas throughout both lungs. Accessory azygos lobe and fissure.  PLEURA: No pleural effusion.  MEDIASTINUM AND MIGUEL A: No lymphadenopathy.  VESSELS: Adequate pulmonary arterial opacification. No pulmonary embolism. Normal caliber thoracic aorta. Main pulmonary artery is not dilated. After scrota calcifications of the thoracic aorta, great vessels, and coronary arteries.  HEART: Heart size is normal. No pericardial effusion. Aortic valve leaflets and mitral annular calcifications.  CHEST WALL AND LOWER NECK: Few scattered thyroid calcifications.  VISUALIZED UPPER ABDOMEN: Cholelithiasis. Small right renalcyst.  BONES: Anterior cervical spinal fusion hardware involving the C5-C6 levels. Old bilateral rib fractures. Unchanged grade 1 anterolisthesis of T2 on T3.    IMPRESSION:     No pulmonary embolism.    No acute parenchymal or pleural lung disease.                  < end of copied text >

## 2020-06-21 NOTE — CONSULT NOTE ADULT - ATTENDING COMMENTS
Patient seen and examined  Admitted with dyspnea with recent TTE showing normal LV function  Does not appear in clinical heart failure with clear lungs on cxr and normal BNP  Ac for dvt per primary team  Follow up pulmonary   Further workup pending above    Ron Quintanilla MD

## 2020-06-21 NOTE — CONSULT NOTE ADULT - ASSESSMENT
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 The patient was discharged on 6/13 and since then feeling more shortness of breath.  At that time she had non occlusive emboli on her CTA, none at present. No pattern to dyspnea- at rest, supine, in chair,, etc.  she notes compliance with her elliquis which she is on for thromboembolic disease.  Denies LE edema, orthopnea, recent trauma, fevers, chills, headache, dizziness, nausea, vomiting, dysuria, freq, hematuria, diarrhea, constipation, chest pain, cough.  Feels better this morning      I suspect dyspnea is multifactorial in nature and includes restrictive dysfunction from kyphoscoliosis and elevated right hemidiaphragm, anxiety and deconditioning.  no evidence acute pulmonary abnormalities on imaging.  Pt does not have a history of COPD, asthma, other pulmonary disease or recent acute infectious illness.    on albuterol- can use for now, but would not dc with nebs  cardiac recs noted- status being optimized  dvt prophylaxis  routine gi prophylaxis as necessary  Oxygen saturation greater than 92%  incentive spirometry  hopeful early dc planning  dw Dr Solomon Chapman MD  [phone]  Pulmonary

## 2020-06-21 NOTE — PROGRESS NOTE ADULT - ASSESSMENT
89 f with    SOB  - supplement O2  - has hx of PE  - continue AC  - nebs  - Pulmonary evaluation noted  - observe on Telemetry  - cardiology evaluation noted    H/O: Hypothyroidism   - follow TSH    Hypertension   - control    mild Dementia  - B12, TSH  - supportive care     Possible anxiety/ Depression  - observe    d/w patient    DCP home    Further action as per clinical course     Art Carbajal MD pager [phone]

## 2020-06-21 NOTE — CONSULT NOTE ADULT - SUBJECTIVE AND OBJECTIVE BOX
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 reports she was discharged on 6/13 and since then feeling worse. shortness of breath occuring all the time reports compliance with her elliquis.  has 24/7 aide. Denies LE edema, orthopnea, recent trauma, fevers, chills, headache, dizziness, nausea, vomiting, dysuria, freq, hematuria, diarrhea, constipation, chest pain, cough.    PAST MEDICAL & SURGICAL HISTORY:  Skin cancer: lower extremities  Hip fracture: Right hip- 1995  Osteoporosis  Chronic anal fissure  Anal spasm  mild Dementia  Dyslipidemia  Hypertension  H/O: Hypothyroidism  H/O gastroesophageal reflux (GERD)  Anterior Cervical discectomy  left eye Retinal tear repair  After-Cataract of Both Eyes  left Elbow surgey secondary to injury  History of Tonsillectomy  History of  Hip surgery with hardware      [ ] Diabetes   [ ] Hypertension  [ ] Hyperlipidemia  [ ] CAD  [ ] PCI  [ ] CABG    PREVIOUS DIAGNOSTIC TESTING:    [ ] Echocardiogram: < from: Transthoracic Echocardiogram (06.11.20 @ 10:11) >  consistent with borderline pulmonary hypertension.  ------------------------------------------------------------------------  Conclusions:  1. Mitral annular calcification, otherwise normal mitral  valve.  2. Increased relative wall thickness with normal left  ventricular mass index, consistent with concentric left  ventricular remodeling.  3. Normal left ventricular systolic function. No segmental  wall motion abnormalities.  4. Normal right ventricular size and function.  ------------------------------------------------------------------------  Confirmed on  6/11/2020 - 17:52:49 by Elizabeth Linda M.D.  ------------------------------------------------------------------------    < end of copied text >    [ ]  Catheterization:  [ ] Stress Test:  	  CT chest: < from: CT Angio Chest w/ IV Cont (06.20.20 @ 19:42) >  IMPRESSION:     No pulmonary embolism.    No acute parenchymal or pleural lung disease.      ANNABELLA THOMAS M.D., RADIOLOGY RESIDENT  This document has been electronically signed.  FARHAT NGUYEN D.O. ATTENDING RADIOLOGIST  This document has been electronically signed. Jun 20 2020  8:55PM    < end of copied text >      MEDICATIONS:  apixaban 2.5 milliGRAM(s) Oral every 12 hours      albuterol/ipratropium for Nebulization 3 milliLiter(s) Nebulizer every 6 hours    acetaminophen   Tablet .. 650 milliGRAM(s) Oral every 6 hours PRN    pantoprazole    Tablet 40 milliGRAM(s) Oral before breakfast    levothyroxine 100 MICROGram(s) Oral daily  simvastatin 20 milliGRAM(s) Oral at bedtime        Allergies    penicillin (Rash)    Intolerances        FAMILY HISTORY:  No pertinent family history in first degree relatives      SOCIAL HISTORY:    [ ] Non-smoker  [ ] Smoker  [ ] Alcohol      REVIEW OF SYSTEMS:  [ ]chest pain  [ x ]shortness of breath  [  ]palpitations  [  ]syncope  [ ]near syncope [  ]diplopia  [  ]altered mental status   [  ]fevers  [ ]chills [ ]nausea  [ ]vomitting  [ ]abdominal pain  [ ]melena  [ ]BRBPR  [  ]epistaxis  [  ]rash  [  ]lower extremity edema       CONSTITUTIONAL: No weakness, fevers or chills  EYES/ENT: No visual changes;  No vertigo or throat pain   NECK: No pain or stiffness  RESPIRATORY: No cough, wheezing, hemoptysis; + shortness of breath  CARDIOVASCULAR: No chest pain or palpitations  GASTROINTESTINAL: No abdominal or epigastric pain. No nausea, vomiting, or hematemesis; No diarrhea or constipation. No melena or hematochezia.  GENITOURINARY: No dysuria, frequency or hematuria  NEUROLOGICAL: No numbness or weakness  SKIN: No itching, burning, rashes, or lesions   All other review of systems is negative unless indicated above.      [ ] All others negative	  [ ] Unable to obtain    PHYSICAL EXAM:  T(C): 36.7 (06-21-20 @ 04:35), Max: 36.8 (06-20-20 @ 16:53)  HR: 68 (06-21-20 @ 04:35) (68 - 79)  BP: 145/70 (06-21-20 @ 04:35) (145/70 - 165/83)  RR: 20 (06-21-20 @ 04:35) (20 - 24)  SpO2: 97% (06-21-20 @ 04:35) (95% - 99%)  Wt(kg): --  I&O's Summary    20 Jun 2020 07:01  -  21 Jun 2020 05:57  --------------------------------------------------------  IN: 0 mL / OUT: 150 mL / NET: -150 mL        Appearance: Normal	  HEENT:   Normal oral mucosa, PERRL, EOMI	  Lymphatic: No lymphadenopathy  Cardiovascular: Normal S1 S2, No JVD, No murmurs, No edema  Respiratory: Lungs clear to auscultation	  Psychiatry: A & O x 3, Mood & affect appropriate  Gastrointestinal:  Soft, Non-tender, + BS	  Skin: No rashes, No ecchymoses, No cyanosis	  Neurologic: Non-focal  Extremities: Normal range of motion, No clubbing, cyanosis or edema  Vascular: Peripheral pulses palpable 2+ bilaterally    TELEMETRY: 	  NSR    ECG  : NORMAL SINUS RHYTHM  82  <NAD, no acute ischemia        RADIOLOGY:  OTHER: 	  	  LABS:	 	    CARDIAC MARKERS:                                  12.5   6.31  )-----------( 335      ( 20 Jun 2020 17:03 )             38.6     06-20    134<L>  |  98  |  13  ----------------------------<  102<H>  4.2   |  25  |  1.14    Ca    9.7      20 Jun 2020 17:03    TPro  6.8  /  Alb  4.2  /  TBili  0.7  /  DBili  x   /  AST  32  /  ALT  25  /  AlkPhos  43  06-20    proBNP: Serum Pro-Brain Natriuretic Peptide: 232 pg/mL (06-20 @ 17:03)    Lipid Profile:   HgA1c:   TSH:     ASSESSMENT/PLAN: 	89 yr female hx of SOB, Osteoporosis, hypothyroidism , Gerd, CKD, multiple fall, HLD , now with worsening SOB, cards consult for CHF    pt with hx of PE,. 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 reports she was discharged on 6/13 and since then feeling worse. shortness of breath occuring all the time reports compliance with her elliquis.  has 24/7 aide. Denies LE edema, orthopnea, recent trauma, fevers, chills, headache, dizziness, nausea, vomiting, dysuria, freq, hematuria, diarrhea, constipation, chest pain, cough.    PAST MEDICAL & SURGICAL HISTORY:  Skin cancer: lower extremities  Hip fracture: Right hip- 1995  Osteoporosis  Chronic anal fissure  Anal spasm  mild Dementia  Dyslipidemia  Hypertension  H/O: Hypothyroidism  H/O gastroesophageal reflux (GERD)  Anterior Cervical discectomy  left eye Retinal tear repair  After-Cataract of Both Eyes  left Elbow surgey secondary to injury  History of Tonsillectomy  History of  Hip surgery with hardware      [ ] Diabetes   [ ] Hypertension  [ ] Hyperlipidemia  [ ] CAD  [ ] PCI  [ ] CABG    PREVIOUS DIAGNOSTIC TESTING:    [ ] Echocardiogram: < from: Transthoracic Echocardiogram (06.11.20 @ 10:11) >  consistent with borderline pulmonary hypertension.  ------------------------------------------------------------------------  Conclusions:  1. Mitral annular calcification, otherwise normal mitral  valve.  2. Increased relative wall thickness with normal left  ventricular mass index, consistent with concentric left  ventricular remodeling.  3. Normal left ventricular systolic function. No segmental  wall motion abnormalities.  4. Normal right ventricular size and function.  ------------------------------------------------------------------------  Confirmed on  6/11/2020 - 17:52:49 by Elizabeth Linda M.D.  ------------------------------------------------------------------------    < end of copied text >    [ ]  Catheterization:  [ ] Stress Test:  	  CT chest: < from: CT Angio Chest w/ IV Cont (06.20.20 @ 19:42) >  IMPRESSION:     No pulmonary embolism.    No acute parenchymal or pleural lung disease.      ANNABELLA THOMAS M.D., RADIOLOGY RESIDENT  This document has been electronically signed.  FARHAT NGUYEN D.O. ATTENDING RADIOLOGIST  This document has been electronically signed. Jun 20 2020  8:55PM    < end of copied text >      MEDICATIONS:  apixaban 2.5 milliGRAM(s) Oral every 12 hours      albuterol/ipratropium for Nebulization 3 milliLiter(s) Nebulizer every 6 hours    acetaminophen   Tablet .. 650 milliGRAM(s) Oral every 6 hours PRN    pantoprazole    Tablet 40 milliGRAM(s) Oral before breakfast    levothyroxine 100 MICROGram(s) Oral daily  simvastatin 20 milliGRAM(s) Oral at bedtime        Allergies    penicillin (Rash)    Intolerances        FAMILY HISTORY:  No pertinent family history in first degree relatives      SOCIAL HISTORY:    [x] Non-smoker  [ ] Smoker  [ ] Alcohol      REVIEW OF SYSTEMS:  [ ]chest pain  [ x ]shortness of breath  [  ]palpitations  [  ]syncope  [ ]near syncope [  ]diplopia  [  ]altered mental status   [  ]fevers  [ ]chills [ ]nausea  [ ]vomitting  [ ]abdominal pain  [ ]melena  [ ]BRBPR  [  ]epistaxis  [  ]rash  [  ]lower extremity edema       CONSTITUTIONAL: No weakness, fevers or chills  EYES/ENT: No visual changes;  No vertigo or throat pain   NECK: No pain or stiffness  RESPIRATORY: No cough, wheezing, hemoptysis; + shortness of breath  CARDIOVASCULAR: No chest pain or palpitations  GASTROINTESTINAL: No abdominal or epigastric pain. No nausea, vomiting, or hematemesis; No diarrhea or constipation. No melena or hematochezia.  GENITOURINARY: No dysuria, frequency or hematuria  NEUROLOGICAL: No numbness or weakness  SKIN: No itching, burning, rashes, or lesions   All other review of systems is negative unless indicated above.      [ ] All others negative	  [ ] Unable to obtain    PHYSICAL EXAM:  T(C): 36.7 (06-21-20 @ 04:35), Max: 36.8 (06-20-20 @ 16:53)  HR: 68 (06-21-20 @ 04:35) (68 - 79)  BP: 145/70 (06-21-20 @ 04:35) (145/70 - 165/83)  RR: 20 (06-21-20 @ 04:35) (20 - 24)  SpO2: 97% (06-21-20 @ 04:35) (95% - 99%)  Wt(kg): --  I&O's Summary    20 Jun 2020 07:01  -  21 Jun 2020 05:57  --------------------------------------------------------  IN: 0 mL / OUT: 150 mL / NET: -150 mL        Appearance: Normal	  HEENT:   Normal oral mucosa, PERRL, EOMI	  Lymphatic: No lymphadenopathy  Cardiovascular: Normal S1 S2, No JVD, No murmurs, No edema  Respiratory: Lungs clear to auscultation	  Psychiatry: A & O x 3, Mood & affect appropriate  Gastrointestinal:  Soft, Non-tender, + BS	  Skin: No rashes, No ecchymoses, No cyanosis	  Neurologic: Non-focal  Extremities: Normal range of motion, No clubbing, cyanosis or edema  Vascular: Peripheral pulses palpable 2+ bilaterally    TELEMETRY: 	  NSR    ECG  : NORMAL SINUS RHYTHM  82  <NAD, no acute ischemia        RADIOLOGY:  OTHER: 	  	  LABS:	 	    CARDIAC MARKERS:                                  12.5   6.31  )-----------( 335      ( 20 Jun 2020 17:03 )             38.6     06-20    134<L>  |  98  |  13  ----------------------------<  102<H>  4.2   |  25  |  1.14    Ca    9.7      20 Jun 2020 17:03    TPro  6.8  /  Alb  4.2  /  TBili  0.7  /  DBili  x   /  AST  32  /  ALT  25  /  AlkPhos  43  06-20    proBNP: Serum Pro-Brain Natriuretic Peptide: 232 pg/mL (06-20 @ 17:03)    Lipid Profile:   HgA1c:   TSH:     ASSESSMENT/PLAN: 	89 yr female hx of SOB, Osteoporosis, hypothyroidism , Gerd, CKD, multiple fall, HLD , now with worsening SOB, cards consult for CHF    pt with hx of PE,. on Eliquis, would cont , recent CT Angio with no acute PE's   ct scan with no effusion , but pt with mild crackles , would start low dose Diuretics   monitor creatine   Monitor lytes   TELE stable   would check ECHO for LV fx   D/W Dr Quintanilla

## 2020-06-21 NOTE — ED ADULT NURSE REASSESSMENT NOTE - NS ED NURSE REASSESS COMMENT FT1
hand off taken from SURESH Abarca. Pt resting comfortably in stretcher. Pt maintaining well on room air, breathing is clear and unlabored. On cardiac monitor with no events. Pt admitted, RVP negative waiting for a bed up stairs.

## 2020-06-21 NOTE — PROGRESS NOTE ADULT - SUBJECTIVE AND OBJECTIVE BOX
Patient is a 89y old  Female who presents with a chief complaint of sob (2020 13:34)      SUBJECTIVE / OVERNIGHT EVENTS: feels better.  Review of Systems  chest pain no  palpitations no  sob no  nausea no  headache no    MEDICATIONS  (STANDING):  albuterol/ipratropium for Nebulization 3 milliLiter(s) Nebulizer every 6 hours  apixaban 2.5 milliGRAM(s) Oral every 12 hours  levothyroxine 100 MICROGram(s) Oral daily  pantoprazole    Tablet 40 milliGRAM(s) Oral before breakfast  simvastatin 20 milliGRAM(s) Oral at bedtime    MEDICATIONS  (PRN):  acetaminophen   Tablet .. 650 milliGRAM(s) Oral every 6 hours PRN Temp greater or equal to 38.5C (101.3F), Mild Pain (1 - 3)      Vital Signs Last 24 Hrs  T(C): 37.2 (2020 11:41), Max: 37.2 (2020 11:41)  T(F): 98.9 (2020 11:41), Max: 98.9 (2020 11:41)  HR: 87 (2020 14:46) (65 - 87)  BP: 103/63 (2020 14:46) (103/63 - 162/73)  BP(mean): --  RR: 18 (2020 11:41) (18 - 24)  SpO2: 97% (2020 14:46) (95% - 97%)    PHYSICAL EXAM:  GENERAL: NAD, well-developed  HEAD:  Atraumatic, Normocephalic  EYES: EOMI, PERRLA, conjunctiva and sclera clear  NECK: Supple, No JVD  CHEST/LUNG: Clear to auscultation bilaterally; No wheeze  HEART: Regular rate and rhythm; No murmurs, rubs, or gallops  ABDOMEN: Soft, Nontender, Nondistended; Bowel sounds present  EXTREMITIES:  2+ Peripheral Pulses, No clubbing, cyanosis, or edema  PSYCH: AAOx3  NEUROLOGY: non-focal  SKIN: No rashes or lesions    LABS:                        12.5   6.31  )-----------( 335      ( 2020 17:03 )             38.6     06-21    137  |  98  |  10  ----------------------------<  101<H>  4.6   |  25  |  1.05    Ca    10.2      2020 06:16    TPro  6.8  /  Alb  4.2  /  TBili  0.7  /  DBili  x   /  AST  32  /  ALT  25  /  AlkPhos  43  06-20    PT/INR - ( 2020 17:03 )   PT: 20.9 sec;   INR: 1.79 ratio         PTT - ( 2020 17:03 )  PTT:36.7 sec      Urinalysis Basic - ( 2020 06:11 )    Color: Light Yellow / Appearance: Clear / S.028 / pH: x  Gluc: x / Ketone: Negative  / Bili: Negative / Urobili: Negative   Blood: x / Protein: Negative / Nitrite: Negative   Leuk Esterase: Small / RBC: 9 /hpf / WBC 4 /HPF   Sq Epi: x / Non Sq Epi: 0 /hpf / Bacteria: Negative          RADIOLOGY & ADDITIONAL TESTS:    Imaging Personally Reviewed:    Consultant(s) Notes Reviewed:      Care Discussed with Consultants/Other Providers:

## 2020-06-21 NOTE — PHYSICAL THERAPY INITIAL EVALUATION ADULT - ADDITIONAL COMMENTS
Pt reports she lives in a  home with 3 MERVIN. Pt reports having a live in [facility] to assist with ADLs. PTA pt amb via RW and uses shower bench for bathing.

## 2020-06-22 ENCOUNTER — TRANSCRIPTION ENCOUNTER (OUTPATIENT)
Age: 85
End: 2020-06-22

## 2020-06-22 VITALS
SYSTOLIC BLOOD PRESSURE: 148 MMHG | TEMPERATURE: 98 F | DIASTOLIC BLOOD PRESSURE: 52 MMHG | HEART RATE: 83 BPM | OXYGEN SATURATION: 96 % | RESPIRATION RATE: 18 BRPM

## 2020-06-22 LAB
CULTURE RESULTS: SIGNIFICANT CHANGE UP
SPECIMEN SOURCE: SIGNIFICANT CHANGE UP

## 2020-06-22 PROCEDURE — 97110 THERAPEUTIC EXERCISES: CPT

## 2020-06-22 PROCEDURE — 87086 URINE CULTURE/COLONY COUNT: CPT

## 2020-06-22 PROCEDURE — 80053 COMPREHEN METABOLIC PANEL: CPT

## 2020-06-22 PROCEDURE — 97116 GAIT TRAINING THERAPY: CPT

## 2020-06-22 PROCEDURE — 87581 M.PNEUMON DNA AMP PROBE: CPT

## 2020-06-22 PROCEDURE — 87798 DETECT AGENT NOS DNA AMP: CPT

## 2020-06-22 PROCEDURE — 85027 COMPLETE CBC AUTOMATED: CPT

## 2020-06-22 PROCEDURE — 93005 ELECTROCARDIOGRAM TRACING: CPT

## 2020-06-22 PROCEDURE — 94640 AIRWAY INHALATION TREATMENT: CPT

## 2020-06-22 PROCEDURE — 85730 THROMBOPLASTIN TIME PARTIAL: CPT

## 2020-06-22 PROCEDURE — 82330 ASSAY OF CALCIUM: CPT

## 2020-06-22 PROCEDURE — 81001 URINALYSIS AUTO W/SCOPE: CPT

## 2020-06-22 PROCEDURE — 82607 VITAMIN B-12: CPT

## 2020-06-22 PROCEDURE — 83605 ASSAY OF LACTIC ACID: CPT

## 2020-06-22 PROCEDURE — 80048 BASIC METABOLIC PNL TOTAL CA: CPT

## 2020-06-22 PROCEDURE — 87633 RESP VIRUS 12-25 TARGETS: CPT

## 2020-06-22 PROCEDURE — 83880 ASSAY OF NATRIURETIC PEPTIDE: CPT

## 2020-06-22 PROCEDURE — 82803 BLOOD GASES ANY COMBINATION: CPT

## 2020-06-22 PROCEDURE — 84443 ASSAY THYROID STIM HORMONE: CPT

## 2020-06-22 PROCEDURE — 84295 ASSAY OF SERUM SODIUM: CPT

## 2020-06-22 PROCEDURE — 84484 ASSAY OF TROPONIN QUANT: CPT

## 2020-06-22 PROCEDURE — 87486 CHLMYD PNEUM DNA AMP PROBE: CPT

## 2020-06-22 PROCEDURE — 99285 EMERGENCY DEPT VISIT HI MDM: CPT | Mod: 25

## 2020-06-22 PROCEDURE — 84132 ASSAY OF SERUM POTASSIUM: CPT

## 2020-06-22 PROCEDURE — 85014 HEMATOCRIT: CPT

## 2020-06-22 PROCEDURE — 85610 PROTHROMBIN TIME: CPT

## 2020-06-22 PROCEDURE — 97162 PT EVAL MOD COMPLEX 30 MIN: CPT

## 2020-06-22 PROCEDURE — 71275 CT ANGIOGRAPHY CHEST: CPT

## 2020-06-22 PROCEDURE — 71045 X-RAY EXAM CHEST 1 VIEW: CPT

## 2020-06-22 PROCEDURE — 82947 ASSAY GLUCOSE BLOOD QUANT: CPT

## 2020-06-22 PROCEDURE — 82435 ASSAY OF BLOOD CHLORIDE: CPT

## 2020-06-22 RX ORDER — IPRATROPIUM/ALBUTEROL SULFATE 18-103MCG
3 AEROSOL WITH ADAPTER (GRAM) INHALATION
Qty: 0 | Refills: 0 | DISCHARGE
Start: 2020-06-22

## 2020-06-22 RX ORDER — APIXABAN 2.5 MG/1
1 TABLET, FILM COATED ORAL
Qty: 60 | Refills: 0
Start: 2020-06-22 | End: 2020-07-21

## 2020-06-22 RX ORDER — ACETAMINOPHEN 500 MG
2 TABLET ORAL
Qty: 0 | Refills: 0 | DISCHARGE
Start: 2020-06-22

## 2020-06-22 RX ADMIN — Medication 100 MICROGRAM(S): at 04:56

## 2020-06-22 RX ADMIN — Medication 3 MILLILITER(S): at 11:05

## 2020-06-22 RX ADMIN — APIXABAN 2.5 MILLIGRAM(S): 2.5 TABLET, FILM COATED ORAL at 16:54

## 2020-06-22 RX ADMIN — PANTOPRAZOLE SODIUM 40 MILLIGRAM(S): 20 TABLET, DELAYED RELEASE ORAL at 04:56

## 2020-06-22 RX ADMIN — Medication 3 MILLILITER(S): at 17:24

## 2020-06-22 RX ADMIN — Medication 3 MILLILITER(S): at 04:57

## 2020-06-22 RX ADMIN — APIXABAN 2.5 MILLIGRAM(S): 2.5 TABLET, FILM COATED ORAL at 04:56

## 2020-06-22 NOTE — PROGRESS NOTE ADULT - SUBJECTIVE AND OBJECTIVE BOX
S: SOB improved. Denies chest pain. Review of systems otherwise (-)  	    MEDICATIONS  (STANDING):  albuterol/ipratropium for Nebulization 3 milliLiter(s) Nebulizer every 6 hours  apixaban 2.5 milliGRAM(s) Oral every 12 hours  levothyroxine 100 MICROGram(s) Oral daily  pantoprazole    Tablet 40 milliGRAM(s) Oral before breakfast  simvastatin 20 milliGRAM(s) Oral at bedtime      LABS:	 	                          12.5   6.31  )-----------( 335      ( 20 Jun 2020 17:03 )             38.6     Hemoglobin: 12.5 g/dL (06-20 @ 17:03)    06-21    137  |  98  |  10  ----------------------------<  101<H>  4.6   |  25  |  1.05    Ca    10.2      21 Jun 2020 06:16    TPro  6.8  /  Alb  4.2  /  TBili  0.7  /  DBili  x   /  AST  32  /  ALT  25  /  AlkPhos  43  06-20    Creatinine Trend: 1.05<--, 1.14<--, 1.28<--, 1.48<--, 1.23<--  COAGS:       proBNP:   Lipid Profile:   HgA1c:   TSH:     PHYSICAL EXAM:  T(C): 36.6 (06-22-20 @ 04:33), Max: 37.2 (06-21-20 @ 11:41)  HR: 64 (06-22-20 @ 04:51) (64 - 87)  BP: 140/80 (06-22-20 @ 04:51) (100/61 - 179/90)  RR: 18 (06-22-20 @ 04:33) (18 - 18)  SpO2: 93% (06-22-20 @ 04:33) (93% - 97%)  Wt(kg): --  I&O's Summary    21 Jun 2020 07:01  -  22 Jun 2020 07:00  --------------------------------------------------------  IN: 480 mL / OUT: 0 mL / NET: 480 mL      Gen: Appears well in NAD  HEENT:  (-)icterus (-)pallor  CV: N S1 S2 1/6 KIN (+)2 Pulses B/l  Resp:  Clear to auscultation B/L, normal effort  GI: (+) BS Soft, NT, ND  Lymph:  (-)Edema, (-)obvious lymphadenopathy  Skin: Warm to touch, Normal turgor  Psych: Appropriate mood and affect      TELEMETRY: NSR 60-80, PVCs	      < from: Transthoracic Echocardiogram (06.11.20 @ 10:11) >  Conclusions:  1. Mitral annular calcification, otherwise normal mitral  valve.  2. Increased relative wall thickness with normal left  ventricular mass index, consistent with concentric left  ventricular remodeling.  3. Normal left ventricular systolic function. No segmental  wall motion abnormalities.  4. Normal right ventricular size and function.    < end of copied text >      ASSESSMENT/PLAN: 89 yr female hx of SOB, Osteoporosis, hypothyroidism , Gerd, CKD, multiple fall, HLD , now with worsening SOB, cards consult for CHF    - Does not appear in clinical heart failure with clear lungs on cxr and normal BNP  - Recent echo with normal LV function  - Pt with hx of PE/DVT on Eliquis, repeat CT Angio with no acute PE's   - Follow up pulmonary   - No further inpatient cardiac w/u needed  - Patient to f/u in our office with Dr. Mancera on Monday 6/29 at 12:30 PM    Florentin Juarez PA-C  Pager: [phone]

## 2020-06-22 NOTE — DISCHARGE NOTE PROVIDER - NSDCMRMEDTOKEN_GEN_ALL_CORE_FT
acetaminophen 325 mg oral tablet: 2 tab(s) orally every 6 hours, As needed, Temp greater or equal to 38.5C (101.3F), Mild Pain (1 - 3)  apixaban 2.5 mg oral tablet: 1 tab(s) orally every 12 hours  ipratropium-albuterol 0.5 mg-2.5 mg/3 mLinhalation solution: 3 milliliter(s) inhaled every 6 hours  levothyroxine 100 mcg (0.1 mg) oral tablet: 1 tab(s) orally once a day  multivitamin: 1 tab(s) orally once a day  omeprazole 40 mg oral delayed release capsule: 1 cap(s) orally once a day  physical thearpy :   probiotic: 1 tab(s) orally 2 times a day  rollong walker :   simvastatin 20 mg oral tablet: 1 tab(s) orally once a day (at bedtime)  Vitamin C 1000 mg oral tablet:   Vitamin D3 5000 intl units (125 mcg) oral capsule: acetaminophen 325 mg oral tablet: 2 tab(s) orally every 6 hours, As needed, Temp greater or equal to 38.5C (101.3F), Mild Pain (1 - 3)  apixaban 2.5 mg oral tablet: 1 tab(s) orally every 12 hours  ipratropium-albuterol 0.5 mg-2.5 mg/3 mLinhalation solution: 3 milliliter(s) inhaled every 6 hours  levothyroxine 100 mcg (0.1 mg) oral tablet: 1 tab(s) orally once a day  multivitamin: 1 tab(s) orally once a day  Nebulizer:   omeprazole 40 mg oral delayed release capsule: 1 cap(s) orally once a day  physical thearpy :   probiotic: 1 tab(s) orally 2 times a day  rollong walker :   simvastatin 20 mg oral tablet: 1 tab(s) orally once a day (at bedtime)  Vitamin C 1000 mg oral tablet:   Vitamin D3 5000 intl units (125 mcg) oral capsule:

## 2020-06-22 NOTE — PROGRESS NOTE ADULT - ASSESSMENT
89 f with    SOB  - supplement O2  - has hx of PE  - continue AC  - nebs  - Pulmonary evaluation noted  - Telemetry no events  - cardiology evaluation noted    H/O: Hypothyroidism   - follow TSH    Hypertension   - control    mild Dementia  - supportive care     Possible anxiety/ Depression  - observe    d/w patient    DC home    Art Carbajal MD pager [phone]

## 2020-06-22 NOTE — DISCHARGE NOTE PROVIDER - NSDCCPCAREPLAN_GEN_ALL_CORE_FT
PRINCIPAL DISCHARGE DIAGNOSIS  Diagnosis: Shortness of breath  Assessment and Plan of Treatment: PRINCIPAL DISCHARGE DIAGNOSIS  Diagnosis: Shortness of breath  Assessment and Plan of Treatment: History of PE/DVT  continue Apixaban  Duonebs q 6 hours as needed   Follow up with Dr. Wang within 1week   Follow up with Cardiology : You have a scheduled appointment on 6/29/2020 at 12:30  with Dr. Mancera      SECONDARY DISCHARGE DIAGNOSES  Diagnosis: Hypothyroid  Assessment and Plan of Treatment: Continue Synthroid

## 2020-06-22 NOTE — DISCHARGE NOTE NURSING/CASE MANAGEMENT/SOCIAL WORK - PATIENT PORTAL LINK FT
You can access the FollowMyHealth Patient Portal offered by [facility] by registering at the following website: [email or web address]. By joining [facility]’s FollowMyHealth portal, you will also be able to view your health information using other applications (apps) compatible with our system.

## 2020-06-22 NOTE — DISCHARGE NOTE PROVIDER - PROVIDER TOKENS
PROVIDER:[TOKEN:[[id number]],FOLLOWUP:[1 week]],PROVIDER:[TOKEN:[[id number]],FOLLOWUP:[1 week]] PROVIDER:[TOKEN:[[id number]],FOLLOWUP:[1 week]],PROVIDER:[TOKEN:[[id number]],FOLLOWUP:[1 week]],PROVIDER:[[id number]],SCHEDULEDAPPT:[06/29/2020],SCHEDULEDAPPTTIME:[12:30 PM],ESTABLISHEDPATIENT:[T]] PROVIDER:[TOKEN:[[id number]],FOLLOWUP:[1 week]],PROVIDER:[[id number]],SCHEDULEDAPPT:[06/29/2020],SCHEDULEDAPPTTIME:[12:30 PM],ESTABLISHEDPATIENT:[T]],PROVIDER:[TOKEN:[[id number]],FOLLOWUP:[1 week]]

## 2020-06-22 NOTE — PROGRESS NOTE ADULT - SUBJECTIVE AND OBJECTIVE BOX
Patient is a 89y old  Female who presents with a chief complaint of shortness of breathe (2020 14:09)      SUBJECTIVE / OVERNIGHT EVENTS: feels better Wants to go home.  Review of Systems  chest pain no  palpitations no  sob no  nausea no  headache no    MEDICATIONS  (STANDING):  albuterol/ipratropium for Nebulization 3 milliLiter(s) Nebulizer every 6 hours  apixaban 2.5 milliGRAM(s) Oral every 12 hours  levothyroxine 100 MICROGram(s) Oral daily  pantoprazole    Tablet 40 milliGRAM(s) Oral before breakfast  simvastatin 20 milliGRAM(s) Oral at bedtime    MEDICATIONS  (PRN):  acetaminophen   Tablet .. 650 milliGRAM(s) Oral every 6 hours PRN Temp greater or equal to 38.5C (101.3F), Mild Pain (1 - 3)      Vital Signs Last 24 Hrs  T(C): 36.7 (2020 17:09), Max: 37 (2020 20:08)  T(F): 98 (2020 17:09), Max: 98.6 (2020 20:08)  HR: 83 (2020 17:09) (64 - 84)  BP: 148/52 (2020 17:09) (100/61 - 179/90)  BP(mean): --  RR: 18 (2020 17:09) (18 - 18)  SpO2: 96% (2020 17:09) (93% - 97%)    PHYSICAL EXAM:  GENERAL: NAD, well-developed  HEAD:  Atraumatic, Normocephalic  EYES: EOMI, PERRLA, conjunctiva and sclera clear  NECK: Supple, No JVD  CHEST/LUNG: Clear to auscultation bilaterally; No wheeze  HEART: Regular rate and rhythm; No murmurs, rubs, or gallops  ABDOMEN: Soft, Nontender, Nondistended; Bowel sounds present  EXTREMITIES:  2+ Peripheral Pulses, No clubbing, cyanosis, or edema  PSYCH: AAOx3  NEUROLOGY: non-focal  SKIN: No rashes or lesions    LABS:        137  |  98  |  10  ----------------------------<  101<H>  4.6   |  25  |  1.05    Ca    10.2      2020 06:16            Urinalysis Basic - ( 2020 06:11 )    Color: Light Yellow / Appearance: Clear / S.028 / pH: x  Gluc: x / Ketone: Negative  / Bili: Negative / Urobili: Negative   Blood: x / Protein: Negative / Nitrite: Negative   Leuk Esterase: Small / RBC: 9 /hpf / WBC 4 /HPF   Sq Epi: x / Non Sq Epi: 0 /hpf / Bacteria: Negative        Culture - Urine (collected 2020 12:17)  Source: .Urine Clean Catch (Midstream)  Final Report (2020 08:18):    <10,000 CFU/mL Normal Urogenital Maribel        RADIOLOGY & ADDITIONAL TESTS:    Imaging Personally Reviewed:    Consultant(s) Notes Reviewed:      Care Discussed with Consultants/Other Providers:

## 2020-06-22 NOTE — DISCHARGE NOTE PROVIDER - CARE PROVIDER_API CALL
Melonie Chapman  CRITICAL CARE MEDICINE  [address] [place] [address] [address]  [place] [address]  Phone: [phone]  Fax: [phone]  Follow Up Time: 1 week    Art Carbajal  INTERNAL MEDICINE  [address] [place] [address]  Phone: [phone]  Fax: [phone]  Follow Up Time: 1 week Melonie Chapman  CRITICAL CARE MEDICINE  [address] [place] [address] [address]  [place] [address]  Phone: [phone]  Fax: [phone]  Follow Up Time: 1 week    Art Carbajal  INTERNAL MEDICINE  [address] [place] [address]  Phone: [phone]  Fax: [phone]  Follow Up Time: 1 week    Víctor Mancera  CARDIOLOGY  [address] [place], [place] [address]. [address]  [place] [address]  Phone: [phone]  Fax: [phone]  Established Patient  Scheduled Appointment: 06/29/2020 12:30 PM Melonie Chapman  CRITICAL CARE MEDICINE  [address] [place] [address] [address]  [place] [address]  Phone: [phone]  Fax: [phone]  Follow Up Time: 1 week    Víctor Mancera  CARDIOLOGY  [address]  [place] [address]  Phone: [phone]  Fax: [phone]  Established Patient  Scheduled Appointment: 06/29/2020 12:30 PM    David Jacobo  INTERNAL MEDICINE  [address] [place] [address] [address] [place] [address]  Phone: [phone]  Fax: [phone]  Follow Up Time: 1 week

## 2020-06-22 NOTE — DISCHARGE NOTE PROVIDER - HOSPITAL COURSE
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 reports she was discharged on 6/13 and since then feeling worse. shortness of breath occuring all the time reports compliance with her elliquis.  has 24/7 aide. Denies LE edema, orthopnea, recent trauma, fevers, chills, headache, dizziness, nausea, vomiting, dysuria, freq, hematuria, diarrhea, constipation, chest pain, cough.        Problem/Plan # 1  Shortness of breathe    - Hx of PE /DVT    - continue Apixaban 2.5 mg po q 12 hours    - Duonebs q 6 hr prn SOB     - Follow up with Dr. Jacobo in 1 week     -Follow up with Pulmonologist in 1 week     - Follow up with Dr. Mancera - she has a scheduled appointment on 6/29/20 at 12:30     - Patient will need refill of Apixaban in 30 days         Problem/Plan #2 Hypothyroid     - continue Synthroid         Patient stable for discharge home with 24 hr nurse's aide

## 2020-06-24 RX ORDER — IPRATROPIUM/ALBUTEROL SULFATE 18-103MCG
3 AEROSOL WITH ADAPTER (GRAM) INHALATION
Qty: 360 | Refills: 0
Start: 2020-06-24 | End: 2020-07-23

## 2020-09-26 NOTE — ED PROVIDER NOTE - HISTORY ATTESTATION, MLM
"MD notified. \"Pt's skin has a small hematoma at pacemaker site that is oozing serous drainage. Just wondering if you are aware of this. Also, pt just had a >10 run of V tach. Thanks.\"  " I have reviewed and confirmed nurses' notes...

## 2020-10-12 ENCOUNTER — INPATIENT (INPATIENT)
Facility: HOSPITAL | Age: 85
LOS: 5 days | Discharge: HOME | DRG: 206 | End: 2020-10-18
Attending: INTERNAL MEDICINE | Admitting: INTERNAL MEDICINE
Payer: MEDICARE

## 2020-10-12 VITALS
RESPIRATION RATE: 18 BRPM | HEART RATE: 75 BPM | TEMPERATURE: 99 F | DIASTOLIC BLOOD PRESSURE: 84 MMHG | SYSTOLIC BLOOD PRESSURE: 155 MMHG | HEIGHT: 56 IN | WEIGHT: 110.01 LBS | OXYGEN SATURATION: 97 %

## 2020-10-12 DIAGNOSIS — T17.908A UNSPECIFIED FOREIGN BODY IN RESPIRATORY TRACT, PART UNSPECIFIED CAUSING OTHER INJURY, INITIAL ENCOUNTER: ICD-10-CM

## 2020-10-12 DIAGNOSIS — I27.82 CHRONIC PULMONARY EMBOLISM: ICD-10-CM

## 2020-10-12 DIAGNOSIS — R53.81 OTHER MALAISE: ICD-10-CM

## 2020-10-12 DIAGNOSIS — R06.02 SHORTNESS OF BREATH: ICD-10-CM

## 2020-10-12 DIAGNOSIS — Z87.19 PERSONAL HISTORY OF OTHER DISEASES OF THE DIGESTIVE SYSTEM: Chronic | ICD-10-CM

## 2020-10-12 LAB
ALBUMIN SERPL ELPH-MCNC: 4.2 G/DL — SIGNIFICANT CHANGE UP (ref 3.3–5)
ALP SERPL-CCNC: 48 U/L — SIGNIFICANT CHANGE UP (ref 40–120)
ALT FLD-CCNC: 16 U/L — SIGNIFICANT CHANGE UP (ref 10–45)
ANION GAP SERPL CALC-SCNC: 10 MMOL/L — SIGNIFICANT CHANGE UP (ref 5–17)
APPEARANCE UR: CLEAR — SIGNIFICANT CHANGE UP
APTT BLD: 27.5 SEC — SIGNIFICANT CHANGE UP (ref 27.5–35.5)
AST SERPL-CCNC: 24 U/L — SIGNIFICANT CHANGE UP (ref 10–40)
BACTERIA # UR AUTO: NEGATIVE — SIGNIFICANT CHANGE UP
BASOPHILS # BLD AUTO: 0.03 K/UL — SIGNIFICANT CHANGE UP (ref 0–0.2)
BASOPHILS NFR BLD AUTO: 0.3 % — SIGNIFICANT CHANGE UP (ref 0–2)
BILIRUB SERPL-MCNC: 0.4 MG/DL — SIGNIFICANT CHANGE UP (ref 0.2–1.2)
BILIRUB UR-MCNC: NEGATIVE — SIGNIFICANT CHANGE UP
BUN SERPL-MCNC: 33 MG/DL — HIGH (ref 7–23)
CALCIUM SERPL-MCNC: 9.7 MG/DL — SIGNIFICANT CHANGE UP (ref 8.4–10.5)
CHLORIDE SERPL-SCNC: 104 MMOL/L — SIGNIFICANT CHANGE UP (ref 96–108)
CO2 SERPL-SCNC: 24 MMOL/L — SIGNIFICANT CHANGE UP (ref 22–31)
COLOR SPEC: SIGNIFICANT CHANGE UP
CREAT SERPL-MCNC: 1.05 MG/DL — SIGNIFICANT CHANGE UP (ref 0.5–1.3)
DIFF PNL FLD: NEGATIVE — SIGNIFICANT CHANGE UP
EOSINOPHIL # BLD AUTO: 0.01 K/UL — SIGNIFICANT CHANGE UP (ref 0–0.5)
EOSINOPHIL NFR BLD AUTO: 0.1 % — SIGNIFICANT CHANGE UP (ref 0–6)
EPI CELLS # UR: 1 /HPF — SIGNIFICANT CHANGE UP
GLUCOSE SERPL-MCNC: 86 MG/DL — SIGNIFICANT CHANGE UP (ref 70–99)
GLUCOSE UR QL: NEGATIVE — SIGNIFICANT CHANGE UP
HCT VFR BLD CALC: 38.2 % — SIGNIFICANT CHANGE UP (ref 34.5–45)
HGB BLD-MCNC: 12.4 G/DL — SIGNIFICANT CHANGE UP (ref 11.5–15.5)
HYALINE CASTS # UR AUTO: 0 /LPF — SIGNIFICANT CHANGE UP (ref 0–2)
IMM GRANULOCYTES NFR BLD AUTO: 0.3 % — SIGNIFICANT CHANGE UP (ref 0–1.5)
INR BLD: 1.04 RATIO — SIGNIFICANT CHANGE UP (ref 0.88–1.16)
KETONES UR-MCNC: NEGATIVE — SIGNIFICANT CHANGE UP
LEUKOCYTE ESTERASE UR-ACNC: ABNORMAL
LYMPHOCYTES # BLD AUTO: 2.88 K/UL — SIGNIFICANT CHANGE UP (ref 1–3.3)
LYMPHOCYTES # BLD AUTO: 29.3 % — SIGNIFICANT CHANGE UP (ref 13–44)
MCHC RBC-ENTMCNC: 32 PG — SIGNIFICANT CHANGE UP (ref 27–34)
MCHC RBC-ENTMCNC: 32.5 GM/DL — SIGNIFICANT CHANGE UP (ref 32–36)
MCV RBC AUTO: 98.5 FL — SIGNIFICANT CHANGE UP (ref 80–100)
MONOCYTES # BLD AUTO: 1.39 K/UL — HIGH (ref 0–0.9)
MONOCYTES NFR BLD AUTO: 14.1 % — HIGH (ref 2–14)
NEUTROPHILS # BLD AUTO: 5.49 K/UL — SIGNIFICANT CHANGE UP (ref 1.8–7.4)
NEUTROPHILS NFR BLD AUTO: 55.9 % — SIGNIFICANT CHANGE UP (ref 43–77)
NITRITE UR-MCNC: NEGATIVE — SIGNIFICANT CHANGE UP
NRBC # BLD: 0 /100 WBCS — SIGNIFICANT CHANGE UP (ref 0–0)
PH UR: 6.5 — SIGNIFICANT CHANGE UP (ref 5–8)
PLATELET # BLD AUTO: 340 K/UL — SIGNIFICANT CHANGE UP (ref 150–400)
POTASSIUM SERPL-MCNC: 3.7 MMOL/L — SIGNIFICANT CHANGE UP (ref 3.5–5.3)
POTASSIUM SERPL-SCNC: 3.7 MMOL/L — SIGNIFICANT CHANGE UP (ref 3.5–5.3)
PROT SERPL-MCNC: 6.8 G/DL — SIGNIFICANT CHANGE UP (ref 6–8.3)
PROT UR-MCNC: SIGNIFICANT CHANGE UP
PROTHROM AB SERPL-ACNC: 12.5 SEC — SIGNIFICANT CHANGE UP (ref 10.6–13.6)
RAPID RVP RESULT: SIGNIFICANT CHANGE UP
RBC # BLD: 3.88 M/UL — SIGNIFICANT CHANGE UP (ref 3.8–5.2)
RBC # FLD: 14.4 % — SIGNIFICANT CHANGE UP (ref 10.3–14.5)
RBC CASTS # UR COMP ASSIST: 2 /HPF — SIGNIFICANT CHANGE UP (ref 0–4)
SARS-COV-2 RNA SPEC QL NAA+PROBE: SIGNIFICANT CHANGE UP
SODIUM SERPL-SCNC: 138 MMOL/L — SIGNIFICANT CHANGE UP (ref 135–145)
SP GR SPEC: 1.02 — SIGNIFICANT CHANGE UP (ref 1.01–1.02)
UROBILINOGEN FLD QL: NEGATIVE — SIGNIFICANT CHANGE UP
WBC # BLD: 9.83 K/UL — SIGNIFICANT CHANGE UP (ref 3.8–10.5)
WBC # FLD AUTO: 9.83 K/UL — SIGNIFICANT CHANGE UP (ref 3.8–10.5)
WBC UR QL: 14 /HPF — HIGH (ref 0–5)

## 2020-10-12 PROCEDURE — 71275 CT ANGIOGRAPHY CHEST: CPT | Mod: 26

## 2020-10-12 PROCEDURE — 71045 X-RAY EXAM CHEST 1 VIEW: CPT | Mod: 26

## 2020-10-12 PROCEDURE — 99223 1ST HOSP IP/OBS HIGH 75: CPT

## 2020-10-12 PROCEDURE — 93010 ELECTROCARDIOGRAM REPORT: CPT

## 2020-10-12 PROCEDURE — 99285 EMERGENCY DEPT VISIT HI MDM: CPT | Mod: CS,GC

## 2020-10-12 RX ORDER — POLYETHYLENE GLYCOL 3350 17 G/17G
17 POWDER, FOR SOLUTION ORAL DAILY
Refills: 0 | Status: DISCONTINUED | OUTPATIENT
Start: 2020-10-12 | End: 2020-10-18

## 2020-10-12 RX ORDER — ALBUTEROL 90 UG/1
2 AEROSOL, METERED ORAL ONCE
Refills: 0 | Status: COMPLETED | OUTPATIENT
Start: 2020-10-12 | End: 2020-10-12

## 2020-10-12 RX ORDER — CHOLECALCIFEROL (VITAMIN D3) 125 MCG
0 CAPSULE ORAL
Qty: 0 | Refills: 0 | DISCHARGE

## 2020-10-12 RX ORDER — SIMVASTATIN 20 MG/1
20 TABLET, FILM COATED ORAL AT BEDTIME
Refills: 0 | Status: DISCONTINUED | OUTPATIENT
Start: 2020-10-12 | End: 2020-10-18

## 2020-10-12 RX ORDER — LEVOTHYROXINE SODIUM 125 MCG
100 TABLET ORAL DAILY
Refills: 0 | Status: DISCONTINUED | OUTPATIENT
Start: 2020-10-12 | End: 2020-10-13

## 2020-10-12 RX ORDER — LANOLIN ALCOHOL/MO/W.PET/CERES
3 CREAM (GRAM) TOPICAL ONCE
Refills: 0 | Status: COMPLETED | OUTPATIENT
Start: 2020-10-12 | End: 2020-10-12

## 2020-10-12 RX ORDER — OLMESARTAN MEDOXOMIL 5 MG/1
1 TABLET, FILM COATED ORAL
Qty: 0 | Refills: 0 | DISCHARGE

## 2020-10-12 RX ORDER — ASPIRIN/CALCIUM CARB/MAGNESIUM 324 MG
1 TABLET ORAL
Qty: 0 | Refills: 0 | DISCHARGE

## 2020-10-12 RX ORDER — PANTOPRAZOLE SODIUM 20 MG/1
40 TABLET, DELAYED RELEASE ORAL
Refills: 0 | Status: DISCONTINUED | OUTPATIENT
Start: 2020-10-12 | End: 2020-10-18

## 2020-10-12 RX ORDER — OMEPRAZOLE 10 MG/1
1 CAPSULE, DELAYED RELEASE ORAL
Qty: 0 | Refills: 0 | DISCHARGE

## 2020-10-12 RX ORDER — FAMOTIDINE 10 MG/ML
20 INJECTION INTRAVENOUS
Refills: 0 | Status: DISCONTINUED | OUTPATIENT
Start: 2020-10-12 | End: 2020-10-12

## 2020-10-12 RX ORDER — ASPIRIN/CALCIUM CARB/MAGNESIUM 324 MG
81 TABLET ORAL DAILY
Refills: 0 | Status: DISCONTINUED | OUTPATIENT
Start: 2020-10-12 | End: 2020-10-18

## 2020-10-12 RX ORDER — SENNA PLUS 8.6 MG/1
2 TABLET ORAL AT BEDTIME
Refills: 0 | Status: DISCONTINUED | OUTPATIENT
Start: 2020-10-12 | End: 2020-10-18

## 2020-10-12 RX ORDER — SIMVASTATIN 20 MG/1
1 TABLET, FILM COATED ORAL
Qty: 0 | Refills: 0 | DISCHARGE

## 2020-10-12 RX ORDER — CLONAZEPAM 1 MG
1 TABLET ORAL
Qty: 0 | Refills: 0 | DISCHARGE

## 2020-10-12 RX ORDER — LOSARTAN POTASSIUM 100 MG/1
1 TABLET, FILM COATED ORAL
Qty: 0 | Refills: 0 | DISCHARGE

## 2020-10-12 RX ORDER — APIXABAN 2.5 MG/1
2.5 TABLET, FILM COATED ORAL EVERY 12 HOURS
Refills: 0 | Status: DISCONTINUED | OUTPATIENT
Start: 2020-10-12 | End: 2020-10-18

## 2020-10-12 RX ADMIN — PANTOPRAZOLE SODIUM 40 MILLIGRAM(S): 20 TABLET, DELAYED RELEASE ORAL at 22:54

## 2020-10-12 RX ADMIN — Medication 3 MILLIGRAM(S): at 21:48

## 2020-10-12 RX ADMIN — ALBUTEROL 2 PUFF(S): 90 AEROSOL, METERED ORAL at 12:24

## 2020-10-12 RX ADMIN — SIMVASTATIN 20 MILLIGRAM(S): 20 TABLET, FILM COATED ORAL at 21:48

## 2020-10-12 NOTE — ED PROVIDER NOTE - OBJECTIVE STATEMENT
89 y.o. F w/ PMHx of HTN, hyperthyroidism, skin cancer p/w URI-like sxs and dyspnea since today morning.  Pt. lives alone and has an aide who helps.  Denies any f/c, sick contacts, CP, abd pain, dysuria, urinary frequency, hematuria, hematochezia, melena.  Spoke w/ pt.'s PCP, Dr. Jacobo, who states she's had a hx of PE and is on eliquis 2.5mg BID.  Pt. was admitted at [facility] in June for SOB and found to have a negative CTA then.

## 2020-10-12 NOTE — ED PROVIDER NOTE - ATTENDING CONTRIBUTION TO CARE
sob / congested. no cough, no cp  rrr, clear lungs, looks good for stated age  labs, ekg, xr chest, swab for resp virus.

## 2020-10-12 NOTE — ED ADULT NURSE NOTE - NSIMPLEMENTINTERV_GEN_ALL_ED
Implemented All Fall with Harm Risk Interventions:  [place] to call system. Call bell, personal items and telephone within reach. Instruct patient to call for assistance. Room bathroom lighting operational. Non-slip footwear when patient is off stretcher. Physically safe environment: no spills, clutter or unnecessary equipment. Stretcher in lowest position, wheels locked, appropriate side rails in place. Provide visual cue, wrist band, yellow gown, etc. Monitor gait and stability. Monitor for mental status changes and reorient to person, place, and time. Review medications for side effects contributing to fall risk. Reinforce activity limits and safety measures with patient and family. Provide visual clues: red socks.

## 2020-10-12 NOTE — ED PROVIDER NOTE - CLINICAL SUMMARY MEDICAL DECISION MAKING FREE TEXT BOX
89 y.o. F p/w dyspnea, URI-sxs since today morning.  Spoke w/ PCP who stated pt. is on eliquis for previous PE.  Will get d-dimer as screening tool given pt. is currently anti-coagulated.  Will get basics, u/a w/ culture, CXR, RVP.  Will reassess and dispo pending results.

## 2020-10-12 NOTE — ED PROVIDER NOTE - PHYSICAL EXAMINATION
GENERAL: Patient awake alert NAD.  HEENT: NC/AT, Moist mucous membranes, no oropharyngeal exudates or erythema.  LUNGS: Crackles in b/l lower lobes, L>R.  CARDIAC: RRR, no m/r/g.  ABDOMEN: Soft, NT, ND, No rebound, guarding. No CVA tenderness.  EXT: No edema. No calf tenderness. CV 2+DP/PT bilaterally.  MSK: No pain with movement, no deformities.  NEURO: A&Ox3. Moving all extremities.  SKIN: Lesions on b/l LE that is chronic per pt.  PSYCH: Normal affect.

## 2020-10-12 NOTE — ED ADULT NURSE REASSESSMENT NOTE - NS ED NURSE REASSESS COMMENT FT1
Report received from SURESH Smith. Upon assessment, pt is AO x 4, speaking in full and complete sentences. Pt assisted with wheelchair to the bathroom, pt assisted safely back to bed. Pt denies needs at this time, updated on plan of care, safety and fall precautions in place, call bell within reach.

## 2020-10-12 NOTE — ED PROVIDER NOTE - NS ED ROS FT
General: denies fever, chills, weight loss/weight gain.  HENT: + nasal congestion, rhinorrhea; denies sore throat, ear pain.  Eyes: denies visual changes, blurred vision, eye discharge, eye redness.  Neck: denies neck pain, neck swelling.  CV: denies chest pain, palpitations.  Resp: +difficulty breathing; denies cough.  Abdominal: denies nausea, vomiting, diarrhea, abdominal pain, blood in stool, dark stool.  MSK: denies muscle aches, bony pain, leg pain, leg swelling.  Neuro: denies headaches, numbness, tingling, dizziness, lightheadedness.  Skin: denies rashes, cuts, bruises.  Hematologic: denies unexplained bruises.

## 2020-10-12 NOTE — ED PROVIDER NOTE - PROGRESS NOTE DETAILS
still symptomatic, uncl cause, vs ok. discussed the case with the admitting MD who accepts the patient for admission

## 2020-10-12 NOTE — H&P ADULT - NSHPLABSRESULTS_GEN_ALL_CORE
Personally reviewed old records.  Personally reviewed labs.  Personally reviewed imaging.  Personally reviewed EKG. NSR. Normal EKG                          12.4   9.83  )-----------( 340      ( 12 Oct 2020 12:40 )             38.2       10-    138  |  104  |  33<H>  ----------------------------<  86  3.7   |  24  |  1.05    Ca    9.7      12 Oct 2020 12:40    TPro  6.8  /  Alb  4.2  /  TBili  0.4  /  DBili  x   /  AST  24  /  ALT  16  /  AlkPhos  48  1012            LIVER FUNCTIONS - ( 12 Oct 2020 12:40 )  Alb: 4.2 g/dL / Pro: 6.8 g/dL / ALK PHOS: 48 U/L / ALT: 16 U/L / AST: 24 U/L / GGT: x             PT/INR - ( 12 Oct 2020 12:40 )   PT: 12.5 sec;   INR: 1.04 ratio         PTT - ( 12 Oct 2020 12:40 )  PTT:27.5 sec    Urinalysis Basic - ( 12 Oct 2020 13:30 )    Color: Light Yellow / Appearance: Clear / S.022 / pH: x  Gluc: x / Ketone: Negative  / Bili: Negative / Urobili: Negative   Blood: x / Protein: Trace / Nitrite: Negative   Leuk Esterase: Moderate / RBC: 2 /hpf / WBC 14 /HPF   Sq Epi: x / Non Sq Epi: 1 /hpf / Bacteria: Negative

## 2020-10-12 NOTE — H&P ADULT - NSHPPHYSICALEXAM_GEN_ALL_CORE
PHYSICAL EXAM:   GENERAL: Alert. Not confused. No acute distress. Not thin. Not cachectic. Not obese.  HEAD:  Atraumatic. Normocephalic.  EYES: EOMI. PERRLA. Normal conjunctiva/sclera.  ENT: Neck supple. No JVD. Moist oral mucosa. Not edentulous. No thrush.  LYMPH: Normal supraclavicular/cervical lymph nodes.   CARDIAC: Not tachy, Not ella. Regular rhythm. Not irregularly irregular. S1. S2. No murmur. No rub. No distant heart sounds.  LUNG/CHEST: BS equal bilaterally. No wheezes. No rales. +rhonchi.  ABDOMEN: Soft. No tenderness. No distension. No fluid wave. Normal bowel sounds.  BACK: No midline/vertebral tenderness. No flank tenderness.  VASCULAR: +2 b/l radial or ulnar pulses. Palpable DP pulses.  EXTREMITIES:  No clubbing. No cyanosis. No edema. Moving all 4.  NEUROLOGY: A&Ox3. Non-focal exam. Cranial nerves intact. Normal speech. Sensation intact.  PSYCH: Normal behavior. Normal affect.  SKIN: No jaundice. Diffuse patches of ecchymoses over chest wall and arms and hands. White plaques on chest.  Vascular Access:     ICU Vital Signs Last 24 Hrs  T(C): 36.7 (12 Oct 2020 20:37), Max: 37 (12 Oct 2020 11:43)  T(F): 98 (12 Oct 2020 20:37), Max: 98.6 (12 Oct 2020 11:43)  HR: 74 (12 Oct 2020 20:37) (70 - 75)  BP: 123/59 (12 Oct 2020 20:37) (123/59 - 155/84)  BP(mean): --  ABP: --  ABP(mean): --  RR: 22 (12 Oct 2020 20:37) (18 - 26)  SpO2: 100% (12 Oct 2020 20:37) (97% - 100%)      I&O's Summary

## 2020-10-12 NOTE — H&P ADULT - ATTENDING COMMENTS
Pt seen and examined, and plan as above. Pt signed out to NP service for episodic care. Dr. Carbajal to assume care in AM.

## 2020-10-12 NOTE — ED ADULT NURSE NOTE - OBJECTIVE STATEMENT
89 year old A&Ox3 female BIB EMS from home for URI like symptoms x 1 day. PMH hyperthyroid, skin cancer, HTN. Pt states SOB, MCKEON and productive cough with yellow/green sputum started last night. Denies COVID contacts, lives alone and has aide for ADLS. Pt tachypneic, speaking in 4-5 word sentences, 97% on room air, crackles heard in bilateral lower lobes. No lower extremity swelling noted. Denies CP, n/v/d, fevers, chills, abdominal pain, urinary symptoms,  numbness, tingling in upper and lower extremities, HA, blurry vision. Updated on plan of care.

## 2020-10-12 NOTE — H&P ADULT - ASSESSMENT
89F c hx HTN, HLD, freq falls, PE (Jun '20) on eliquis, GERD, Osteoporosis, hypothyroidism, pw SOB and cough poss 2/2 aspiration.

## 2020-10-12 NOTE — H&P ADULT - NSHPSOCIALHISTORY_GEN_ALL_CORE
Social History:    Marital Status:  (   )    (   ) Single    (   )    ( x )   Occupation:   Lives with: (x  ) alone  (  ) children   (  ) spouse   (  ) parents  (  ) other    Substance Use (street drugs): ( x ) never used  (  ) other:  Tobacco Usage:  (  ) never smoked   ( x  ) former smoker 25 pk yrs  Alcohol Usage: denies    (     ) Advanced Directives: (     ) None    (      ) DNR    (     ) DNI    (     ) Health Care Proxy:

## 2020-10-12 NOTE — H&P ADULT - HISTORY OF PRESENT ILLNESS
89F c hx HTN, HLD, freq falls, PE (Jun '20) on eliquis, GERD, Osteoporosis, hypothyroidism, pw SOB x1 day.    Pt states she has been having increased non-traumatic bruising on her skin she's noticed, and has been putting a white cream on her chest. Otherwise, pt has been in her usual state of health until this morning, when she awoke feeling SOB and coughing due to significant secretions in her throat. Pt denies any hx of coughing when eating or drinking. Pt uses 1 pillow at night. Pt does report chills today. Pt denies any fevers, CP, abd pain, NV, diarrhea. At baseline, pt ambulates with a walker, and would be able to walk up a flight of stairs with great difficulty.    VS: Tm 98.6, P 75, /62, R 26, 100% RA  In the ED, received albuterol.

## 2020-10-13 LAB
ALBUMIN SERPL ELPH-MCNC: 4 G/DL — SIGNIFICANT CHANGE UP (ref 3.3–5)
ALP SERPL-CCNC: 49 U/L — SIGNIFICANT CHANGE UP (ref 40–120)
ALT FLD-CCNC: 19 U/L — SIGNIFICANT CHANGE UP (ref 10–45)
ANION GAP SERPL CALC-SCNC: 15 MMOL/L — SIGNIFICANT CHANGE UP (ref 5–17)
AST SERPL-CCNC: 22 U/L — SIGNIFICANT CHANGE UP (ref 10–40)
BASOPHILS # BLD AUTO: 0.09 K/UL — SIGNIFICANT CHANGE UP (ref 0–0.2)
BASOPHILS NFR BLD AUTO: 0.9 % — SIGNIFICANT CHANGE UP (ref 0–2)
BILIRUB SERPL-MCNC: 0.5 MG/DL — SIGNIFICANT CHANGE UP (ref 0.2–1.2)
BUN SERPL-MCNC: 30 MG/DL — HIGH (ref 7–23)
CALCIUM SERPL-MCNC: 10 MG/DL — SIGNIFICANT CHANGE UP (ref 8.4–10.5)
CHLORIDE SERPL-SCNC: 102 MMOL/L — SIGNIFICANT CHANGE UP (ref 96–108)
CO2 SERPL-SCNC: 24 MMOL/L — SIGNIFICANT CHANGE UP (ref 22–31)
CREAT SERPL-MCNC: 1.04 MG/DL — SIGNIFICANT CHANGE UP (ref 0.5–1.3)
CULTURE RESULTS: SIGNIFICANT CHANGE UP
EOSINOPHIL # BLD AUTO: 0.2 K/UL — SIGNIFICANT CHANGE UP (ref 0–0.5)
EOSINOPHIL NFR BLD AUTO: 2 % — SIGNIFICANT CHANGE UP (ref 0–6)
GLUCOSE SERPL-MCNC: 82 MG/DL — SIGNIFICANT CHANGE UP (ref 70–99)
HCT VFR BLD CALC: 38.3 % — SIGNIFICANT CHANGE UP (ref 34.5–45)
HGB BLD-MCNC: 12 G/DL — SIGNIFICANT CHANGE UP (ref 11.5–15.5)
IMM GRANULOCYTES NFR BLD AUTO: 0.4 % — SIGNIFICANT CHANGE UP (ref 0–1.5)
LYMPHOCYTES # BLD AUTO: 3.78 K/UL — HIGH (ref 1–3.3)
LYMPHOCYTES # BLD AUTO: 38.2 % — SIGNIFICANT CHANGE UP (ref 13–44)
MAGNESIUM SERPL-MCNC: 2.4 MG/DL — SIGNIFICANT CHANGE UP (ref 1.6–2.6)
MCHC RBC-ENTMCNC: 31.3 GM/DL — LOW (ref 32–36)
MCHC RBC-ENTMCNC: 31.3 PG — SIGNIFICANT CHANGE UP (ref 27–34)
MCV RBC AUTO: 99.7 FL — SIGNIFICANT CHANGE UP (ref 80–100)
MONOCYTES # BLD AUTO: 1.18 K/UL — HIGH (ref 0–0.9)
MONOCYTES NFR BLD AUTO: 11.9 % — SIGNIFICANT CHANGE UP (ref 2–14)
NEUTROPHILS # BLD AUTO: 4.6 K/UL — SIGNIFICANT CHANGE UP (ref 1.8–7.4)
NEUTROPHILS NFR BLD AUTO: 46.6 % — SIGNIFICANT CHANGE UP (ref 43–77)
NRBC # BLD: 0 /100 WBCS — SIGNIFICANT CHANGE UP (ref 0–0)
PHOSPHATE SERPL-MCNC: 3.7 MG/DL — SIGNIFICANT CHANGE UP (ref 2.5–4.5)
PLATELET # BLD AUTO: 323 K/UL — SIGNIFICANT CHANGE UP (ref 150–400)
POTASSIUM SERPL-MCNC: 4.4 MMOL/L — SIGNIFICANT CHANGE UP (ref 3.5–5.3)
POTASSIUM SERPL-SCNC: 4.4 MMOL/L — SIGNIFICANT CHANGE UP (ref 3.5–5.3)
PROT SERPL-MCNC: 6.6 G/DL — SIGNIFICANT CHANGE UP (ref 6–8.3)
RBC # BLD: 3.84 M/UL — SIGNIFICANT CHANGE UP (ref 3.8–5.2)
RBC # FLD: 14.6 % — HIGH (ref 10.3–14.5)
SARS-COV-2 IGG SERPL QL IA: NEGATIVE — SIGNIFICANT CHANGE UP
SARS-COV-2 IGM SERPL IA-ACNC: 0.22 RATIO — SIGNIFICANT CHANGE UP
SODIUM SERPL-SCNC: 141 MMOL/L — SIGNIFICANT CHANGE UP (ref 135–145)
SPECIMEN SOURCE: SIGNIFICANT CHANGE UP
TSH SERPL-MCNC: 0.02 UIU/ML — LOW (ref 0.27–4.2)
VIT B12 SERPL-MCNC: 578 PG/ML — SIGNIFICANT CHANGE UP (ref 232–1245)
WBC # BLD: 9.89 K/UL — SIGNIFICANT CHANGE UP (ref 3.8–10.5)
WBC # FLD AUTO: 9.89 K/UL — SIGNIFICANT CHANGE UP (ref 3.8–10.5)

## 2020-10-13 RX ORDER — LEVOTHYROXINE SODIUM 125 MCG
50 TABLET ORAL DAILY
Refills: 0 | Status: DISCONTINUED | OUTPATIENT
Start: 2020-10-13 | End: 2020-10-15

## 2020-10-13 RX ORDER — IPRATROPIUM/ALBUTEROL SULFATE 18-103MCG
3 AEROSOL WITH ADAPTER (GRAM) INHALATION EVERY 6 HOURS
Refills: 0 | Status: DISCONTINUED | OUTPATIENT
Start: 2020-10-13 | End: 2020-10-18

## 2020-10-13 RX ORDER — LANOLIN ALCOHOL/MO/W.PET/CERES
5 CREAM (GRAM) TOPICAL ONCE
Refills: 0 | Status: COMPLETED | OUTPATIENT
Start: 2020-10-13 | End: 2020-10-13

## 2020-10-13 RX ORDER — IPRATROPIUM/ALBUTEROL SULFATE 18-103MCG
3 AEROSOL WITH ADAPTER (GRAM) INHALATION ONCE
Refills: 0 | Status: COMPLETED | OUTPATIENT
Start: 2020-10-13 | End: 2020-10-13

## 2020-10-13 RX ORDER — BUDESONIDE, MICRONIZED 100 %
0.5 POWDER (GRAM) MISCELLANEOUS EVERY 12 HOURS
Refills: 0 | Status: DISCONTINUED | OUTPATIENT
Start: 2020-10-13 | End: 2020-10-18

## 2020-10-13 RX ADMIN — Medication 5 MILLIGRAM(S): at 22:23

## 2020-10-13 RX ADMIN — SIMVASTATIN 20 MILLIGRAM(S): 20 TABLET, FILM COATED ORAL at 21:03

## 2020-10-13 RX ADMIN — APIXABAN 2.5 MILLIGRAM(S): 2.5 TABLET, FILM COATED ORAL at 05:38

## 2020-10-13 RX ADMIN — Medication 50 MILLIGRAM(S): at 21:03

## 2020-10-13 RX ADMIN — Medication 81 MILLIGRAM(S): at 11:48

## 2020-10-13 RX ADMIN — Medication 100 MICROGRAM(S): at 05:38

## 2020-10-13 RX ADMIN — Medication 3 MILLILITER(S): at 18:07

## 2020-10-13 RX ADMIN — Medication 3 MILLILITER(S): at 05:50

## 2020-10-13 RX ADMIN — APIXABAN 2.5 MILLIGRAM(S): 2.5 TABLET, FILM COATED ORAL at 18:07

## 2020-10-13 RX ADMIN — POLYETHYLENE GLYCOL 3350 17 GRAM(S): 17 POWDER, FOR SOLUTION ORAL at 11:48

## 2020-10-13 RX ADMIN — Medication 0.5 MILLIGRAM(S): at 18:07

## 2020-10-13 NOTE — SWALLOW BEDSIDE ASSESSMENT ADULT - SLP PERTINENT HISTORY OF CURRENT PROBLEM
Per H&P: 89F c hx HTN, HLD, freq falls, PE (Jun '20) on eliquis, GERD, Osteoporosis, hypothyroidism, h/o ACDF, p/w SOB x1 day. Pt states she has been having increased non-traumatic bruising on her skin she's noticed, and has been putting a white cream on her chest. Otherwise, pt has been in her usual state of health until this morning, when she awoke feeling SOB and coughing due to significant secretions in her throat. Pt denies any hx of coughing when eating or drinking. Pt uses 1 pillow at night. Pt does report chills today. Pt denies any fevers, CP, abd pain, NV, diarrhea. At baseline, pt ambulates with a walker, and would be able to walk up a flight of stairs with great difficulty. Per Medicine: Pt a/w SOB and cough poss 2/2 aspiration. CTA neg for pe or infection. suspect 2/2 aspiration. hold off abx. Aspiration - speech/swallow dysphagia eval.

## 2020-10-13 NOTE — CONSULT NOTE ADULT - SUBJECTIVE AND OBJECTIVE BOX
[facility] - [facility] - CONSULT NOTE    HPI:    PMHX:  Dementia  Hyperlipidemia  Hypertension  Chronic kidney disease  Skin cancer  Osteoporosis  Chronic anal fissure  Anal spasm  Hypothyroidism  Gastroesophageal reflux   Pulmonary embolism    PSHX:  Anterior Cervical discectomy  Retinal tear repair - left eye   Cataract extraction - bilateral  Elbow surgery - left  Tonsillectomy  Hip fracture repair with hardware    FAMILY HISTORY:  No pertinent family history in first degree relatives    SOCIAL HISTORY:  lives alone with HHA several hours per day; former smoker;       Pulmonary Medications:   albuterol/ipratropium for Nebulization 3 milliLiter(s) Nebulizer every 6 hours  buDESOnide    Inhalation Suspension 0.5 milliGRAM(s) Inhalation every 12 hours      Antimicrobials:      Cardiology:      Other:  apixaban 2.5 milliGRAM(s) Oral every 12 hours  aspirin enteric coated 81 milliGRAM(s) Oral daily  levothyroxine 50 MICROGram(s) Oral daily  pantoprazole    Tablet 40 milliGRAM(s) Oral before breakfast  polyethylene glycol 3350 17 Gram(s) Oral daily  predniSONE   Tablet 50 milliGRAM(s) Oral daily  simvastatin 20 milliGRAM(s) Oral at bedtime      Prn:  MEDICATIONS  (PRN):  senna 2 Tablet(s) Oral at bedtime PRN Constipation      Allergies    penicillin (Rash)    HOME MEDICATIONS: see  H & P    REVIEW OF SYSTEMS:  Constitutional: As per HPI  HEENT: Within normal limits  CV: As per HPI  Resp: As per HPI  GI: Within normal limits   : Within normal limits  Musculoskeletal: Within normal limits  Skin: Within normal limits  Neurological: dementia  Psychiatric: Within normal limits  Endocrine: Within normal limits  Hematologic/Lymphatic: Within normal limits  Allergic/Immunologic: Within normal limits    [x] All other systems negative    OBJECTIVE:    PHYSICAL EXAM:  ICU Vital Signs Last 24 Hrs  T(C): 36.9 (13 Oct 2020 08:22), Max: 36.9 (13 Oct 2020 08:22)  T(F): 98.4 (13 Oct 2020 08:22), Max: 98.4 (13 Oct 2020 08:22)  HR: 78 (13 Oct 2020 08:22) (70 - 78)  BP: 130/78 (13 Oct 2020 08:22) (123/59 - 155/60)  BP(mean): --  ABP: --  ABP(mean): --  RR: 20 (13 Oct 2020 08:22) (20 - 28)  SpO2: 99% (13 Oct 2020 08:22) (94% - 100%) on room air    General: Awake. Alert. Cooperative. No distress. Appears stated age 	  HEENT:   Atraumatic. Normocephalic. Anicteric. Normal oral mucosa. PERRL. EOMI.  Neck: Supple. Trachea midline. Thyroid without enlargement/tenderness/nodules. No carotid bruit. No JVD.	  Cardiovascular: Regular rate and rhythm. S1 S2 normal. No murmurs, rubs or gallops.  Respiratory: Respirations unlabored. Mild wheeze. Kyphoscoliosis.  Abdomen: Soft. Non-tender. Non-distended. No organomegaly. No masses. Normal bowel sounds.  Extremities: Warm to touch. No clubbing or cyanosis. No pedal edema.  Pulses: 2+ peripheral pulses all extremities.	  Skin: Multiple ecchymoses with venous stasis changes on both legs  Lymph Nodes: Cervical, supraclavicular and axillary nodes normal  Neurological: Motor and sensory examination equal and normal. A and O x 2  Psychiatry: Appropriate mood and affect.      LABS:                          12.0   9.89  )-----------( 323      ( 13 Oct 2020 07:22 )             38.3     CBC    WBC  9.89 <==, 9.83 <==    Hemoglobin  12.0 <<==, 12.4 <<==    Hematocrit  38.3 <==, 38.2 <==    Platelets  323 <==, 340 <==      141  |  102  |  30<H>  ----------------------------<  82    10-13  4.4   |  24  |  1.04    LYTES    sodium  141 <==, 138 <==    potassium   4.4 <==, 3.7 <==    chloride  102 <==, 104 <==    carbon dioxide  24 <==, 24 <==    =============================================================================================  RENAL FUNCTION:    Creatinine:   1.04  <<==, 1.05  <<==    BUN:   30 <==, 33 <==    ============================================================================================    calcium   10.0 <==, 9.7 <==    phos   3.7 <==    mag   2.4 <==    ============================================================================================  LFTs    AST:   22 <== , 24 <==     ALT:  19  <== , 16  <==     AP:  49  <=, 48  <=    Bili:  0.5  <=, 0.4  <=    PT/INR - ( 12 Oct 2020 12:40 )   PT: 12.5 sec;   INR: 1.04 ratio       PTT - ( 12 Oct 2020 12:40 )  PTT:27.5 sec    D-Dimer Assay, Quantitative: 683 ng/mL DDU (10-12 @ 12:40)    CARDIAC MARKERS ( 12 Oct 2020 12:40 )  CPK x     /CKMB x     /CKMB Units x        troponin 23 ng/L    MICROBIOLOGY:     Respiratory Viral Panel + COVID-19 by STEPHAN (10.12.20 @ 12:39)   Rapid RVP Result: Not[facility]   SARS-CoV-2: Not[facility]: This Respiratory Panel uses polymerase chain reaction (PCR) to detect for   adenovirus; coronavirus (HKU1, NL63, 229E, OC43); human metapneumovirus   (hMPV); human enterovirus/rhinovirus (Entero/RV); influenza A; influenza   A/H1; influenza A/H3; influenza A/H1-2009; influenza B; parainfluenza   viruses 1, 2, 3, 4; respiratory syncytial virus; Mycoplasma pneumoniae;   Chlamydophila pneumoniae; and SARS-CoV-2.   ---------------------------------------------------------------------------------------------------------------    Urinalysis Basic - ( 12 Oct 2020 13:30 )    Color: Light Yellow / Appearance: Clear / S.022 / pH: x  Gluc: x / Ketone: Negative  / Bili: Negative / Urobili: Negative   Blood: x / Protein: Trace / Nitrite: Negative   Leuk Esterase: Moderate / RBC: 2 /hpf / WBC 14 /HPF   Sq Epi: x / Non Sq Epi: 1 /hpf / Bacteria: Negative    Culture - Urine (10.12.20 @ 17:29)   Specimen Source: .Urine Clean Catch (Midstream)   Culture Results:   <10,000 CFU/mL Normal Urogenital Maribel   ---------------------------------------------------------------------------------------------------------------    RADIOLOGY:  [x ] Chest radiographs reviewed and interpreted by me      EXAM:  XR CHEST PORTABLE URGENT 1V                          PROCEDURE DATE:  10/12/2020      INTERPRETATION:  A single chest x-ray was obtained on 2020.    Indication: Crackles in bases.    Impression:    The heart is normal in size. The lungs are clear. Slightly elevated right hemidiaphragm. No pleural effusion. No pneumothorax. A loop recorder is seen overlying the left hemithorax. Degenerative changes of the thoracic spine. Orthopedic hardware is seen in the cervical spine.    PADMINI JUNIOR M.D., ATTENDING RADIOLOGIST  This document has been electronically signed. Oct 12 2020  1:52PM  ---------------------------------------------------------------------------------------------------------------    EXAM:  CT ANGIO CHEST (W)AW IC                          PROCEDURE DATE:  10/12/2020      INTERPRETATION:  CLINICAL INFORMATION: Dyspnea    COMPARISON: CT chest dated 2020    PROCEDURE:  CT Angiography of the Chest.  90 ml of Omnipaque 350 was injected intravenously. 10 ml were discarded.  Sagittal and coronal reformats were performed as well as 3D (MIP) reconstructions.    FINDINGS:    LUNGS AND AIRWAYS: Patent central airways. Secretions are noted within several of the bronchi in both lower lobes. Lungs are clear.  PLEURA: No pleural effusion.  MEDIASTINUM AND MIGUEL A: No lymphadenopathy.  HEART: Heart size is normal. No pericardial effusion.  CHEST WALL AND LOWER NECK: Within normal limits.  VISUALIZED UPPER ABDOMEN: Within normal limits.  BONES: Degenerative changes of the spine    IMPRESSION:  No pulmonary embolism    SRAVAN BLUNT M.D., RADIOLOGY RESIDENT  This document has been electronically signed.  ZACK MEDINA M.D., ATTENDING RADIOLOGIST  This documenthas been electronically signed. 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 The patient was admitted in  with shortness of breath. She was found to have a left lower lobe non-occlusive pulmonary embolism in addition to restrictive lung disease, bronchospasm and deconditioning. She improved with anticoagulation and bronchodilators. Over the last several days, the patient reports that her breathing has become more difficult especially when talking. She describes difficulty mobilizing secretions in the back of her throat. She has no cough or sputum production. She has chest congestion and wheeze. No fevers, chills or sweats. No chest pain/pressure or palpitations. CTA is unremarkable. Asked to evaluate.    PMHX:  Dementia  Hyperlipidemia  Hypertension  Chronic kidney disease  Skin cancer  Osteoporosis  Chronic anal fissure  Anal spasm  Hypothyroidism  Gastroesophageal reflux   Pulmonary embolism    PSHX:  Anterior Cervical discectomy  Retinal tear repair - left eye   Cataract extraction - bilateral  Elbow surgery - left  Tonsillectomy  Hip fracture repair with hardware    FAMILY HISTORY:  No pertinent family history in first degree relatives    SOCIAL HISTORY:  lives alone with HHA several hours per day; former smoker;       Pulmonary Medications:   albuterol/ipratropium for Nebulization 3 milliLiter(s) Nebulizer every 6 hours  buDESOnide    Inhalation Suspension 0.5 milliGRAM(s) Inhalation every 12 hours      Antimicrobials:      Cardiology:      Other:  apixaban 2.5 milliGRAM(s) Oral every 12 hours  aspirin enteric coated 81 milliGRAM(s) Oral daily  levothyroxine 50 MICROGram(s) Oral daily  pantoprazole    Tablet 40 milliGRAM(s) Oral before breakfast  polyethylene glycol 3350 17 Gram(s) Oral daily  predniSONE   Tablet 50 milliGRAM(s) Oral daily  simvastatin 20 milliGRAM(s) Oral at bedtime      Prn:  MEDICATIONS  (PRN):  senna 2 Tablet(s) Oral at bedtime PRN Constipation      Allergies    penicillin (Rash)    HOME MEDICATIONS: see  H & P    REVIEW OF SYSTEMS:  Constitutional: As per HPI  HEENT: Within normal limits  CV: As per HPI  Resp: As per HPI  GI: Within normal limits   : Within normal limits  Musculoskeletal: Within normal limits  Skin: lower extremity bruising/discoloration  Neurological: dementia  Psychiatric: Within normal limits  Endocrine: Within normal limits  Hematologic/Lymphatic: Within normal limits  Allergic/Immunologic: Within normal limits    [x] All other systems negative    OBJECTIVE:    PHYSICAL EXAM:  ICU Vital Signs Last 24 Hrs  T(C): 36.9 (13 Oct 2020 08:22), Max: 36.9 (13 Oct 2020 08:22)  T(F): 98.4 (13 Oct 2020 08:22), Max: 98.4 (13 Oct 2020 08:22)  HR: 78 (13 Oct 2020 08:22) (70 - 78)  BP: 130/78 (13 Oct 2020 08:22) (123/59 - 155/60)  BP(mean): --  ABP: --  ABP(mean): --  RR: 20 (13 Oct 2020 08:22) (20 - 28)  SpO2: 99% (13 Oct 2020 08:22) (94% - 100%) on room air    General: Awake. Alert. Cooperative. No distress. Appears stated age 	  HEENT:   Atraumatic. Normocephalic. Anicteric. Normal oral mucosa. PERRL. EOMI.  Neck: Supple. Trachea midline. Thyroid without enlargement/tenderness/nodules. No carotid bruit. No JVD.	  Cardiovascular: Regular rate and rhythm. S1 S2 normal. No murmurs, rubs or gallops.  Respiratory: Respirations unlabored. Mild wheeze. Kyphoscoliosis.  Abdomen: Soft. Non-tender. Non-distended. No organomegaly. No masses. Normal bowel sounds.  Extremities: Warm to touch. No clubbing or cyanosis. No pedal edema.  Pulses: 2+ peripheral pulses all extremities.	  Skin: Multiple ecchymoses with venous stasis changes on both legs  Lymph Nodes: Cervical, supraclavicular and axillary nodes normal  Neurological: Motor and sensory examination equal and normal. A and O x 2  Psychiatry: Appropriate mood and affect.      LABS:                          12.0   9.89  )-----------( 323      ( 13 Oct 2020 07:22 )             38.3     CBC    WBC  9.89 <==, 9.83 <==    Hemoglobin  12.0 <<==, 12.4 <<==    Hematocrit  38.3 <==, 38.2 <==    Platelets  323 <==, 340 <==      141  |  102  |  30<H>  ----------------------------<  82    10-13  4.4   |  24  |  1.04    LYTES    sodium  141 <==, 138 <==    potassium   4.4 <==, 3.7 <==    chloride  102 <==, 104 <==    carbon dioxide  24 <==, 24 <==    =============================================================================================  RENAL FUNCTION:    Creatinine:   1.04  <<==, 1.05  <<==    BUN:   30 <==, 33 <==    ============================================================================================    calcium   10.0 <==, 9.7 <==    phos   3.7 <==    mag   2.4 <==    ============================================================================================  LFTs    AST:   22 <== , 24 <==     ALT:  19  <== , 16  <==     AP:  49  <=, 48  <=    Bili:  0.5  <=, 0.4  <=    PT/INR - ( 12 Oct 2020 12:40 )   PT: 12.5 sec;   INR: 1.04 ratio       PTT - ( 12 Oct 2020 12:40 )  PTT:27.5 sec    D-Dimer Assay, Quantitative: 683 ng/mL DDU (10-12 @ 12:40)    CARDIAC MARKERS ( 12 Oct 2020 12:40 )  CPK x     /CKMB x     /CKMB Units x        troponin 23 ng/L    MICROBIOLOGY:     Respiratory Viral Panel + COVID-19 by STEPHAN (10.12.20 @ 12:39)   Rapid RVP Result: [facility]   SARS-CoV-2: [facility]: This Respiratory Panel uses polymerase chain reaction (PCR) to detect for   adenovirus; coronavirus (HKU1, NL63, 229E, OC43); human metapneumovirus   (hMPV); human enterovirus/rhinovirus (Entero/RV); influenza A; influenza   A/H1; influenza A/H3; influenza A/H1-2009; influenza B; parainfluenza   viruses 1, 2, 3, 4; respiratory syncytial virus; Mycoplasma pneumoniae;   Chlamydophila pneumoniae; and SARS-CoV-2.   ---------------------------------------------------------------------------------------------------------------    Urinalysis Basic - ( 12 Oct 2020 13:30 )    Color: Light Yellow / Appearance: Clear / S.022 / pH: x  Gluc: x / Ketone: Negative  / Bili: Negative / Urobili: Negative   Blood: x / Protein: Trace / Nitrite: Negative   Leuk Esterase: Moderate / RBC: 2 /hpf / WBC 14 /HPF   Sq Epi: x / Non Sq Epi: 1 /hpf / Bacteria: Negative    Culture - Urine (10.12.20 @ 17:29)   Specimen Source: .Urine Clean Catch (Midstream)   Culture Results:   <10,000 CFU/mL Normal Urogenital Maribel   ---------------------------------------------------------------------------------------------------------------    RADIOLOGY:  [x ] Chest radiographs reviewed and interpreted by me      EXAM:  XR CHEST PORTABLE URGENT 1V                          PROCEDURE DATE:  10/12/2020      INTERPRETATION:  A single chest x-ray was obtained on 2020.    Indication: Crackles in bases.    Impression:    The heart is normal in size. The lungs are clear. Slightly elevated right hemidiaphragm. No pleural effusion. No pneumothorax. A loop recorder is seen overlying the left hemithorax. Degenerative changes of the thoracic spine. Orthopedic hardware is seen in the cervical spine.    PADMINI JUNIOR M.D., ATTENDING RADIOLOGIST  This document has been electronically signed. Oct 12 2020  1:52PM  ---------------------------------------------------------------------------------------------------------------    EXAM:  CT ANGIO CHEST (W)AW IC                          PROCEDURE DATE:  10/12/2020      INTERPRETATION:  CLINICAL INFORMATION: Dyspnea    COMPARISON: CT chest dated 2020    PROCEDURE:  CT Angiography of the Chest.  90 ml of Omnipaque 350 was injected intravenously. 10 ml were discarded.  Sagittal and coronal reformats were performed as well as 3D (MIP) reconstructions.    FINDINGS:    LUNGS AND AIRWAYS: Patent central airways. Secretions are noted within several of the bronchi in both lower lobes. Lungs are clear.  PLEURA: No pleural effusion.  MEDIASTINUM AND MIGUEL A: No lymphadenopathy.  HEART: Heart size is normal. No pericardial effusion.  CHEST WALL AND LOWER NECK: Within normal limits.  VISUALIZED UPPER ABDOMEN: Within normal limits.  BONES: Degenerative changes of the spine    IMPRESSION:  No pulmonary embolism    SRAVAN BLUNT M.D., RADIOLOGY RESIDENT  This document has been electronically signed.  ZACK MEDINA M.D., ATTENDING RADIOLOGIST  This documenthas been electronically signed. Oct 12 2020  3:07PM  ---------------------------------------------------------------------------------------------------------------

## 2020-10-13 NOTE — CONSULT NOTE ADULT - SUBJECTIVE AND OBJECTIVE BOX
HISTORY OF PRESENT ILLNESS: HPI:  Patient is a 89 y/o Female well known to our office with PMH of HTN, HLD, frequent falls, hx PE on Eliquis, GERD, Osteoporosis, hypothyroidism who is admitted with SOB/cough concerning for aspiration. Cardiology consulted for further evaluation. Patient currently in no distress on room air.  Reports coughing due to secretions in her throat. Denies chest pain, palpitations, dizziness, syncope, LE edema, orthopnea, abd pain, n/v, back pain, fever/chills.    PAST MEDICAL & SURGICAL HISTORY:  Skin cancer  lower extremities    Hip fracture  Right hip- 1995    Osteoporosis    Chronic anal fissure    Anal spasm    mild Dementia    Dyslipidemia    Hypertension    H/O: Hypothyroidism    H/O gastroesophageal reflux (GERD)    Anterior Cervical discectomy    left eye Retinal tear repair    After-Cataract of Both Eyes    left Elbow surgey secondary to injury    History of Tonsillectomy    History of  Hip surgery with hardware        MEDICATIONS:  MEDICATIONS  (STANDING):  apixaban 2.5 milliGRAM(s) Oral every 12 hours  aspirin enteric coated 81 milliGRAM(s) Oral daily  levothyroxine 100 MICROGram(s) Oral daily  pantoprazole    Tablet 40 milliGRAM(s) Oral before breakfast  polyethylene glycol 3350 17 Gram(s) Oral daily  simvastatin 20 milliGRAM(s) Oral at bedtime      Allergies    penicillin (Rash)    Intolerances        FAMILY HISTORY:  No pertinent family history in first degree relatives      Non-contributary for premature coronary disease or sudden cardiac death    SOCIAL HISTORY:    [ x] Non-smoker  [ ] Smoker  [ ] Alcohol    FLU VACCINE THIS YEAR STARTS IN AUGUST:  [ ] Yes    [ ] No    IF OVER 65 HAVE YOU EVER HAD A PNA VACCINE:  [ ] Yes    [ ] No       [ ] N/A      REVIEW OF SYSTEMS:  [ ]chest pain  [x  ]shortness of breath  [  ]palpitations  [  ]syncope  [ ]near syncope [ ]upper extremity weakness   [ ] lower extremity weakness  [  ]diplopia  [  ]altered mental status   [  ]fevers  [ ]chills [ ]nausea  [ ]vomitting  [  ]dysphagia    [ ]abdominal pain  [ ]melena  [ ]BRBPR    [  ]epistaxis  [  ]rash    [ ]lower extremity edema    +cough      [ x] All others negative	  [ ] Unable to obtain      LABS:	 	    CARDIAC MARKERS:                              12.0   9.89  )-----------( 323      ( 13 Oct 2020 07:22 )             38.3     Hb Trend: 12.0<--    10-13    141  |  102  |  30<H>  ----------------------------<  82  4.4   |  24  |  1.04    Ca    10.0      13 Oct 2020 07:22  Phos  3.7     10-13  Mg     2.4     10-13    TPro  6.6  /  Alb  4.0  /  TBili  0.5  /  DBili  x   /  AST  22  /  ALT  19  /  AlkPhos  49  10-13    Creatinine Trend: 1.04<--, 1.05<--    Coags:      proBNP:   Lipid Profile:   HgA1c:   TSH: Thyroid Stimulating Hormone, Serum: 0.02 uIU/mL (10-13 @ 08:40)          PHYSICAL EXAM:  T(C): 36.9 (10-13-20 @ 08:22), Max: 36.9 (10-13-20 @ 08:22)  HR: 78 (10-13-20 @ 08:22) (70 - 78)  BP: 130/78 (10-13-20 @ 08:22) (123/59 - 155/60)  RR: 20 (10-13-20 @ 08:22) (20 - 28)  SpO2: 99% (10-13-20 @ 08:22) (94% - 100%)  Wt(kg): --   BMI (kg/m2): 24.7 (10-12-20 @ 11:43)  I&O's Summary      Gen: Appears well in NAD  HEENT:  (-)icterus (-)pallor  CV: N S1 S2 1/6 KIN (+)2 Pulses B/l  Resp:  Clear to auscultation B/L, normal effort  GI: (+) BS Soft, NT, ND  Lymph:  (-)Edema, (-)obvious lymphadenopathy  Skin: Warm to touch, Normal turgor  Psych: Appropriate mood and affect    TELEMETRY: 	      ECG: NSR, no acute ST-T changes 	    RADIOLOGY:         CXR:     ASSESSMENT/PLAN: Patient is a 89 y/o Female well known to our office with PMH of HTN, HLD, frequent falls, hx PE on Eliquis, GERD, Osteoporosis, hypothyroidism who is admitted with SOB/cough concerning for aspiration. Cardiology consulted for further evaluation.    - CTA noted with no PE, clear lungs but secretions noted within several bronchi of lower lobes  - SOB appears related to poss aspiration  - Recommend pulm eval, S+S eval  - Patient with historically normal LV/RV function  - Continue AC with Eliquis for hx PE  - Will repeat TTE to confirm normal LV/RV function  - Patient to f/u with Dr. Mancera on 11/11 at 1 PM    Florentin Juarez PA-C  Pager: [phone]

## 2020-10-13 NOTE — CONSULT NOTE ADULT - ASSESSMENT
ASSESSMENT:    dyspnea without hypoxemia - appears comfortable on room air    1) restrictive lung disease - respiratory muscle weakness/elevated right diaphragm/kyphoscoliosis  2) resolved non-occlusive left lower lobe pulmonary embolism - no evidence of new pulmonary emboli  3) mild bronchospasm  4) poor secretion clearance    PLAN/RECOMMENDATIONS:    stable oxygenation on room air  prednisone 50mg daily x 5 days  albuterol/atrovent nebs q6h  pulmicort 0.5mg nebs q12h - give after duoneb - rinse mouth after use  incentive spirometry  acapella device  continues on eliquis from PE in June    Thank you for the courtesy of this referral. Plan of care discussed with the patient at bedside     Ryan Fuentes MD, [facility]  [phone]  Pulmonary Medicine   ASSESSMENT:    dyspnea without hypoxemia - appears comfortable on room air    1) restrictive lung disease - respiratory muscle weakness/elevated right diaphragm/kyphoscoliosis  2) resolved non-occlusive left lower lobe pulmonary embolism - no evidence of new pulmonary emboli  3) mild bronchospasm  4) poor secretion clearance    PLAN/RECOMMENDATIONS:    stable oxygenation on room air  prednisone 50mg daily x 5 days  albuterol/atrovent nebs q6h  pulmicort 0.5mg nebs q12h - give after duoneb - rinse mouth after use  incentive spirometry  acapella device  continues on eliqu from PE in June  speech and swallow evaluation    Thank you for the courtesy of this referral. Plan of care discussed with the patient at bedside     Ryan Fuentes MD, [facility]  [phone]  Pulmonary Medicine

## 2020-10-13 NOTE — PROGRESS NOTE ADULT - SUBJECTIVE AND OBJECTIVE BOX
Patient is a 89y old  Female who presents with a chief complaint of sob (13 Oct 2020 14:21)      SUBJECTIVE / OVERNIGHT EVENTS: Comfortable   Review of Systems  chest pain no  palpitations no  sob no  nausea no  headache no    MEDICATIONS  (STANDING):  albuterol/ipratropium for Nebulization 3 milliLiter(s) Nebulizer every 6 hours  apixaban 2.5 milliGRAM(s) Oral every 12 hours  aspirin enteric coated 81 milliGRAM(s) Oral daily  levothyroxine 50 MICROGram(s) Oral daily  pantoprazole    Tablet 40 milliGRAM(s) Oral before breakfast  polyethylene glycol 3350 17 Gram(s) Oral daily  simvastatin 20 milliGRAM(s) Oral at bedtime    MEDICATIONS  (PRN):  senna 2 Tablet(s) Oral at bedtime PRN Constipation      Vital Signs Last 24 Hrs  T(C): 36.9 (13 Oct 2020 08:22), Max: 36.9 (13 Oct 2020 08:22)  T(F): 98.4 (13 Oct 2020 08:22), Max: 98.4 (13 Oct 2020 08:22)  HR: 78 (13 Oct 2020 08:22) (70 - 78)  BP: 130/78 (13 Oct 2020 08:22) (123/59 - 155/60)  BP(mean): --  RR: 20 (13 Oct 2020 08:22) (20 - 28)  SpO2: 99% (13 Oct 2020 08:22) (94% - 100%)    PHYSICAL EXAM:  GENERAL: NAD, well-developed  HEAD:  Atraumatic, Normocephalic  EYES: EOMI, PERRLA, conjunctiva and sclera clear  NECK: Supple, No JVD + rash  CHEST/LUNG: Clear to auscultation bilaterally; No wheeze  HEART: Regular rate and rhythm; No murmurs, rubs, or gallops  ABDOMEN: Soft, Nontender, Nondistended; Bowel sounds present  EXTREMITIES:  2+ Peripheral Pulses, No clubbing, cyanosis, or edema  PSYCH: AAOx3  NEUROLOGY: non-focal  SKIN: No rashes or lesions    LABS:                        12.0   9.89  )-----------( 323      ( 13 Oct 2020 07:22 )             38.3     10-13    141  |  102  |  30<H>  ----------------------------<  82  4.4   |  24  |  1.04    Ca    10.0      13 Oct 2020 07:22  Phos  3.7     10-13  Mg     2.4     10-13    TPro  6.6  /  Alb  4.0  /  TBili  0.5  /  DBili  x   /  AST  22  /  ALT  19  /  AlkPhos  49  10-13    PT/INR - ( 12 Oct 2020 12:40 )   PT: 12.5 sec;   INR: 1.04 ratio         PTT - ( 12 Oct 2020 12:40 )  PTT:27.5 sec      Urinalysis Basic - ( 12 Oct 2020 13:30 )    Color: Light Yellow / Appearance: Clear / S.022 / pH: x  Gluc: x / Ketone: Negative  / Bili: Negative / Urobili: Negative   Blood: x / Protein: Trace / Nitrite: Negative   Leuk Esterase: Moderate / RBC: 2 /hpf / WBC 14 /HPF   Sq Epi: x / Non Sq Epi: 1 /hpf / Bacteria: Negative        Culture - Urine (collected 12 Oct 2020 17:29)  Source: .Urine Clean Catch (Midstream)  Final Report (13 Oct 2020 12:53):    <10,000 CFU/mL Normal Urogenital Maribel        RADIOLOGY & ADDITIONAL TESTS:    Imaging Personally Reviewed:    Consultant(s) Notes Reviewed:      Care Discussed with Consultants/Other Providers:

## 2020-10-13 NOTE — PROGRESS NOTE ADULT - ASSESSMENT
89F c hx HTN, HLD, freq falls, PE (Jun '20) on eliquis, GERD, Osteoporosis, hypothyroidism, pw SOB and cough poss 2/2 aspiration.    SOB (shortness of breath).  - CTA neg for pe or infection  - suspect 2/2 aspiration  - cont nebs, chest PT  - hold off abx.   - nebs  - Pulmonary evaluation Dr. Fuentes  - Cardiology evaluation Dr. Dixon    Aspiration into airway, initial encounter.   - speech/swallow dysphagia eval  - pepcid.     Chronic pulmonary embolism without acute cor pulmonale, unspecified pulmonary embolism type.  - cont eliquis  - aspirin to 81.     Debility.   - PT eval    Hypothyroidism  - decrease Synthroid to 50 mcg  - follow TSH.     Art Carbajal MD pager [phone]

## 2020-10-14 LAB
ANION GAP SERPL CALC-SCNC: 9 MMOL/L — SIGNIFICANT CHANGE UP (ref 5–17)
BUN SERPL-MCNC: 24 MG/DL — HIGH (ref 7–23)
CALCIUM SERPL-MCNC: 9.4 MG/DL — SIGNIFICANT CHANGE UP (ref 8.4–10.5)
CHLORIDE SERPL-SCNC: 102 MMOL/L — SIGNIFICANT CHANGE UP (ref 96–108)
CO2 SERPL-SCNC: 26 MMOL/L — SIGNIFICANT CHANGE UP (ref 22–31)
CREAT SERPL-MCNC: 0.91 MG/DL — SIGNIFICANT CHANGE UP (ref 0.5–1.3)
GLUCOSE SERPL-MCNC: 141 MG/DL — HIGH (ref 70–99)
HCT VFR BLD CALC: 33.7 % — LOW (ref 34.5–45)
HGB BLD-MCNC: 11.3 G/DL — LOW (ref 11.5–15.5)
MCHC RBC-ENTMCNC: 31.7 PG — SIGNIFICANT CHANGE UP (ref 27–34)
MCHC RBC-ENTMCNC: 33.5 GM/DL — SIGNIFICANT CHANGE UP (ref 32–36)
MCV RBC AUTO: 94.7 FL — SIGNIFICANT CHANGE UP (ref 80–100)
NRBC # BLD: 0 /100 WBCS — SIGNIFICANT CHANGE UP (ref 0–0)
PLATELET # BLD AUTO: 307 K/UL — SIGNIFICANT CHANGE UP (ref 150–400)
POTASSIUM SERPL-MCNC: 4.5 MMOL/L — SIGNIFICANT CHANGE UP (ref 3.5–5.3)
POTASSIUM SERPL-SCNC: 4.5 MMOL/L — SIGNIFICANT CHANGE UP (ref 3.5–5.3)
RBC # BLD: 3.56 M/UL — LOW (ref 3.8–5.2)
RBC # FLD: 14.1 % — SIGNIFICANT CHANGE UP (ref 10.3–14.5)
SODIUM SERPL-SCNC: 137 MMOL/L — SIGNIFICANT CHANGE UP (ref 135–145)
WBC # BLD: 4.61 K/UL — SIGNIFICANT CHANGE UP (ref 3.8–10.5)
WBC # FLD AUTO: 4.61 K/UL — SIGNIFICANT CHANGE UP (ref 3.8–10.5)

## 2020-10-14 PROCEDURE — 70450 CT HEAD/BRAIN W/O DYE: CPT | Mod: 26

## 2020-10-14 PROCEDURE — 93306 TTE W/DOPPLER COMPLETE: CPT | Mod: 26

## 2020-10-14 RX ORDER — LORATADINE 10 MG/1
10 TABLET ORAL DAILY
Refills: 0 | Status: DISCONTINUED | OUTPATIENT
Start: 2020-10-14 | End: 2020-10-18

## 2020-10-14 RX ORDER — FLUTICASONE PROPIONATE 50 MCG
1 SPRAY, SUSPENSION NASAL
Refills: 0 | Status: DISCONTINUED | OUTPATIENT
Start: 2020-10-14 | End: 2020-10-18

## 2020-10-14 RX ORDER — LANOLIN ALCOHOL/MO/W.PET/CERES
3 CREAM (GRAM) TOPICAL ONCE
Refills: 0 | Status: COMPLETED | OUTPATIENT
Start: 2020-10-14 | End: 2020-10-14

## 2020-10-14 RX ADMIN — SIMVASTATIN 20 MILLIGRAM(S): 20 TABLET, FILM COATED ORAL at 22:40

## 2020-10-14 RX ADMIN — PANTOPRAZOLE SODIUM 40 MILLIGRAM(S): 20 TABLET, DELAYED RELEASE ORAL at 05:11

## 2020-10-14 RX ADMIN — APIXABAN 2.5 MILLIGRAM(S): 2.5 TABLET, FILM COATED ORAL at 17:26

## 2020-10-14 RX ADMIN — Medication 3 MILLILITER(S): at 05:08

## 2020-10-14 RX ADMIN — Medication 50 MICROGRAM(S): at 05:12

## 2020-10-14 RX ADMIN — Medication 50 MILLIGRAM(S): at 05:12

## 2020-10-14 RX ADMIN — Medication 0.5 MILLIGRAM(S): at 05:08

## 2020-10-14 RX ADMIN — Medication 3 MILLILITER(S): at 13:18

## 2020-10-14 RX ADMIN — APIXABAN 2.5 MILLIGRAM(S): 2.5 TABLET, FILM COATED ORAL at 05:13

## 2020-10-14 RX ADMIN — Medication 81 MILLIGRAM(S): at 13:18

## 2020-10-14 RX ADMIN — Medication 1 SPRAY(S): at 17:26

## 2020-10-14 RX ADMIN — Medication 0.5 MILLIGRAM(S): at 17:26

## 2020-10-14 RX ADMIN — POLYETHYLENE GLYCOL 3350 17 GRAM(S): 17 POWDER, FOR SOLUTION ORAL at 13:18

## 2020-10-14 RX ADMIN — Medication 3 MILLILITER(S): at 17:26

## 2020-10-14 RX ADMIN — LORATADINE 10 MILLIGRAM(S): 10 TABLET ORAL at 16:05

## 2020-10-14 RX ADMIN — Medication 3 MILLIGRAM(S): at 22:40

## 2020-10-14 NOTE — PHYSICAL THERAPY INITIAL EVALUATION ADULT - GAIT DEVIATIONS NOTED, PT EVAL
increased time in double stance/decreased velocity of limb motion/decreased robert/decreased stride length/decreased weight-shifting ability

## 2020-10-14 NOTE — PHYSICAL THERAPY INITIAL EVALUATION ADULT - ADDITIONAL COMMENTS
pt lives alone in private home, 4 steps to enter +HR, +chair lift to second floor. Pt has an aide several days a week, pt states wanting to hire her 24/7. Pt amb with a rolling walker without assist, states she does not have trouble negotiating stairs.

## 2020-10-14 NOTE — SWALLOW BEDSIDE ASSESSMENT ADULT - SWALLOW EVAL: RECOMMENDED FEEDING/EATING TECHNIQUES
oral hygiene/small sips/bites/crush medication (when feasible)/maintain upright posture during/after eating for 30 mins/position upright (90 degrees)

## 2020-10-14 NOTE — SWALLOW BEDSIDE ASSESSMENT ADULT - SWALLOW EVAL: DIAGNOSIS
Attempted to see pt for swallow evaluation. Chart reviewed. Pt currently in transit to the floor. Will re-attempt at a later time/date, as time permits.
89 yo female with h/o PE (6/20), GERD, remote h/o ACDF, currently admitted with SOB x1 day, presents with evidence of an oropharyngeal dysphagia notable for s/s laryngeal penetration/aspiration with thin and nectar-thick liquids, dry hard solids, and mixed consistencies.

## 2020-10-14 NOTE — SWALLOW BEDSIDE ASSESSMENT ADULT - PHARYNGEAL PHASE
palpable hyolaryngeal elevation/excursion; no overt s/s laryngeal penetration/aspiration palpable hyolaryngeal elevation/excursion; intermittent audible swallows; intermittent throat clears, increased when used as a liquid wash with solids; one episode of cough with nectar-thick liquids when head hyperextended throat clears with hard solid, and with liquid wash of reported pharyngeal retention

## 2020-10-14 NOTE — PROGRESS NOTE ADULT - ASSESSMENT
89F c hx HTN, HLD, freq falls, PE (Jun '20) on eliquis, GERD, Osteoporosis, hypothyroidism, pw SOB and cough poss 2/2 aspiration.    SOB (shortness of breath).  - CTA neg for pe or infection  - suspect 2/2 aspiration  - cont nebs, chest PT  - short course of Prednisone   - hold off abx.   - nebs  - Pulmonary evaluation noted  - Cardiology evaluation Dr. Dixon    Aspiration into airway, initial encounter.   - speech/swallow dysphagia eval  - pepcid.   - MBS pending     Chronic pulmonary embolism without acute cor pulmonale, unspecified pulmonary embolism type.  - cont eliquis  - aspirin to 81.     Debility.   - PT eval    Hypothyroidism  - decrease Synthroid to 50 mcg  - follow TSH.     R sided weakness  - CT head  - Neuro evaluation    Art Carbajal MD pager [phone]

## 2020-10-14 NOTE — PROGRESS NOTE ADULT - SUBJECTIVE AND OBJECTIVE BOX
Patient denies chest pain or shortness of breath.   Review of systems otherwise (-)  	    MEDICATIONS  (STANDING):  albuterol/ipratropium for Nebulization 3 milliLiter(s) Nebulizer every 6 hours  apixaban 2.5 milliGRAM(s) Oral every 12 hours  aspirin enteric coated 81 milliGRAM(s) Oral daily  buDESOnide    Inhalation Suspension 0.5 milliGRAM(s) Inhalation every 12 hours  levothyroxine 50 MICROGram(s) Oral daily  pantoprazole    Tablet 40 milliGRAM(s) Oral before breakfast  polyethylene glycol 3350 17 Gram(s) Oral daily  predniSONE   Tablet 50 milliGRAM(s) Oral daily  simvastatin 20 milliGRAM(s) Oral at bedtime      LABS:	 	                          11.3   4.61  )-----------( 307      ( 14 Oct 2020 06:54 )             33.7     Hemoglobin: 11.3 g/dL (10-14 @ 06:54)  Hemoglobin: 12.0 g/dL (10-13 @ 07:22)  Hemoglobin: 12.4 g/dL (10-12 @ 12:40)    10-14    137  |  102  |  24<H>  ----------------------------<  141<H>  4.5   |  26  |  0.91    Ca    9.4      14 Oct 2020 06:54  Phos  3.7     10-13  Mg     2.4     10-13    TPro  6.6  /  Alb  4.0  /  TBili  0.5  /  DBili  x   /  AST  22  /  ALT  19  /  AlkPhos  49  10-13    Creatinine Trend: 0.91<--, 1.04<--, 1.05<--  COAGS:       proBNP:   Lipid Profile:   HgA1c:   TSH:     PHYSICAL EXAM:  T(C): 36.8 (10-14-20 @ 11:38), Max: 37 (10-13-20 @ 16:17)  HR: 80 (10-14-20 @ 11:38) (72 - 94)  BP: 144/78 (10-14-20 @ 11:38) (144/78 - 177/80)  RR: 18 (10-14-20 @ 11:38) (17 - 20)  SpO2: 95% (10-14-20 @ 11:38) (93% - 98%)  Wt(kg): --  I&O's Summary    13 Oct 2020 07:01  -  14 Oct 2020 07:00  --------------------------------------------------------  IN: 240 mL / OUT: 0 mL / NET: 240 mL      Gen: Appears well in NAD  HEENT:  (-)icterus (-)pallor  CV: N S1 S2 1/6 KIN (+)2 Pulses B/l  Resp:  Clear to auscultation B/L, normal effort  GI: (+) BS Soft, NT, ND  Lymph:  (-)Edema, (-)obvious lymphadenopathy  Skin: Warm to touch, Normal turgor  Psych: Appropriate mood and affect      TELEMETRY: SR 70s	      < from: Transthoracic Echocardiogram (10.14.20 @ 07:06) >  Conclusions:  1. Calcified trileaflet aortic valve with decreased  opening. Peak transaortic valve gradient equals 16 mm Hg,  mean transaortic valve gradient equals 7 mm Hg, estimated  aortic valve area equals 1.7 sqcm (by continuity equation),  aortic valve velocity time integral equals 40 cm,  consistent with mild aortic stenosis.  2. Normal left ventricular internal dimensions and wall  thicknesses.  3. Hyperdynamic left ventricular systolic function.  4. Estimated pulmonary artery systolic pressure equals 44  mm Hg, assuming right atrial pressure equals 8 mm Hg,  consistent with mild pulmonary pressures.    < end of copied text >      ASSESSMENT/PLAN: Patient is a 89 y/o Female well known to our office with PMH of HTN, HLD, frequent falls, hx PE on Eliquis, GERD, Osteoporosis, hypothyroidism who is admitted with SOB/cough concerning for aspiration. Cardiology consulted for further evaluation.    - CTA noted with no PE, clear lungs but secretions noted within several bronchi of lower lobes  - SOB appears related to poss aspiration  - Pulm eval appreciated  - F/u S+S eval  - Continue AC with Eliquis for hx PE  - Repeat TTE with hyperdynamic LV, mild AS  - No further inpatient cardiac w/u needed  - Patient to f/u with Dr. Mancera on 11/11 at 1 PM    Florentin Juarez PA-C  Pager: [phone]

## 2020-10-14 NOTE — PHYSICAL THERAPY INITIAL EVALUATION ADULT - PERTINENT HX OF CURRENT PROBLEM, REHAB EVAL
89 yo F p/w URI-like sxs and dyspnea since today morning. Per PCP, pt has had a hx of PE and is on eliquis 2.5mg BID. Pt. was admitted at [facility] in June for SOB with (-) CTA. CT Chest 10/12: Neg.

## 2020-10-14 NOTE — PHYSICAL THERAPY INITIAL EVALUATION ADULT - PLANNED THERAPY INTERVENTIONS, PT EVAL
gait training/balance training/bed mobility training/transfer training/GOAL: Pt will negotiate 4 steps with 1 HR and step to pattern independently in 4 weeks.

## 2020-10-14 NOTE — SWALLOW BEDSIDE ASSESSMENT ADULT - ASR SWALLOW REFERRAL
Consider Neuro consult for ? reported slight R weakness, occasional word-finding difficulties, and throat clearing with PO intake, all in the last 2-3 weeks.

## 2020-10-14 NOTE — PROGRESS NOTE ADULT - SUBJECTIVE AND OBJECTIVE BOX
[facility] - PROGRESS NOTE    CHIEF COMPLAINT: dyspnea; paralyzed right diaphragm; kyphoscoliosis; bronchospasm    INTERVAL HISTORY: multiple skin bruises and excoriations -> patient would like to see dermatology; placed on dysphagia diet awaiting formal speech and swallow study; reports shortness of breath although looks comfortable sitting in the chair on room air; frequent throat clearing; no chest congestion or wheeze; no fevers, chills or sweats; no chest pain/pressure or palpitations    REVIEW OF SYSTEMS:  Constitutional: As per interval history  HEENT: Within normal limits  CV: As per interval history  Resp: As per interval history  GI: Within normal limits   : Within normal limits  Musculoskeletal: Within normal limits  Skin: diffuse ecchymoses/skin discoloration and abrasions  Neurological: Within normal limits  Psychiatric: Within normal limits  Endocrine: Within normal limits  Hematologic/Lymphatic: Within normal limits  Allergic/Immunologic: Within normal limits      MEDICATIONS:     Pulmonary "  albuterol/ipratropium for Nebulization 3 milliLiter(s) Nebulizer every 6 hours  buDESOnide    Inhalation Suspension 0.5 milliGRAM(s) Inhalation every 12 hours    Anti-microbials:    Cardiovascular:    Other:  apixaban 2.5 milliGRAM(s) Oral every 12 hours  aspirin enteric coated 81 milliGRAM(s) Oral daily  levothyroxine 50 MICROGram(s) Oral daily  pantoprazole    Tablet 40 milliGRAM(s) Oral before breakfast  polyethylene glycol 3350 17 Gram(s) Oral daily  predniSONE   Tablet 50 milliGRAM(s) Oral daily  simvastatin 20 milliGRAM(s) Oral at bedtime    MEDICATIONS  (PRN):  senna 2 Tablet(s) Oral at bedtime PRN Constipation    OBJECTIVE:    I&O's Detail    13 Oct 2020 07:01  -  14 Oct 2020 07:00  --------------------------------------------------------  IN:    Oral Fluid: 240 mL  Total IN: 240 mL    OUT:  Total OUT: 0 mL    Total NET: 240 mL    PHYSICAL EXAM:       ICU Vital Signs Last 24 Hrs  T(C): 36.8 (14 Oct 2020 11:38), Max: 37 (13 Oct 2020 16:17)  T(F): 98.3 (14 Oct 2020 11:38), Max: 98.6 (13 Oct 2020 16:17)  HR: 80 (14 Oct 2020 11:38) (72 - 94)  BP: 144/78 (14 Oct 2020 11:38) (144/78 - 177/80)  BP(mean): --  ABP: --  ABP(mean): --  RR: 18 (14 Oct 2020 11:38) (17 - 20)  SpO2: 95% (14 Oct 2020 11:38) (93% - 98%) on room air     General: Awake. Alert. Cooperative. No distress. Appears stated age. Sitting in the chair.	  HEENT:   Atraumatic. Normocephalic. Anicteric. Normal oral mucosa. PERRL. EOMI.  Neck: Supple. Trachea midline. Thyroid without enlargement/tenderness/nodules. No carotid bruit. No JVD.	  Cardiovascular: Regular rate and rhythm. S1 S2 normal. No murmurs, rubs or gallops.  Respiratory: Respirations unlabored. Clear to auscultation and percussion. Kyphoscoliosis.  Abdomen: Soft. Non-tender. Non-distended. No organomegaly. No masses. Normal bowel sounds.  Extremities: Warm to touch. No clubbing or cyanosis. No pedal edema.  Pulses: 2+ peripheral pulses all extremities.	  Skin: Multiple ecchymoses with venous stasis changes on both legs. Diffuse ecchymoses and abrasions.  Lymph Nodes: Cervical, supraclavicular and axillary nodes normal  Neurological: Motor and sensory examination equal and normal. A and O x 2  Psychiatry: Appropriate mood and affect.    LABS:                          11.3   4.61  )-----------( 307      ( 14 Oct 2020 06:54 )             33.7     CBC    WBC  4.61 <==, 9.89 <==, 9.83 <==    Hemoglobin  11.3 <<==, 12.0 <<==, 12.4 <<==    Hematocrit  33.7 <==, 38.3 <==, 38.2 <==    Platelets  307 <==, 323 <==, 340 <==      137  |  102  |  24<H>  ----------------------------<  141<H>    10-14  4.5   |  26  |  0.91      LYTES    sodium  137 <==, 141 <==, 138 <==    potassium   4.5 <==, 4.4 <==, 3.7 <==    chloride  102 <==, 102 <==, 104 <==    carbon dioxide  26 <==, 24 <==, 24 <==    =============================================================================================  RENAL FUNCTION:    Creatinine:   0.91  <<==, 1.04  <<==, 1.05  <<==    BUN:   24 <==, 30 <==, 33 <==    ============================================================================================    calcium   9.4 <==, 10.0 <==, 9.7 <==    phos   3.7 <==    mag   2.4 <==    ============================================================================================  LFTs    AST:   22 <== , 24 <==     ALT:  19  <== , 16  <==     AP:  49  <=, 48  <=    Bili:  0.5  <=, 0.4  <=      PT/INR - ( 12 Oct 2020 12:40 )   PT: 12.5 sec;   INR: 1.04 ratio      PTT - ( 12 Oct 2020 12:40 )  PTT:27.5 sec    D-Dimer Assay, Quantitative: 683 ng/mL DDU (10-12 @ 12:40)    CARDIAC MARKERS ( 12 Oct 2020 12:40 )  CPK x     /CKMB x     /CKMB Units x        troponin 23 ng/L    MICROBIOLOGY:     Respiratory Viral Panel + COVID-19 by STEPHAN (10.12.20 @ 12:39)   Rapid RVP Result: [facility]   SARS-CoV-2: [facility]: This Respiratory Panel uses polymerase chain reaction (PCR) to detect for   adenovirus; coronavirus (HKU1, NL63, 229E, OC43); human metapneumovirus   (hMPV); human enterovirus/rhinovirus (Entero/RV); influenza A; influenza   A/H1; influenza A/H3; influenza A/H1-2009; influenza B; parainfluenza   viruses 1, 2, 3, 4; respiratory syncytial virus; Mycoplasma pneumoniae;   Chlamydophila pneumoniae; and SARS-CoV-2.   ---------------------------------------------------------------------------------------------------------------    Urinalysis Basic - ( 12 Oct 2020 13:30 )    Color: Light Yellow / Appearance: Clear / S.022 / pH: x  Gluc: x / Ketone: Negative  / Bili: Negative / Urobili: Negative   Blood: x / Protein: Trace / Nitrite: Negative   Leuk Esterase: Moderate / RBC: 2 /hpf / WBC 14 /HPF   Sq Epi: x / Non Sq Epi: 1 /hpf / Bacteria: Negative    Culture - Urine (10.12.20 @ 17:29)   Specimen Source: .Urine Clean Catch (Midstream)   Culture Results:   <10,000 CFU/mL Normal Urogenital Maribel   ---------------------------------------------------------------------------------------------------------------    RADIOLOGY:  [x ] Chest radiographs reviewed and interpreted by me      EXAM:  XR CHEST PORTABLE URGENT 1V                          PROCEDURE DATE:  10/12/2020      INTERPRETATION:  A single chest x-ray was obtained on 2020.    Indication: Crackles in bases.    Impression:    The heart is normal in size. The lungs are clear. Slightly elevated right hemidiaphragm. No pleural effusion. No pneumothorax. A loop recorder is seen overlying the left hemithorax. Degenerative changes of the thoracic spine. Orthopedic hardware is seen in the cervical spine.    PADMINI JUNIOR M.D., ATTENDING RADIOLOGIST  This document has been electronically signed. Oct 12 2020  1:52PM  ---------------------------------------------------------------------------------------------------------------    EXAM:  CT ANGIO CHEST (W)AW IC                          PROCEDURE DATE:  10/12/2020      INTERPRETATION:  CLINICAL INFORMATION: Dyspnea    COMPARISON: CT chest dated 2020    PROCEDURE:  CT Angiography of the Chest.  90 ml of Omnipaque 350 was injected intravenously. 10 ml were discarded.  Sagittal and coronal reformats were performed as well as 3D (MIP) reconstructions.    FINDINGS:    LUNGS AND AIRWAYS: Patent central airways. Secretions are noted within several of the bronchi in both lower lobes. Lungs are clear.  PLEURA: No pleural effusion.  MEDIASTINUM AND MIGUEL A: No lymphadenopathy.  HEART: Heart size is normal. No pericardial effusion.  CHEST WALL AND LOWER NECK: Within normal limits.  VISUALIZED UPPER ABDOMEN: Within normal limits.  BONES: Degenerative changes of the spine    IMPRESSION:  No pulmonary embolism    SRAVAN BLUNT M.D., RADIOLOGY RESIDENT  This document has been electronically signed.  ZACK MEDINA M.D., ATTENDING RADIOLOGIST  This documenthas been electronically signed. Oct 12 2020  3:07PM  ---------------------------------------------------------------------------------------------------------------

## 2020-10-14 NOTE — SWALLOW BEDSIDE ASSESSMENT ADULT - COMMENTS
Per Pulm: The patient was admitted in June with shortness of breath. She was found to have a left lower lobe non-occlusive pulmonary embolism in addition to restrictive lung disease, bronchospasm and deconditioning. She improved with anticoagulation and bronchodilators. Over the last several days, the patient reports that her breathing has become more difficult especially when talking. She describes difficulty mobilizing secretions in the back of her throat. She has no cough or sputum production. She has chest congestion and wheeze.  dyspnea without hypoxemia - appears comfortable on room air. Assessment: 1) restrictive lung disease - respiratory muscle weakness/elevated right diaphragm/kyphoscoliosis. 2) resolved non-occlusive left lower lobe pulmonary embolism - no evidence of new pulmonary emboli. 3) mild bronchospasm. 4) poor secretion clearance.
Per Pulm: The patient was admitted in June with shortness of breath. She was found to have a left lower lobe non-occlusive pulmonary embolism in addition to restrictive lung disease, bronchospasm and deconditioning. She improved with anticoagulation and bronchodilators. Over the last several days, the patient reports that her breathing has become more difficult especially when talking. She describes difficulty mobilizing secretions in the back of her throat. She has no cough or sputum production. She has chest congestion and wheeze.  dyspnea without hypoxemia - appears comfortable on room air. Assessment: 1) restrictive lung disease - respiratory muscle weakness/elevated right diaphragm/kyphoscoliosis. 2) resolved non-occlusive left lower lobe pulmonary embolism - no evidence of new pulmonary emboli. 3) mild bronchospasm. 4) poor secretion clearance.

## 2020-10-14 NOTE — SWALLOW BEDSIDE ASSESSMENT ADULT - SLP GENERAL OBSERVATIONS
Pt seen at bedside, awake and alert, oriented, verbally responsive, following directions for the purposes of the exam. Pt observed with an episode of word-finding difficulty, with verbal paraphasias, multiple attempts and error awareness. Upon further questioning, Pt endorses more frequent episodes of word-finding difficulty over the last 2-3 weeks. Pt also requested repetitions of statements periodically, states she thinks her hearing has gotten worse over the last couple weeks. ? Component of reduced auditory comprehension?

## 2020-10-14 NOTE — PHYSICAL THERAPY INITIAL EVALUATION ADULT - IMPAIRMENTS FOUND, PT EVAL
gait, locomotion, and balance/aerobic capacity/endurance gait, locomotion, and balance/integumentary integrity/aerobic capacity/endurance

## 2020-10-14 NOTE — PHYSICAL THERAPY INITIAL EVALUATION ADULT - MODALITIES TREATMENT COMMENTS
PT wound care order received for dressing rec't to Rt upper chest skin tear, rohan session well, wound received C/D/I, measured: 4.0cmx2.0cmx0.0cm, mild erythema (7.0cmx7.0cm), no purulent, no malodor, cleansed with NS, cavilon to periwound, covered the wound with adaptic touch, followed by DSD, secured with paper tape, pt as found with NAD,

## 2020-10-14 NOTE — PROGRESS NOTE ADULT - SUBJECTIVE AND OBJECTIVE BOX
Patient is a 89y old  Female who presents with a chief complaint of sob (14 Oct 2020 12:07)      SUBJECTIVE / OVERNIGHT EVENTS: c/o R sided weakness for few days.  Review of Systems  chest pain no  palpitations no  sob no  nausea no  headache no    MEDICATIONS  (STANDING):  albuterol/ipratropium for Nebulization 3 milliLiter(s) Nebulizer every 6 hours  apixaban 2.5 milliGRAM(s) Oral every 12 hours  aspirin enteric coated 81 milliGRAM(s) Oral daily  buDESOnide    Inhalation Suspension 0.5 milliGRAM(s) Inhalation every 12 hours  fluticasone propionate 50 MICROgram(s)/spray Nasal Spray 1 Spray(s) Both Nostrils two times a day  levothyroxine 50 MICROGram(s) Oral daily  loratadine 10 milliGRAM(s) Oral daily  pantoprazole    Tablet 40 milliGRAM(s) Oral before breakfast  polyethylene glycol 3350 17 Gram(s) Oral daily  predniSONE   Tablet 50 milliGRAM(s) Oral daily  simvastatin 20 milliGRAM(s) Oral at bedtime    MEDICATIONS  (PRN):  senna 2 Tablet(s) Oral at bedtime PRN Constipation      Vital Signs Last 24 Hrs  T(C): 36.6 (14 Oct 2020 20:24), Max: 36.8 (14 Oct 2020 11:38)  T(F): 97.8 (14 Oct 2020 20:24), Max: 98.3 (14 Oct 2020 11:38)  HR: 88 (14 Oct 2020 20:24) (80 - 94)  BP: 158/68 (14 Oct 2020 20:24) (144/78 - 174/85)  BP(mean): --  RR: 18 (14 Oct 2020 20:24) (18 - 18)  SpO2: 97% (14 Oct 2020 20:24) (93% - 97%)    PHYSICAL EXAM:  GENERAL: NAD, well-developed  HEAD:  Atraumatic, Normocephalic  EYES: EOMI, PERRLA, conjunctiva and sclera clear  NECK: Supple, No JVD  CHEST/LUNG: Clear to auscultation bilaterally; No wheeze  HEART: Regular rate and rhythm; No murmurs, rubs, or gallops  ABDOMEN: Soft, Nontender, Nondistended; Bowel sounds present  EXTREMITIES:  2+ Peripheral Pulses, No clubbing, cyanosis, or edema  PSYCH: AAOx3, forgetful   NEUROLOGY: non-focal  SKIN: No rashes or lesions    LABS:                        11.3   4.61  )-----------( 307      ( 14 Oct 2020 06:54 )             33.7     10-14    137  |  102  |  24<H>  ----------------------------<  141<H>  4.5   |  26  |  0.91    Ca    9.4      14 Oct 2020 06:54  Phos  3.7     10-13  Mg     2.4     10-13    TPro  6.6  /  Alb  4.0  /  TBili  0.5  /  DBili  x   /  AST  22  /  ALT  19  /  AlkPhos  49  10-13              Culture - Urine (collected 12 Oct 2020 17:29)  Source: .Urine Clean Catch (Midstream)  Final Report (13 Oct 2020 12:53):    <10,000 CFU/mL Normal Urogenital Maribel        RADIOLOGY & ADDITIONAL TESTS:    Imaging Personally Reviewed:    Consultant(s) Notes Reviewed:      Care Discussed with Consultants/Other Providers:

## 2020-10-14 NOTE — PROGRESS NOTE ADULT - ASSESSMENT
ASSESSMENT:    dyspnea without hypoxemia - appears comfortable on room air    1) restrictive lung disease - respiratory muscle weakness/elevated right diaphragm/kyphoscoliosis  2) resolved non-occlusive left lower lobe pulmonary embolism - no evidence of new pulmonary emboli  3) mild bronchospasm  4) poor secretion clearance    PLAN/RECOMMENDATIONS:    stable oxygenation on room air  prednisone 50mg daily x 5 days  albuterol/atrovent nebs q6h  pulmicort 0.5mg nebs q12h - give after duoneb - rinse mouth after use  claritin/flonase  incentive spirometry  acapella device  continues on eliqu from PE in June  speech and swallow evaluation ongoing    Will follow with you. Plan of care discussed with the patient at bedside    Ryan Fuentes MD, [facility]  [phone]  Pulmonary Medicine

## 2020-10-14 NOTE — SWALLOW BEDSIDE ASSESSMENT ADULT - SWALLOW EVAL: PATIENT/FAMILY GOALS STATEMENT
Pt reports increase incidence of throat clearing and coughing when eating and drinking over the last two to three weeks. Also endorses clumsiness with self-feeding with her right hand, which has also occurred in the last two to three weeks, and slightly increased weakness of RLE when walking during the same interval. Pt states that at her baseline, she eats a Regular texture diet with no prior h/o difficulty.

## 2020-10-15 LAB
ANION GAP SERPL CALC-SCNC: 8 MMOL/L — SIGNIFICANT CHANGE UP (ref 5–17)
BUN SERPL-MCNC: 23 MG/DL — SIGNIFICANT CHANGE UP (ref 7–23)
CALCIUM SERPL-MCNC: 9 MG/DL — SIGNIFICANT CHANGE UP (ref 8.4–10.5)
CHLORIDE SERPL-SCNC: 100 MMOL/L — SIGNIFICANT CHANGE UP (ref 96–108)
CO2 SERPL-SCNC: 28 MMOL/L — SIGNIFICANT CHANGE UP (ref 22–31)
CREAT SERPL-MCNC: 0.89 MG/DL — SIGNIFICANT CHANGE UP (ref 0.5–1.3)
GLUCOSE SERPL-MCNC: 85 MG/DL — SIGNIFICANT CHANGE UP (ref 70–99)
HCT VFR BLD CALC: 31.6 % — LOW (ref 34.5–45)
HGB BLD-MCNC: 10.6 G/DL — LOW (ref 11.5–15.5)
MCHC RBC-ENTMCNC: 31.8 PG — SIGNIFICANT CHANGE UP (ref 27–34)
MCHC RBC-ENTMCNC: 33.5 GM/DL — SIGNIFICANT CHANGE UP (ref 32–36)
MCV RBC AUTO: 94.9 FL — SIGNIFICANT CHANGE UP (ref 80–100)
NRBC # BLD: 0 /100 WBCS — SIGNIFICANT CHANGE UP (ref 0–0)
PLATELET # BLD AUTO: 272 K/UL — SIGNIFICANT CHANGE UP (ref 150–400)
POTASSIUM SERPL-MCNC: 4.2 MMOL/L — SIGNIFICANT CHANGE UP (ref 3.5–5.3)
POTASSIUM SERPL-SCNC: 4.2 MMOL/L — SIGNIFICANT CHANGE UP (ref 3.5–5.3)
RBC # BLD: 3.33 M/UL — LOW (ref 3.8–5.2)
RBC # FLD: 14.3 % — SIGNIFICANT CHANGE UP (ref 10.3–14.5)
SODIUM SERPL-SCNC: 136 MMOL/L — SIGNIFICANT CHANGE UP (ref 135–145)
WBC # BLD: 9.1 K/UL — SIGNIFICANT CHANGE UP (ref 3.8–10.5)
WBC # FLD AUTO: 9.1 K/UL — SIGNIFICANT CHANGE UP (ref 3.8–10.5)

## 2020-10-15 PROCEDURE — 73030 X-RAY EXAM OF SHOULDER: CPT | Mod: 26,RT

## 2020-10-15 RX ORDER — LANOLIN ALCOHOL/MO/W.PET/CERES
5 CREAM (GRAM) TOPICAL AT BEDTIME
Refills: 0 | Status: COMPLETED | OUTPATIENT
Start: 2020-10-15 | End: 2020-10-17

## 2020-10-15 RX ORDER — LEVOTHYROXINE SODIUM 125 MCG
75 TABLET ORAL DAILY
Refills: 0 | Status: DISCONTINUED | OUTPATIENT
Start: 2020-10-15 | End: 2020-10-18

## 2020-10-15 RX ORDER — LANOLIN ALCOHOL/MO/W.PET/CERES
5 CREAM (GRAM) TOPICAL ONCE
Refills: 0 | Status: COMPLETED | OUTPATIENT
Start: 2020-10-15 | End: 2020-10-15

## 2020-10-15 RX ADMIN — SIMVASTATIN 20 MILLIGRAM(S): 20 TABLET, FILM COATED ORAL at 22:59

## 2020-10-15 RX ADMIN — Medication 1 SPRAY(S): at 17:20

## 2020-10-15 RX ADMIN — Medication 0.5 MILLIGRAM(S): at 17:11

## 2020-10-15 RX ADMIN — Medication 3 MILLILITER(S): at 23:01

## 2020-10-15 RX ADMIN — Medication 0.5 MILLIGRAM(S): at 07:50

## 2020-10-15 RX ADMIN — Medication 3 MILLILITER(S): at 11:45

## 2020-10-15 RX ADMIN — Medication 3 MILLILITER(S): at 17:11

## 2020-10-15 RX ADMIN — APIXABAN 2.5 MILLIGRAM(S): 2.5 TABLET, FILM COATED ORAL at 05:06

## 2020-10-15 RX ADMIN — APIXABAN 2.5 MILLIGRAM(S): 2.5 TABLET, FILM COATED ORAL at 17:20

## 2020-10-15 RX ADMIN — Medication 1 SPRAY(S): at 07:50

## 2020-10-15 RX ADMIN — PANTOPRAZOLE SODIUM 40 MILLIGRAM(S): 20 TABLET, DELAYED RELEASE ORAL at 05:06

## 2020-10-15 RX ADMIN — Medication 3 MILLILITER(S): at 07:51

## 2020-10-15 RX ADMIN — Medication 50 MICROGRAM(S): at 05:05

## 2020-10-15 RX ADMIN — LORATADINE 10 MILLIGRAM(S): 10 TABLET ORAL at 11:45

## 2020-10-15 RX ADMIN — Medication 50 MILLIGRAM(S): at 05:05

## 2020-10-15 RX ADMIN — Medication 5 MILLIGRAM(S): at 23:28

## 2020-10-15 RX ADMIN — Medication 81 MILLIGRAM(S): at 11:45

## 2020-10-15 NOTE — PROGRESS NOTE ADULT - ASSESSMENT
ASSESSMENT:    dyspnea without hypoxemia - appears comfortable on room air    1) restrictive lung disease - respiratory muscle weakness/elevated right diaphragm/kyphoscoliosis  2) resolved non-occlusive left lower lobe pulmonary embolism - no evidence of new pulmonary emboli  3) mild bronchospasm  4) poor secretion clearance    PLAN/RECOMMENDATIONS:    stable oxygenation on room air  observe off antibiotics  prednisone 50mg daily x 5 days  albuterol/atrovent nebs q6h  pulmicort 0.5mg nebs q12h - give after duoneb - rinse mouth after use  claritin/flonase  incentive spirometry  acapella device  continues on eliquis for PE in June  speech and swallow evaluation ongoing  wound care evaluation  other issues as per medicine    Will follow with you. Plan of care discussed with the patient at bedside and the dedicated floor NP.    Ryan Fuentes MD, [facility]  [phone]  Pulmonary Medicine

## 2020-10-15 NOTE — PROGRESS NOTE ADULT - SUBJECTIVE AND OBJECTIVE BOX
Patient is a 89y old  Female who presents with a chief complaint of sob (14 Oct 2020 14:34)      SUBJECTIVE / OVERNIGHT EVENTS: c/o R shoulder pain.  Review of Systems  chest pain no  palpitations no  sob no  nausea no  headache no    MEDICATIONS  (STANDING):  albuterol/ipratropium for Nebulization 3 milliLiter(s) Nebulizer every 6 hours  apixaban 2.5 milliGRAM(s) Oral every 12 hours  aspirin enteric coated 81 milliGRAM(s) Oral daily  buDESOnide    Inhalation Suspension 0.5 milliGRAM(s) Inhalation every 12 hours  fluticasone propionate 50 MICROgram(s)/spray Nasal Spray 1 Spray(s) Both Nostrils two times a day  levothyroxine 50 MICROGram(s) Oral daily  loratadine 10 milliGRAM(s) Oral daily  pantoprazole    Tablet 40 milliGRAM(s) Oral before breakfast  polyethylene glycol 3350 17 Gram(s) Oral daily  predniSONE   Tablet 50 milliGRAM(s) Oral daily  simvastatin 20 milliGRAM(s) Oral at bedtime    MEDICATIONS  (PRN):  senna 2 Tablet(s) Oral at bedtime PRN Constipation      Vital Signs Last 24 Hrs  T(C): 37.1 (15 Oct 2020 12:13), Max: 37.1 (15 Oct 2020 12:13)  T(F): 98.7 (15 Oct 2020 12:13), Max: 98.7 (15 Oct 2020 12:13)  HR: 74 (15 Oct 2020 12:13) (64 - 88)  BP: 151/545 (15 Oct 2020 12:13) (145/67 - 158/68)  BP(mean): --  RR: 17 (15 Oct 2020 12:13) (17 - 18)  SpO2: 91% (15 Oct 2020 12:13) (91% - 97%)    PHYSICAL EXAM:  GENERAL: NAD  HEAD:  Atraumatic, Normocephalic  EYES: EOMI, PERRLA, conjunctiva and sclera clear  NECK: Supple, No JVD  CHEST/LUNG: Clear to auscultation bilaterally; No wheeze  HEART: Regular rate and rhythm; No murmurs, rubs, or gallops  ABDOMEN: Soft, Nontender, Nondistended; Bowel sounds present  EXTREMITIES:  2+ Peripheral Pulses, No clubbing, cyanosis, or edema  PSYCH: AAOx3  NEUROLOGY: non-focal  SKIN: + rashes on thorax and ant neck    LABS:                        10.6   9.10  )-----------( 272      ( 15 Oct 2020 05:48 )             31.6     10-15    136  |  100  |  23  ----------------------------<  85  4.2   |  28  |  0.89    Ca    9.0      15 Oct 2020 05:48                Culture - Urine (collected 12 Oct 2020 17:29)  Source: .Urine Clean Catch (Midstream)  Final Report (13 Oct 2020 12:53):    <10,000 CFU/mL Normal Urogenital Maribel        RADIOLOGY & ADDITIONAL TESTS:    Imaging Personally Reviewed:  < from: CT Head No Cont (10.14.20 @ 17:10) >    IMPRESSION: No acute intracranial hemorrhage, mass effect, or shift of the midline structures.    Similar-appearing mild chronic white matter microvascular type changes.      < end of copied text >    Consultant(s) Notes Reviewed:      Care Discussed with Consultants/Other Providers:

## 2020-10-15 NOTE — CONSULT NOTE ADULT - SUBJECTIVE AND OBJECTIVE BOX
[id number]  Patient is a 89y old  Female who presents with a chief complaint of sob (15 Oct 2020 12:40)    HPI:  89F c hx HTN, HLD, freq falls, PE (Jun '20) on eliquis, GERD, Osteoporosis, hypothyroidism, pw SOB x1 day.    Pt states she has been having increased non-traumatic bruising on her skin she's noticed, and has been putting a white cream on her chest. Otherwise, pt has been in her usual state of health until this morning, when she awoke feeling SOB and coughing due to significant secretions in her throat. Pt denies any hx of coughing when eating or drinking. Pt uses 1 pillow at night. Pt does report chills today. Pt denies any fevers, CP, abd pain, NV, diarrhea. At baseline, pt ambulates with a walker, and would be able to walk up a flight of stairs with great difficulty.    Hospital course: CTA was negative for PE and CT chest showed no evidence of consolidation and her symptoms were thought to most likely be due to aspiration. Based on speech and swallow evaluation, the patient is now on a dysphagia diet with thickened liquids.     On exam today, the patient described a feeling that something is in her throat that began a couple of days ago. She feels slightly hoarse and the sensation in the back of her throat bothers her when she tries to speak. She denied any difficulty with word finding, speaking fluently, producing speech, or understanding anything that is being said to her. She also endorsed right shoulder pain that began about 1 week ago that is limiting her ability to use her right arm. She was unsure if it was weak, because her movements have been limited by pain. She denied any trauma to the area (she had a fall about 1 wk ago where she fell on her hip, and denied any involvement of her right shoulder). She has no history of shoulder pain. She also endorsed a change in sensation in her fingers and toes that began when she came to the hospital, saying that they feel cold. She described this sensation as numbness but mentioned that she could feel everything with the same intensity, but the sensation was just different in her fingers/toes than in the rest of her body. She denied any tingling sensation. Denied any pain or weakness anywhere aside from her right shoulder. She denied HA, changes in vision, dizziness, light-headedness      PAST MEDICAL & SURGICAL HISTORY:  Skin cancer  lower extremities    Hip fracture  Right hip- 1995    Osteoporosis    Chronic anal fissure    Anal spasm    mild Dementia    Dyslipidemia    Hypertension    H/O: Hypothyroidism    H/O gastroesophageal reflux (GERD)    Anterior Cervical discectomy    left eye Retinal tear repair    After-Cataract of Both Eyes    left Elbow surgey secondary to injury    History of Tonsillectomy    History of  Hip surgery with hardware      FAMILY HISTORY:  No pertinent family history in first degree relatives      Social Hx:  Nonsmoker, no drug or alcohol use    Home Medications:  Aspirin Enteric Coated 325 mg oral delayed release tablet: 1 tab(s) orally once a day (12 Oct 2020 21:04)  calcium: 600 milligram(s) orally once a day (12 Oct 2020 21:04)  calcium-vitamin D:  (12 Oct 2020 21:04)  levothyroxine 100 mcg (0.1 mg) oral tablet: 1 tab(s) orally once a day (12 Oct 2020 21:04)  multivitamin: 1 tab(s) orally once a day (12 Oct 2020 21:04)  probiotic: 1 tab(s) orally 2 times a day (12 Oct 2020 16:38)  Vitamin B Complex oral tablet: 1 tab(s) orally once a day (12 Oct 2020 21:04)  Vitamin B12:  (12 Oct 2020 21:04)  Vitamin C 1000 mg oral tablet:  (12 Oct 2020 21:04)  Vitamin D3 2000 intl units (50 mcg) oral tablet:  (12 Oct 2020 21:04)    MEDICATIONS  (STANDING):  albuterol/ipratropium for Nebulization 3 milliLiter(s) Nebulizer every 6 hours  apixaban 2.5 milliGRAM(s) Oral every 12 hours  aspirin enteric coated 81 milliGRAM(s) Oral daily  buDESOnide    Inhalation Suspension 0.5 milliGRAM(s) Inhalation every 12 hours  fluticasone propionate 50 MICROgram(s)/spray Nasal Spray 1 Spray(s) Both Nostrils two times a day  levothyroxine 50 MICROGram(s) Oral daily  loratadine 10 milliGRAM(s) Oral daily  pantoprazole    Tablet 40 milliGRAM(s) Oral before breakfast  polyethylene glycol 3350 17 Gram(s) Oral daily  predniSONE   Tablet 50 milliGRAM(s) Oral daily  simvastatin 20 milliGRAM(s) Oral at bedtime    MEDICATIONS  (PRN):  senna 2 Tablet(s) Oral at bedtime PRN Constipation    Allergies  penicillin (Rash)    Intolerances      REVIEW OF SYSTEMS      CONSTITUTIONAL: No weakness, fevers or chills  EYES/ENT: No visual changes;  No vertigo or throat pain   RESPIRATORY: No cough, wheezing, hemoptysis; No shortness of breath  CARDIOVASCULAR: No chest pain or palpitations  GASTROINTESTINAL: No abdominal or epigastric pain. No nausea, vomiting, or hematemesis; No diarrhea or constipation. No melena or hematochezia.  GENITOURINARY: No dysuria, frequency or hematuria  NEUROLOGICAL: see HPI  SKIN: No itching. +rash on her chest       Vital Signs Last 24 Hrs  T(C): 36.8 (15 Oct 2020 13:41), Max: 37.1 (15 Oct 2020 12:13)  T(F): 98.3 (15 Oct 2020 13:41), Max: 98.7 (15 Oct 2020 12:13)  HR: 74 (15 Oct 2020 13:41) (64 - 88)  BP: 122/66 (15 Oct 2020 13:41) (122/66 - 158/68)  BP(mean): --  RR: 18 (15 Oct 2020 13:41) (17 - 18)  SpO2: 97% (15 Oct 2020 13:41) (91% - 97%)      NEUROLOGICAL EXAM:  MS: AAOX3, fluent, attends b/l; recent and remote memory intact; normal attention, language and fund of knowledge. Intact naming and repetition. speech fluent. Follows three step commands.   CN: VFF, EOMI, PERRLA. V1-3 intact, no facial asymmetry, t/p midline, SCM intact b/l, trap on right-side movement limited by pain,   Motor: Strength: 5/5 in left UE, and LE b/l.  right shoulder movement limited by pain. elbow flexion/extension at least 3/5, but also limited due to shoulder pain. wrist flexion/extension 5/5, right  strength normal.  Tone: normal. Bulk: normal. DTR 2+ symm.  Plantar flex b/l.   Sensation: noted a difference in sensation in tips of fingers and toes, no numbness. intact vibratory sense. intact proprioception.   Coordination: intact 4x.   Gait:  Deferred     Labs:   cbc                      10.6   9.10  )-----------( 272      ( 15 Oct 2020 05:48 )             31.6     [id number]    136  |  100  |  23  ----------------------------<  85  4.2   |  28  |  0.89    Ca    9.0      15 Oct 2020 05:48    < from: CT Head No Cont (10.14.20 @ 17:10) >  IMPRESSION: No acute intracranial hemorrhage, mass effect, or shift of the midline structures.    Similar-appearing mild chronic white matter microvascular type changes.        < end of copied text >     [id number]  Patient is a 89y old  Female who presents with a chief complaint of sob (15 Oct 2020 12:40)    HPI:  89F c hx HTN, HLD, freq falls, PE (Jun '20) on eliquis, GERD, Osteoporosis, hypothyroidism, pw SOB x1 day. Patient was in her usual state of health until the morning, when she awoke feeling SOB and coughing due to significant secretions in her throat. Pt denies any hx of coughing when eating or drinking. Pt uses 1 pillow at night. Pt does report chills today. Pt denies any fevers, CP, abd pain, NV, diarrhea. At baseline, pt ambulates with a walker, and would be able to walk up a flight of stairs with great difficulty.    Hospital course: CTA was negative for PE and CT chest showed no evidence of consolidation and her symptoms were thought to most likely be due to aspiration. Based on speech and swallow evaluation, the patient is now on a dysphagia diet with thickened liquids.     On exam today, the patient described a feeling that something is in her throat that began a couple of days ago. She feels slightly hoarse and the sensation in the back of her throat bothers her when she tries to speak. She denied any difficulty with word finding, speaking fluently, producing speech, or understanding anything that is being said to her. She also endorsed right shoulder pain that began about 1 week ago that is limiting her ability to use her right arm. She was unsure if it was weak, because her movements have been limited by pain. She denied any trauma to the area (she had a fall about 1 wk ago where she fell on her hip, and denied any involvement of her right shoulder). She has no history of shoulder pain. She also endorsed a change in sensation in her fingers and toes that began when she came to the hospital, saying that they feel cold. She described this sensation as numbness but mentioned that she could feel everything with the same intensity, but the sensation was just different in her fingers/toes than in the rest of her body. She denied any tingling sensation. Denied any pain or weakness anywhere aside from her right shoulder. She denied HA, changes in vision, dizziness, light-headedness      PAST MEDICAL & SURGICAL HISTORY:  Skin cancer  lower extremities    Hip fracture  Right hip- 1995    Osteoporosis    Chronic anal fissure    Anal spasm    mild Dementia    Dyslipidemia    Hypertension    H/O: Hypothyroidism    H/O gastroesophageal reflux (GERD)    Anterior Cervical discectomy    left eye Retinal tear repair    After-Cataract of Both Eyes    left Elbow surgey secondary to injury    History of Tonsillectomy    History of  Hip surgery with hardware      FAMILY HISTORY:  No pertinent family history in first degree relatives      Social Hx:  Nonsmoker, no drug or alcohol use    Home Medications:  Aspirin Enteric Coated 325 mg oral delayed release tablet: 1 tab(s) orally once a day (12 Oct 2020 21:04)  calcium: 600 milligram(s) orally once a day (12 Oct 2020 21:04)  calcium-vitamin D:  (12 Oct 2020 21:04)  levothyroxine 100 mcg (0.1 mg) oral tablet: 1 tab(s) orally once a day (12 Oct 2020 21:04)  multivitamin: 1 tab(s) orally once a day (12 Oct 2020 21:04)  probiotic: 1 tab(s) orally 2 times a day (12 Oct 2020 16:38)  Vitamin B Complex oral tablet: 1 tab(s) orally once a day (12 Oct 2020 21:04)  Vitamin B12:  (12 Oct 2020 21:04)  Vitamin C 1000 mg oral tablet:  (12 Oct 2020 21:04)  Vitamin D3 2000 intl units (50 mcg) oral tablet:  (12 Oct 2020 21:04)    MEDICATIONS  (STANDING):  albuterol/ipratropium for Nebulization 3 milliLiter(s) Nebulizer every 6 hours  apixaban 2.5 milliGRAM(s) Oral every 12 hours  aspirin enteric coated 81 milliGRAM(s) Oral daily  buDESOnide    Inhalation Suspension 0.5 milliGRAM(s) Inhalation every 12 hours  fluticasone propionate 50 MICROgram(s)/spray Nasal Spray 1 Spray(s) Both Nostrils two times a day  levothyroxine 50 MICROGram(s) Oral daily  loratadine 10 milliGRAM(s) Oral daily  pantoprazole    Tablet 40 milliGRAM(s) Oral before breakfast  polyethylene glycol 3350 17 Gram(s) Oral daily  predniSONE   Tablet 50 milliGRAM(s) Oral daily  simvastatin 20 milliGRAM(s) Oral at bedtime    MEDICATIONS  (PRN):  senna 2 Tablet(s) Oral at bedtime PRN Constipation    Allergies  penicillin (Rash)    Intolerances      REVIEW OF SYSTEMS      CONSTITUTIONAL: No weakness, fevers or chills  EYES/ENT: No visual changes;  No vertigo or throat pain   RESPIRATORY: No cough, wheezing, hemoptysis; No shortness of breath  CARDIOVASCULAR: No chest pain or palpitations  GASTROINTESTINAL: No abdominal or epigastric pain. No nausea, vomiting, or hematemesis; No diarrhea or constipation. No melena or hematochezia.  GENITOURINARY: No dysuria, frequency or hematuria  NEUROLOGICAL: see HPI  SKIN: No itching. +rash on her chest       Vital Signs Last 24 Hrs  T(C): 36.8 (15 Oct 2020 13:41), Max: 37.1 (15 Oct 2020 12:13)  T(F): 98.3 (15 Oct 2020 13:41), Max: 98.7 (15 Oct 2020 12:13)  HR: 74 (15 Oct 2020 13:41) (64 - 88)  BP: 122/66 (15 Oct 2020 13:41) (122/66 - 158/68)  BP(mean): --  RR: 18 (15 Oct 2020 13:41) (17 - 18)  SpO2: 97% (15 Oct 2020 13:41) (91% - 97%)      NEUROLOGICAL EXAM:  MS: AAOX3, fluent, attends b/l; recent and remote memory intact; normal attention, language and fund of knowledge. Intact naming and repetition. Speech fluent. Follows three step commands.   CN: VFF, EOMI, PERRLA. V1-3 intact, no facial asymmetry, t/p midline, SCM intact b/l, trap on right-side movement limited by pain,   Motor: Strength: 5/5 in left UE, and LE b/l.  right shoulder movement limited by pain. elbow flexion/extension at least 3/5, but also limited due to shoulder pain. wrist flexion/extension 5/5, right  strength normal.  Tone: normal. Bulk: normal.   DTR 2+ symm.  Plantar flex b/l.   Sensation: noted a difference in sensation in tips of fingers and toes, no numbness. intact vibratory sense. intact proprioception.   Coordination: intact 4x.   Gait:  Deferred     Labs:   cbc                      10.6   9.10  )-----------( 272      ( 15 Oct 2020 05:48 )             31.6     [id number]    136  |  100  |  23  ----------------------------<  85  4.2   |  28  |  0.89    Ca    9.0      15 Oct 2020 05:48    < from: CT Head No Cont (10.14.20 @ 17:10) >  IMPRESSION: No acute intracranial hemorrhage, mass effect, or shift of the midline structures.    Similar-appearing mild chronic white matter microvascular type changes.        < end of copied text >

## 2020-10-15 NOTE — PROGRESS NOTE ADULT - ASSESSMENT
89F c hx HTN, HLD, freq falls, PE (Jun '20) on eliquis, GERD, Osteoporosis, hypothyroidism, pw SOB and cough poss 2/2 aspiration.    SOB (shortness of breath).  - CTA neg for pe or infection  - suspect 2/2 aspiration  - cont nebs, chest PT  - short course of Prednisone   - hold off abx.   - nebs  - Pulmonary follow  - Cardiology follow     Aspiration into airway, initial encounter.   - speech/swallow dysphagia eval  - pepcid.   - MBS pending     Chronic pulmonary embolism without acute cor pulmonale, unspecified pulmonary embolism type.  - cont eliquis  - aspirin to 81.     Debility.   - PT eval    Hypothyroidism  - decrease Synthroid to 50 mcg  - follow TSH.   - Endocrine evaluation    R sided weakness  - CT head with no acute findings.  - Neuro evaluation    Rash  - Dermatology evaluation    R shoulder pain  - Xray shoulder  - pain control    Art Carbajal MD pager [phone]

## 2020-10-15 NOTE — CONSULT NOTE ADULT - ASSESSMENT
***INCOMPLETE   Assessment: 89Fw/  PMH of HTN, HLD, freq falls, PE (Jun '20) on eliquis, GERD, Osteoporosis, hypothyroidism, h/o ACDF presenting with SOB, admitted for suspected aspiration. Neurology consulted to rule out stroke. The patient has been experiencing right shoulder pain for the past week, which has limited movement. She feels no overt weakness, and is only unable to move her shoulder and elbow fully due to pain.  For the past several days, the patient has been experiencing an abnormal sensation in the back of her throat, which has been bothering her when she speaks, but the patient denied any difficulty word finding, producing speech, or speaking fluently, and was observed to speak clearly and fluently, with intact naming and repetition, during the exam. A CT head was negative for any acute findings. Her symptoms and CT findings are low suspicion for stroke, and are likely not neurological in nature.     Plan:   [] no further neurological work-up necessary   [] continue management per primary team Assessment: 89Fw/  PMH of HTN, HLD, freq falls, PE (Jun '20) on eliquis, GERD, Osteoporosis, hypothyroidism, h/o ACDF presenting with SOB, admitted for suspected aspiration. Neurology consulted to rule out stroke. The patient has been experiencing right shoulder pain for the past week, which has limited movement. She feels no overt weakness, and is only unable to move her shoulder and elbow fully due to pain.  For the past several days, the patient has been experiencing an abnormal sensation in the back of her throat, which has been bothering her when she speaks, but the patient denied any difficulty word finding, producing speech, or speaking fluently, and was observed to speak clearly and fluently, with intact naming and repetition, during the exam. A CT head was negative for any acute findings. Her symptoms and CT findings are low suspicion for stroke, and are likely not neurological in nature.     Plan:   [] no further neurological work-up necessary   [] continue management per primary team Assessment: 89Fw/  PMH of HTN, HLD, freq falls, PE (Jun '20) on eliquis, GERD, Osteoporosis, hypothyroidism, h/o ACDF presenting with SOB, admitted for suspected aspiration. Neurology consulted to rule out stroke. The patient has been experiencing right shoulder pain for the past week, which has limited movement. She feels no overt weakness, and is only unable to move her shoulder and elbow fully due to pain.  For the past several days, the patient has been experiencing an abnormal sensation in the back of her throat, which has been bothering her when she speaks, but the patient denied any difficulty word finding, producing speech, or speaking fluently, and was observed to speak clearly and fluently, with intact naming and repetition, during the exam. A CT head was negative for any acute findings. Her symptoms and CT findings are low suspicion for stroke, and are likely not neurological in nature.     Plan:   [] no further neurological work-up necessary   [] continue management per primary team      Addendum  See attending addendum below Consider MRI Brain, C-spine

## 2020-10-15 NOTE — PROGRESS NOTE ADULT - SUBJECTIVE AND OBJECTIVE BOX
[facility] - PROGRESS NOTE    CHIEF COMPLAINT: dyspnea; paralyzed right diaphragm; kyphoscoliosis; bronchospasm    INTERVAL HISTORY: multiple skin bruises and excoriations -> patient would like to see wound care; placed on dysphagia diet awaiting formal speech and swallow study; improved shortness of breath - less secretions in the back of her throat - looks comfortable sitting in the chair on room air; no chest congestion or wheeze; no fevers, chills or sweats; no chest pain/pressure or palpitations; right shoulder pain limiting ability to raise the arm;    REVIEW OF SYSTEMS:  Constitutional: As per interval history  HEENT: Within normal limits  CV: As per interval history  Resp: As per interval history  GI: Within normal limits   : Within normal limits  Musculoskeletal: Within normal limits  Skin: diffuse ecchymoses/skin discoloration and abrasions  Neurological: Within normal limits  Psychiatric: Within normal limits  Endocrine: Within normal limits  Hematologic/Lymphatic: Within normal limits  Allergic/Immunologic: Within normal limits    MEDICATIONS:     Pulmonary "  albuterol/ipratropium for Nebulization 3 milliLiter(s) Nebulizer every 6 hours  buDESOnide    Inhalation Suspension 0.5 milliGRAM(s) Inhalation every 12 hours  loratadine 10 milliGRAM(s) Oral daily    Anti-microbials:    Cardiovascular:    Other:  apixaban 2.5 milliGRAM(s) Oral every 12 hours  aspirin enteric coated 81 milliGRAM(s) Oral daily  fluticasone propionate 50 MICROgram(s)/spray Nasal Spray 1 Spray(s) Both Nostrils two times a day  levothyroxine 50 MICROGram(s) Oral daily  pantoprazole    Tablet 40 milliGRAM(s) Oral before breakfast  polyethylene glycol 3350 17 Gram(s) Oral daily  predniSONE   Tablet 50 milliGRAM(s) Oral daily  simvastatin 20 milliGRAM(s) Oral at bedtime    MEDICATIONS  (PRN):  senna 2 Tablet(s) Oral at bedtime PRN Constipation        OBJECTIVE:    I&O's Detail    14 Oct 2020 07:01  -  15 Oct 2020 07:00  --------------------------------------------------------  IN:    Oral Fluid: 120 mL  Total IN: 120 mL    OUT:    Voided (mL): 300 mL  Total OUT: 300 mL    Total NET: -180 mL    PHYSICAL EXAM:       ICU Vital Signs Last 24 Hrs  T(C): 36.4 (15 Oct 2020 04:42), Max: 36.6 (14 Oct 2020 20:24)  T(F): 97.5 (15 Oct 2020 04:42), Max: 97.8 (14 Oct 2020 20:24)  HR: 64 (15 Oct 2020 04:42) (64 - 88)  BP: 145/67 (15 Oct 2020 04:42) (145/67 - 158/68)  BP(mean): --  ABP: --  ABP(mean): --  RR: 17 (15 Oct 2020 04:42) (17 - 18)  SpO2: 95% (15 Oct 2020 04:42) (95% - 97%) on room air     General: Awake. Alert. Cooperative. No distress. Appears stated age. Sitting in the chair.	  HEENT:   Atraumatic. Normocephalic. Anicteric. Normal oral mucosa. PERRL. EOMI.  Neck: Supple. Trachea midline. Thyroid without enlargement/tenderness/nodules. No carotid bruit. No JVD.	  Cardiovascular: Regular rate and rhythm. S1 S2 normal. No murmurs, rubs or gallops.  Respiratory: Respirations unlabored. Clear to auscultation and percussion. Kyphoscoliosis.  Abdomen: Soft. Non-tender. Non-distended. No organomegaly. No masses. Normal bowel sounds.  Extremities: Warm to touch. No clubbing or cyanosis. No pedal edema. Decreased ROM of right shoulder.  Pulses: 2+ peripheral pulses all extremities.	  Skin: Multiple ecchymoses with venous stasis changes on both legs. Diffuse ecchymoses and abrasions.  Lymph Nodes: Cervical, supraclavicular and axillary nodes normal  Neurological: Motor and sensory examination equal and normal. A and O x 2  Psychiatry: Appropriate mood and affect.    LABS:                          10.6   9.10  )-----------( 272      ( 15 Oct 2020 05:48 )             31.6     CBC    WBC  9.10 <==, 4.61 <==, 9.89 <==, 9.83 <==    Hemoglobin  10.6 <<==, 11.3 <<==, 12.0 <<==, 12.4 <<==    Hematocrit  31.6 <==, 33.7 <==, 38.3 <==, 38.2 <==    Platelets  272 <==, 307 <==, 323 <==, 340 <==      136  |  100  |  23  ----------------------------<  85    10-15  4.2   |  28  |  0.89      LYTES    sodium  136 <==, 137 <==, 141 <==, 138 <==    potassium   4.2 <==, 4.5 <==, 4.4 <==, 3.7 <==    chloride  100 <==, 102 <==, 102 <==, 104 <==    carbon dioxide  28 <==, 26 <==, 24 <==, 24 <==    =============================================================================================  RENAL FUNCTION:    Creatinine:   0.89  <<==, 0.91  <<==, 1.04  <<==, 1.05  <<==    BUN:   23 <==, 24 <==, 30 <==, 33 <==    ============================================================================================    calcium   9.0 <==, 9.4 <==, 10.0 <==, 9.7 <==    phos   3.7 <==    mag   2.4 <==    ============================================================================================  LFTs    AST:   22 <== , 24 <==     ALT:  19  <== , 16  <==     AP:  49  <=, 48  <=    Bili:  0.5  <=, 0.4  <=      PT/INR - ( 12 Oct 2020 12:40 )   PT: 12.5 sec;   INR: 1.04 ratio      PTT - ( 12 Oct 2020 12:40 )  PTT:27.5 sec    D-Dimer Assay, Quantitative: 683 ng/mL DDU (10-12 @ 12:40)    CARDIAC MARKERS ( 12 Oct 2020 12:40 )  CPK x     /CKMB x     /CKMB Units x        troponin 23 ng/L    MICROBIOLOGY:     Respiratory Viral Panel + COVID-19 by STEPHAN (10.12.20 @ 12:39)   Rapid RVP Result: [facility]   [facility] This Respiratory Panel uses polymerase chain reaction (PCR) to detect for   adenovirus; coronavirus (HKU1, NL63, 229E, OC43); human metapneumovirus   (hMPV); human enterovirus/rhinovirus (Entero/RV); influenza A; influenza   A/H1; influenza A/H3; influenza A/H1-2009; influenza B; parainfluenza   viruses 1, 2, 3, 4; respiratory syncytial virus; Mycoplasma pneumoniae;   Chlamydophila pneumoniae; and SARS-CoV-2.   ---------------------------------------------------------------------------------------------------------------    Urinalysis Basic - ( 12 Oct 2020 13:30 )    Color: Light Yellow / Appearance: Clear / S.022 / pH: x  Gluc: x / Ketone: Negative  / Bili: Negative / Urobili: Negative   Blood: x / Protein: Trace / Nitrite: Negative   Leuk Esterase: Moderate / RBC: 2 /hpf / WBC 14 /HPF   Sq Epi: x / Non Sq Epi: 1 /hpf / Bacteria: Negative    Culture - Urine (10.12.20 @ 17:29)   Specimen Source: .Urine Clean Catch (Midstream)   Culture Results:   <10,000 CFU/mL Normal Urogenital Maribel   ---------------------------------------------------------------------------------------------------------------    RADIOLOGY:  [x ] Chest radiographs reviewed and interpreted by me      EXAM:  XR CHEST PORTABLE URGENT 1V                          PROCEDURE DATE:  10/12/2020      INTERPRETATION:  A single chest x-ray was obtained on 2020.    Indication: Crackles in bases.    Impression:    The heart is normal in size. The lungs are clear. Slightly elevated right hemidiaphragm. No pleural effusion. No pneumothorax. A loop recorder is seen overlying the left hemithorax. Degenerative changes of the thoracic spine. Orthopedic hardware is seen in the cervical spine.    PADMINI JUNIOR M.D., ATTENDING RADIOLOGIST  This document has been electronically signed. Oct 12 2020  1:52PM  ---------------------------------------------------------------------------------------------------------------    EXAM:  CT ANGIO CHEST (W)AW IC                          PROCEDURE DATE:  10/12/2020      INTERPRETATION:  CLINICAL INFORMATION: Dyspnea    COMPARISON: CT chest dated 2020    PROCEDURE:  CT Angiography of the Chest.  90 ml of Omnipaque 350 was injected intravenously. 10 ml were discarded.  Sagittal and coronal reformats were performed as well as 3D (MIP) reconstructions.    FINDINGS:    LUNGS AND AIRWAYS: Patent central airways. Secretions are noted within several of the bronchi in both lower lobes. Lungs are clear.  PLEURA: No pleural effusion.  MEDIASTINUM AND MIGUEL A: No lymphadenopathy.  HEART: Heart size is normal. No pericardial effusion.  CHEST WALL AND LOWER NECK: Within normal limits.  VISUALIZED UPPER ABDOMEN: Within normal limits.  BONES: Degenerative changes of the spine    IMPRESSION:  No pulmonary embolism    SRAVAN BLUNT M.D., RADIOLOGY RESIDENT  This document has been electronically signed.  ZACK MEDINA M.D., ATTENDING RADIOLOGIST  This documenthas been electronically signed. Oct 12 2020  3:07PM  ---------------------------------------------------------------------------------------------------------------

## 2020-10-15 NOTE — PROGRESS NOTE ADULT - SUBJECTIVE AND OBJECTIVE BOX
S: Less SOB and cough, Denies chest pain. Review of systems otherwise (-)  	    MEDICATIONS  (STANDING):  albuterol/ipratropium for Nebulization 3 milliLiter(s) Nebulizer every 6 hours  apixaban 2.5 milliGRAM(s) Oral every 12 hours  aspirin enteric coated 81 milliGRAM(s) Oral daily  buDESOnide    Inhalation Suspension 0.5 milliGRAM(s) Inhalation every 12 hours  fluticasone propionate 50 MICROgram(s)/spray Nasal Spray 1 Spray(s) Both Nostrils two times a day  levothyroxine 50 MICROGram(s) Oral daily  loratadine 10 milliGRAM(s) Oral daily  pantoprazole    Tablet 40 milliGRAM(s) Oral before breakfast  polyethylene glycol 3350 17 Gram(s) Oral daily  predniSONE   Tablet 50 milliGRAM(s) Oral daily  simvastatin 20 milliGRAM(s) Oral at bedtime    MEDICATIONS  (PRN):  senna 2 Tablet(s) Oral at bedtime PRN Constipation      LABS:                            10.6   9.10  )-----------( 272      ( 15 Oct 2020 05:48 )             31.6     Hemoglobin: 10.6 g/dL (10-15 @ 05:48)  Hemoglobin: 11.3 g/dL (10-14 @ 06:54)  Hemoglobin: 12.0 g/dL (10-13 @ 07:22)  Hemoglobin: 12.4 g/dL (10-12 @ 12:40)    10-15    136  |  100  |  23  ----------------------------<  85  4.2   |  28  |  0.89    Ca    9.0      15 Oct 2020 05:48      Creatinine Trend: 0.89<--, 0.91<--, 1.04<--, 1.05<--             10-14-20 @ 07:01  -  10-15-20 @ 07:00  --------------------------------------------------------  IN: 120 mL / OUT: 300 mL / NET: -180 mL        PHYSICAL EXAM  Vital Signs Last 24 Hrs  T(C): 36.8 (15 Oct 2020 13:41), Max: 37.1 (15 Oct 2020 12:13)  T(F): 98.3 (15 Oct 2020 13:41), Max: 98.7 (15 Oct 2020 12:13)  HR: 74 (15 Oct 2020 13:41) (64 - 88)  BP: 122/66 (15 Oct 2020 13:41) (122/66 - 158/68)  BP(mean): --  RR: 18 (15 Oct 2020 13:41) (17 - 18)  SpO2: 97% (15 Oct 2020 13:41) (91% - 97%)      Gen: Appears well in NAD  HEENT:  (-)icterus (-)pallor  CV: N S1 S2 1/6 KIN (+)2 Pulses B/l  Resp:  Clear to auscultation B/L, normal effort  GI: (+) BS Soft, NT, ND  Lymph:  (-)Edema, (-)obvious lymphadenopathy  Skin: Warm to touch, Normal turgor  Psych: Appropriate mood and affect      TELEMETRY: None	      < from: Transthoracic Echocardiogram (10.14.20 @ 07:06) >  Conclusions:  1. Calcified trileaflet aortic valve with decreased  opening. Peak transaortic valve gradient equals 16 mm Hg,  mean transaortic valve gradient equals 7 mm Hg, estimated  aortic valve area equals 1.7 sqcm (by continuity equation),  aortic valve velocity time integral equals 40 cm,  consistent with mild aortic stenosis.  2. Normal left ventricular internal dimensions and wall  thicknesses.  3. Hyperdynamic left ventricular systolic function.  4. Estimated pulmonary artery systolic pressure equals 44  mm Hg, assuming right atrial pressure equals 8 mm Hg,  consistent with mild pulmonary pressures.    < end of copied text >      ASSESSMENT/PLAN: Patient is a 89 y/o Female well known to our office with PMH of HTN, HLD, frequent falls, hx PE on Eliquis, GERD, Osteoporosis, hypothyroidism who is admitted with SOB/cough concerning for aspiration. Cardiology consulted for further evaluation.    - CTA noted with no PE, clear lungs but secretions noted within several bronchi of lower lobes  - No evidence of clinical HF or anginal symptoms  - Pulm follow up  - F/u S+S eval  - Continue AC with Eliquis for hx PE  - Repeat TTE with hyperdynamic LV, mild AS  - No further inpatient cardiac w/u needed  - Patient to f/u with Dr. Mancera on 11/11 at 1 PM    Florentin Juarez PA-C  Pager: [phone]

## 2020-10-15 NOTE — CHART NOTE - NSCHARTNOTEFT_GEN_A_CORE
Dermatology Brief Chart Note:    89F hx HTN, HLD, freq falls, PE (Jun '20) on eliquis, GERD, Osteoporosis, hypothyroidism admitted for SOB (possibly due to restrive lung disease and deconditioning) and stroke work up. Dermatology consulted for itchy rash on the arms and chest.     PE: pink and white crust and scale on lower extremities with an eschar at site of trauma, atrophic skin on arms and chest with purpura and erosion/ulceration on chest, pink patch on back with thin skin    A/P:     1. Stasis Changes on the lower extremities     -Vaseline, leg elevation, gentle compression     2. Senile/ Solar Purpura on arms and chest, worse in setting of Eliquis     -Vaseline BID to arms and chest until erosion/ ulcer on chest heals     3. Eczematous Patch on back     Triamcinolone Ointment BID for 2 weeks, moisturize after with Vaseline to prevent recurrence       Discussed with attending Dr. Lissett Aragon    Patient can follow-up at our outpatient office:  [address], [place] [address], phone number for appointment: [phone]    If any questions, please page [phone] (please leave 10 digit call back number because we cover several facilities)

## 2020-10-15 NOTE — CHART NOTE - NSCHARTNOTEFT_GEN_A_CORE
Endocrine consulted for low TSH. Recommend check Free T4 in am. Full consult to follow tomorrow based on results.    Nhan Wise D.O  [phone]

## 2020-10-16 ENCOUNTER — TRANSCRIPTION ENCOUNTER (OUTPATIENT)
Age: 85
End: 2020-10-16

## 2020-10-16 DIAGNOSIS — E05.80 OTHER THYROTOXICOSIS WITHOUT THYROTOXIC CRISIS OR STORM: ICD-10-CM

## 2020-10-16 DIAGNOSIS — I10 ESSENTIAL (PRIMARY) HYPERTENSION: ICD-10-CM

## 2020-10-16 DIAGNOSIS — E78.5 HYPERLIPIDEMIA, UNSPECIFIED: ICD-10-CM

## 2020-10-16 DIAGNOSIS — R13.10 DYSPHAGIA, UNSPECIFIED: ICD-10-CM

## 2020-10-16 DIAGNOSIS — M81.0 AGE-RELATED OSTEOPOROSIS WITHOUT CURRENT PATHOLOGICAL FRACTURE: ICD-10-CM

## 2020-10-16 LAB
T4 FREE SERPL-MCNC: 2 NG/DL — HIGH (ref 0.9–1.8)
TSH SERPL-MCNC: 0.01 UIU/ML — LOW (ref 0.27–4.2)

## 2020-10-16 PROCEDURE — 73020 X-RAY EXAM OF SHOULDER: CPT | Mod: 26,RT

## 2020-10-16 PROCEDURE — 72141 MRI NECK SPINE W/O DYE: CPT | Mod: 26

## 2020-10-16 PROCEDURE — 99222 1ST HOSP IP/OBS MODERATE 55: CPT

## 2020-10-16 PROCEDURE — 74230 X-RAY XM SWLNG FUNCJ C+: CPT | Mod: 26

## 2020-10-16 PROCEDURE — 31575 DIAGNOSTIC LARYNGOSCOPY: CPT

## 2020-10-16 PROCEDURE — 70551 MRI BRAIN STEM W/O DYE: CPT | Mod: 26

## 2020-10-16 PROCEDURE — 99221 1ST HOSP IP/OBS SF/LOW 40: CPT

## 2020-10-16 PROCEDURE — 99222 1ST HOSP IP/OBS MODERATE 55: CPT | Mod: 25

## 2020-10-16 RX ORDER — PETROLATUM,WHITE
1 JELLY (GRAM) TOPICAL
Refills: 0 | Status: DISCONTINUED | OUTPATIENT
Start: 2020-10-16 | End: 2020-10-18

## 2020-10-16 RX ADMIN — APIXABAN 2.5 MILLIGRAM(S): 2.5 TABLET, FILM COATED ORAL at 06:09

## 2020-10-16 RX ADMIN — Medication 1 SPRAY(S): at 17:31

## 2020-10-16 RX ADMIN — Medication 3 MILLILITER(S): at 11:19

## 2020-10-16 RX ADMIN — SIMVASTATIN 20 MILLIGRAM(S): 20 TABLET, FILM COATED ORAL at 21:20

## 2020-10-16 RX ADMIN — Medication 1 APPLICATION(S): at 17:36

## 2020-10-16 RX ADMIN — Medication 1 SPRAY(S): at 05:12

## 2020-10-16 RX ADMIN — Medication 50 MILLIGRAM(S): at 06:09

## 2020-10-16 RX ADMIN — Medication 0.5 MILLIGRAM(S): at 17:31

## 2020-10-16 RX ADMIN — LORATADINE 10 MILLIGRAM(S): 10 TABLET ORAL at 11:18

## 2020-10-16 RX ADMIN — Medication 81 MILLIGRAM(S): at 11:18

## 2020-10-16 RX ADMIN — APIXABAN 2.5 MILLIGRAM(S): 2.5 TABLET, FILM COATED ORAL at 17:31

## 2020-10-16 RX ADMIN — Medication 3 MILLILITER(S): at 23:27

## 2020-10-16 RX ADMIN — Medication 3 MILLILITER(S): at 17:31

## 2020-10-16 RX ADMIN — Medication 1 APPLICATION(S): at 17:32

## 2020-10-16 RX ADMIN — PANTOPRAZOLE SODIUM 40 MILLIGRAM(S): 20 TABLET, DELAYED RELEASE ORAL at 06:09

## 2020-10-16 RX ADMIN — Medication 5 MILLIGRAM(S): at 21:20

## 2020-10-16 RX ADMIN — Medication 75 MICROGRAM(S): at 05:12

## 2020-10-16 RX ADMIN — Medication 0.5 MILLIGRAM(S): at 06:10

## 2020-10-16 RX ADMIN — Medication 3 MILLILITER(S): at 05:11

## 2020-10-16 NOTE — CONSULT NOTE ADULT - PROBLEM SELECTOR RECOMMENDATION 4
statin therapy    Will sign off at this time. Please reconsult if needed.    Nhan Wise D.O  [phone]

## 2020-10-16 NOTE — SWALLOW VFSS/MBS ASSESSMENT ADULT - LARYNGEAL PENETRATION DURING THE SWALLOW - SILENT
Mild/Trace/over laryngeal surface of the arytenoids, appears retrieved during swallow; deeper with consecutive cup sips

## 2020-10-16 NOTE — CONSULT NOTE ADULT - PROBLEM SELECTOR RECOMMENDATION 9
Decrease dose to 75 mcg. Repeat TFTs in 6 weeks.  Goal TSH  high-normal given age.
No acute ENT intervention indicated   Diet per speech and swallow

## 2020-10-16 NOTE — DIETITIAN INITIAL EVALUATION ADULT. - ORAL INTAKE PTA/DIET HISTORY
Pt reports good appetite and PO intake at home. Confirms NKFA. Pt reports not following any type of diet or restriction at home; states consuming food/liquids with regular texture. Reports taking Multivitamin, Vitamin C, Probiotics, Calcium + Vitamin D, Vitamin D3, B complex, and Vitamin B12 PTA (consistent with H&P); denies drinking any nutritional supplement.

## 2020-10-16 NOTE — DIETITIAN INITIAL EVALUATION ADULT. - REASON INDICATOR FOR ASSESSMENT
Pt seen for length of stay initial assessment.  Information obtained from: medical record, pt, and PCA.  Pt forgetful at times as per documentation; unable to reach reference contact via phone call -?accuracy of information obtained.

## 2020-10-16 NOTE — CONSULT NOTE ADULT - SUBJECTIVE AND OBJECTIVE BOX
HPI:  89F c hx HTN, HLD, freq falls, PE (Jun '20) on eliquis, GERD, Osteoporosis, hypothyroidism, pw SOB x1 day.    Pt states she has been having increased non-traumatic bruising on her skin she's noticed, and has been putting a white cream on her chest. Otherwise, pt has been in her usual state of health until this morning, when she awoke feeling SOB and coughing due to significant secretions in her throat. Pt denies any hx of coughing when eating or drinking. Pt uses 1 pillow at night. Pt does report chills today. Pt denies any fevers, CP, abd pain, NV, diarrhea. At baseline, pt ambulates with a walker, and would be able to walk up a flight of stairs with great difficulty.    Endocrine consulted for low TSH with followup elevated Free T4. Pt. has had hypothyroidism x "many years" on stable dose of levothyroxine 100 mcg for a few years. Takes daily in the morning on empty stomach with adherence. No hx of neck surgeries or radiation. On prednisone. No amiodarone or lithium. Feels tired and weak. Also with some cold intolerance. Denies palpitations, heat intolerance, diarrhea, tremors.    PAST MEDICAL & SURGICAL HISTORY:  Skin cancer  lower extremities    Hip fracture  Right hip- 1995    Osteoporosis    Chronic anal fissure    Anal spasm    mild Dementia    Dyslipidemia    Hypertension    H/O: Hypothyroidism    H/O gastroesophageal reflux (GERD)    Anterior Cervical discectomy    left eye Retinal tear repair    After-Cataract of Both Eyes    left Elbow surgey secondary to injury    History of Tonsillectomy    History of  Hip surgery with hardware        FAMILY HISTORY:  No pertinent family history in first degree relatives        Social History: No tobacco or alcohol use    Outpatient Medications:  · 	apixaban 2.5 mg oral tablet: Last Dose Taken:  , 1 tab(s) orally every 12 hours  · 	simvastatin 20 mg oral tablet: Last Dose Taken:  , 1 tab(s) orally once a day (at bedtime)  · 	multivitamin: Last Dose Taken:  , 1 tab(s) orally once a day  · 	Vitamin C 1000 mg oral tablet: Last Dose Taken:    · 	levothyroxine 100 mcg (0.1 mg) oral tablet: Last Dose Taken:  , 1 tab(s) orally once a day  · 	probiotic: Last Dose Taken:  , 1 tab(s) orally 2 times a day  · 	calcium-vitamin D: Last Dose Taken:    · 	calcium: Last Dose Taken:  , 600 milligram(s) orally once a day  · 	Vitamin B Complex oral tablet: Last Dose Taken:  , 1 tab(s) orally once a day  · 	Vitamin B12: Last Dose Taken:    · 	Vitamin D3 2000 intl units (50 mcg) oral tablet: Last Dose Taken:    · 	Aspirin Enteric Coated 325 mg oral delayed release tablet: Last Dose Taken:  , 1 tab(s) orally once a day  · 	simvastatin 20 mg oral tablet: 1 tab(s) orally once a day (at bedtime)      MEDICATIONS  (STANDING):  albuterol/ipratropium for Nebulization 3 milliLiter(s) Nebulizer every 6 hours  apixaban 2.5 milliGRAM(s) Oral every 12 hours  aspirin enteric coated 81 milliGRAM(s) Oral daily  buDESOnide    Inhalation Suspension 0.5 milliGRAM(s) Inhalation every 12 hours  fluticasone propionate 50 MICROgram(s)/spray Nasal Spray 1 Spray(s) Both Nostrils two times a day  levothyroxine 75 MICROGram(s) Oral daily  loratadine 10 milliGRAM(s) Oral daily  melatonin 5 milliGRAM(s) Oral at bedtime  pantoprazole    Tablet 40 milliGRAM(s) Oral before breakfast  petrolatum white Ointment 1 Application(s) Topical two times a day  polyethylene glycol 3350 17 Gram(s) Oral daily  predniSONE   Tablet 50 milliGRAM(s) Oral daily  simvastatin 20 milliGRAM(s) Oral at bedtime  triamcinolone 0.1% Ointment 1 Application(s) Topical two times a day    MEDICATIONS  (PRN):  senna 2 Tablet(s) Oral at bedtime PRN Constipation      Allergies    penicillin (Rash)    Intolerances      Review of Systems:  Constitutional: No fever, No change in weight +fatigue and weakess  Eyes: No blurry vision  Neuro: No headache, No paresthesias  HEENT: No throat pain  Cardiovascular: No chest pain  Respiratory: No SOB  GI: No nausea or vomiting  : No polyuria  Skin: no rash  Psych: no depression  Endocrine: No polydipsia, +cold intolerance, rest as noted in HPI  Hem/lymph: no swelling    All other review of systems negative      PHYSICAL EXAM:  VITALS: T(C): 36.8 (10-16-20 @ 11:56)  T(F): 98.3 (10-16-20 @ 11:56), Max: 98.3 (10-16-20 @ 11:56)  HR: 94 (10-16-20 @ 14:40) (77 - 94)  BP: 126/69 (10-16-20 @ 14:40) (116/69 - 150/78)  RR:  (18 - 19)  SpO2:  (94% - 95%)  Wt(kg): --  GENERAL: NAD at this time  EYES: No proptosis, EOMI  HEENT:  Atraumatic, Normocephalic,   THYROID: Normal size, no palpable nodules  RESPIRATORY: Clear to auscultation bilaterally, full excursion, non-labored  CARDIOVASCULAR: Regular rhythm; No murmurs; no peripheral edema  GI: Soft, nontender, non distended, normal bowel sounds  SKIN: Dry, intact, +ecchymoses on arms and legs  MUSCULOSKELETAL: normal strength  NEURO: follows commands, +mild tremor b/l hands  PSYCH: Alert and oriented x 3, normal affect, normal mood  CUSHING'S SIGNS: no striae                                  10.6   9.10  )-----------( 272      ( 15 Oct 2020 05:48 )             31.6       10-15    136  |  100  |  23  ----------------------------<  85  4.2   |  28  |  0.89    EGFR if : 65  EGFR if non : 56<L>    Ca    9.0      10-15      Thyroid Function Tests:  10-16 @ 09:35 TSH 0.01 FreeT4 2.0 T3 -- Anti TPO -- Anti Thyroglobulin Ab -- TSI --  10-13 @ 08:40 TSH 0.02 FreeT4 -- T3 -- Anti TPO -- Anti Thyroglobulin Ab -- TSI --            Radiology:

## 2020-10-16 NOTE — PROGRESS NOTE ADULT - ASSESSMENT
89Fw/  PMH of HTN, HLD, freq falls, PE (Jun '20) on eliquis, GERD, Osteoporosis, hypothyroidism, h/o ACDF presenting with SOB. PE with concern for myelopathy and right C5 weakness. MRI brain negative MRI C-spine as abovewith prior ACD% c5-c6, disc osteophytes, moderate stenosis likely explains symptoms. Patient is likely not a surgical candidate    Would use anti-nflammatories  physical therapy  warm compresses to neck

## 2020-10-16 NOTE — DIETITIAN INITIAL EVALUATION ADULT. - DIET TYPE
Recommend regular diet to allow pt more food options in setting of older age. Defer diet/fluid consistencies to medical team/SLP recommendations. Will continue to monitor and adjust as needed. Spoke to MD.

## 2020-10-16 NOTE — CONSULT NOTE ADULT - SUBJECTIVE AND OBJECTIVE BOX
CC: Hoarseness    HPI:  89Fw/ PMH of HTN, HLD, freq falls, PE (Jun '20) on eliquis, GERD, Osteoporosis, hypothyroidism, h/o ACDF presenting with SOB, admitted for suspected aspiration. Increased pharyngeal secretions with difficulty clearing them. Pt presents with a mild oropharyngeal. ENT consulted for vocal cord evaluation         PAST MEDICAL & SURGICAL HISTORY:  Skin cancer  lower extremities    Hip fracture  Right hip- 1995    Osteoporosis    Chronic anal fissure    Anal spasm    mild Dementia    Dyslipidemia    Hypertension    H/O: Hypothyroidism    H/O gastroesophageal reflux (GERD)    Anterior Cervical discectomy    left eye Retinal tear repair    After-Cataract of Both Eyes    left Elbow surgey secondary to injury    History of Tonsillectomy    History of  Hip surgery with hardware      Allergies    penicillin (Rash)    Intolerances      MEDICATIONS  (STANDING):  albuterol/ipratropium for Nebulization 3 milliLiter(s) Nebulizer every 6 hours  apixaban 2.5 milliGRAM(s) Oral every 12 hours  aspirin enteric coated 81 milliGRAM(s) Oral daily  buDESOnide    Inhalation Suspension 0.5 milliGRAM(s) Inhalation every 12 hours  fluticasone propionate 50 MICROgram(s)/spray Nasal Spray 1 Spray(s) Both Nostrils two times a day  levothyroxine 75 MICROGram(s) Oral daily  loratadine 10 milliGRAM(s) Oral daily  melatonin 5 milliGRAM(s) Oral at bedtime  pantoprazole    Tablet 40 milliGRAM(s) Oral before breakfast  polyethylene glycol 3350 17 Gram(s) Oral daily  predniSONE   Tablet 50 milliGRAM(s) Oral daily  simvastatin 20 milliGRAM(s) Oral at bedtime    MEDICATIONS  (PRN):  senna 2 Tablet(s) Oral at bedtime PRN Constipation      Social History: former smoker, lives alone    Family history: no pertinent family history     ROS:   ENT: all negative except as noted in HPI   CV: denies palpitations  Pulm: denies SOB, cough, hemoptysis  GI: denies change in apetite, indigestion, n/v  : denies pertinent urinary symptoms, urgency  Neuro: denies numbness/tingling, loss of sensation  Psych: denies anxiety  MS: denies muscle weakness, instability  Heme: denies easy bruising or bleeding  Endo: denies heat/cold intolerance, excessive sweating  Vascular: denies LE edema    Vital Signs Last 24 Hrs  T(C): 36.8 (16 Oct 2020 11:56), Max: 36.8 (16 Oct 2020 11:56)  T(F): 98.3 (16 Oct 2020 11:56), Max: 98.3 (16 Oct 2020 11:56)  HR: 94 (16 Oct 2020 14:40) (77 - 94)  BP: 126/69 (16 Oct 2020 14:40) (116/69 - 151/81)  BP(mean): --  RR: 19 (16 Oct 2020 11:56) (18 - 19)  SpO2: 94% (16 Oct 2020 14:40) (94% - 97%)                          10.6   9.10  )-----------( 272      ( 15 Oct 2020 05:48 )             31.6    10-15    136  |  100  |  23  ----------------------------<  85  4.2   |  28  |  0.89    Ca    9.0      15 Oct 2020 05:48         PHYSICAL EXAM:  Gen: NAD  Skin: No rashes, bruises, or lesions  Head: Normocephalic, Atraumatic  Face: no edema, erythema, or fluctuance. Parotid glands soft without mass  Eyes: no scleral injection  Ears: Right - ear canal clear, TM intact without effusion or erythema. No evidence of any fluid drainage. No mastoid tenderness, erythema, or ear bulging            Left - ear canal clear, TM intact without effusion or erythema. No evidence of any fluid drainage. No mastoid tenderness, erythema, or ear bulging  Nose: Nares bilaterally patent, no discharge  Mouth: No Stridor / Drooling / Trismus.  Mucosa moist, tongue/uvula midline, oropharynx clear  Neck: Flat, supple, no lymphadenopathy, trachea midline, no masses  Lymphatic: No lymphadenopathy  Resp: breathing easily, no stridor  CV: no peripheral edema/cyanosis  GI: nondistended   Peripheral vascular: no JVD or edema  Neuro: facial nerve intact, no facial droop        Diagnostic Nasal Endoscopy: (Scope #2 used)    Fiberoptic Indirect laryngoscopy:  (Scope #2 used)        IMAGING/ADDITIONAL STUDIES: CC: Dysphagia     HPI:  89Fw/ PMH of HTN, HLD, freq falls, PE (Jun '20) on eliquis, GERD, Osteoporosis, hypothyroidism, h/o ACDF presenting with SOB, admitted for suspected aspiration. Increased pharyngeal secretions with difficulty clearing them. Pt presents with a mild oropharyngeal. ENT consulted for vocal cord evaluation.          PAST MEDICAL & SURGICAL HISTORY:  Skin cancer  lower extremities    Hip fracture  Right hip- 1995    Osteoporosis    Chronic anal fissure    Anal spasm    mild Dementia    Dyslipidemia    Hypertension    H/O: Hypothyroidism    H/O gastroesophageal reflux (GERD)    Anterior Cervical discectomy    left eye Retinal tear repair    After-Cataract of Both Eyes    left Elbow surgey secondary to injury    History of Tonsillectomy    History of  Hip surgery with hardware      Allergies    penicillin (Rash)    Intolerances      MEDICATIONS  (STANDING):  albuterol/ipratropium for Nebulization 3 milliLiter(s) Nebulizer every 6 hours  apixaban 2.5 milliGRAM(s) Oral every 12 hours  aspirin enteric coated 81 milliGRAM(s) Oral daily  buDESOnide    Inhalation Suspension 0.5 milliGRAM(s) Inhalation every 12 hours  fluticasone propionate 50 MICROgram(s)/spray Nasal Spray 1 Spray(s) Both Nostrils two times a day  levothyroxine 75 MICROGram(s) Oral daily  loratadine 10 milliGRAM(s) Oral daily  melatonin 5 milliGRAM(s) Oral at bedtime  pantoprazole    Tablet 40 milliGRAM(s) Oral before breakfast  polyethylene glycol 3350 17 Gram(s) Oral daily  predniSONE   Tablet 50 milliGRAM(s) Oral daily  simvastatin 20 milliGRAM(s) Oral at bedtime    MEDICATIONS  (PRN):  senna 2 Tablet(s) Oral at bedtime PRN Constipation      Social History: former smoker, lives alone    Family history: no pertinent family history     ROS:   ENT: all negative except as noted in HPI   CV: denies palpitations  Pulm: denies SOB, cough, hemoptysis  GI: denies change in apetite, indigestion, n/v  : denies pertinent urinary symptoms, urgency  Neuro: denies numbness/tingling, loss of sensation  Psych: denies anxiety  MS: denies muscle weakness, instability  Heme: denies easy bruising or bleeding  Endo: denies heat/cold intolerance, excessive sweating  Vascular: denies LE edema    Vital Signs Last 24 Hrs  T(C): 36.8 (16 Oct 2020 11:56), Max: 36.8 (16 Oct 2020 11:56)  T(F): 98.3 (16 Oct 2020 11:56), Max: 98.3 (16 Oct 2020 11:56)  HR: 94 (16 Oct 2020 14:40) (77 - 94)  BP: 126/69 (16 Oct 2020 14:40) (116/69 - 151/81)  BP(mean): --  RR: 19 (16 Oct 2020 11:56) (18 - 19)  SpO2: 94% (16 Oct 2020 14:40) (94% - 97%)                          10.6   9.10  )-----------( 272      ( 15 Oct 2020 05:48 )             31.6    10-15    136  |  100  |  23  ----------------------------<  85  4.2   |  28  |  0.89    Ca    9.0      15 Oct 2020 05:48         PHYSICAL EXAM:  Gen: NAD  Skin: No rashes, bruises, or lesions  Head: Normocephalic, Atraumatic  Face: no edema, erythema, or fluctuance. Parotid glands soft without mass  Eyes: no scleral injection  Nose: Nares bilaterally patent, no discharge  Mouth: No Stridor / Drooling / Trismus.  Mucosa moist, tongue/uvula midline, oropharynx clear  Neck: Flat, supple, no lymphadenopathy, trachea midline, no masses  Lymphatic: No lymphadenopathy  Resp: breathing easily, no stridor  CV: no peripheral edema/cyanosis  GI: nondistended   Peripheral vascular: no JVD or edema  Neuro: facial nerve intact, no facial droop        Diagnostic Nasal Endoscopy: (Scope #2 used)    Fiberoptic Indirect laryngoscopy:  (Scope #2 used)        IMAGING/ADDITIONAL STUDIES:   Reason for Laryngoscopy:    Patient was unable to cooperate with mirror.  Nasopharynx, oropharynx, and hypopharynx clear, no bleeding. Tongue base, posterior pharyngeal wall, vallecula, epiglottis, and subglottis appear normal. No erythema, edema, pooling of secretions, masses or lesions. Airway patent, no foreign body visualized. No glottic/supraglottic edema. True vocal cords, arytenoids, vestibular folds, ventricles, pyriform sinuses, and aryepiglottic folds appear normal bilaterally. Vocal cords mobile with good contact b/l.

## 2020-10-16 NOTE — SWALLOW VFSS/MBS ASSESSMENT ADULT - CONSISTENCIES ADMINISTERED
puree thick/puree thin puree thick/puree thin/honey thick/nectar thick thin liquid soft solid/hard solid

## 2020-10-16 NOTE — CONSULT NOTE ADULT - ASSESSMENT
89Fw/ PMH of HTN, HLD, freq falls, PE (Jun '20) on eliquis, GERD, Osteoporosis, hypothyroidism, h/o ACDF presenting with SOB, admitted for suspected aspiration and increased pharyngeal secretions with difficulty clearing them and mild oropharyngeal. ENT consulted for vocal cord evaluation. Laryngoscopy done at bedside, vocal cords mobile and intact bilaterally. Airway patent.

## 2020-10-16 NOTE — CONSULT NOTE ADULT - ASSESSMENT
89 y/o F w/ hx of hypothyroidism, osteoporosis, HTN, HLD admitted with SOB found to have iatrogenic hyperthyroidism

## 2020-10-16 NOTE — DIETITIAN INITIAL EVALUATION ADULT. - OTHER INFO
Confirmed information with PCA. Pt reports decreased appetite but good PO intake in house due to "hospitalization". Noted 50-75% PO intake as per flow sheets. Offered nutritional supplement - pt refused due to "good PO intake". Diet upgraded to regular texture today, pt reports previously tolerating mechanical soft diet and honey thickened liquids. Pt denies nausea, vomiting, diarrhea, or constipation, last BM yesterday (10/15).     Pt denies significant weight changes PTA, reports UBW approximately 114 pounds. Weight as per flow sheets (10/12) 110 pounds - will continue to monitor (pt sitting in  chair).     Encouraged to continue adequate kcal and protein intake; reviewed foods with protein and menu order procedures. Pt denies having further questions/concerns about diet and nutrition; made aware RD remains available.

## 2020-10-16 NOTE — DIETITIAN INITIAL EVALUATION ADULT. - ADD RECOMMEND
1. Will continue to monitor PO intake, weight, labs, skin, GI status, diet. 2. Encourage PO intake and obtain food preferences (obtained at this time). 3. Encouraged to continue adequate kcal and protein intake - made aware RD remains available.

## 2020-10-16 NOTE — PROGRESS NOTE ADULT - ASSESSMENT
ASSESSMENT:    dyspnea without hypoxemia - appears comfortable on room air - hoarseness    1) restrictive lung disease - respiratory muscle weakness/elevated right diaphragm/kyphoscoliosis  2) resolved non-occlusive left lower lobe pulmonary embolism - no evidence of new pulmonary emboli  3) mild bronchospasm  4) poor secretion clearance  5) "rule out" vocal cord pathology    PLAN/RECOMMENDATIONS:    stable oxygenation on room air  ENT evalulation  observe off antibiotics  prednisone 50mg daily x 5 days  albuterol/atrovent nebs q6h  pulmicort 0.5mg nebs q12h - give after duoneb - rinse mouth after use  claritin/flonase  incentive spirometry  acapella device  continues on eliquis for PE in June  speech and swallow evaluation ongoing  wound care   other issues as per medicine    Will follow with you. Plan of care discussed with the patient at bedside and the dedicated floor NP.    Ryan Fuentes MD, [facility]  [phone]  Pulmonary Medicine

## 2020-10-16 NOTE — DISCHARGE NOTE NURSING/CASE MANAGEMENT/SOCIAL WORK - PATIENT PORTAL LINK FT
You can access the FollowMyHealth Patient Portal offered by [facility] by registering at the following website: [email or web address]. By joining [employer]’s FollowMyHealth portal, you will also be able to view your health information using other applications (apps) compatible with our system.

## 2020-10-16 NOTE — CONSULT NOTE ADULT - ATTENDING COMMENTS
Patient seen and examined, agree with above assessment and plan as transcribed above.    - secretions in bronchi ? Aspiration   - ? swallow eval    Mumtaz Dixon MD, [facility]  BEEPER [phone]
Plan for nonoperative treatment
Patient with a few days of bilateral hand numbness right neck pain with tenderness over the paraspinal muscles 4-/5 of right biceps, 3/5 R deltoid. 5/5 triceps  is, no significant shoulder pain on passive ROM, ? subtle right dorsiflexion weakness Bilateral Babinsky, more prominent on the left    Exam raises concern for possible cervical myelopathy with prominent deficits in the right C5-C6, would also r/o CVA    Would consider MRI C-Spine w/o con     Consider getting MRI brain w/o con to r/o stroke although unlikely to  as patient is on AC NIHSS 3,     warm packs to right neck

## 2020-10-16 NOTE — PROGRESS NOTE ADULT - SUBJECTIVE AND OBJECTIVE BOX
S: SOB/Cough improved. Denies chest pain. Review of systems otherwise (-)  	    MEDICATIONS  (STANDING):  albuterol/ipratropium for Nebulization 3 milliLiter(s) Nebulizer every 6 hours  apixaban 2.5 milliGRAM(s) Oral every 12 hours  aspirin enteric coated 81 milliGRAM(s) Oral daily  buDESOnide    Inhalation Suspension 0.5 milliGRAM(s) Inhalation every 12 hours  fluticasone propionate 50 MICROgram(s)/spray Nasal Spray 1 Spray(s) Both Nostrils two times a day  levothyroxine 75 MICROGram(s) Oral daily  loratadine 10 milliGRAM(s) Oral daily  melatonin 5 milliGRAM(s) Oral at bedtime  pantoprazole    Tablet 40 milliGRAM(s) Oral before breakfast  polyethylene glycol 3350 17 Gram(s) Oral daily  predniSONE   Tablet 50 milliGRAM(s) Oral daily  simvastatin 20 milliGRAM(s) Oral at bedtime    MEDICATIONS  (PRN):  senna 2 Tablet(s) Oral at bedtime PRN Constipation      LABS:                            10.6   9.10  )-----------( 272      ( 15 Oct 2020 05:48 )             31.6     Hemoglobin: 10.6 g/dL (10-15 @ 05:48)  Hemoglobin: 11.3 g/dL (10-14 @ 06:54)  Hemoglobin: 12.0 g/dL (10-13 @ 07:22)  Hemoglobin: 12.4 g/dL (10-12 @ 12:40)    10-15    136  |  100  |  23  ----------------------------<  85  4.2   |  28  |  0.89    Ca    9.0      15 Oct 2020 05:48      Creatinine Trend: 0.89<--, 0.91<--, 1.04<--, 1.05<--             10-15-20 @ 07:01  -  10-16-20 @ 07:00  --------------------------------------------------------  IN: 540 mL / OUT: 400 mL / NET: 140 mL    10-16-20 @ 07:01  -  10-16-20 @ 15:09  --------------------------------------------------------  IN: 360 mL / OUT: 0 mL / NET: 360 mL        PHYSICAL EXAM  Vital Signs Last 24 Hrs  T(C): 36.8 (16 Oct 2020 11:56), Max: 36.8 (16 Oct 2020 11:56)  T(F): 98.3 (16 Oct 2020 11:56), Max: 98.3 (16 Oct 2020 11:56)  HR: 94 (16 Oct 2020 14:40) (77 - 94)  BP: 126/69 (16 Oct 2020 14:40) (116/69 - 151/81)  BP(mean): --  RR: 19 (16 Oct 2020 11:56) (18 - 19)  SpO2: 94% (16 Oct 2020 14:40) (94% - 97%)      Gen: Appears well in NAD  HEENT:  (-)icterus (-)pallor  CV: N S1 S2 1/6 KIN (+)2 Pulses B/l  Resp:  Clear to auscultation B/L, normal effort  GI: (+) BS Soft, NT, ND  Lymph:  (-)Edema, (-)obvious lymphadenopathy  Skin: Warm to touch, Normal turgor  Psych: Appropriate mood and affect      TELEMETRY: None	      < from: Transthoracic Echocardiogram (10.14.20 @ 07:06) >  Conclusions:  1. Calcified trileaflet aortic valve with decreased  opening. Peak transaortic valve gradient equals 16 mm Hg,  mean transaortic valve gradient equals 7 mm Hg, estimated  aortic valve area equals 1.7 sqcm (by continuity equation),  aortic valve velocity time integral equals 40 cm,  consistent with mild aortic stenosis.  2. Normal left ventricular internal dimensions and wall  thicknesses.  3. Hyperdynamic left ventricular systolic function.  4. Estimated pulmonary artery systolic pressure equals 44  mm Hg, assuming right atrial pressure equals 8 mm Hg,  consistent with mild pulmonary pressures.    < end of copied text >      ASSESSMENT/PLAN: Patient is a 89 y/o Female well known to our office with PMH of HTN, HLD, frequent falls, hx PE on Eliquis, GERD, Osteoporosis, hypothyroidism who is admitted with SOB/cough concerning for aspiration. Cardiology consulted for further evaluation.    - CTA noted with no PE, clear lungs but secretions noted within several bronchi of lower lobes  - No evidence of clinical HF or anginal symptoms  - Pulm follow up appreciated  - F/u S+S eval  - Neuro f/u regarding MRI C spine, MRI brain with no acute CVA  - Continue AC with Eliquis for hx PE  - Repeat TTE with hyperdynamic LV, mild AS  - No further inpatient cardiac w/u needed  - Patient to f/u with Dr. Mancera on 11/11 at 1 PM    Florentin Juarez PA-C  Pager: [phone]

## 2020-10-16 NOTE — SWALLOW VFSS/MBS ASSESSMENT ADULT - ESOPHAGEAL STAGE
trace retention in proximal esophagus just superior to ? small nonobstructive cricopharyngeal bar trace retention and retrograde flow in proximal esophagus just superior to ? small nonobstructive cricopharyngeal bar

## 2020-10-16 NOTE — PROGRESS NOTE ADULT - SUBJECTIVE AND OBJECTIVE BOX
[facility] - [facility] - PROGRESS NOTE    CHIEF COMPLAINT: dyspnea; paralyzed right diaphragm; kyphoscoliosis; bronchospasm    INTERVAL HISTORY: multiple skin bruises and excoriations -> seen by derm; placed on dysphagia diet awaiting results of MBS earlier today; improved shortness of breath - less secretions in the back of her throat - hoarse - looks comfortable sitting in bed on room air; no chest congestion or wheeze; no fevers, chills or sweats; no chest pain/pressure or palpitations; right shoulder pain limiting ability to raise the arm;      REVIEW OF SYSTEMS:  Constitutional: As per interval history  HEENT: Within normal limits  CV: As per interval history  Resp: As per interval history  GI: Within normal limits   : Within normal limits  Musculoskeletal: right shoulder pain  Skin: diffuse ecchymoses/skin discoloration and abrasions  Neurological: Within normal limits  Psychiatric: Within normal limits  Endocrine: Within normal limits  Hematologic/Lymphatic: Within normal limits  Allergic/Immunologic: Within normal limits    MEDICATIONS:     Pulmonary "  albuterol/ipratropium for Nebulization 3 milliLiter(s) Nebulizer every 6 hours  buDESOnide    Inhalation Suspension 0.5 milliGRAM(s) Inhalation every 12 hours  loratadine 10 milliGRAM(s) Oral daily    Anti-microbials:    Cardiovascular:    Other:  apixaban 2.5 milliGRAM(s) Oral every 12 hours  aspirin enteric coated 81 milliGRAM(s) Oral daily  fluticasone propionate 50 MICROgram(s)/spray Nasal Spray 1 Spray(s) Both Nostrils two times a day  levothyroxine 75 MICROGram(s) Oral daily  melatonin 5 milliGRAM(s) Oral at bedtime  pantoprazole    Tablet 40 milliGRAM(s) Oral before breakfast  polyethylene glycol 3350 17 Gram(s) Oral daily  predniSONE   Tablet 50 milliGRAM(s) Oral daily  simvastatin 20 milliGRAM(s) Oral at bedtime    MEDICATIONS  (PRN):  senna 2 Tablet(s) Oral at bedtime PRN Constipation        OBJECTIVE:    I&O's Detail    15 Oct 2020 07:01  -  16 Oct 2020 07:00  --------------------------------------------------------  IN:    Oral Fluid: 540 mL  Total IN: 540 mL    OUT:    Voided (mL): 400 mL  Total OUT: 400 mL    Total NET: 140 mL    PHYSICAL EXAM:       ICU Vital Signs Last 24 Hrs  T(C): 36.3 (16 Oct 2020 04:47), Max: 37.1 (15 Oct 2020 12:13)  T(F): 97.4 (16 Oct 2020 04:47), Max: 98.7 (15 Oct 2020 12:13)  HR: 77 (16 Oct 2020 04:47) (74 - 94)  BP: 134/65 (16 Oct 2020 04:47) (122/66 - 151/81)  BP(mean): --  ABP: --  ABP(mean): --  RR: 18 (16 Oct 2020 04:47) (17 - 19)  SpO2: 95% (16 Oct 2020 04:47) (91% - 97%) on room air     General: Awake. Alert. Cooperative. No distress. Appears stated age. Sitting in the chair. Hoarse.  HEENT:   Atraumatic. Normocephalic. Anicteric. Normal oral mucosa. PERRL. EOMI.  Neck: Supple. Trachea midline. Thyroid without enlargement/tenderness/nodules. No carotid bruit. No JVD.	  Cardiovascular: Regular rate and rhythm. S1 S2 normal. No murmurs, rubs or gallops.  Respiratory: Respirations unlabored. Clear to auscultation and percussion. Kyphoscoliosis.  Abdomen: Soft. Non-tender. Non-distended. No organomegaly. No masses. Normal bowel sounds.  Extremities: Warm to touch. No clubbing or cyanosis. No pedal edema. Decreased ROM of right shoulder.  Pulses: 2+ peripheral pulses all extremities.	  Skin: Multiple ecchymoses with venous stasis changes on both legs. Diffuse ecchymoses and abrasions.  Lymph Nodes: Cervical, supraclavicular and axillary nodes normal  Neurological: Motor and sensory examination equal and normal. A and O x 2  Psychiatry: Appropriate mood and affect.    LABS:                          10.6   9.10  )-----------( 272      ( 15 Oct 2020 05:48 )             31.6     CBC    WBC  9.10 <==, 4.61 <==, 9.89 <==, 9.83 <==    Hemoglobin  10.6 <<==, 11.3 <<==, 12.0 <<==, 12.4 <<==    Hematocrit  31.6 <==, 33.7 <==, 38.3 <==, 38.2 <==    Platelets  272 <==, 307 <==, 323 <==, 340 <==      136  |  100  |  23  ----------------------------<  85    10-15  4.2   |  28  |  0.89      LYTES    sodium  136 <==, 137 <==, 141 <==, 138 <==    potassium   4.2 <==, 4.5 <==, 4.4 <==, 3.7 <==    chloride  100 <==, 102 <==, 102 <==, 104 <==    carbon dioxide  28 <==, 26 <==, 24 <==, 24 <==    =============================================================================================  RENAL FUNCTION:    Creatinine:   0.89  <<==, 0.91  <<==, 1.04  <<==, 1.05  <<==    BUN:   23 <==, 24 <==, 30 <==, 33 <==    ============================================================================================    calcium   9.0 <==, 9.4 <==, 10.0 <==, 9.7 <==    phos   3.7 <==    mag   2.4 <==    ============================================================================================  LFTs    AST:   22 <== , 24 <==     ALT:  19  <== , 16  <==     AP:  49  <=, 48  <=    Bili:  0.5  <=, 0.4  <=      PT/INR - ( 12 Oct 2020 12:40 )   PT: 12.5 sec;   INR: 1.04 ratio     PTT - ( 12 Oct 2020 12:40 )  PTT:27.5 sec    D-Dimer Assay, Quantitative: 683 ng/mL DDU (10-12 @ 12:40)    CARDIAC MARKERS ( 12 Oct 2020 12:40 )  CPK x     /CKMB x     /CKMB Units x        troponin 23 ng/L    MICROBIOLOGY:     Respiratory Viral Panel + COVID-19 by STEPHAN (10.12.20 @ 12:39)   Rapid RVP Result: NotDete   SARS-CoV-2: NotDete: This Respiratory Panel uses polymerase chain reaction (PCR) to detect for   adenovirus; coronavirus (HKU1, NL63, 229E, OC43); human metapneumovirus   (hMPV); human enterovirus/rhinovirus (Entero/RV); influenza A; influenza   A/H1; influenza A/H3; influenza A/H1-2009; influenza B; parainfluenza   viruses 1, 2, 3, 4; respiratory syncytial virus; Mycoplasma pneumoniae;   Chlamydophila pneumoniae; and SARS-CoV-2.   ---------------------------------------------------------------------------------------------------------------    Urinalysis Basic - ( 12 Oct 2020 13:30 )    Color: Light Yellow / Appearance: Clear / S.022 / pH: x  Gluc: x / Ketone: Negative  / Bili: Negative / Urobili: Negative   Blood: x / Protein: Trace / Nitrite: Negative   Leuk Esterase: Moderate / RBC: 2 /hpf / WBC 14 /HPF   Sq Epi: x / Non Sq Epi: 1 /hpf / Bacteria: Negative    Culture - Urine (10.12.20 @ 17:29)   Specimen Source: .Urine Clean Catch (Midstream)   Culture Results:   <10,000 CFU/mL Normal Urogenital Maribel   ---------------------------------------------------------------------------------------------------------------    RADIOLOGY:  [x ] Chest radiographs reviewed and interpreted by me      EXAM:  XR CHEST PORTABLE URGENT 1V                          PROCEDURE DATE:  10/12/2020      INTERPRETATION:  A single chest x-ray was obtained on 2020.    Indication: Crackles in bases.    Impression:    The heart is normal in size. The lungs are clear. Slightly elevated right hemidiaphragm. No pleural effusion. No pneumothorax. A loop recorder is seen overlying the left hemithorax. Degenerative changes of the thoracic spine. Orthopedic hardware is seen in the cervical spine.    PADMINI JUNIOR M.D., ATTENDING RADIOLOGIST  This document has been electronically signed. Oct 12 2020  1:52PM  ---------------------------------------------------------------------------------------------------------------    EXAM:  CT ANGIO CHEST (W)AW IC                          PROCEDURE DATE:  10/12/2020      INTERPRETATION:  CLINICAL INFORMATION: Dyspnea    COMPARISON: CT chest dated 2020    PROCEDURE:  CT Angiography of the Chest.  90 ml of Omnipaque 350 was injected intravenously. 10 ml were discarded.  Sagittal and coronal reformats were performed as well as 3D (MIP) reconstructions.    FINDINGS:    LUNGS AND AIRWAYS: Patent central airways. Secretions are noted within several of the bronchi in both lower lobes. Lungs are clear.  PLEURA: No pleural effusion.  MEDIASTINUM AND MIGUEL A: No lymphadenopathy.  HEART: Heart size is normal. No pericardial effusion.  CHEST WALL AND LOWER NECK: Within normal limits.  VISUALIZED UPPER ABDOMEN: Within normal limits.  BONES: Degenerative changes of the spine    IMPRESSION:  No pulmonary embolism    SRAVAN BLUNT M.D., RADIOLOGY RESIDENT  This document has been electronically signed.  ZACK MEDINA M.D., ATTENDING RADIOLOGIST  This documenthas been electronically signed. Oct 12 2020  3:07PM  ---------------------------------------------------------------------------------------------------------------

## 2020-10-16 NOTE — SWALLOW VFSS/MBS ASSESSMENT ADULT - SLP GENERAL OBSERVATIONS
Pt encountered in radiology, secure in GLO chair. Pt awake and alert, cooperative, following directions for the purposes of the exam.

## 2020-10-16 NOTE — SWALLOW VFSS/MBS ASSESSMENT ADULT - COMMENTS
Per Pulm: The patient was admitted in June with shortness of breath. She was found to have a left lower lobe non-occlusive pulmonary embolism in addition to restrictive lung disease, bronchospasm and deconditioning. She improved with anticoagulation and bronchodilators. Over the last several days, the patient reports that her breathing has become more difficult especially when talking. She describes difficulty mobilizing secretions in the back of her throat. She has no cough or sputum production. She has chest congestion and wheeze.  dyspnea without hypoxemia - appears comfortable on room air. Assessment: 1) restrictive lung disease - respiratory muscle weakness/elevated right diaphragm/kyphoscoliosis. 2) resolved non-occlusive left lower lobe pulmonary embolism - no evidence of new pulmonary emboli. 3) mild bronchospasm. 4) poor secretion clearance.

## 2020-10-16 NOTE — PROGRESS NOTE ADULT - SUBJECTIVE AND OBJECTIVE BOX
Interval History - Patient seen and examined this am. Still with RUE weakness            Vital Signs Last 24 Hrs  T(C): 36.8 (16 Oct 2020 11:56), Max: 36.8 (16 Oct 2020 11:56)  T(F): 98.3 (16 Oct 2020 11:56), Max: 98.3 (16 Oct 2020 11:56)  HR: 79 (16 Oct 2020 11:56) (77 - 94)  BP: 116/69 (16 Oct 2020 11:56) (116/69 - 151/81)  BP(mean): --  RR: 19 (16 Oct 2020 11:56) (18 - 19)  SpO2: 95% (16 Oct 2020 11:56) (95% - 97%)    NEUROLOGICAL EXAM:  MS: AAOX3, fluent, attends b/l; recent and remote memory intact; normal attention, language and fund of knowledge. Intact naming and repetition. Speech fluent. Follows three step commands.   CN: VFF, EOMI, PERRLA. V1-3 intact, no facial asymmetry, t/p midline, SCM intact b/l, trap on right-side movement limited by pain,   Motor: Strength: 5/5 in left UE, and LE b/l. LUE 5/5 except deltoid and biceps 4/5  Tone: increased in LES 5/5 LES. Bulk: normal.   DTR 2+ symm.  Plantar flexright left equivocal  Sensation: noted a difference in sensation in tips of fingers and toes, no numbness. intact vibratory sense. intact proprioception.   Coordination: intact 4x.   Gait:  Deferred         Medications  albuterol/ipratropium for Nebulization 3 milliLiter(s) Nebulizer every 6 hours  apixaban 2.5 milliGRAM(s) Oral every 12 hours  aspirin enteric coated 81 milliGRAM(s) Oral daily  buDESOnide    Inhalation Suspension 0.5 milliGRAM(s) Inhalation every 12 hours  fluticasone propionate 50 MICROgram(s)/spray Nasal Spray 1 Spray(s) Both Nostrils two times a day  levothyroxine 75 MICROGram(s) Oral daily  loratadine 10 milliGRAM(s) Oral daily  melatonin 5 milliGRAM(s) Oral at bedtime  pantoprazole    Tablet 40 milliGRAM(s) Oral before breakfast  polyethylene glycol 3350 17 Gram(s) Oral daily  predniSONE   Tablet 50 milliGRAM(s) Oral daily  senna 2 Tablet(s) Oral at bedtime PRN  simvastatin 20 milliGRAM(s) Oral at bedtime      Lab      Radiology    < from: MR Head No Cont (10.16.20 @ 13:22) >  IMPRESSION: Age-appropriate involutional and ischemic gliotic changes. No acute infarcts or hemorrhage.    < end of copied text >  < from: MR Cervical Spine No Cont (10.16.20 @ 13:22) >    INTERPRETATION:  CLINICAL INDICATION: Myelopathy, right C5 weakness    Magnetic resonance imaging of the cervical spine was carried out with sagittal surfacecoil imaging from C1 to T8 using T1 and fast spin echo T2 weighted images with and without fat saturation technique with axial T1 and fast spin echo T2 weighted imaging on a 1.5 Brittani magnet.    There has been a previous anterior cervical discectomy C5-6 with an interposition  graft and overlying metallic plating. There are ventral extradural defects at C2-3, C3-4 and C4-5 disc osteophyte complexes. There is a dorsal extradural defect at C2-3 and C3-4 due to ligamentous hypertrophy. There is moderate spinal stenosis without cord compression at C3-4. The cervical vertebrae are normal in height and intensity. There is grade 1 anterolisthesis of C7 on T1.    Impression: Previous anterior cervical discectomy and metallic plating C5-6. Disc osteophyte complexes C2-3, C3-4 and C4-5. Moderate spinal stenosis C3-4.    < end of copied text >

## 2020-10-16 NOTE — CONSULT NOTE ADULT - SUBJECTIVE AND OBJECTIVE BOX
89y Female w/ PMH of HTN, HLD, freq falls, PE (Jun '20) on eliquis, admitted with aspiration. Ortho consulted due to R shoulder weakness. Patient states she was diagnosed with R shoulder rotator cuff tear about 6 months ago which was treated nonoperatively. States she was having slight improvement of symptoms since then, but then recently fell one week ago when her R shoulder weakness was aggravated. Reports pain and difficulty moving affected extremity afterward. Denies headstrike/LOC. Denies numbness/tingling of the affected extremity. No other bone or joint complaints.    PAST MEDICAL & SURGICAL HISTORY:  Skin cancer  lower extremities    Hip fracture  Right hip- 1995    Osteoporosis    Chronic anal fissure    Anal spasm    mild Dementia    Dyslipidemia    Hypertension    H/O: Hypothyroidism    H/O gastroesophageal reflux (GERD)    Anterior Cervical discectomy    left eye Retinal tear repair    After-Cataract of Both Eyes    left Elbow surgey secondary to injury    History of Tonsillectomy    History of  Hip surgery with hardware      MEDICATIONS  (STANDING):  albuterol/ipratropium for Nebulization 3 milliLiter(s) Nebulizer every 6 hours  apixaban 2.5 milliGRAM(s) Oral every 12 hours  aspirin enteric coated 81 milliGRAM(s) Oral daily  buDESOnide    Inhalation Suspension 0.5 milliGRAM(s) Inhalation every 12 hours  fluticasone propionate 50 MICROgram(s)/spray Nasal Spray 1 Spray(s) Both Nostrils two times a day  levothyroxine 75 MICROGram(s) Oral daily  loratadine 10 milliGRAM(s) Oral daily  melatonin 5 milliGRAM(s) Oral at bedtime  pantoprazole    Tablet 40 milliGRAM(s) Oral before breakfast  petrolatum white Ointment 1 Application(s) Topical two times a day  polyethylene glycol 3350 17 Gram(s) Oral daily  predniSONE   Tablet 50 milliGRAM(s) Oral daily  simvastatin 20 milliGRAM(s) Oral at bedtime  triamcinolone 0.1% Ointment 1 Application(s) Topical two times a day    MEDICATIONS  (PRN):  senna 2 Tablet(s) Oral at bedtime PRN Constipation    penicillin (Rash)      Physical Exam  T(C): 36.8 (10-16-20 @ 11:56), Max: 36.8 (10-16-20 @ 11:56)  HR: 94 (10-16-20 @ 14:40) (77 - 94)  BP: 126/69 (10-16-20 @ 14:40) (116/69 - 150/78)  RR: 19 (10-16-20 @ 11:56) (18 - 19)  SpO2: 94% (10-16-20 @ 14:40) (94% - 95%)  Wt(kg): --    Gen: NAD  RUE: skin intact, no palpable deformity or defects  AROM: int rotation to belly, ext rotation 10 degs, forward flex 30 degs, abduction 30 degs  PROM: able to forward flex and adbuct shoulder to 100 degs without pain  +Ax/M/R/U intact  SILT Ax/M/U/R  2+ radial pulses, cap refill < 2s    Imaging  X-ray: Glenohumeral arthritis, no acute fracture.       A/P: 89y Female w/ acute on chronic R shoulder weakness. Previously diagnosed with weakness from R rotator cuff tear 6 months ago treated non op. Weakness of R shoulder became worse last week ago after a mechanical fall. Weakness is likely from acute on chronic rotator cuff tear based on exam.  - No orthopaedic surgical intervention  - Need axillary view of R shoulder to rule out dislocation  - pain control  - PT/OT  - WBAT RUE  - follow-up with Dr. Rich in one to two weeks after discharge

## 2020-10-16 NOTE — SWALLOW VFSS/MBS ASSESSMENT ADULT - NS SWALLOW VFSS REC ASPIR MON
change of breathing pattern/Monitor for s/s aspiration/laryngeal penetration. If noted:  D/C p.o. intake, provide non-oral nutrition/hydration/meds, and contact this service @ [phone]/upper respiratory infection/cough/fever/pneumonia/throat clearing/gurgly voice

## 2020-10-16 NOTE — DIETITIAN INITIAL EVALUATION ADULT. - PHYSCIAL ASSESSMENT
Performed nutrition focused physical exam with pt's consent and noted no signs of muscle/fat loss in any area/well nourished Skin: no noted pressure injuries as per documentation.

## 2020-10-16 NOTE — PROGRESS NOTE ADULT - ASSESSMENT
89F c hx HTN, HLD, freq falls, PE (Jun '20) on eliquis, GERD, Osteoporosis, hypothyroidism, pw SOB and cough poss 2/2 aspiration.    SOB (shortness of breath).  - CTA neg for pe or infection  - suspect 2/2 aspiration  - cont nebs, chest PT  - short course of Prednisone   - hold off abx.   - nebs  - Pulmonary follow  - Cardiology follow     Aspiration into airway, initial encounter.   - speech/swallow dysphagia eval  - pepcid.   - MBS pending     Chronic pulmonary embolism without acute cor pulmonale, unspecified pulmonary embolism type.  - cont eliquis  - aspirin to 81.     Debility.   - PT eval    Hypothyroidism  - decrease Synthroid to 50 mcg  - follow TSH.   - Endocrine evaluation noted    R sided weakness  - CT head with no acute findings.  - Neuro evaluation noted    Rash  - Dermatology evaluation pending     R shoulder pain  - Xray shoulder axillary view  - PT  - Ortho eval    - pain control    Art Carbajal MD pager [phone]

## 2020-10-16 NOTE — DIETITIAN INITIAL EVALUATION ADULT. - OTHER CALCULATIONS
Pertinent information as per chart: Pt 89 y/o F with PMH: HTN, HLD, freq falls, PE (Jun '20) on eliquis, GERD, Osteoporosis, hypothyroidism, h/o ACDF presenting with SOB poss 2/2 aspiration; S/P swallow evaluation (10/14) recommending dysphagia 2 + honey thickened liquids, S/P VFSS/MBS (10/16) recommending regular texture and thin liquids.

## 2020-10-16 NOTE — SWALLOW VFSS/MBS ASSESSMENT ADULT - DIAGNOSTIC IMPRESSIONS
89 yo female admitted with SOB x1 day, with h/o GERD, ACDF, and new c/o increased pharyngeal secretions with difficulty clearing them. Pt presents with a mild oropharyngeal 89 yo female admitted with SOB x1 day, with h/o GERD, ACDF, and new c/o increased pharyngeal secretions with difficulty clearing them. Pt presents with a mild oropharyngeal dysphagia notable for mildly impaired oral management with prolonged yet efficient mastication, piecemeal deglutition, trace to min pharyngeal residue, and laryngeal penetration of thin liquids that appears deeper with consecutive cups sips, but that appears retrieved. No aspiration was observed across trials. Pt with occasional throat clears in absence of laryngeal penetration/aspiration.

## 2020-10-16 NOTE — SWALLOW VFSS/MBS ASSESSMENT ADULT - ADDITIONAL INFORMATION
+ slight calcifications of laryngeal surface of epiglottis, thyroid cartilage, and arytenoid cartilages   + exaggerated lordosis of C-spine  + anterior C-spine hardware at C5-6 region c/w prior discectomy  ? small nonobstructive cricopharyngeal bar

## 2020-10-16 NOTE — PROGRESS NOTE ADULT - SUBJECTIVE AND OBJECTIVE BOX
Patient is a 89y old  Female who presents with a chief complaint of sob (16 Oct 2020 17:00)      SUBJECTIVE / OVERNIGHT EVENTS: c/o R shoulder limited ROM  Review of Systems  chest pain no  palpitations no  sob no  nausea no  headache no    MEDICATIONS  (STANDING):  albuterol/ipratropium for Nebulization 3 milliLiter(s) Nebulizer every 6 hours  apixaban 2.5 milliGRAM(s) Oral every 12 hours  aspirin enteric coated 81 milliGRAM(s) Oral daily  buDESOnide    Inhalation Suspension 0.5 milliGRAM(s) Inhalation every 12 hours  fluticasone propionate 50 MICROgram(s)/spray Nasal Spray 1 Spray(s) Both Nostrils two times a day  levothyroxine 75 MICROGram(s) Oral daily  loratadine 10 milliGRAM(s) Oral daily  melatonin 5 milliGRAM(s) Oral at bedtime  pantoprazole    Tablet 40 milliGRAM(s) Oral before breakfast  petrolatum white Ointment 1 Application(s) Topical two times a day  polyethylene glycol 3350 17 Gram(s) Oral daily  predniSONE   Tablet 50 milliGRAM(s) Oral daily  simvastatin 20 milliGRAM(s) Oral at bedtime  triamcinolone 0.1% Ointment 1 Application(s) Topical two times a day    MEDICATIONS  (PRN):  senna 2 Tablet(s) Oral at bedtime PRN Constipation      Vital Signs Last 24 Hrs  T(C): 36.8 (16 Oct 2020 11:56), Max: 36.8 (16 Oct 2020 11:56)  T(F): 98.3 (16 Oct 2020 11:56), Max: 98.3 (16 Oct 2020 11:56)  HR: 94 (16 Oct 2020 14:40) (77 - 94)  BP: 126/69 (16 Oct 2020 14:40) (116/69 - 150/78)  BP(mean): --  RR: 19 (16 Oct 2020 11:56) (18 - 19)  SpO2: 94% (16 Oct 2020 14:40) (94% - 95%)    PHYSICAL EXAM:  GENERAL: NAD  HEAD:  Atraumatic, Normocephalic  EYES: EOMI, PERRLA, conjunctiva and sclera clear  NECK: Supple, No JVD  CHEST/LUNG: Clear to auscultation bilaterally; No wheeze  HEART: Regular rate and rhythm; No murmurs, rubs, or gallops  ABDOMEN: Soft, Nontender, Nondistended; Bowel sounds present  EXTREMITIES:  2+ Peripheral Pulses, No clubbing, cyanosis, or edema  PSYCH: AAOx3  NEUROLOGY: non-focal  SKIN: + rashes chest/ neck    LABS:                        10.6   9.10  )-----------( 272      ( 15 Oct 2020 05:48 )             31.6     10-15    136  |  100  |  23  ----------------------------<  85  4.2   |  28  |  0.89    Ca    9.0      15 Oct 2020 05:48                  RADIOLOGY & ADDITIONAL TESTS:    Imaging Personally Reviewed:    Consultant(s) Notes Reviewed:      Care Discussed with Consultants/Other Providers:

## 2020-10-16 NOTE — SWALLOW VFSS/MBS ASSESSMENT ADULT - ORAL PHASE
Residue in oral cavity/Reduced anterior - posterior transport/piecemeal deglutition; trace lingual residue post swallow piecemeal deglutition; intermittent spillover to the valleculae during consecutive cup sips of nectar-thick liquids; trace lingual residue post swallow/Delayed oral transit time/Reduced anterior - posterior transport Reduced anterior - posterior transport/Delayed oral transit time/piecemeal deglutition; trace lingual residue Delayed oral transit time/Reduced anterior - posterior transport/piecemeal deglutition; trace to min lingual residue that clears with reswallow

## 2020-10-17 RX ORDER — INFLUENZA VIRUS VACCINE 15; 15; 15; 15 UG/.5ML; UG/.5ML; UG/.5ML; UG/.5ML
0.5 SUSPENSION INTRAMUSCULAR ONCE
Refills: 0 | Status: DISCONTINUED | OUTPATIENT
Start: 2020-10-17 | End: 2020-10-18

## 2020-10-17 RX ADMIN — Medication 1 SPRAY(S): at 05:06

## 2020-10-17 RX ADMIN — Medication 0.5 MILLIGRAM(S): at 05:08

## 2020-10-17 RX ADMIN — Medication 1 APPLICATION(S): at 17:47

## 2020-10-17 RX ADMIN — Medication 3 MILLILITER(S): at 17:46

## 2020-10-17 RX ADMIN — Medication 3 MILLILITER(S): at 05:06

## 2020-10-17 RX ADMIN — Medication 3 MILLILITER(S): at 23:34

## 2020-10-17 RX ADMIN — SIMVASTATIN 20 MILLIGRAM(S): 20 TABLET, FILM COATED ORAL at 21:33

## 2020-10-17 RX ADMIN — LORATADINE 10 MILLIGRAM(S): 10 TABLET ORAL at 11:13

## 2020-10-17 RX ADMIN — Medication 81 MILLIGRAM(S): at 11:13

## 2020-10-17 RX ADMIN — APIXABAN 2.5 MILLIGRAM(S): 2.5 TABLET, FILM COATED ORAL at 05:06

## 2020-10-17 RX ADMIN — PANTOPRAZOLE SODIUM 40 MILLIGRAM(S): 20 TABLET, DELAYED RELEASE ORAL at 05:08

## 2020-10-17 RX ADMIN — Medication 1 APPLICATION(S): at 17:48

## 2020-10-17 RX ADMIN — APIXABAN 2.5 MILLIGRAM(S): 2.5 TABLET, FILM COATED ORAL at 17:47

## 2020-10-17 RX ADMIN — Medication 50 MILLIGRAM(S): at 05:06

## 2020-10-17 RX ADMIN — Medication 75 MICROGRAM(S): at 05:06

## 2020-10-17 RX ADMIN — Medication 5 MILLIGRAM(S): at 21:33

## 2020-10-17 RX ADMIN — POLYETHYLENE GLYCOL 3350 17 GRAM(S): 17 POWDER, FOR SOLUTION ORAL at 11:13

## 2020-10-17 RX ADMIN — SENNA PLUS 2 TABLET(S): 8.6 TABLET ORAL at 10:45

## 2020-10-17 RX ADMIN — Medication 1 APPLICATION(S): at 05:07

## 2020-10-17 RX ADMIN — Medication 0.5 MILLIGRAM(S): at 17:47

## 2020-10-17 RX ADMIN — Medication 3 MILLILITER(S): at 11:13

## 2020-10-17 RX ADMIN — Medication 1 SPRAY(S): at 17:45

## 2020-10-17 RX ADMIN — Medication 1 APPLICATION(S): at 05:09

## 2020-10-17 NOTE — PROGRESS NOTE ADULT - SUBJECTIVE AND OBJECTIVE BOX
pt seen and examined, no complaints, ROS - .         albuterol/ipratropium for Nebulization 3 milliLiter(s) Nebulizer every 6 hours  apixaban 2.5 milliGRAM(s) Oral every 12 hours  aspirin enteric coated 81 milliGRAM(s) Oral daily  buDESOnide    Inhalation Suspension 0.5 milliGRAM(s) Inhalation every 12 hours  fluticasone propionate 50 MICROgram(s)/spray Nasal Spray 1 Spray(s) Both Nostrils two times a day  levothyroxine 75 MICROGram(s) Oral daily  loratadine 10 milliGRAM(s) Oral daily  melatonin 5 milliGRAM(s) Oral at bedtime  pantoprazole    Tablet 40 milliGRAM(s) Oral before breakfast  petrolatum white Ointment 1 Application(s) Topical two times a day  polyethylene glycol 3350 17 Gram(s) Oral daily  predniSONE   Tablet 50 milliGRAM(s) Oral daily  senna 2 Tablet(s) Oral at bedtime PRN  simvastatin 20 milliGRAM(s) Oral at bedtime  triamcinolone 0.1% Ointment 1 Application(s) Topical two times a day          Hemoglobin: 10.6 g/dL (10-15 @ 05:48)  Hemoglobin: 11.3 g/dL (10-14 @ 06:54)  Hemoglobin: 12.0 g/dL (10-13 @ 07:22)  Hemoglobin: 12.4 g/dL (10-12 @ 12:40)            Creatinine Trend: 0.89<--, 0.91<--, 1.04<--, 1.05<--    COAGS:           T(C): 36.7 (10-17-20 @ 05:45), Max: 36.8 (10-16-20 @ 11:56)  HR: 61 (10-17-20 @ 05:45) (61 - 94)  BP: 147/82 (10-17-20 @ 05:45) (113/68 - 147/82)  RR: 19 (10-17-20 @ 05:45) (17 - 19)  SpO2: 100% (10-17-20 @ 05:45) (94% - 100%)  Wt(kg): --    I&O's Summary    16 Oct 2020 07:01  -  17 Oct 2020 07:00  --------------------------------------------------------  IN: 810 mL / OUT: 350 mL / NET: 460 mL      Gen: Appears well in NAD  HEENT:  (-)icterus (-)pallor  CV: N S1 S2 1/6 KIN (+)2 Pulses B/l  Resp:  Clear to auscultation B/L, normal effort  GI: (+) BS Soft, NT, ND  Lymph:  (-)Edema, (-)obvious lymphadenopathy  Skin: Warm to touch, Normal turgor  Psych: Appropriate mood and affect      TELEMETRY: None	      < from: Transthoracic Echocardiogram (10.14.20 @ 07:06) >  Conclusions:  1. Calcified trileaflet aortic valve with decreased  opening. Peak transaortic valve gradient equals 16 mm Hg,  mean transaortic valve gradient equals 7 mm Hg, estimated  aortic valve area equals 1.7 sqcm (by continuity equation),  aortic valve velocity time integral equals 40 cm,  consistent with mild aortic stenosis.  2. Normal left ventricular internal dimensions and wall  thicknesses.  3. Hyperdynamic left ventricular systolic function.  4. Estimated pulmonary artery systolic pressure equals 44  mm Hg, assuming right atrial pressure equals 8 mm Hg,  consistent with mild pulmonary pressures.    < end of copied text >      ASSESSMENT/PLAN: Patient is a 89 y/o Female well known to our office with PMH of HTN, HLD, frequent falls, hx PE on Eliquis, GERD, Osteoporosis, hypothyroidism who is admitted with SOB/cough concerning for aspiration. Cardiology consulted for further evaluation.    - No evidence of clinical HF or anginal symptoms  - Pulm follow up   - Neuro f/u regarding MRI C spine, MRI brain with no acute CVA  - AC with Eliquis for hx PE  - ECHO with hyperdynamic LV, mild AS  - No further inpatient cardiac w/u needed  - Patient to f/u with Dr. Mancera on 11/11 at 1 PM

## 2020-10-17 NOTE — PROGRESS NOTE ADULT - SUBJECTIVE AND OBJECTIVE BOX
Follow-up Pulm Progress Note    No new respiratory events overnight.  Denies increased SOB, chest pain, cough or mucus.  feels "the same". feels her  fingers are swollen    Medications:  MEDICATIONS  (STANDING):  albuterol/ipratropium for Nebulization 3 milliLiter(s) Nebulizer every 6 hours  apixaban 2.5 milliGRAM(s) Oral every 12 hours  aspirin enteric coated 81 milliGRAM(s) Oral daily  buDESOnide    Inhalation Suspension 0.5 milliGRAM(s) Inhalation every 12 hours  fluticasone propionate 50 MICROgram(s)/spray Nasal Spray 1 Spray(s) Both Nostrils two times a day  influenza   Vaccine 0.5 milliLiter(s) IntraMuscular once  levothyroxine 75 MICROGram(s) Oral daily  loratadine 10 milliGRAM(s) Oral daily  melatonin 5 milliGRAM(s) Oral at bedtime  pantoprazole    Tablet 40 milliGRAM(s) Oral before breakfast  petrolatum white Ointment 1 Application(s) Topical two times a day  polyethylene glycol 3350 17 Gram(s) Oral daily  predniSONE   Tablet 50 milliGRAM(s) Oral daily  simvastatin 20 milliGRAM(s) Oral at bedtime  triamcinolone 0.1% Ointment 1 Application(s) Topical two times a day    MEDICATIONS  (PRN):  senna 2 Tablet(s) Oral at bedtime PRN Constipation      Vent settings (if applicable)      Vital Signs Last 24 Hrs  T(C): 36.7 (17 Oct 2020 11:50), Max: 36.7 (16 Oct 2020 20:37)  T(F): 98.1 (17 Oct 2020 11:50), Max: 98.1 (17 Oct 2020 05:45)  HR: 77 (17 Oct 2020 11:50) (61 - 94)  BP: 130/71 (17 Oct 2020 11:50) (113/68 - 147/82)  BP(mean): --  RR: 18 (17 Oct 2020 11:50) (17 - 19)  SpO2: 96% (17 Oct 2020 11:50) (94% - 100%)          10-16 @ 07:01  -  10-17 @ 07:00  --------------------------------------------------------  IN: 810 mL / OUT: 350 mL / NET: 460 mL          LABS:                CAPILLARY BLOOD GLUCOSE                  CULTURES:  Culture Results:   <10,000 CFU/mL Normal Urogenital Maribel (10-12 @ 17:29)    Most recent blood culture -- 10-12 @ 17:29   -- -- .Urine Clean Catch (Midstream) 10-12 @ 17:29      Physical Examination:  Awake and alert, generally comfortable, severe kyphosis  HEENT: unremarkable  PULM: Clear to auscultation bilaterally, no significant sputum production  CVS: Regular rate and rhythm, no murmurs, rubs, or gallops  Abd:  soft, non tender  Extrem: No CCE    RADIOLOGY REVIEWED  CXR:    CT chest:

## 2020-10-17 NOTE — PROGRESS NOTE ADULT - SUBJECTIVE AND OBJECTIVE BOX
Patient is a 89y old  Female who presents with a chief complaint of sob (17 Oct 2020 14:32)      SUBJECTIVE / OVERNIGHT EVENTS: No new complaints.   Review of Systems  chest pain no  palpitations no  sob no  nausea no  headache no    MEDICATIONS  (STANDING):  albuterol/ipratropium for Nebulization 3 milliLiter(s) Nebulizer every 6 hours  apixaban 2.5 milliGRAM(s) Oral every 12 hours  aspirin enteric coated 81 milliGRAM(s) Oral daily  buDESOnide    Inhalation Suspension 0.5 milliGRAM(s) Inhalation every 12 hours  fluticasone propionate 50 MICROgram(s)/spray Nasal Spray 1 Spray(s) Both Nostrils two times a day  influenza   Vaccine 0.5 milliLiter(s) IntraMuscular once  levothyroxine 75 MICROGram(s) Oral daily  loratadine 10 milliGRAM(s) Oral daily  melatonin 5 milliGRAM(s) Oral at bedtime  pantoprazole    Tablet 40 milliGRAM(s) Oral before breakfast  petrolatum white Ointment 1 Application(s) Topical two times a day  polyethylene glycol 3350 17 Gram(s) Oral daily  predniSONE   Tablet 50 milliGRAM(s) Oral daily  simvastatin 20 milliGRAM(s) Oral at bedtime  triamcinolone 0.1% Ointment 1 Application(s) Topical two times a day    MEDICATIONS  (PRN):  senna 2 Tablet(s) Oral at bedtime PRN Constipation      Vital Signs Last 24 Hrs  T(C): 36.7 (17 Oct 2020 11:50), Max: 36.7 (16 Oct 2020 20:37)  T(F): 98.1 (17 Oct 2020 11:50), Max: 98.1 (17 Oct 2020 05:45)  HR: 77 (17 Oct 2020 11:50) (61 - 87)  BP: 130/71 (17 Oct 2020 11:50) (113/68 - 147/82)  BP(mean): --  RR: 18 (17 Oct 2020 11:50) (17 - 19)  SpO2: 96% (17 Oct 2020 11:50) (96% - 100%)    PHYSICAL EXAM:  GENERAL: NAD  HEAD:  Atraumatic, Normocephalic  EYES: EOMI, PERRLA, conjunctiva and sclera clear  NECK: Supple, No JVD  CHEST/LUNG: Clear to auscultation bilaterally; No wheeze  HEART: Regular rate and rhythm; No murmurs, rubs, or gallops  ABDOMEN: Soft, Nontender, Nondistended; Bowel sounds present  EXTREMITIES:  2+ Peripheral Pulses, No clubbing, cyanosis, or edema  PSYCH: AAOx3  NEUROLOGY: non-focal  SKIN: + rashes on chest, back , arms    LABS:                      RADIOLOGY & ADDITIONAL TESTS:    Imaging Personally Reviewed:    Consultant(s) Notes Reviewed:      Care Discussed with Consultants/Other Providers:

## 2020-10-17 NOTE — PROGRESS NOTE ADULT - ATTENDING COMMENTS
CARDIOLOGY ATTENDING    Agree with above. Continue lifelong a/c for PAF. No further inpatient cardiac workup expected. F/U with Dr Mancera after discharge on 11/11 as scheduled.

## 2020-10-17 NOTE — PROGRESS NOTE ADULT - ASSESSMENT
89F c hx HTN, HLD, freq falls, PE (Jun '20) on eliquis, GERD, Osteoporosis, hypothyroidism, pw SOB and cough poss 2/2 aspiration.    SOB (shortness of breath).  - CTA neg for pe or infection  - cont nebs, chest PT  - short course of Prednisone   - hold off abx.   - nebs  - Pulmonary follow  - Cardiology follow     Aspiration into airway, initial encounter.   - speech/swallow dysphagia eval  - pepcid.   - MBS noted. Regular diet.    Chronic pulmonary embolism without acute cor pulmonale, unspecified pulmonary embolism type.  - cont eliquis  - aspirin to 81.     Debility.   - PT eval    Hypothyroidism  - decrease Synthroid to 50 mcg  - follow TSH.   - Endocrine evaluation noted    R sided weakness  - CT head with no acute findings.  - Neuro evaluation noted    Rash  - Dermatology evaluation pending     R shoulder pain  - Xray shoulder axillary noted. No dislocation.  - PT  - Ortho follow.  - pain control    d/w patient and son in law (over phone)    DCP home.    Art Carbajal MD pager [phone]

## 2020-10-17 NOTE — PROGRESS NOTE ADULT - ASSESSMENT
ASSESSMENT:    dyspnea without hypoxemia - appears comfortable on room air - multifactorial etiology without acute distress    1) restrictive lung disease - respiratory muscle weakness/elevated right diaphragm/kyphoscoliosis  2) resolved non-occlusive left lower lobe pulmonary embolism - no evidence of new pulmonary emboli  3) mild bronchospasm  4) poor secretion clearance  5) "rule out" vocal cord pathology    PLAN/RECOMMENDATIONS:    stable oxygenation on room air  ENT evalulation  observe off antibiotics  prednisone 50mg daily x 5 days, then dc  albuterol/atrovent nebs q6h  pulmicort 0.5mg nebs q12h - give after duoneb - rinse mouth after use  claritin/flonase  incentive spirometry  acapella device  continues on eliquis for PE in June  speech and swallow evaluation ongoing  wound care   other issues as per medicine    Melonie Chapman MD, [facility]  [phone]  Pulmonary Medicine

## 2020-10-18 ENCOUNTER — TRANSCRIPTION ENCOUNTER (OUTPATIENT)
Age: 85
End: 2020-10-18

## 2020-10-18 VITALS
OXYGEN SATURATION: 95 % | DIASTOLIC BLOOD PRESSURE: 74 MMHG | SYSTOLIC BLOOD PRESSURE: 137 MMHG | HEART RATE: 74 BPM | RESPIRATION RATE: 17 BRPM | TEMPERATURE: 98 F

## 2020-10-18 PROCEDURE — 71275 CT ANGIOGRAPHY CHEST: CPT

## 2020-10-18 PROCEDURE — 86769 SARS-COV-2 COVID-19 ANTIBODY: CPT

## 2020-10-18 PROCEDURE — 0225U NFCT DS DNA&RNA 21 SARSCOV2: CPT

## 2020-10-18 PROCEDURE — 84443 ASSAY THYROID STIM HORMONE: CPT

## 2020-10-18 PROCEDURE — 87086 URINE CULTURE/COLONY COUNT: CPT

## 2020-10-18 PROCEDURE — 80048 BASIC METABOLIC PNL TOTAL CA: CPT

## 2020-10-18 PROCEDURE — 81001 URINALYSIS AUTO W/SCOPE: CPT

## 2020-10-18 PROCEDURE — 84439 ASSAY OF FREE THYROXINE: CPT

## 2020-10-18 PROCEDURE — 99285 EMERGENCY DEPT VISIT HI MDM: CPT

## 2020-10-18 PROCEDURE — 73030 X-RAY EXAM OF SHOULDER: CPT

## 2020-10-18 PROCEDURE — 84484 ASSAY OF TROPONIN QUANT: CPT

## 2020-10-18 PROCEDURE — 83735 ASSAY OF MAGNESIUM: CPT

## 2020-10-18 PROCEDURE — 93306 TTE W/DOPPLER COMPLETE: CPT

## 2020-10-18 PROCEDURE — 85027 COMPLETE CBC AUTOMATED: CPT

## 2020-10-18 PROCEDURE — 70450 CT HEAD/BRAIN W/O DYE: CPT

## 2020-10-18 PROCEDURE — 71045 X-RAY EXAM CHEST 1 VIEW: CPT

## 2020-10-18 PROCEDURE — 97530 THERAPEUTIC ACTIVITIES: CPT

## 2020-10-18 PROCEDURE — 94640 AIRWAY INHALATION TREATMENT: CPT

## 2020-10-18 PROCEDURE — 85379 FIBRIN DEGRADATION QUANT: CPT

## 2020-10-18 PROCEDURE — 97164 PT RE-EVAL EST PLAN CARE: CPT

## 2020-10-18 PROCEDURE — 72141 MRI NECK SPINE W/O DYE: CPT

## 2020-10-18 PROCEDURE — 97116 GAIT TRAINING THERAPY: CPT

## 2020-10-18 PROCEDURE — 80053 COMPREHEN METABOLIC PANEL: CPT

## 2020-10-18 PROCEDURE — 74230 X-RAY XM SWLNG FUNCJ C+: CPT

## 2020-10-18 PROCEDURE — 70551 MRI BRAIN STEM W/O DYE: CPT

## 2020-10-18 PROCEDURE — 85610 PROTHROMBIN TIME: CPT

## 2020-10-18 PROCEDURE — 73020 X-RAY EXAM OF SHOULDER: CPT

## 2020-10-18 PROCEDURE — 85025 COMPLETE CBC W/AUTO DIFF WBC: CPT

## 2020-10-18 PROCEDURE — 84100 ASSAY OF PHOSPHORUS: CPT

## 2020-10-18 PROCEDURE — 85730 THROMBOPLASTIN TIME PARTIAL: CPT

## 2020-10-18 PROCEDURE — 82607 VITAMIN B-12: CPT

## 2020-10-18 PROCEDURE — 93005 ELECTROCARDIOGRAM TRACING: CPT

## 2020-10-18 PROCEDURE — 97161 PT EVAL LOW COMPLEX 20 MIN: CPT

## 2020-10-18 RX ORDER — PETROLATUM,WHITE
1 JELLY (GRAM) TOPICAL
Qty: 1 | Refills: 0
Start: 2020-10-18 | End: 2020-11-01

## 2020-10-18 RX ORDER — LEVOTHYROXINE SODIUM 125 MCG
1 TABLET ORAL
Qty: 0 | Refills: 0 | DISCHARGE

## 2020-10-18 RX ORDER — SENNA PLUS 8.6 MG/1
2 TABLET ORAL
Qty: 0 | Refills: 0 | DISCHARGE
Start: 2020-10-18

## 2020-10-18 RX ORDER — POLYETHYLENE GLYCOL 3350 17 G/17G
17 POWDER, FOR SOLUTION ORAL
Qty: 0 | Refills: 0 | DISCHARGE
Start: 2020-10-18

## 2020-10-18 RX ADMIN — Medication 0.5 MILLIGRAM(S): at 05:37

## 2020-10-18 RX ADMIN — LORATADINE 10 MILLIGRAM(S): 10 TABLET ORAL at 12:58

## 2020-10-18 RX ADMIN — Medication 1 APPLICATION(S): at 16:29

## 2020-10-18 RX ADMIN — APIXABAN 2.5 MILLIGRAM(S): 2.5 TABLET, FILM COATED ORAL at 05:37

## 2020-10-18 RX ADMIN — Medication 75 MICROGRAM(S): at 05:37

## 2020-10-18 RX ADMIN — Medication 0.5 MILLIGRAM(S): at 16:29

## 2020-10-18 RX ADMIN — Medication 1 APPLICATION(S): at 05:35

## 2020-10-18 RX ADMIN — Medication 3 MILLILITER(S): at 05:37

## 2020-10-18 RX ADMIN — Medication 1 APPLICATION(S): at 16:27

## 2020-10-18 RX ADMIN — PANTOPRAZOLE SODIUM 40 MILLIGRAM(S): 20 TABLET, DELAYED RELEASE ORAL at 05:37

## 2020-10-18 RX ADMIN — APIXABAN 2.5 MILLIGRAM(S): 2.5 TABLET, FILM COATED ORAL at 16:27

## 2020-10-18 RX ADMIN — Medication 3 MILLILITER(S): at 16:27

## 2020-10-18 RX ADMIN — Medication 1 APPLICATION(S): at 05:45

## 2020-10-18 RX ADMIN — Medication 1 SPRAY(S): at 16:27

## 2020-10-18 RX ADMIN — Medication 1 SPRAY(S): at 05:37

## 2020-10-18 RX ADMIN — Medication 81 MILLIGRAM(S): at 11:33

## 2020-10-18 RX ADMIN — Medication 50 MILLIGRAM(S): at 05:37

## 2020-10-18 RX ADMIN — Medication 3 MILLILITER(S): at 11:33

## 2020-10-18 NOTE — DISCHARGE NOTE PROVIDER - CARE PROVIDER_API CALL
David Jacobo  INTERNAL MEDICINE  [address] [place] [address] [address]  [place] [address]  Phone: [phone]  Fax: [phone]  Follow Up Time:    David Jacobo  INTERNAL MEDICINE  [address], [address]  [place] [address]  Phone: [phone]  Fax: [phone]  Follow Up Time:     Víctor Mancera  CARDIOLOGY  [address]  [place] [address]  Phone: [phone]  Fax: [phone]  Follow Up Time:     Jordy Rich  ORTHOPAEDIC SURGERY  [address], [address]  [place] [address]  Phone: [phone]  Fax: [phone]  Follow Up Time:    David Jacobo  INTERNAL MEDICINE  [address], [address]  [place] [address]  Phone: [phone]  Fax: [phone]  Follow Up Time:     Víctor Mancera  CARDIOLOGY  [address], [address]  [place] [address]  Phone: [phone]  Fax: [phone]  Follow Up Time:     Jordy Rich  ORTHOPAEDIC SURGERY  [address], [address]  [place] [address]  Phone: [phone]  Fax: [phone]  Follow Up Time:     Lissett Aragon  DERMATOLOGY  [address], [address]  [place] [address]  Phone: [phone]  Fax: [phone]  Follow Up Time:

## 2020-10-18 NOTE — PROGRESS NOTE ADULT - ASSESSMENT
89F c hx HTN, HLD, freq falls, PE (Jun '20) on eliquis, GERD, Osteoporosis, hypothyroidism, pw SOB and cough poss 2/2 aspiration.    SOB (shortness of breath).  - CTA neg for pe or infection  - cont nebs, chest PT  - short course of Prednisone   - hold off abx.   - nebs  - Pulmonary follow  - Cardiology follow     Aspiration into airway, initial encounter.   - speech/swallow dysphagia eval  - pepcid.   - MBS noted. Regular diet.    Chronic pulmonary embolism without acute cor pulmonale, unspecified pulmonary embolism type.  - cont eliquis  - aspirin to 81.     Debility.   - PT eval    Hypothyroidism  - decrease Synthroid to 75 mcg  - follow TSH.   - Endocrine follow    R sided weakness  - CT head with no acute findings.  - Neuro evaluation noted    Rash  - Dermatology evaluation pending     R shoulder pain  - Xray shoulder axillary noted. No dislocation.  - PT  - Ortho follow.  - pain control    d/w patient    DC home. Follow with PMD/ Pulmonary/ Ortho/ Endocrine in 3-4 days. QA    Art Carbajal MD pager [phone]

## 2020-10-18 NOTE — PROGRESS NOTE ADULT - SUBJECTIVE AND OBJECTIVE BOX
Interval History - Patient seen and examined this am. Still with RUE weakness      Vital Signs Last 24 Hrs  T(C): 36.2 (18 Oct 2020 04:33), Max: 36.7 (17 Oct 2020 11:50)  T(F): 97.2 (18 Oct 2020 04:33), Max: 98.1 (17 Oct 2020 11:50)  HR: 67 (18 Oct 2020 04:33) (67 - 77)  BP: 149/76 (18 Oct 2020 04:33) (125/69 - 149/76)  BP(mean): --  RR: 19 (18 Oct 2020 04:33) (17 - 19)  SpO2: 94% (18 Oct 2020 04:33) (94% - 98%)    NEUROLOGICAL EXAM:  MS: AAOX3, fluent, attends b/l; recent and remote memory intact; normal attention, language and fund of knowledge. Intact naming and repetition. Speech fluent. Follows three step commands.   CN: VFF, EOMI, PERRLA. V1-3 intact, no facial asymmetry, t/p midline, SCM intact b/l, trap on right-side movement limited by pain,   Motor: Strength: 5/5 in left UE, and LE b/l. LUE 5/5 except deltoid and biceps 4/5  Tone: increased in LES 5/5 LES. Bulk: normal.   DTR 2+ symm.  Plantar flexright left equivocal  Sensation: noted a difference in sensation in tips of fingers and toes, no numbness. intact vibratory sense. intact proprioception.   Coordination: intact 4x.   Gait:  Deferred         MEDICATIONS  (STANDING):  albuterol/ipratropium for Nebulization 3 milliLiter(s) Nebulizer every 6 hours  apixaban 2.5 milliGRAM(s) Oral every 12 hours  aspirin enteric coated 81 milliGRAM(s) Oral daily  buDESOnide    Inhalation Suspension 0.5 milliGRAM(s) Inhalation every 12 hours  fluticasone propionate 50 MICROgram(s)/spray Nasal Spray 1 Spray(s) Both Nostrils two times a day  influenza   Vaccine 0.5 milliLiter(s) IntraMuscular once  levothyroxine 75 MICROGram(s) Oral daily  loratadine 10 milliGRAM(s) Oral daily  pantoprazole    Tablet 40 milliGRAM(s) Oral before breakfast  petrolatum white Ointment 1 Application(s) Topical two times a day  polyethylene glycol 3350 17 Gram(s) Oral daily  simvastatin 20 milliGRAM(s) Oral at bedtime  triamcinolone 0.1% Ointment 1 Application(s) Topical two times a day    MEDICATIONS  (PRN):  senna 2 Tablet(s) Oral at bedtime PRN Constipation      Lab      Radiology    < from: MR Head No Cont (10.16.20 @ 13:22) >  IMPRESSION: Age-appropriate involutional and ischemic gliotic changes. No acute infarcts or hemorrhage.    < end of copied text >  < from: MR Cervical Spine No Cont (10.16.20 @ 13:22) >    INTERPRETATION:  CLINICAL INDICATION: Myelopathy, right C5 weakness    Magnetic resonance imaging of the cervical spine was carried out with sagittal surfacecoil imaging from C1 to T8 using T1 and fast spin echo T2 weighted images with and without fat saturation technique with axial T1 and fast spin echo T2 weighted imaging on a 1.5 Brittani magnet.    There has been a previous anterior cervical discectomy C5-6 with an interposition  graft and overlying metallic plating. There are ventral extradural defects at C2-3, C3-4 and C4-5 disc osteophyte complexes. There is a dorsal extradural defect at C2-3 and C3-4 due to ligamentous hypertrophy. There is moderate spinal stenosis without cord compression at C3-4. The cervical vertebrae are normal in height and intensity. There is grade 1 anterolisthesis of C7 on T1.    Impression: Previous anterior cervical discectomy and metallic plating C5-6. Disc osteophyte complexes C2-3, C3-4 and C4-5. Moderate spinal stenosis C3-4.    < end of copied text >

## 2020-10-18 NOTE — DISCHARGE NOTE PROVIDER - CARE PROVIDERS DIRECT ADDRESSES
,DirectAddress_Unknown ,DirectAddress_Unknown,DirectAddress_Unknown,DirectAddress_Unknown ,DirectAddress_Unknown,DirectAddress_Unknown,DirectAddress_Unknown,[email or web address]

## 2020-10-18 NOTE — DISCHARGE NOTE PROVIDER - PROVIDER TOKENS
PROVIDER:[TOKEN:[[id number]]] PROVIDER:[TOKEN:[[id number]]],PROVIDER:[TOKEN:[[id number]]],PROVIDER:[TOKEN:[[id number]]] PROVIDER:[TOKEN:[[id number]]],PROVIDER:[TOKEN:[[id number]]],PROVIDER:[TOKEN:[[id number]]],PROVIDER:[TOKEN:[[id number]]]

## 2020-10-18 NOTE — DISCHARGE NOTE PROVIDER - NSDCMRMEDTOKEN_GEN_ALL_CORE_FT
apixaban 2.5 mg oral tablet: 1 tab(s) orally every 12 hours  Aspirin Enteric Coated 325 mg oral delayed release tablet: 1 tab(s) orally once a day  calcium: 600 milligram(s) orally once a day  calcium-vitamin D:   fluticasone 50 mcg/inh nasal spray: 1 spray(s) nasal 2 times a day  levothyroxine 100 mcg (0.1 mg) oral tablet: 1 tab(s) orally once a day  multivitamin: 1 tab(s) orally once a day  pantoprazole 40 mg oral delayed release tablet: 1 tab(s) orally once a day (before a meal)  polyethylene glycol 3350 oral powder for reconstitution: 17 gram(s) orally once a day  probiotic: 1 tab(s) orally 2 times a day  senna oral tablet: 2 tab(s) orally once a day (at bedtime), As needed, Constipation  simvastatin 20 mg oral tablet: 1 tab(s) orally once a day (at bedtime)  Vitamin B Complex oral tablet: 1 tab(s) orally once a day  Vitamin B12:   Vitamin C 1000 mg oral tablet:   Vitamin D3 2000 intl units (50 mcg) oral tablet:    apixaban 2.5 mg oral tablet: 1 tab(s) orally every 12 hours  Aspirin Enteric Coated 325 mg oral delayed release tablet: 1 tab(s) orally once a day  budesonide 180 mcg/inh inhalation powder: 1 puff(s) inhaled 2 times a day   calcium: 600 milligram(s) orally once a day  calcium-vitamin D:   fluticasone 50 mcg/inh nasal spray: 1 spray(s) nasal 2 times a day  levothyroxine 75 mcg (0.075 mg) oral tablet: 1 tab(s) orally once a day  multivitamin: 1 tab(s) orally once a day  pantoprazole 40 mg oral delayed release tablet: 1 tab(s) orally once a day (before a meal)  petrolatum topical ointment: 1 application topically 2 times a day  polyethylene glycol 3350 oral powder for reconstitution: 17 gram(s) orally once a day  probiotic: 1 tab(s) orally 2 times a day  senna oral tablet: 2 tab(s) orally once a day (at bedtime), As needed, Constipation  simvastatin 20 mg oral tablet: 1 tab(s) orally once a day (at bedtime)  triamcinolone 0.1% topical ointment: 1 application topically 2 times a day  Vitamin B Complex oral tablet: 1 tab(s) orally once a day  Vitamin B12:   Vitamin C 1000 mg oral tablet:   Vitamin D3 2000 intl units (50 mcg) oral tablet:

## 2020-10-18 NOTE — PROGRESS NOTE ADULT - SUBJECTIVE AND OBJECTIVE BOX
Patient is a 89y old  Female who presents with a chief complaint of sob (18 Oct 2020 11:23)      SUBJECTIVE / OVERNIGHT EVENTS: No new complaints.   Review of Systems  chest pain no  palpitations no  sob no  nausea no  headache no    MEDICATIONS  (STANDING):  albuterol/ipratropium for Nebulization 3 milliLiter(s) Nebulizer every 6 hours  apixaban 2.5 milliGRAM(s) Oral every 12 hours  aspirin enteric coated 81 milliGRAM(s) Oral daily  buDESOnide    Inhalation Suspension 0.5 milliGRAM(s) Inhalation every 12 hours  fluticasone propionate 50 MICROgram(s)/spray Nasal Spray 1 Spray(s) Both Nostrils two times a day  influenza   Vaccine 0.5 milliLiter(s) IntraMuscular once  levothyroxine 75 MICROGram(s) Oral daily  loratadine 10 milliGRAM(s) Oral daily  pantoprazole    Tablet 40 milliGRAM(s) Oral before breakfast  petrolatum white Ointment 1 Application(s) Topical two times a day  polyethylene glycol 3350 17 Gram(s) Oral daily  simvastatin 20 milliGRAM(s) Oral at bedtime  triamcinolone 0.1% Ointment 1 Application(s) Topical two times a day    MEDICATIONS  (PRN):  senna 2 Tablet(s) Oral at bedtime PRN Constipation      Vital Signs Last 24 Hrs  T(C): 36.8 (18 Oct 2020 12:28), Max: 36.8 (18 Oct 2020 12:28)  T(F): 98.3 (18 Oct 2020 12:28), Max: 98.3 (18 Oct 2020 12:28)  HR: 74 (18 Oct 2020 12:28) (67 - 74)  BP: 137/74 (18 Oct 2020 12:28) (125/69 - 149/76)  BP(mean): --  RR: 17 (18 Oct 2020 12:28) (17 - 19)  SpO2: 95% (18 Oct 2020 12:28) (94% - 98%)    PHYSICAL EXAM:  GENERAL: NAD  HEAD:  Atraumatic, Normocephalic  EYES: EOMI, PERRLA, conjunctiva and sclera clear  NECK: Supple, No JVD  CHEST/LUNG: Clear to auscultation bilaterally; No wheeze  HEART: Regular rate and rhythm; No murmurs, rubs, or gallops  ABDOMEN: Soft, Nontender, Nondistended; Bowel sounds present  EXTREMITIES:  2+ Peripheral Pulses, No clubbing, cyanosis, or edema  PSYCH: AAOx3  NEUROLOGY: non-focal  SKIN: chest and back rashes improving     LABS:                      RADIOLOGY & ADDITIONAL TESTS:    Imaging Personally Reviewed:    Consultant(s) Notes Reviewed:      Care Discussed with Consultants/Other Providers:

## 2020-10-18 NOTE — PROGRESS NOTE ADULT - ASSESSMENT
89Fw/  PMH of HTN, HLD, freq falls, PE (Jun '20) on eliquis, GERD, Osteoporosis, hypothyroidism, h/o ACDF presenting with SOB. PE with concern for myelopathy. RUE weakness unclear cause MRI brain negative MRI C-spine as abovewith prior ACD% c5-c6, disc osteophytes, moderate stenosis likely explains symptoms. Patient is likely not a surgical candidate    Would use anti-inflammatories  physical therapy

## 2020-10-18 NOTE — PROGRESS NOTE ADULT - ATTENDING COMMENTS
CARDIOLOGY ATTENDING    Agree with above. Continue lifelong a/c for PAF. No further inpatient cardiac workup expected. F/U with Dr Mancera after discharge on 11/11 as scheduled

## 2020-10-18 NOTE — DISCHARGE NOTE PROVIDER - NSDCCPCAREPLAN_GEN_ALL_CORE_FT
PRINCIPAL DISCHARGE DIAGNOSIS  Diagnosis: Chronic pulmonary embolism without acute cor pulmonale, unspecified pulmonary embolism type  Assessment and Plan of Treatment: Chronic pulmonary embolism without acute cor pulmonale, unspecified pulmonary embolism type      SECONDARY DISCHARGE DIAGNOSES  Diagnosis: Hypertension, unspecified type  Assessment and Plan of Treatment: Hypertension, unspecified type    Diagnosis: Dyslipidemia  Assessment and Plan of Treatment: Dyslipidemia     PRINCIPAL DISCHARGE DIAGNOSIS  Diagnosis: Shortness of breath  Assessment and Plan of Treatment: No evidence of clinical HF or anginal symptoms  CTA neg for pe or infection  s/p Prednisone  Patient to f/u with Dr. Mancera on 11/11 at 1 PM      SECONDARY DISCHARGE DIAGNOSES  Diagnosis: Hypertension, unspecified type  Assessment and Plan of Treatment: Hypertension, unspecified type    Diagnosis: Dyslipidemia  Assessment and Plan of Treatment: Dyslipidemia     PRINCIPAL DISCHARGE DIAGNOSIS  Diagnosis: Shortness of breath  Assessment and Plan of Treatment: No evidence of clinical HF or anginal symptoms  CTA neg for pe or infection  s/p Prednisone  Patient to f/u with Dr. Mancera on 11/11 at 1 PM      SECONDARY DISCHARGE DIAGNOSES  Diagnosis: Purpura  Assessment and Plan of Treatment: Stasis Changes on the lower extremities   Vaseline, leg elevation, gentle compression   Solar Purpura on arms and chest  Vaseline BID to arms and chest until erosion/ ulcer on chest heals   3. Eczematous Patch on back   Triamcinolone Ointment BID for 2 weeks, moisturize after with Vaseline to prevent recurrence   Patient can follow-up at our outpatient office:  [address], [place] [address], phone number for appointment: [phone]      Diagnosis: Upper extremity weakness  Assessment and Plan of Treatment: seen by neurosurgery   Would use anti-inflammatories  physical therapy  seen by orthopedics  - EVELYN GOLD  - follow-up with Dr. Rich in one to two weeks after discharge      Diagnosis: Aspiration into airway, initial encounter  Assessment and Plan of Treatment: Aspiration into airway, initial encounter  s/p seeach and swallow Regular diet    Diagnosis: Chronic pulmonary embolism without acute cor pulmonale, unspecified pulmonary embolism type  Assessment and Plan of Treatment: Chronic pulmonary embolism without acute cor pulmonale, unspecified pulmonary embolism type  c/w elquis    Diagnosis: Iatrogenic hyperthyroidism  Assessment and Plan of Treatment: Iatrogenic hyperthyroidism  Decrease dose to 75 mcg. Repeat Thyroid Function Test in 6 weeks.      Diagnosis: Hypertension, unspecified type  Assessment and Plan of Treatment: Hypertension, unspecified type  Low salt diet  Activity as tolerated.  Take all medication as prescribed.  Follow up with your medical doctor for routine blood pressure monitoring at your next visit.  Notify your doctor if you have any of the following symptoms:   Dizziness, Lightheadedness, Blurry vision, Headache, Chest pain, Shortness of breath      Diagnosis: Dyslipidemia  Assessment and Plan of Treatment: Dyslipidemia  Continue with your cholesterol medications. Eat a heart healthy diet that is low in saturated fats and salt, and includes whole grains, fruits, vegetables and lean protein; exercise regularly (consult with your physician or cardiologist first); maintain a heart healthy weight; if you smoke - quit (A resource to help you stop smoking is the [facility] – phone number [phone].). Continue to follow with your primary physician or cardiologist.  Seek medical help for dizziness, Lightheadedness, Blurry vision, Headache, Chest pain, Shortness of breath

## 2020-10-18 NOTE — DISCHARGE NOTE PROVIDER - HOSPITAL COURSE
89F c hx HTN, HLD, freq falls, PE (Jun '20) on eliquis, GERD, Osteoporosis, hypothyroidism, pw SOB and cough poss 2/2 aspiration.    SOB (shortness of breath).  - CTA neg for pe or infection  - cont nebs, chest PT  - short course of Prednisone   - hold off abx.   - nebs  - Pulmonary follow  - Cardiology follow     Aspiration into airway, initial encounter.   - speech/swallow dysphagia eval  - pepcid.   - MBS noted. Regular diet.    Chronic pulmonary embolism without acute cor pulmonale, unspecified pulmonary embolism type.  - cont eliquis  - aspirin to 81.     Debility.   - PT eval    Hypothyroidism  - decrease Synthroid to 50 mcg  - follow TSH.   - Endocrine evaluation noted    R sided weakness  - CT head with no acute findings.  - Neuro evaluation noted    Rash  - Dermatology evaluation pending     R shoulder pain  - Xray shoulder axillary noted. No dislocation.  - PT  - Ortho follow.  - pain control    d/w patient and son in law (over phone)    DCP home.    Art Carbajal MD pager [phone]    89F c hx HTN, HLD, freq falls, PE (Jun '20) on eliquis, GERD, Osteoporosis, hypothyroidism, pw SOB and cough poss 2/2 aspiration.    SOB (shortness of breath).  - CTA neg for pe or infection  - cont nebs, chest PT  - short course of Prednisone   - hold off abx.   - nebs  - Pulmonary follow  - Cardiology follow     Aspiration into airway, initial encounter.   - speech/swallow dysphagia eval  - pepcid.   - MBS noted. Regular diet.    Chronic pulmonary embolism without acute cor pulmonale, unspecified pulmonary embolism type.  - cont eliquis  - aspirin to 81.     Debility.   - PT eval    Hypothyroidism  - Decrease dose to 75 mcg. Repeat TFTs in 6 weeks.      R sided weakness  - CT head with no acute findings.  - Neuro evaluation noted    Rash  - Dermatology evaluation pending     R shoulder pain  - Xray shoulder axillary noted. No dislocation.  - PT  - Ortho follow.  - pain control    d/w patient and son in law (over phone)    DCP home.    Art Carbajal MD pager [phone]

## 2020-10-18 NOTE — PROGRESS NOTE ADULT - ASSESSMENT
ASSESSMENT:    dyspnea without hypoxemia - appears comfortable on room air - multifactorial etiology without acute distress    1) restrictive lung disease - respiratory muscle weakness/elevated right diaphragm/kyphoscoliosis  2) resolved non-occlusive left lower lobe pulmonary embolism - no evidence of new pulmonary emboli  3) mild bronchospasm  4) poor secretion clearance  5) "rule out" vocal cord pathology    PLAN/RECOMMENDATIONS:    stable oxygenation on room air  ENT evalulation  observe off antibiotics  prednisone 50mg daily x 5 days, then dc  pt does not need nebs for dc  can use antitussives prn  speech and swallow exam with normal diet- general precautions regarding aspirations such as upright position, cutting food properly, etc should be employed  claritin/flonase  incentive spirometry  acapella device  continues on eliquis for PE in June  speech and swallow evaluation ongoing  wound care   other issues as per medicine  ok for dc from pulmonary standpoint    Melonie Chapman MD, [facility]  [phone]  Pulmonary Medicine

## 2020-10-18 NOTE — DISCHARGE NOTE PROVIDER - NSFOLLOWUPCLINICS_GEN_ALL_ED_FT
[facility]  Pulmonology  [address] [place] [address]  Phone: [phone]  Fax:   Follow Up Time:

## 2020-10-18 NOTE — PROGRESS NOTE ADULT - SUBJECTIVE AND OBJECTIVE BOX
pt seen and examined, no complaints, ROS - .            albuterol/ipratropium for Nebulization 3 milliLiter(s) Nebulizer every 6 hours  apixaban 2.5 milliGRAM(s) Oral every 12 hours  aspirin enteric coated 81 milliGRAM(s) Oral daily  buDESOnide    Inhalation Suspension 0.5 milliGRAM(s) Inhalation every 12 hours  fluticasone propionate 50 MICROgram(s)/spray Nasal Spray 1 Spray(s) Both Nostrils two times a day  influenza   Vaccine 0.5 milliLiter(s) IntraMuscular once  levothyroxine 75 MICROGram(s) Oral daily  loratadine 10 milliGRAM(s) Oral daily  pantoprazole    Tablet 40 milliGRAM(s) Oral before breakfast  petrolatum white Ointment 1 Application(s) Topical two times a day  polyethylene glycol 3350 17 Gram(s) Oral daily  senna 2 Tablet(s) Oral at bedtime PRN  simvastatin 20 milliGRAM(s) Oral at bedtime  triamcinolone 0.1% Ointment 1 Application(s) Topical two times a day          Hemoglobin: 10.6 g/dL (10-15 @ 05:48)  Hemoglobin: 11.3 g/dL (10-14 @ 06:54)            Creatinine Trend: 0.89<--, 0.91<--, 1.04<--, 1.05<--    COAGS:           T(C): 36.2 (10-18-20 @ 04:33), Max: 36.7 (10-17-20 @ 11:50)  HR: 67 (10-18-20 @ 04:33) (67 - 77)  BP: 149/76 (10-18-20 @ 04:33) (125/69 - 149/76)  RR: 19 (10-18-20 @ 04:33) (17 - 19)  SpO2: 94% (10-18-20 @ 04:33) (94% - 98%)  Wt(kg): --    I&O's Summary    17 Oct 2020 07:01  -  18 Oct 2020 07:00  --------------------------------------------------------  IN: 950 mL / OUT: 0 mL / NET: 950 mL      Gen: Appears well in NAD  HEENT:  (-)icterus (-)pallor  CV: N S1 S2 1/6 KIN (+)2 Pulses B/l  Resp:  Clear to auscultation B/L, normal effort  GI: (+) BS Soft, NT, ND  Lymph:  (-)Edema, (-)obvious lymphadenopathy  Skin: Warm to touch, Normal turgor  Psych: Appropriate mood and affect      TELEMETRY: None	      < from: Transthoracic Echocardiogram (10.14.20 @ 07:06) >  Conclusions:  1. Calcified trileaflet aortic valve with decreased  opening. Peak transaortic valve gradient equals 16 mm Hg,  mean transaortic valve gradient equals 7 mm Hg, estimated  aortic valve area equals 1.7 sqcm (by continuity equation),  aortic valve velocity time integral equals 40 cm,  consistent with mild aortic stenosis.  2. Normal left ventricular internal dimensions and wall  thicknesses.  3. Hyperdynamic left ventricular systolic function.  4. Estimated pulmonary artery systolic pressure equals 44  mm Hg, assuming right atrial pressure equals 8 mm Hg,  consistent with mild pulmonary pressures.    < end of copied text >      ASSESSMENT/PLAN: Patient is a 89 y/o Female well known to our office with PMH of HTN, HLD, frequent falls, hx PE on Eliquis, GERD, Osteoporosis, hypothyroidism who is admitted with SOB/cough concerning for aspiration. Cardiology consulted for further evaluation.    - No evidence of clinical HF or anginal symptoms  - Pulm follow up   - Neuro f/u regarding MRI C spine, MRI brain with no acute CVA  - AC with Eliquis for hx PE  - ECHO with hyperdynamic LV, mild AS  - No further inpatient cardiac w/u needed  - Patient to f/u with Dr. Mancera on 11/11 at 1 PM

## 2020-10-18 NOTE — PROGRESS NOTE ADULT - SUBJECTIVE AND OBJECTIVE BOX
Follow-up Pulm Progress Note    No new respiratory events overnight.  Denies increased SOB, chest pain, cough or mucus. anxious about going home    Medications:  MEDICATIONS  (STANDING):  albuterol/ipratropium for Nebulization 3 milliLiter(s) Nebulizer every 6 hours  apixaban 2.5 milliGRAM(s) Oral every 12 hours  aspirin enteric coated 81 milliGRAM(s) Oral daily  buDESOnide    Inhalation Suspension 0.5 milliGRAM(s) Inhalation every 12 hours  fluticasone propionate 50 MICROgram(s)/spray Nasal Spray 1 Spray(s) Both Nostrils two times a day  influenza   Vaccine 0.5 milliLiter(s) IntraMuscular once  levothyroxine 75 MICROGram(s) Oral daily  loratadine 10 milliGRAM(s) Oral daily  pantoprazole    Tablet 40 milliGRAM(s) Oral before breakfast  petrolatum white Ointment 1 Application(s) Topical two times a day  polyethylene glycol 3350 17 Gram(s) Oral daily  simvastatin 20 milliGRAM(s) Oral at bedtime  triamcinolone 0.1% Ointment 1 Application(s) Topical two times a day    MEDICATIONS  (PRN):  senna 2 Tablet(s) Oral at bedtime PRN Constipation      Vent settings (if applicable)      Vital Signs Last 24 Hrs  T(C): 36.8 (18 Oct 2020 12:28), Max: 36.8 (18 Oct 2020 12:28)  T(F): 98.3 (18 Oct 2020 12:28), Max: 98.3 (18 Oct 2020 12:28)  HR: 74 (18 Oct 2020 12:28) (67 - 74)  BP: 137/74 (18 Oct 2020 12:28) (125/69 - 149/76)  BP(mean): --  RR: 17 (18 Oct 2020 12:28) (17 - 19)  SpO2: 95% (18 Oct 2020 12:28) (94% - 98%)          10-17 @ 07:01  -  10-18 @ 07:00  --------------------------------------------------------  IN: 950 mL / OUT: 0 mL / NET: 950 mL          LABS:                CAPILLARY BLOOD GLUCOSE                  CULTURES:  Culture Results:   <10,000 CFU/mL Normal Urogenital Maribel (10-12 @ 17:29)        Physical Examination:  Awake and alert, generally comfortable  HEENT: unremarkable  PULM: Clear to auscultation bilaterally, no significant sputum production  CVS: Regular rate and rhythm, no murmurs, rubs, or gallops  Abd:  soft, non tender  Extrem: No CCE    RADIOLOGY REVIEWED  CXR:    CT chest:

## 2020-10-27 ENCOUNTER — APPOINTMENT (OUTPATIENT)
Dept: PULMONOLOGY | Facility: CLINIC | Age: 85
End: 2020-10-27
Payer: MEDICARE

## 2020-10-27 VITALS
HEIGHT: 58 IN | SYSTOLIC BLOOD PRESSURE: 144 MMHG | HEART RATE: 61 BPM | DIASTOLIC BLOOD PRESSURE: 79 MMHG | WEIGHT: 120 LBS | BODY MASS INDEX: 25.19 KG/M2 | TEMPERATURE: 97.3 F | RESPIRATION RATE: 15 BRPM

## 2020-10-27 DIAGNOSIS — R06.00 DYSPNEA, UNSPECIFIED: ICD-10-CM

## 2020-10-27 PROCEDURE — 99203 OFFICE O/P NEW LOW 30 MIN: CPT | Mod: GC

## 2020-10-27 NOTE — PHYSICAL EXAM
[No Acute Distress] : no acute distress [No Deformities] : no deformities [Normal Appearance] : normal appearance [No Neck Mass] : no neck mass [Normal Rate/Rhythm] : normal rate/rhythm [Normal S1, S2] : normal s1, s2 [No Resp Distress] : no resp distress [Not Tender] : not tender [No Clubbing] : no clubbing [No Cyanosis] : no cyanosis [Oriented x3] : oriented x3 [Normal Affect] : normal affect [TextBox_68] : minimal rhonchi RLL otherwise clear [TextBox_80] : fragile skin with erytehma; erythema and dressing [TextBox_125] : atrophic skin, fragile

## 2020-10-27 NOTE — HISTORY OF PRESENT ILLNESS
[TextBox_4] : 89F with a history of HTN, HLD, freq falls, PE (Jun '20) on eliquis, GERD, Osteoporosis, hypothyroidism presenting after recent hospitalization 10/12-10/18 after presenting with SOB. She was not hypoxemic during her stay. She reports that she has been having "labored breathing and talking" since early October. She denies cough, fevers, sputum. She denies trouble swallowing solids or liquids. HHA accompanied patient today and states she been more run down than usual. She generally reads and watches TV but is less interested in doing so and has trouble doing errands. Generally walks with walker; she had to take a [place]. CTA negative for acute PE or parenchymal disease; secretions noted in the airways. Barium swallow with thin liquids laryngeal perforation but no aspiration.Laryngoscopy done at bedside, vocal cords mobile and intact bilaterally. s/s eval performed; recommended regular texture diet with thin liquids via Small Single Sips.

## 2020-10-27 NOTE — ASSESSMENT
[FreeTextEntry1] : 89F history of PE on Eliquis, h/o multiple spinal issues with prior discetomy, with recent imaging showing osteophytes and regional cervical spinal stenosis, prior 34 pack year history who presents with subacute presentation of dyspnea. Was hospitalized earlier this month with fairly extensive work up including laryngoscopy and s/s evaluation without significant pathology. Her CTA showed resolved PE, with secretions noted in the airways. MRI cervical spine showed osteophytes and stenosis as above. Patient is not hypoxic. Dyspnea does not appear to be related to underlying pulmonary parenchymal issue. May be combination of mild aspiration and restrictive disease given cervical spine disease. Would continue aspiration precautions as discussed with patient by inpatient speech and swallow. Follow up PRN

## 2020-10-27 NOTE — REVIEW OF SYSTEMS
[Dyspnea] : dyspnea [SOB on Exertion] : sob on exertion [Arthralgias] : arthralgias [Easy Bruising] : easy bruising [Fever] : no fever [Chills] : no chills [Dry Eyes] : no dry eyes [Eye Irritation] : no eye irritation [Cough] : no cough [Sputum] : no sputum [Chest Discomfort] : no chest discomfort [Orthopnea] : no orthopnea [Nasal Discharge] : no nasal discharge [GERD] : no gerd [Abdominal Pain] : no abdominal pain [Nocturia] : no nocturia [Dysuria] : no dysuria [Trauma/ Injury] : no trauma/ injury [Raynaud] : no raynaud [Telangiectasias] : no telangiectasias [Anemia] : no anemia [Headache] : no headache [Dizziness] : no dizziness [Depression] : no depression [Anxiety] : no anxiety

## 2020-10-28 ENCOUNTER — EMERGENCY (EMERGENCY)
Facility: HOSPITAL | Age: 85
LOS: 1 days | Discharge: HOME | End: 2020-10-28
Attending: EMERGENCY MEDICINE
Payer: MEDICARE

## 2020-10-28 VITALS
RESPIRATION RATE: 18 BRPM | SYSTOLIC BLOOD PRESSURE: 142 MMHG | HEART RATE: 71 BPM | TEMPERATURE: 98 F | OXYGEN SATURATION: 97 % | DIASTOLIC BLOOD PRESSURE: 72 MMHG

## 2020-10-28 VITALS
RESPIRATION RATE: 18 BRPM | HEART RATE: 63 BPM | HEIGHT: 56 IN | DIASTOLIC BLOOD PRESSURE: 50 MMHG | SYSTOLIC BLOOD PRESSURE: 149 MMHG | OXYGEN SATURATION: 63 % | WEIGHT: 117.07 LBS | TEMPERATURE: 98 F

## 2020-10-28 DIAGNOSIS — Z87.19 PERSONAL HISTORY OF OTHER DISEASES OF THE DIGESTIVE SYSTEM: Chronic | ICD-10-CM

## 2020-10-28 LAB
ALBUMIN SERPL ELPH-MCNC: 3.7 G/DL — SIGNIFICANT CHANGE UP (ref 3.3–5)
ALP SERPL-CCNC: 50 U/L — SIGNIFICANT CHANGE UP (ref 40–120)
ALT FLD-CCNC: 17 U/L — SIGNIFICANT CHANGE UP (ref 10–45)
ANION GAP SERPL CALC-SCNC: 7 MMOL/L — SIGNIFICANT CHANGE UP (ref 5–17)
APTT BLD: 30.5 SEC — SIGNIFICANT CHANGE UP (ref 27.5–35.5)
AST SERPL-CCNC: 26 U/L — SIGNIFICANT CHANGE UP (ref 10–40)
BASOPHILS # BLD AUTO: 0.03 K/UL — SIGNIFICANT CHANGE UP (ref 0–0.2)
BASOPHILS NFR BLD AUTO: 0.3 % — SIGNIFICANT CHANGE UP (ref 0–2)
BILIRUB SERPL-MCNC: 0.4 MG/DL — SIGNIFICANT CHANGE UP (ref 0.2–1.2)
BUN SERPL-MCNC: 17 MG/DL — SIGNIFICANT CHANGE UP (ref 7–23)
CALCIUM SERPL-MCNC: 9.6 MG/DL — SIGNIFICANT CHANGE UP (ref 8.4–10.5)
CHLORIDE SERPL-SCNC: 104 MMOL/L — SIGNIFICANT CHANGE UP (ref 96–108)
CO2 SERPL-SCNC: 29 MMOL/L — SIGNIFICANT CHANGE UP (ref 22–31)
CREAT SERPL-MCNC: 0.96 MG/DL — SIGNIFICANT CHANGE UP (ref 0.5–1.3)
EOSINOPHIL # BLD AUTO: 0.02 K/UL — SIGNIFICANT CHANGE UP (ref 0–0.5)
EOSINOPHIL NFR BLD AUTO: 0.2 % — SIGNIFICANT CHANGE UP (ref 0–6)
GLUCOSE SERPL-MCNC: 107 MG/DL — HIGH (ref 70–99)
HCT VFR BLD CALC: 37 % — SIGNIFICANT CHANGE UP (ref 34.5–45)
HGB BLD-MCNC: 11.7 G/DL — SIGNIFICANT CHANGE UP (ref 11.5–15.5)
IMM GRANULOCYTES NFR BLD AUTO: 0.5 % — SIGNIFICANT CHANGE UP (ref 0–1.5)
INR BLD: 1.03 RATIO — SIGNIFICANT CHANGE UP (ref 0.88–1.16)
LYMPHOCYTES # BLD AUTO: 0.88 K/UL — LOW (ref 1–3.3)
LYMPHOCYTES # BLD AUTO: 7.5 % — LOW (ref 13–44)
MCHC RBC-ENTMCNC: 31.6 GM/DL — LOW (ref 32–36)
MCHC RBC-ENTMCNC: 31.7 PG — SIGNIFICANT CHANGE UP (ref 27–34)
MCV RBC AUTO: 100.3 FL — HIGH (ref 80–100)
MONOCYTES # BLD AUTO: 0.62 K/UL — SIGNIFICANT CHANGE UP (ref 0–0.9)
MONOCYTES NFR BLD AUTO: 5.3 % — SIGNIFICANT CHANGE UP (ref 2–14)
NEUTROPHILS # BLD AUTO: 10.18 K/UL — HIGH (ref 1.8–7.4)
NEUTROPHILS NFR BLD AUTO: 86.2 % — HIGH (ref 43–77)
NRBC # BLD: 0 /100 WBCS — SIGNIFICANT CHANGE UP (ref 0–0)
PLATELET # BLD AUTO: 314 K/UL — SIGNIFICANT CHANGE UP (ref 150–400)
POTASSIUM SERPL-MCNC: 4.5 MMOL/L — SIGNIFICANT CHANGE UP (ref 3.5–5.3)
POTASSIUM SERPL-SCNC: 4.5 MMOL/L — SIGNIFICANT CHANGE UP (ref 3.5–5.3)
PROT SERPL-MCNC: 6.2 G/DL — SIGNIFICANT CHANGE UP (ref 6–8.3)
PROTHROM AB SERPL-ACNC: 12.3 SEC — SIGNIFICANT CHANGE UP (ref 10.6–13.6)
RBC # BLD: 3.69 M/UL — LOW (ref 3.8–5.2)
RBC # FLD: 14 % — SIGNIFICANT CHANGE UP (ref 10.3–14.5)
SODIUM SERPL-SCNC: 140 MMOL/L — SIGNIFICANT CHANGE UP (ref 135–145)
WBC # BLD: 11.79 K/UL — HIGH (ref 3.8–10.5)
WBC # FLD AUTO: 11.79 K/UL — HIGH (ref 3.8–10.5)

## 2020-10-28 PROCEDURE — 73010 X-RAY EXAM OF SHOULDER BLADE: CPT | Mod: 26,76

## 2020-10-28 PROCEDURE — 73000 X-RAY EXAM OF COLLAR BONE: CPT

## 2020-10-28 PROCEDURE — 85025 COMPLETE CBC W/AUTO DIFF WBC: CPT

## 2020-10-28 PROCEDURE — 80053 COMPREHEN METABOLIC PANEL: CPT

## 2020-10-28 PROCEDURE — 73030 X-RAY EXAM OF SHOULDER: CPT

## 2020-10-28 PROCEDURE — 71045 X-RAY EXAM CHEST 1 VIEW: CPT | Mod: 26

## 2020-10-28 PROCEDURE — 85730 THROMBOPLASTIN TIME PARTIAL: CPT

## 2020-10-28 PROCEDURE — 99284 EMERGENCY DEPT VISIT MOD MDM: CPT | Mod: 25

## 2020-10-28 PROCEDURE — 73030 X-RAY EXAM OF SHOULDER: CPT | Mod: 26,RT

## 2020-10-28 PROCEDURE — 23500 CLTX CLAVICULAR FX W/O MNPJ: CPT | Mod: 54

## 2020-10-28 PROCEDURE — 99284 EMERGENCY DEPT VISIT MOD MDM: CPT | Mod: 25,57

## 2020-10-28 PROCEDURE — 73010 X-RAY EXAM OF SHOULDER BLADE: CPT

## 2020-10-28 PROCEDURE — 23500 CLTX CLAVICULAR FX W/O MNPJ: CPT | Mod: LT

## 2020-10-28 PROCEDURE — 71045 X-RAY EXAM CHEST 1 VIEW: CPT

## 2020-10-28 PROCEDURE — 85610 PROTHROMBIN TIME: CPT

## 2020-10-28 PROCEDURE — 73000 X-RAY EXAM OF COLLAR BONE: CPT | Mod: 26,LT

## 2020-10-28 RX ORDER — ACETAMINOPHEN 500 MG
975 TABLET ORAL ONCE
Refills: 0 | Status: COMPLETED | OUTPATIENT
Start: 2020-10-28 | End: 2020-10-28

## 2020-10-28 RX ORDER — SODIUM CHLORIDE 9 MG/ML
1000 INJECTION INTRAMUSCULAR; INTRAVENOUS; SUBCUTANEOUS ONCE
Refills: 0 | Status: COMPLETED | OUTPATIENT
Start: 2020-10-28 | End: 2020-10-28

## 2020-10-28 RX ADMIN — SODIUM CHLORIDE 1000 MILLILITER(S): 9 INJECTION INTRAMUSCULAR; INTRAVENOUS; SUBCUTANEOUS at 14:51

## 2020-10-28 RX ADMIN — Medication 975 MILLIGRAM(S): at 14:51

## 2020-10-28 NOTE — ED PROVIDER NOTE - PHYSICAL EXAMINATION
On Physical Exam:  General: well appearing, in NAD, speaking clearly in full sentences and without difficulty; cooperative with exam  HEENT: PERRL, MMM  Neck: no neck tenderness, no nuchal rigidity  Cardiac: normal s1, s2; RRR; no MGR  Lungs: CTABL  Abdomen: soft nontender/nondistended  : no bladder tenderness or distension  Skin: warm, intact, no rash  Extremities: bilat shoulder deformity, pain to palpation of rib cage L lateral,  no peripheral edema,   Neuro: no gross neurologic deficits

## 2020-10-28 NOTE — ED ADULT NURSE REASSESSMENT NOTE - NS ED NURSE REASSESS COMMENT FT1
resting in bed, milk provided to son to feed pt, PRBC unit not ready yet per blood bank (called at 1810), dispo pending, *okay to eat/drink per ED PA and attending

## 2020-10-28 NOTE — ED PROVIDER NOTE - OBJECTIVE STATEMENT
89 year old fm with pmhx of HLD, HTN, dementia, osteoporosis presenting to the ED c.o pain to left shoulder s.p mechanical fall while getting out of bed onto carpet 6 hours ago. patient states she fell onto carpet sideways denies head/neck/back pain or injury. patient states she got up by herself without issue but endorses pain and difficulty abduction of L shoulder. patient ambulatory post fall. patient admits to taking blood thinners. patient denies CP, abd pain, change in vision, LOC, no focal neurological deficits, no confusion. patient denies taking anything for pain. 89 year old fm with pmhx of HLD, HTN, PE, dementia, osteoporosis presenting to the ED c.o pain to left shoulder s.p mechanical fall while getting out of bed onto carpet 6 hours ago. patient states she fell onto carpet sideways denies head/neck/back pain or injury. patient states she got up by herself without issue but endorses pain and difficulty abduction of L shoulder. patient ambulatory post fall. patient admits to taking blood thinners eliquis. patient denies CP, abd pain, change in vision, LOC, no focal neurological deficits, no confusion. patient denies taking anything for pain.

## 2020-10-28 NOTE — ED PROVIDER NOTE - PROGRESS NOTE DETAILS
Patient reassessed. Patient feels "great" wants to go home. patient refusing CT scan. Discussed risks and dangers of refusing CT scan. Patient understood and continued to refuse. Patient wants to DC. plan to DC Jose A Olvera PA-C Offered transport. patient refused wants to go home with janett Olvera PA-C

## 2020-10-28 NOTE — ED ADULT NURSE NOTE - OBJECTIVE STATEMENT
89 yr old female from home s/p mechanical fall while getting OOB, lives with 24/7 pvt aide, fell onto L side, +ambulatory after fall, at present c/o L shoulder pain, xray: +clavicle fracture noted, awaiting head CT, at present resting in bed, maex4 except LUE, fed pt apple sauce, no other /co, neuro exam wnl

## 2020-10-28 NOTE — ED PROVIDER NOTE - NSFOLLOWUPCLINICS_GEN_ALL_ED_FT
[facility]thopedics  .  [place]   Phone: [phone]  Fax:   Follow Up Time: 1-3 Days

## 2020-10-28 NOTE — ED PROVIDER NOTE - NSFOLLOWUPINSTRUCTIONS_ED_ALL_ED_FT
There are no signs of emergency conditions requiring admission to the hospital on today's workup.  Based on the evaluation, a presumptive diagnosis was made, however, further evaluation may be required by your primary care physician or a specialist for a more definitive diagnosis.  Therefore, please follow-up as directed or return to the Emergency Department if your symptoms change or worsen.    We recommend that you:  1. See your primary care physician within the next 72 hours for follow up.  Bring a copy of your discharge paperwork (including any test results) to your doctor.  2. Please maintain arm sling  3. Please follow up with your orthopedic Doctor or [facility] clinic information Provided.   4. Please stay hydrated and eat full meals  5. Please take one over the counter Tylenol as needed for pain every 6-8 hours      *** Return immediately if you have worsening symptoms, pain, falls, syncope, fever, confusion, chest pain, dizziness, trauma, or any other new/concerning symptoms. ***

## 2020-10-28 NOTE — ED ADULT NURSE NOTE - NSIMPLEMENTINTERV_GEN_ALL_ED
Implemented All Fall Risk Interventions:  [place] to call system. Call bell, personal items and telephone within reach. Instruct patient to call for assistance. Room bathroom lighting operational. Non-slip footwear when patient is off stretcher. Physically safe environment: no spills, clutter or unnecessary equipment. Stretcher in lowest position, wheels locked, appropriate side rails in place. Provide visual cue, wrist band, yellow gown, etc. Monitor gait and stability. Monitor for mental status changes and reorient to person, place, and time. Review medications for side effects contributing to fall risk. Reinforce activity limits and safety measures with patient and family.

## 2020-10-28 NOTE — ED ADULT NURSE REASSESSMENT NOTE - NS ED NURSE REASSESS COMMENT FT1
Report received from SURESH Jeffers. Pt AO x 4, speaking in full and complete sentences. Pt assisted to bathroom and assisted back to stretcher. Pt awaiting ambulette. Pt denies needs at this time.

## 2020-10-28 NOTE — ED ADULT NURSE REASSESSMENT NOTE - NS ED NURSE REASSESS COMMENT FT1
dinner tray provided, resting/sitting up in bed, LUE in sling present, awaiting ambulette to transport pt home, pvt aide to meet pt at home dinner tray provided, resting/sitting up in bed, LUE in sling present, awaiting ambulance to transport pt home, pvt aide to meet pt at home

## 2020-10-28 NOTE — ED PROVIDER NOTE - CARE PLAN
Principal Discharge DX:	Fall   Principal Discharge DX:	Closed nondisplaced fracture of clavicle, unspecified laterality, unspecified part of clavicle, initial encounter

## 2020-10-28 NOTE — CHART NOTE - NSCHARTNOTEFT_GEN_A_CORE
Emergency Room : Patient is a 89 year old female presented to the ED after a fall.  Medical chart was reviewed.  Per chart patient's medical history includes: HLD, HTN, PE, dementia, osteoporosis.   LMSW introduced herself to the patient and she verbalized understanding the role of the .  Patient is alert and oriented x 4 spheres. Demographic information was reviewed and confirmed.  Patient resides in a private home with 24 hour live in private aide. Patient requested assistance with discharge transportation.  Per medical team patient is cleared for discharge.  Due to the patient physical limitations the medical team authorized a ambulance for transportation home.  Medical provided completed the Non-Emergent form.  LMSW contacted the patient's aideRoyce ([phone]) to confirm she is home to receive  the patient. No further social work intervention needed at this time.  LSMW will follow up as needed.

## 2020-10-28 NOTE — ED PROVIDER NOTE - PATIENT PORTAL LINK FT
You can access the FollowMyHealth Patient Portal offered by [facility] by registering at the following website: [email or web address]. By joining [facility]’s FollowMyHealth portal, you will also be able to view your health information using other applications (apps) compatible with our system.

## 2020-10-28 NOTE — ED PROVIDER NOTE - ATTENDING CONTRIBUTION TO CARE
I, Steve Bangura, performed a history and physical exam of the patient and discussed their management with the resident and /or advanced care provider. I reviewed the resident and /or ACP's note and agree with the documented findings and plan of care. I was present and available for all procedures.  Patient with repeated falls had trip and fall today.  No signs of head trauma, pt ambulatory after event.  Patient complains of Left shoulder pain.  Will evaluate head CT, shoulder xray and reassess.  Patient stable in ED and provided tylenol in case of increasing pain from fall.  Will likely discharge home if studies are wnl.

## 2020-10-28 NOTE — ED PROVIDER NOTE - NS ED ROS FT
Review of Systems:  · Constitutional: no chills, no fever, no night sweats, no weight loss  · Nose: no epistaxis  · Mouth/Throat: no difficulty in swallowing, trachea midline, uvula midline  . Cardio: No CP, no palpitations, no chest pressure  · Respiratory: no cough, no exertional dyspnea, no hemoptysis, no orthopnea, no shortness of breath  · Gastrointestinal: no abdominal pain, no diarrhea, no melena, no nausea, no vomiting  · Genitourinary: no difficulty urinating, no dysuria, no hematuria  · MUSCULOSKELETAL: unable to ROM L shoulder, FROM of all other extremities  · Skin: no abrasion; no bruising; no laceration  · Neurological: no change in level of consciousness, no headache, no seizures  · Psychiatric: no anxiety, no depression  · ROS STATEMENT: all other ROS negative except as per HPI

## 2020-10-28 NOTE — CONSULT NOTE ADULT - SUBJECTIVE AND OBJECTIVE BOX
89 y/o Female right hand dominant presents c/o L shoulder pain sp mechanical fall. Feet got caught over one another. Denies Headstrike/LOC. Denies numbness, tingling paresthesias in affected extremity. Able to ambulate after fall. No other orthopedic injuries at this time    PAST MEDICAL & SURGICAL HISTORY:  Skin cancer  lower extremities    Hip fracture  Right hip- 1995    Osteoporosis    Chronic anal fissure    Anal spasm    mild Dementia    Dyslipidemia    Hypertension    H/O: Hypothyroidism    H/O gastroesophageal reflux (GERD)    Anterior Cervical discectomy    left eye Retinal tear repair    After-Cataract of Both Eyes    left Elbow surgey secondary to injury    History of Tonsillectomy    History of  Hip surgery with hardware      MEDICATIONS  (STANDING):    Allergies    penicillin (Rash)    Intolerances                            11.7   11.79 )-----------( 314      ( 28 Oct 2020 12:53 )             37.0     28 Oct 2020 12:53    140    |  104    |  17     ----------------------------<  107    4.5     |  29     |  0.96     Ca    9.6        28 Oct 2020 12:53    TPro  6.2    /  Alb  3.7    /  TBili  0.4    /  DBili  x      /  AST  26     /  ALT  17     /  AlkPhos  50     28 Oct 2020 12:53    PT/INR - ( 28 Oct 2020 12:53 )   PT: 12.3 sec;   INR: 1.03 ratio         PTT - ( 28 Oct 2020 12:53 )  PTT:30.5 sec    Imaging: XR demonstates R/L Clavicle Fracture    Vital Signs Last 24 Hrs  T(C): 36.9 (10-28-20 @ 11:58), Max: 36.9 (10-28-20 @ 11:58)  T(F): 98.4 (10-28-20 @ 11:58), Max: 98.4 (10-28-20 @ 11:58)  HR: 63 (10-28-20 @ 11:58) (63 - 63)  BP: 149/50 (10-28-20 @ 11:58) (149/50 - 149/50)  BP(mean): --  RR: 18 (10-28-20 @ 11:58) (18 - 18)  SpO2: 63% (10-28-20 @ 11:58) (63% - 63%)      Gen: NAD  L UE: Skin intact, +ecchymosis over clavicle, + TTP over clavicle, unable to range shoulder 2/2 pain, +ain/pin/m/r/u function, SILT C5-T1, radial pulse intact, compartments soft, brisk cap refill, no bony ttp at elbow/wrist/hand/fingers    SECONDARY EXAM: Benign, Skin intact, SILT throughout, motor grossly intact throughout, no other orthopedic injuries at this time, compartments soft and compressible    SPINE: Skin intact, no bony tenderness or step-offs appreciated throughout cervical/thoracic/lumbar/sacral spine    R UE: Skin intact, no erythema, ecchymosis, edema, gross deformity, NTTP over the bony prominences of the shoulder/elbow/wrist/hand, painless passive/active ROM of the shoulder/elbow/wrist/hand, C5-T1 SILT, motor grossly intact throughout axillary/musculocutaenous/radial/median/ulnar nerves, + radial pulse    B/L LE: Skin intact, no erythema, ecchymosis, edema, gross deformity, NTTP over the bony prominences of the hip/knee/ankle/foot, painless passive/active ROM of the hip/knee/ankle/foot, L2-S1 SILT, motor grossly intact throughout hip flexors/quads/hams/TA/EHL/FHL/GSC, + DP/PT pulses, no pain with log roll, no pain on axial loading, compartments soft and compressible, calves nontender      A/P: 89y Female w L clavicle fracture  Pain control  NWB L UE in sling  Ice  Active movement of fingers/wrist/elbow encouraged  Follow up with Dr. Corley  within 1-2 weeks, call office for appointment  Ortho stable, no surgical intervention at this time  Outpatient follow up

## 2020-10-28 NOTE — ED PROVIDER NOTE - CLINICAL SUMMARY MEDICAL DECISION MAKING FREE TEXT BOX
Patient with repeated falls had trip and fall today.  No signs of head trauma, pt ambulatory after event.  Patient complains of Left shoulder pain.  Will evaluate head CT, shoulder xray and reassess.  Patient stable in ED and provided tylenol in case of increasing pain from fall.  Will likely discharge home if studies are wnl.

## 2020-10-28 NOTE — ED PROVIDER NOTE - PRINCIPAL DIAGNOSIS
Fall Closed nondisplaced fracture of clavicle, unspecified laterality, unspecified part of clavicle, initial encounter

## 2020-10-28 NOTE — ED ADULT NURSE REASSESSMENT NOTE - NS ED NURSE REASSESS COMMENT FT1
spoke with son on phone, pvt aide present at home, okay for pt to return home, son aware that pt refused head CT, son is power of

## 2020-10-30 ENCOUNTER — APPOINTMENT (OUTPATIENT)
Dept: ORTHOPEDIC SURGERY | Facility: CLINIC | Age: 85
End: 2020-10-30
Payer: MEDICARE

## 2020-10-30 VITALS
HEART RATE: 70 BPM | SYSTOLIC BLOOD PRESSURE: 183 MMHG | BODY MASS INDEX: 26.32 KG/M2 | OXYGEN SATURATION: 95 % | WEIGHT: 117 LBS | HEIGHT: 56 IN | DIASTOLIC BLOOD PRESSURE: 80 MMHG

## 2020-10-30 DIAGNOSIS — S42.032A DISPLACED FRACTURE OF LATERAL END OF LEFT CLAVICLE, INITIAL ENCOUNTER FOR CLOSED FRACTURE: ICD-10-CM

## 2020-10-30 PROCEDURE — 99214 OFFICE O/P EST MOD 30 MIN: CPT

## 2020-10-30 NOTE — HISTORY OF PRESENT ILLNESS
[FreeTextEntry1] : This is a RHD 89-year-old female with a history of hypertension, PE (6/2020, on Eliquis), GERD, hypothyroidism, who is presenting for evaluation of a left clavicle fracture which she sustained 2 days ago after a fall due to loss of balance.  She denies any other injuries.  X-rays were performed in the ER revealing a comminuted distal clavicle fracture.  She was then placed in a sling and sent home.  Today she says she has 10 out of 10 pain localized to her left clavicle.  She has been taking Tylenol with no relief.  She is not allowed to take any anti-inflammatories due to her Eliquis use.

## 2020-10-30 NOTE — PHYSICAL EXAM
[FreeTextEntry1] : General: well nourished, in no acute distress, alert and oriented to person, place and time.\par Psychiatric: normal mood and affect, no abnormal movements or speech patterns.\par Eyes: vision intact without deficits, sclera and conjunctiva were normal, pupils were equal in size. \par ENT: Ears and nose were normal in appearance. No thyromegaly.\par Lymph: no enlarged nodes, no lymphedema in extremity.\par Respiratory: Normal respiratory rhythm and effort. No wheezing detected without auscultation. No shortness of breath or respiratory distress.\par Cardiac: no cardiac related leg swelling, 2+ peripheral pulses.\par Neurology: normal gross sensation in extremities to light touch.\par Abdomen: soft, non-tender, tympanic, no masses.\par \par LUE:\par \par Skin with diffuse ecchymosis surrounding the entire shoulder and upper chest wall.  There is no skin breach at the site of injury.  \par M/U/R/AIN/PIN 5/5. M/U/R/Ax SILT. +2 Rad pulse. Compartments soft. \par No palpable masses or lymphedema.\par +TTP over AC joint and distal clavicle.\par Light shoulder range of motion was performed without any pain.\par

## 2020-10-30 NOTE — DATA REVIEWED
[de-identified] : 10/28/2020–left clavicle x-rays: There is a minimally displaced and comminuted distal clavicle fracture.  There is no evidence of any glenohumeral dislocation.

## 2020-10-30 NOTE — CONSULT LETTER
[Dear  ___] : Dear  [unfilled], [Courtesy Letter:] : I had the pleasure of seeing your patient, [unfilled], in my office today. [Please see my note below.] : Please see my note below. [Sincerely,] : Sincerely, [FreeTextEntry3] : Ian Bell MD\par Musculoskeletal Oncology\par \par [address]\[place] [address]

## 2020-11-05 ENCOUNTER — INPATIENT (INPATIENT)
Facility: HOSPITAL | Age: 85
LOS: 6 days | Discharge: NURSING, REHAB OR LONG-TERM CARE | DRG: 205 | End: 2020-11-12
Attending: INTERNAL MEDICINE | Admitting: INTERNAL MEDICINE
Payer: MEDICARE

## 2020-11-05 VITALS
HEIGHT: 56 IN | WEIGHT: 130.07 LBS | TEMPERATURE: 98 F | OXYGEN SATURATION: 98 % | HEART RATE: 82 BPM | SYSTOLIC BLOOD PRESSURE: 181 MMHG | DIASTOLIC BLOOD PRESSURE: 93 MMHG | RESPIRATION RATE: 20 BRPM

## 2020-11-05 DIAGNOSIS — K59.00 CONSTIPATION, UNSPECIFIED: ICD-10-CM

## 2020-11-05 DIAGNOSIS — S22.31XA FRACTURE OF ONE RIB, RIGHT SIDE, INITIAL ENCOUNTER FOR CLOSED FRACTURE: ICD-10-CM

## 2020-11-05 DIAGNOSIS — R10.9 UNSPECIFIED ABDOMINAL PAIN: ICD-10-CM

## 2020-11-05 DIAGNOSIS — Z79.899 OTHER LONG TERM (CURRENT) DRUG THERAPY: ICD-10-CM

## 2020-11-05 DIAGNOSIS — R26.2 DIFFICULTY IN WALKING, NOT ELSEWHERE CLASSIFIED: ICD-10-CM
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
## 2020-11-05 DIAGNOSIS — Z29.9 ENCOUNTER FOR PROPHYLACTIC MEASURES, UNSPECIFIED: ICD-10-CM

## 2020-11-05 DIAGNOSIS — Z87.19 PERSONAL HISTORY OF OTHER DISEASES OF THE DIGESTIVE SYSTEM: Chronic | ICD-10-CM

## 2020-11-05 LAB
ALBUMIN SERPL ELPH-MCNC: 3.7 G/DL — SIGNIFICANT CHANGE UP (ref 3.3–5)
ALP SERPL-CCNC: 60 U/L — SIGNIFICANT CHANGE UP (ref 40–120)
ALT FLD-CCNC: 19 U/L — SIGNIFICANT CHANGE UP (ref 10–45)
ANION GAP SERPL CALC-SCNC: 10 MMOL/L — SIGNIFICANT CHANGE UP (ref 5–17)
APPEARANCE UR: CLEAR — SIGNIFICANT CHANGE UP
APTT BLD: 34.4 SEC — SIGNIFICANT CHANGE UP (ref 27.5–35.5)
AST SERPL-CCNC: 29 U/L — SIGNIFICANT CHANGE UP (ref 10–40)
BACTERIA # UR AUTO: NEGATIVE — SIGNIFICANT CHANGE UP
BASOPHILS # BLD AUTO: 0.02 K/UL — SIGNIFICANT CHANGE UP (ref 0–0.2)
BASOPHILS NFR BLD AUTO: 0.3 % — SIGNIFICANT CHANGE UP (ref 0–2)
BILIRUB SERPL-MCNC: 0.7 MG/DL — SIGNIFICANT CHANGE UP (ref 0.2–1.2)
BILIRUB UR-MCNC: NEGATIVE — SIGNIFICANT CHANGE UP
BUN SERPL-MCNC: 23 MG/DL — SIGNIFICANT CHANGE UP (ref 7–23)
CALCIUM SERPL-MCNC: 10 MG/DL — SIGNIFICANT CHANGE UP (ref 8.4–10.5)
CHLORIDE SERPL-SCNC: 100 MMOL/L — SIGNIFICANT CHANGE UP (ref 96–108)
CO2 SERPL-SCNC: 28 MMOL/L — SIGNIFICANT CHANGE UP (ref 22–31)
COLOR SPEC: YELLOW — SIGNIFICANT CHANGE UP
CREAT SERPL-MCNC: 1.13 MG/DL — SIGNIFICANT CHANGE UP (ref 0.5–1.3)
DIFF PNL FLD: NEGATIVE — SIGNIFICANT CHANGE UP
EOSINOPHIL # BLD AUTO: 0.12 K/UL — SIGNIFICANT CHANGE UP (ref 0–0.5)
EOSINOPHIL NFR BLD AUTO: 2 % — SIGNIFICANT CHANGE UP (ref 0–6)
EPI CELLS # UR: 7 /HPF — HIGH
GAS PNL BLDV: SIGNIFICANT CHANGE UP
GLUCOSE SERPL-MCNC: 104 MG/DL — HIGH (ref 70–99)
GLUCOSE UR QL: NEGATIVE — SIGNIFICANT CHANGE UP
HCT VFR BLD CALC: 35.9 % — SIGNIFICANT CHANGE UP (ref 34.5–45)
HGB BLD-MCNC: 11.8 G/DL — SIGNIFICANT CHANGE UP (ref 11.5–15.5)
HYALINE CASTS # UR AUTO: 0 /LPF — SIGNIFICANT CHANGE UP (ref 0–7)
IMM GRANULOCYTES NFR BLD AUTO: 0.3 % — SIGNIFICANT CHANGE UP (ref 0–1.5)
INR BLD: 1.27 RATIO — HIGH (ref 0.88–1.16)
KETONES UR-MCNC: ABNORMAL
LEUKOCYTE ESTERASE UR-ACNC: ABNORMAL
LIDOCAIN IGE QN: 22 U/L — SIGNIFICANT CHANGE UP (ref 7–60)
LYMPHOCYTES # BLD AUTO: 1.48 K/UL — SIGNIFICANT CHANGE UP (ref 1–3.3)
LYMPHOCYTES # BLD AUTO: 25.1 % — SIGNIFICANT CHANGE UP (ref 13–44)
MCHC RBC-ENTMCNC: 31.8 PG — SIGNIFICANT CHANGE UP (ref 27–34)
MCHC RBC-ENTMCNC: 32.9 GM/DL — SIGNIFICANT CHANGE UP (ref 32–36)
MCV RBC AUTO: 96.8 FL — SIGNIFICANT CHANGE UP (ref 80–100)
MONOCYTES # BLD AUTO: 0.71 K/UL — SIGNIFICANT CHANGE UP (ref 0–0.9)
MONOCYTES NFR BLD AUTO: 12.1 % — SIGNIFICANT CHANGE UP (ref 2–14)
NEUTROPHILS # BLD AUTO: 3.54 K/UL — SIGNIFICANT CHANGE UP (ref 1.8–7.4)
NEUTROPHILS NFR BLD AUTO: 60.2 % — SIGNIFICANT CHANGE UP (ref 43–77)
NITRITE UR-MCNC: NEGATIVE — SIGNIFICANT CHANGE UP
NRBC # BLD: 0 /100 WBCS — SIGNIFICANT CHANGE UP (ref 0–0)
PH UR: 7.5 — SIGNIFICANT CHANGE UP (ref 5–8)
PLATELET # BLD AUTO: 401 K/UL — HIGH (ref 150–400)
POTASSIUM SERPL-MCNC: 4.5 MMOL/L — SIGNIFICANT CHANGE UP (ref 3.5–5.3)
POTASSIUM SERPL-SCNC: 4.5 MMOL/L — SIGNIFICANT CHANGE UP (ref 3.5–5.3)
PROT SERPL-MCNC: 6.4 G/DL — SIGNIFICANT CHANGE UP (ref 6–8.3)
PROT UR-MCNC: ABNORMAL
PROTHROM AB SERPL-ACNC: 15.1 SEC — HIGH (ref 10.6–13.6)
RBC # BLD: 3.71 M/UL — LOW (ref 3.8–5.2)
RBC # FLD: 13.9 % — SIGNIFICANT CHANGE UP (ref 10.3–14.5)
RBC CASTS # UR COMP ASSIST: 3 /HPF — SIGNIFICANT CHANGE UP (ref 0–4)
SODIUM SERPL-SCNC: 138 MMOL/L — SIGNIFICANT CHANGE UP (ref 135–145)
SP GR SPEC: >1.05 (ref 1.01–1.02)
UROBILINOGEN FLD QL: NEGATIVE — SIGNIFICANT CHANGE UP
WBC # BLD: 5.89 K/UL — SIGNIFICANT CHANGE UP (ref 3.8–10.5)
WBC # FLD AUTO: 5.89 K/UL — SIGNIFICANT CHANGE UP (ref 3.8–10.5)
WBC UR QL: 31 /HPF — HIGH (ref 0–5)

## 2020-11-05 PROCEDURE — 72170 X-RAY EXAM OF PELVIS: CPT | Mod: 26

## 2020-11-05 PROCEDURE — 74177 CT ABD & PELVIS W/CONTRAST: CPT | Mod: 26

## 2020-11-05 PROCEDURE — 99223 1ST HOSP IP/OBS HIGH 75: CPT | Mod: AI

## 2020-11-05 PROCEDURE — 71045 X-RAY EXAM CHEST 1 VIEW: CPT | Mod: 26

## 2020-11-05 PROCEDURE — 99285 EMERGENCY DEPT VISIT HI MDM: CPT | Mod: CS

## 2020-11-05 PROCEDURE — 70450 CT HEAD/BRAIN W/O DYE: CPT | Mod: 26

## 2020-11-05 PROCEDURE — 72125 CT NECK SPINE W/O DYE: CPT | Mod: 26

## 2020-11-05 PROCEDURE — 71260 CT THORAX DX C+: CPT | Mod: 26

## 2020-11-05 RX ORDER — ACETAMINOPHEN 500 MG
650 TABLET ORAL ONCE
Refills: 0 | Status: COMPLETED | OUTPATIENT
Start: 2020-11-05 | End: 2020-11-05

## 2020-11-05 RX ORDER — LIDOCAINE 4 G/100G
1 CREAM TOPICAL EVERY 24 HOURS
Refills: 0 | Status: DISCONTINUED | OUTPATIENT
Start: 2020-11-05 | End: 2020-11-07

## 2020-11-05 RX ORDER — ASPIRIN/CALCIUM CARB/MAGNESIUM 324 MG
1 TABLET ORAL
Qty: 0 | Refills: 0 | DISCHARGE

## 2020-11-05 RX ORDER — CHOLECALCIFEROL (VITAMIN D3) 125 MCG
0 CAPSULE ORAL
Qty: 0 | Refills: 0 | DISCHARGE

## 2020-11-05 RX ORDER — POLYETHYLENE GLYCOL 3350 17 G/17G
17 POWDER, FOR SOLUTION ORAL DAILY
Refills: 0 | Status: DISCONTINUED | OUTPATIENT
Start: 2020-11-05 | End: 2020-11-12

## 2020-11-05 RX ORDER — ACETAMINOPHEN 500 MG
650 TABLET ORAL EVERY 6 HOURS
Refills: 0 | Status: DISCONTINUED | OUTPATIENT
Start: 2020-11-05 | End: 2020-11-12

## 2020-11-05 RX ORDER — OXYCODONE AND ACETAMINOPHEN 5; 325 MG/1; MG/1
0.5 TABLET ORAL EVERY 4 HOURS
Refills: 0 | Status: DISCONTINUED | OUTPATIENT
Start: 2020-11-05 | End: 2020-11-11

## 2020-11-05 RX ORDER — SENNA PLUS 8.6 MG/1
1 TABLET ORAL EVERY 12 HOURS
Refills: 0 | Status: DISCONTINUED | OUTPATIENT
Start: 2020-11-06 | End: 2020-11-11

## 2020-11-05 RX ORDER — PREGABALIN 225 MG/1
0 CAPSULE ORAL
Qty: 0 | Refills: 0 | DISCHARGE

## 2020-11-05 RX ORDER — ASCORBIC ACID 60 MG
0 TABLET,CHEWABLE ORAL
Qty: 0 | Refills: 0 | DISCHARGE

## 2020-11-05 RX ORDER — INFLUENZA VIRUS VACCINE 15; 15; 15; 15 UG/.5ML; UG/.5ML; UG/.5ML; UG/.5ML
0.5 SUSPENSION INTRAMUSCULAR ONCE
Refills: 0 | Status: DISCONTINUED | OUTPATIENT
Start: 2020-11-05 | End: 2020-11-12

## 2020-11-05 RX ADMIN — POLYETHYLENE GLYCOL 3350 17 GRAM(S): 17 POWDER, FOR SOLUTION ORAL at 23:18

## 2020-11-05 RX ADMIN — Medication 650 MILLIGRAM(S): at 18:15

## 2020-11-05 RX ADMIN — OXYCODONE AND ACETAMINOPHEN 0.5 TABLET(S): 5; 325 TABLET ORAL at 23:18

## 2020-11-05 RX ADMIN — LIDOCAINE 1 PATCH: 4 CREAM TOPICAL at 23:18

## 2020-11-05 NOTE — ED PROVIDER NOTE - OBJECTIVE STATEMENT
89 yo female with mild dementia, HTN, HLD< PE on eliquis presenting from home for concern of no BM x 4-5 days. Pt states she has been tolerating PO (although diminished appetite), passing gas, denying abdominal pain, her aide called her PMD who instructed her to come to the ED to r/o an obstruction. Pt states she is feeling well, was seen here last week for broken clavicle s/p fall, denies taking opioids for the pain, has not needed pain medications. Pt also c/o some SOB at rest x 2-3 days, unable to explain aggravating/relieving symptoms, denies orthopnea or cough. Pt states she fell 2-3 days ago, states it was unwitnessed while she was getting up from using the bathroom, unsure if she hit her head, did not seek medical attention at the time, unsure how long she was on the floor for. Pt denies any recent fevers, chills, no nausea, vomiting, abdominal pain, difficulty breathing, chest pain, bacl pain, numbness, tingling, paresthesias. Pt states she has been taking eliquis as prescribed daily, has a live in aid who is with the pt 24/7. 89 yo female with mild dementia, HTN, HLD< PE on eliquis presenting from home for concern of no BM x 4-5 days. Pt states she has been tolerating PO (although diminished appetite), passing gas, denying abdominal pain, her aide called her PMD who instructed her to come to the ED to r/o an obstruction. Pt states she is feeling well, was seen here last week for broken clavicle s/p fall, denies taking opioids for the pain, has not needed pain medications. Pt also c/o some SOB at rest x 2-3 days, unable to explain aggravating/relieving symptoms, denies orthopnea or cough. Pt states she fell 2-3 days ago, states it was unwitnessed while she was getting up from using the bathroom, unsure if she hit her head, did not seek medical attention at the time, unsure how long she was on the floor for. Pt denies any recent fevers, chills, no nausea, vomiting, abdominal pain, difficulty breathing, chest pain, back pain, numbness, tingling, paresthesias. Pt states she has been taking eliquis as prescribed daily, has a live in aid who is with the pt 24/7. Family lives in [place]

## 2020-11-05 NOTE — H&P ADULT - NSHPPHYSICALEXAM_GEN_ALL_CORE
PHYSICAL EXAM:   GENERAL: Frail, elderly female. Alert. Not confused. No acute distress. thin.   HEAD:  Atraumatic. Normocephalic.  EYES: EOMI. PERRLA. Normal conjunctiva/sclera.  ENT: Neck supple. No JVD. Moist oral mucosa. Not edentulous. No thrush.  LYMPH: Normal supraclavicular/cervical lymph nodes.   CARDIAC: No tachy, Not ella. Regular rhythm. Not irregularly irregular. S1. S2. No murmur. No rub. No distant heart sounds.  LUNG/CHEST: CTAB. BS equal bilaterally. No wheezes. No rales. No rhonchi.  ABDOMEN: Soft. No tenderness. No distension. No fluid wave. Normal bowel sounds.  BACK: No midline/vertebral tenderness. No flank tenderness.  VASCULAR: +2 b/l radial or ulnar pulses. Palpable DP pulses.  EXTREMITIES:  No clubbing. No cyanosis. No edema. Moving all 4.  NEUROLOGY: A&Ox3. Non-focal exam. Cranial nerves intact. Normal speech. Sensation intact.  PSYCH: Normal behavior. Normal affect.  SKIN: Multiple bruising ecchymosis bilateral arms. No jaundice. No erythema. No rash/lesion.  Vascular Access:     ICU Vital Signs Last 24 Hrs  T(C): 36.3 (05 Nov 2020 22:50), Max: 37.3 (05 Nov 2020 17:59)  T(F): 97.4 (05 Nov 2020 22:50), Max: 99.1 (05 Nov 2020 17:59)  HR: 66 (05 Nov 2020 22:50) (61 - 82)  BP: 175/81 (05 Nov 2020 22:50) (140/73 - 181/93)  BP(mean): 112 (05 Nov 2020 22:50) (112 - 112)  ABP: --  ABP(mean): --  RR: 16 (05 Nov 2020 22:50) (16 - 20)  SpO2: 95% (05 Nov 2020 22:50) (95% - 98%)      I&O's Summary

## 2020-11-05 NOTE — H&P ADULT - ASSESSMENT
89 yr old female with mild dementia, HTN, HLD, PE (on a/c with eliquis) presents from home for abdominal pain and concern of no BM x 4-5 days. Imaging negative for SBO but does show large amount of stool in the rectal vault with minimal surrounding inflammatory changes.

## 2020-11-05 NOTE — ED ADULT NURSE NOTE - OBJECTIVE STATEMENT
Patient is a 89 yr old female with a PMH of skin cancer/hip fracture/HTN/dementia who presents to the ED for abdominal pain. Per EMS patient has been constipated for the past 4 days. She has had the urge to have a BM but has not been able to. Patient also states she has fallen recently and unsure if she had LOC, now experiencing some SOB. Patient is on Eliquis for a recent PE. Upon assessment, patient is AOx3 (forgetful at times), lung sounds clear upon ausculation, heart sounds S1/S2 present, abdomen soft/non tender/+bowel sounds all 4 quadrants, ecchymosis all over body more prominent on legs/upper back/chest, +pulses and denies n/v/d/f/chills/CP/covid contact. Provider assessed at bedside, heplock placed with labs drawn/sent, call bell at bedside, all safety precautions maintained and will continue to monitor.

## 2020-11-05 NOTE — H&P ADULT - PROBLEM SELECTOR PLAN 1
CT notable w/ stool in rectal vault w/o inflammation; no signs of SBO; nontender on palpation  - will give enema x1 now  - will start on bowel regimen after enema: miralax + senna bid

## 2020-11-05 NOTE — H&P ADULT - PROBLEM SELECTOR PLAN 6
c/w eliquis 2.5 mg bid  unclear when PE was and if needs lifelong a/c vs how many months remaining. Discuss in am, risk benefits of a/c vs falls

## 2020-11-05 NOTE — H&P ADULT - NSHPREVIEWOFSYSTEMS_GEN_ALL_CORE
REVIEW OF SYSTEMS:  CONSTITUTIONAL: No weakness. No fevers. No chills. No rigors. No weight loss. No night sweats. No poor appetite.  EYES: No blurry or double vision. No eye pain.  ENT: No hearing difficulty. No vertigo. No dysphagia. No sore throat. No Sinusitis/rhinorrhea.   NECK: No pain. No stiffness/rigidity.  CARDIAC: +L sided pain w/ inspiration. No palpitations. No lightheadedness. No syncope.  RESPIRATORY: No cough. No SOB. No hemoptysis.  GASTROINTESTINAL: +abdominal pain, constipation x 4-5 days. No nausea. No vomiting. No hematemesis. No diarrhea. No melena. No hematochezia.  GENITOURINARY: No dysuria. No frequency. No hesitancy. No hematuria. No oliguria.  NEUROLOGICAL: No numbness/tingling. No focal weakness. No urinary or fecal incontinence. No headache. No unsteady gait.  BACK: No back pain. No flank pain.  EXTREMITIES: No lower extremity edema. Full ROM. No joint pain.  SKIN: No rashes. No itching. No other lesions.  PSYCHIATRIC: No depression. No anxiety. No SI/HI.  ALLERGIC: No lip swelling. No hives.  All other review of systems is negative unless indicated above.  Unless indicated above, unable to assess ROS 2/2 REVIEW OF SYSTEMS:  CONSTITUTIONAL: No weakness. No fevers. No chills. No rigors. No weight loss. No night sweats. No poor appetite.  EYES: No blurry or double vision. No eye pain.  ENT: No hearing difficulty. No vertigo. No dysphagia. No sore throat. No Sinusitis/rhinorrhea.   NECK: No pain. No stiffness/rigidity.  CARDIAC: +L sided pain w/ inspiration. No palpitations. No lightheadedness. No syncope.  RESPIRATORY: No cough. No SOB. No hemoptysis.  GASTROINTESTINAL: +abdominal pain, constipation x 4-5 days. No nausea. No vomiting. No hematemesis. No diarrhea. No melena. No hematochezia.  GENITOURINARY: No dysuria. No frequency. No hesitancy. No hematuria. No oliguria.  NEUROLOGICAL: No numbness/tingling. No focal weakness. No urinary or fecal incontinence. No headache. No unsteady gait. +falls  BACK: No back pain. No flank pain.  EXTREMITIES: No lower extremity edema. Full ROM. No joint pain.  SKIN: No rashes. No itching. No other lesions.  PSYCHIATRIC: No depression. No anxiety. No SI/HI.  ALLERGIC: No lip swelling. No hives.  All other review of systems is negative unless indicated above.  Unless indicated above, unable to assess ROS 2/2

## 2020-11-05 NOTE — H&P ADULT - PROBLEM SELECTOR PLAN 7
- per son, pt on levothyroxine 75 mcg  - per chart review, levothyroxine reduced to 50 mcg on 10/16  - will c/w levothyroxine 50 mcg

## 2020-11-05 NOTE — H&P ADULT - PROBLEM SELECTOR PLAN 8
Reviewed medications with son; confirm in am with pharmacy (levothyroxine dose, budesonide, and pantoprazole all need review)

## 2020-11-05 NOTE — ED PROVIDER NOTE - CLINICAL SUMMARY MEDICAL DECISION MAKING FREE TEXT BOX
Jen Steele MD - Attending Physician: Pt here with abd pain, decreased BM. Pt with minimal complaints at this time. Exam notable for multiple bruises in various stages of healing, pt reports falling but unable to explain circumstances. Unable to ambulate or get out of bed in ED without full support. Concern for unsafe discharge. Trauma scan, Labs, likely admit

## 2020-11-05 NOTE — H&P ADULT - NSHPSOCIALHISTORY_GEN_ALL_CORE
Denies smoking and etoh  Lives with HHA 24/7; dependent on ADLs Denies smoking and etoh  Lives with HHA 24/7; able to ambulate w/ assistive device; needs assistance with most ADLs

## 2020-11-05 NOTE — ED PROVIDER NOTE - PROGRESS NOTE DETAILS
Spoke with pts son in law Damien (POA, emergency contact in system) who states he lives out of state and unsure if pt fell recently but was told about pt not having a BM and was concerned for SBO. He is also concerned as when he spoke with her today she seemed a bit more disoriented than usual which is new since the last time he spoke with her yesterday. -Esthela Golden PA-C Pt signed out to me by CINTHIA Golden pending CT results. Given new left rib fracture, unable to ambulate without assistance, and frequent falls at home (multiple old fx's noted on imaging), plan for admission as pt is an unsafe discharge. Lives with HHA, family out of state. D/w Dr. Carbajal. - Hannah Juarez, RAFIQ

## 2020-11-05 NOTE — ED PROVIDER NOTE - PHYSICAL EXAMINATION
A&Ox3, NAD. NCAT. PERRL, EOMI. Neck supple, no LAD, cervical spine with full active rom, no vertebral ttp of c/t/l spine, hips stable, no joint pain or ttp. no sternal ttp. Lungs CTAB. +S1S2, RRR, No m/r/g. Abd soft, NT/ND, +BS, no rebound or guarding. Extremities: cap refill <2, pulses in distal extremities 4+, no edema. Skin multiple areas of ecchymosis along upper back, upper chest, left shoulder, and BL LE. CN II-XII intact. Strength 5/5 UE/LE. Sensations intact throughout.

## 2020-11-05 NOTE — ED ADULT NURSE REASSESSMENT NOTE - NS ED NURSE REASSESS COMMENT FT1
Patient straight cath attempted using sterile technique. Patient had no output. PA Willsey made aware. No intervention at this time. Will attempt at a later time. All safety precautions maintained and will continue to monitor

## 2020-11-05 NOTE — H&P ADULT - PROBLEM SELECTOR PLAN 2
Per patient, fall recently 2-3 days ago, presents with new Rib fracture, also here a week ago w/ fall and L clavicular and L 4th rib fracture; no signs of syncope/pre-syncope  - also w/ new R arm pain on exam, will obtain a XR of R clavicle/ R shoulder  - PT eval

## 2020-11-05 NOTE — H&P ADULT - NSHPLABSRESULTS_GEN_ALL_CORE
Personally reviewed labs.   Personally reviewed imaging.   Personally reviewed EKG.                         11.8   5.89  )-----------( 401      ( 05 Nov 2020 18:04 )             35.9   11-05    138  |  100  |  23  ----------------------------<  104<H>  4.5   |  28  |  1.13    Ca    10.0      05 Nov 2020 18:04    TPro  6.4  /  Alb  3.7  /  TBili  0.7  /  DBili  x   /  AST  29  /  ALT  19  /  AlkPhos  60  11-05    LIVER FUNCTIONS - ( 05 Nov 2020 18:04 )  Alb: 3.7 g/dL / Pro: 6.4 g/dL / ALK PHOS: 60 U/L / ALT: 19 U/L / AST: 29 U/L / GGT: x             PT/INR - ( 05 Nov 2020 18:04 )   PT: 15.1 sec;   INR: 1.27 ratio    PTT - ( 05 Nov 2020 18:04 )  PTT:34.4 sec  Urinalysis Basic - ( 05 Nov 2020 21:38 )    Color: Yellow / Appearance: Clear / SG: >1.050 / pH: x  Gluc: x / Ketone: Small  / Bili: Negative / Urobili: Negative   Blood: x / Protein: 30 mg/dL / Nitrite: Negative   Leuk Esterase: Moderate / RBC: 3 /hpf / WBC 31 /HPF   Sq Epi: x / Non Sq Epi: 7 /hpf / Bacteria: Negative Personally reviewed labs.   Personally reviewed imaging.   Personally reviewed EKG.                         11.8   5.89  )-----------( 401      ( 05 Nov 2020 18:04 )             35.9   11-05    138  |  100  |  23  ----------------------------<  104<H>  4.5   |  28  |  1.13    Ca    10.0      05 Nov 2020 18:04    TPro  6.4  /  Alb  3.7  /  TBili  0.7  /  DBili  x   /  AST  29  /  ALT  19  /  AlkPhos  60  11-05    LIVER FUNCTIONS - ( 05 Nov 2020 18:04 )  Alb: 3.7 g/dL / Pro: 6.4 g/dL / ALK PHOS: 60 U/L / ALT: 19 U/L / AST: 29 U/L / GGT: x             PT/INR - ( 05 Nov 2020 18:04 )   PT: 15.1 sec;   INR: 1.27 ratio    PTT - ( 05 Nov 2020 18:04 )  PTT:34.4 sec  Urinalysis Basic - ( 05 Nov 2020 21:38 )    Color: Yellow / Appearance: Clear / SG: >1.050 / pH: x  Gluc: x / Ketone: Small  / Bili: Negative / Urobili: Negative   Blood: x / Protein: 30 mg/dL / Nitrite: Negative   Leuk Esterase: Moderate / RBC: 3 /hpf / WBC 31 /HPF   Sq Epi: x / Non Sq Epi: 7 /hpf / Bacteria: Negative    EKG: NSR, no acute ischemic changes Personally reviewed labs.   Personally reviewed imaging.   Personally reviewed EKG.   Personally reviewed prior records.                        11.8   5.89  )-----------( 401      ( 05 Nov 2020 18:04 )             35.9   11-05    138  |  100  |  23  ----------------------------<  104<H>  4.5   |  28  |  1.13    Ca    10.0      05 Nov 2020 18:04    TPro  6.4  /  Alb  3.7  /  TBili  0.7  /  DBili  x   /  AST  29  /  ALT  19  /  AlkPhos  60  11-05    LIVER FUNCTIONS - ( 05 Nov 2020 18:04 )  Alb: 3.7 g/dL / Pro: 6.4 g/dL / ALK PHOS: 60 U/L / ALT: 19 U/L / AST: 29 U/L / GGT: x             PT/INR - ( 05 Nov 2020 18:04 )   PT: 15.1 sec;   INR: 1.27 ratio    PTT - ( 05 Nov 2020 18:04 )  PTT:34.4 sec  Urinalysis Basic - ( 05 Nov 2020 21:38 )    Color: Yellow / Appearance: Clear / SG: >1.050 / pH: x  Gluc: x / Ketone: Small  / Bili: Negative / Urobili: Negative   Blood: x / Protein: 30 mg/dL / Nitrite: Negative   Leuk Esterase: Moderate / RBC: 3 /hpf / WBC 31 /HPF   Sq Epi: x / Non Sq Epi: 7 /hpf / Bacteria: Negative    EKG: NSR, no acute ischemic changes      CT CHEST:  Acute fracture of the left 3rd rib. No pleural effusion or pneumothorax. No consolidation.     Large amount of stool in the rectal vault with minimal surrounding inflammatory changes.

## 2020-11-05 NOTE — ED ADULT NURSE REASSESSMENT NOTE - NS ED NURSE REASSESS COMMENT FT1
report received from SURESH Trevino, pt at Xray. Will assess when pt returns. report received from SURESH Trevino, pt at CT. Will assess when CT returns.

## 2020-11-06 DIAGNOSIS — M25.511 PAIN IN RIGHT SHOULDER: ICD-10-CM

## 2020-11-06 DIAGNOSIS — E03.9 HYPOTHYROIDISM, UNSPECIFIED: ICD-10-CM

## 2020-11-06 DIAGNOSIS — I26.99 OTHER PULMONARY EMBOLISM WITHOUT ACUTE COR PULMONALE: ICD-10-CM

## 2020-11-06 LAB
ANION GAP SERPL CALC-SCNC: 10 MMOL/L — SIGNIFICANT CHANGE UP (ref 5–17)
BUN SERPL-MCNC: 19 MG/DL — SIGNIFICANT CHANGE UP (ref 7–23)
CALCIUM SERPL-MCNC: 10.1 MG/DL — SIGNIFICANT CHANGE UP (ref 8.4–10.5)
CHLORIDE SERPL-SCNC: 100 MMOL/L — SIGNIFICANT CHANGE UP (ref 96–108)
CO2 SERPL-SCNC: 28 MMOL/L — SIGNIFICANT CHANGE UP (ref 22–31)
CREAT SERPL-MCNC: 1.08 MG/DL — SIGNIFICANT CHANGE UP (ref 0.5–1.3)
CULTURE RESULTS: SIGNIFICANT CHANGE UP
GLUCOSE SERPL-MCNC: 76 MG/DL — SIGNIFICANT CHANGE UP (ref 70–99)
HCT VFR BLD CALC: 37.9 % — SIGNIFICANT CHANGE UP (ref 34.5–45)
HGB BLD-MCNC: 12.1 G/DL — SIGNIFICANT CHANGE UP (ref 11.5–15.5)
MCHC RBC-ENTMCNC: 31.4 PG — SIGNIFICANT CHANGE UP (ref 27–34)
MCHC RBC-ENTMCNC: 31.9 GM/DL — LOW (ref 32–36)
MCV RBC AUTO: 98.4 FL — SIGNIFICANT CHANGE UP (ref 80–100)
NRBC # BLD: 0 /100 WBCS — SIGNIFICANT CHANGE UP (ref 0–0)
PLATELET # BLD AUTO: 429 K/UL — HIGH (ref 150–400)
POTASSIUM SERPL-MCNC: 4.6 MMOL/L — SIGNIFICANT CHANGE UP (ref 3.5–5.3)
POTASSIUM SERPL-SCNC: 4.6 MMOL/L — SIGNIFICANT CHANGE UP (ref 3.5–5.3)
RBC # BLD: 3.85 M/UL — SIGNIFICANT CHANGE UP (ref 3.8–5.2)
RBC # FLD: 14 % — SIGNIFICANT CHANGE UP (ref 10.3–14.5)
SARS-COV-2 IGG SERPL QL IA: NEGATIVE — SIGNIFICANT CHANGE UP
SARS-COV-2 IGM SERPL IA-ACNC: 0.09 INDEX — SIGNIFICANT CHANGE UP
SARS-COV-2 RNA SPEC QL NAA+PROBE: SIGNIFICANT CHANGE UP
SODIUM SERPL-SCNC: 138 MMOL/L — SIGNIFICANT CHANGE UP (ref 135–145)
SPECIMEN SOURCE: SIGNIFICANT CHANGE UP
WBC # BLD: 5.89 K/UL — SIGNIFICANT CHANGE UP (ref 3.8–10.5)
WBC # FLD AUTO: 5.89 K/UL — SIGNIFICANT CHANGE UP (ref 3.8–10.5)

## 2020-11-06 PROCEDURE — 73020 X-RAY EXAM OF SHOULDER: CPT | Mod: 26,RT

## 2020-11-06 RX ORDER — APIXABAN 2.5 MG/1
2.5 TABLET, FILM COATED ORAL EVERY 12 HOURS
Refills: 0 | Status: DISCONTINUED | OUTPATIENT
Start: 2020-11-06 | End: 2020-11-12

## 2020-11-06 RX ORDER — LOSARTAN POTASSIUM 100 MG/1
25 TABLET, FILM COATED ORAL DAILY
Refills: 0 | Status: DISCONTINUED | OUTPATIENT
Start: 2020-11-06 | End: 2020-11-12

## 2020-11-06 RX ORDER — LEVOTHYROXINE SODIUM 125 MCG
50 TABLET ORAL DAILY
Refills: 0 | Status: DISCONTINUED | OUTPATIENT
Start: 2020-11-06 | End: 2020-11-12

## 2020-11-06 RX ORDER — CHLORHEXIDINE GLUCONATE 213 G/1000ML
1 SOLUTION TOPICAL DAILY
Refills: 0 | Status: DISCONTINUED | OUTPATIENT
Start: 2020-11-06 | End: 2020-11-12

## 2020-11-06 RX ADMIN — Medication 650 MILLIGRAM(S): at 12:49

## 2020-11-06 RX ADMIN — OXYCODONE AND ACETAMINOPHEN 0.5 TABLET(S): 5; 325 TABLET ORAL at 07:16

## 2020-11-06 RX ADMIN — OXYCODONE AND ACETAMINOPHEN 0.5 TABLET(S): 5; 325 TABLET ORAL at 06:32

## 2020-11-06 RX ADMIN — APIXABAN 2.5 MILLIGRAM(S): 2.5 TABLET, FILM COATED ORAL at 17:26

## 2020-11-06 RX ADMIN — LIDOCAINE 1 PATCH: 4 CREAM TOPICAL at 07:37

## 2020-11-06 RX ADMIN — SENNA PLUS 1 TABLET(S): 8.6 TABLET ORAL at 04:34

## 2020-11-06 RX ADMIN — Medication 650 MILLIGRAM(S): at 17:53

## 2020-11-06 RX ADMIN — Medication 650 MILLIGRAM(S): at 17:26

## 2020-11-06 RX ADMIN — APIXABAN 2.5 MILLIGRAM(S): 2.5 TABLET, FILM COATED ORAL at 04:35

## 2020-11-06 RX ADMIN — LIDOCAINE 1 PATCH: 4 CREAM TOPICAL at 12:00

## 2020-11-06 RX ADMIN — CHLORHEXIDINE GLUCONATE 1 APPLICATION(S): 213 SOLUTION TOPICAL at 12:16

## 2020-11-06 RX ADMIN — POLYETHYLENE GLYCOL 3350 17 GRAM(S): 17 POWDER, FOR SOLUTION ORAL at 12:16

## 2020-11-06 RX ADMIN — Medication 650 MILLIGRAM(S): at 12:16

## 2020-11-06 RX ADMIN — SENNA PLUS 1 TABLET(S): 8.6 TABLET ORAL at 17:26

## 2020-11-06 RX ADMIN — Medication 650 MILLIGRAM(S): at 04:34

## 2020-11-06 RX ADMIN — Medication 50 MICROGRAM(S): at 04:34

## 2020-11-06 RX ADMIN — LOSARTAN POTASSIUM 25 MILLIGRAM(S): 100 TABLET, FILM COATED ORAL at 04:34

## 2020-11-06 RX ADMIN — OXYCODONE AND ACETAMINOPHEN 0.5 TABLET(S): 5; 325 TABLET ORAL at 00:00

## 2020-11-06 NOTE — PROGRESS NOTE ADULT - SUBJECTIVE AND OBJECTIVE BOX
Patient is a 89y old  Female who presents with a chief complaint of Abdominal pain, acute Rib fracture (06 Nov 2020 17:18)      SUBJECTIVE / OVERNIGHT EVENTS: Comfortable   Review of Systems  chest pain no  palpitations no  sob no  nausea no  headache no    MEDICATIONS  (STANDING):  acetaminophen   Tablet .. 650 milliGRAM(s) Oral every 6 hours  apixaban 2.5 milliGRAM(s) Oral every 12 hours  chlorhexidine 2% Cloths 1 Application(s) Topical daily  influenza   Vaccine 0.5 milliLiter(s) IntraMuscular once  levothyroxine 50 MICROGram(s) Oral daily  lidocaine   Patch 1 Patch Transdermal every 24 hours  losartan 25 milliGRAM(s) Oral daily  polyethylene glycol 3350 17 Gram(s) Oral daily  senna 1 Tablet(s) Oral every 12 hours    MEDICATIONS  (PRN):  oxycodone    5 mG/acetaminophen 325 mG 0.5 Tablet(s) Oral every 4 hours PRN Severe Pain (7 - 10)      Vital Signs Last 24 Hrs  T(C): 36.4 (06 Nov 2020 16:16), Max: 36.7 (06 Nov 2020 04:33)  T(F): 97.5 (06 Nov 2020 16:16), Max: 98.1 (06 Nov 2020 04:33)  HR: 82 (06 Nov 2020 16:16) (64 - 82)  BP: 178/89 (06 Nov 2020 16:16) (145/78 - 178/89)  BP(mean): 112 (05 Nov 2020 22:50) (112 - 112)  RR: 18 (06 Nov 2020 16:16) (16 - 18)  SpO2: 93% (06 Nov 2020 16:16) (92% - 95%)    PHYSICAL EXAM:  GENERAL: NAD   HEAD:  Atraumatic, Normocephalic  EYES: EOMI, PERRLA, conjunctiva and sclera clear  NECK: Supple, No JVD  CHEST/LUNG: Clear to auscultation bilaterally; No wheeze  HEART: Regular rate and rhythm; No murmurs, rubs, or gallops  ABDOMEN: Soft, Nontender, Nondistended; Bowel sounds present  EXTREMITIES:  2+ Peripheral Pulses, No clubbing, cyanosis, or edema  PSYCH: some confusion  NEUROLOGY: non-focal  SKIN: No rashes or lesions    LABS:                        12.1   5.89  )-----------( 429      ( 06 Nov 2020 06:43 )             37.9     11-06    138  |  100  |  19  ----------------------------<  76  4.6   |  28  |  1.08    Ca    10.1      06 Nov 2020 06:42    TPro  6.4  /  Alb  3.7  /  TBili  0.7  /  DBili  x   /  AST  29  /  ALT  19  /  AlkPhos  60  11-05    PT/INR - ( 05 Nov 2020 18:04 )   PT: 15.1 sec;   INR: 1.27 ratio         PTT - ( 05 Nov 2020 18:04 )  PTT:34.4 sec      Urinalysis Basic - ( 05 Nov 2020 21:38 )    Color: Yellow / Appearance: Clear / SG: >1.050 / pH: x  Gluc: x / Ketone: Small  / Bili: Negative / Urobili: Negative   Blood: x / Protein: 30 mg/dL / Nitrite: Negative   Leuk Esterase: Moderate / RBC: 3 /hpf / WBC 31 /HPF   Sq Epi: x / Non Sq Epi: 7 /hpf / Bacteria: Negative          RADIOLOGY & ADDITIONAL TESTS:    Imaging Personally Reviewed:    Consultant(s) Notes Reviewed:      Care Discussed with Consultants/Other Providers:

## 2020-11-06 NOTE — CONSULT NOTE ADULT - SUBJECTIVE AND OBJECTIVE BOX
Cardiology Attending    HISTORY OF PRESENT ILLNESS:  Ms Jacobo is an 89yo woman who sees Dr Mancera for Cardiology, for management of HTN, HLD and a Pulmonary Embolus.  She presenst after recurrent falls at home, and also having not experienced a bowel movement in the last 4-5 days.  Having abdominal pain and cramping in the lower pelvis and in the rectum.    She has fallen down in the bathroom and in other parts of the house, without loss of consciousness or preceding angina/shortness of breath / lightheadedness or palpitations.    Chest and shoulder pain tender with motion and physical exam, not consistent with angina, nonradiating and no associated symptoms such as shortness of breath, sweating, nausea.  In the last year she has been falling more frequently, and even fractured her clavicle last admission.  Pt denies any recent fevers, chills, lightheaded or dizzines, nausea, vomiting, abdominal pain, difficulty breathing, back pain, numbness, tingling, paresthesias.   Pt states she has been taking eliquis as prescribed daily, has a live in aid who is with the pt 24/7.    At this time, no new complaints, and ROS x10 is negative.      PAST MEDICAL & SURGICAL HISTORY:  Skin cancer  lower extremities    Hip fracture  Right hip- 1995    Osteoporosis    Chronic anal fissure    Anal spasm    mild Dementia    Dyslipidemia    Hypertension    H/O: Hypothyroidism    H/O gastroesophageal reflux (GERD)    Anterior Cervical discectomy    left eye Retinal tear repair    After-Cataract of Both Eyes    left Elbow surgey secondary to injury    History of Tonsillectomy    History of  Hip surgery with hardware      MEDICATIONS  (STANDING):  acetaminophen   Tablet .. 650 milliGRAM(s) Oral every 6 hours  apixaban 2.5 milliGRAM(s) Oral every 12 hours  chlorhexidine 2% Cloths 1 Application(s) Topical daily  influenza   Vaccine 0.5 milliLiter(s) IntraMuscular once  levothyroxine 50 MICROGram(s) Oral daily  lidocaine   Patch 1 Patch Transdermal every 24 hours  losartan 25 milliGRAM(s) Oral daily  polyethylene glycol 3350 17 Gram(s) Oral daily  senna 1 Tablet(s) Oral every 12 hours    Allergies    penicillin (Rash)    Intolerances    FAMILY HISTORY:  No family history of cardiovascular disease      Non-contributary for premature coronary disease or sudden cardiac death    SOCIAL HISTORY:    [x ] Non-smoker  [ ] Smoker  [ ] Alcohol    PHYSICAL EXAM:  T(C): 36.4 (11-06-20 @ 10:21), Max: 37.3 (11-05-20 @ 17:59)  HR: 70 (11-06-20 @ 10:21) (61 - 82)  BP: 145/78 (11-06-20 @ 10:21) (140/73 - 181/93)  RR: 18 (11-06-20 @ 04:33) (16 - 20)  SpO2: 92% (11-06-20 @ 10:21) (92% - 98%)  Wt(kg): --    General: elderly white female in no acute distress, alert and oriented x 3  Head: normocephalic, no apparent trauma  Neck: no JVD, no bruit, supple, not enlarged  CV: S1S2, no S3, regular rate, rhythm is SINUS, no murmurs.    Lungs: clear BL, no rales or wheezes  Abdomen: bowel sounds +, soft, nontender, nondistended  Extremities: no clubbing, cyanosis or edema  Neuro: Moves all 4 extremities, sensation intact x 4 extremities  Skin: warm and moist, normal turgor, diffuse bruising on arms/legs, and chronic venous stasis changes in the shins.  bruise on dorsum of right foot is newer / tender.  Psych: Mood and affect are appropriate for circumstances  MSK: normal range of motion and strength x4 extremities.    ECG: NSR with no ischemic ST segment changes. 	  Echo: 10/2020 with hyperdynamic LV (EF>70%) and no significant valve dysfunction.  	  LABS:	 	                          12.1   5.89  )-----------( 429      ( 06 Nov 2020 06:43 )             37.9     11-06    138  |  100  |  19  ----------------------------<  76  4.6   |  28  |  1.08    Ca    10.1      06 Nov 2020 06:42    TPro  6.4  /  Alb  3.7  /  TBili  0.7  /  DBili  x   /  AST  29  /  ALT  19  /  AlkPhos  60  11-05    ASSESSMENT/PLAN: 	89y Female with hypertension, pulmonary embolism on dose-adjusted apixaban for age/weight, experienced a mechanical fall at home.  Following on behalf of Dr Víctor Mancera.    Continue apixaban at 2.5mg BID.  Needs PT/OT eval for safe return home.  Does not need a repeat echo on this admission- last was ~3 wks ago with no significant change in examination.  Will follow.      Vasile Muñoz M.D.  Cardiac Electrophysiology    office [phone]  pager [phone]

## 2020-11-06 NOTE — CONSULT NOTE ADULT - ASSESSMENT
A/P: 89y female with frequent falls and pmh of dementia, HTN, HLD, PE on NOAC found to have acute 3rd left rib fracture. Trauma Surgery consulted for evaluation of rib fracture.     - no acute surgical intervention  - recommend multimodal pain control to maintain adequate ventilation of lungs  - rec consider risk versus benefits of continued A/C in setting of frequent falls.   - continue incentive spirometry 10x / hr  - d/w Dr. Hendrickson     [facility] Surgery  [phone]

## 2020-11-06 NOTE — PROGRESS NOTE ADULT - ASSESSMENT
89 yr old female with mild dementia, HTN, HLD, PE (on a/c with eliquis) presents from home for abdominal pain and concern of no BM x 4-5 days. Imaging negative for SBO but does show large amount of stool in the rectal vault with minimal surrounding inflammatory changes.    Abdominal pain.   - CT notable w/ stool in rectal vault w/o inflammation; no signs of SBO; nontender on palpation  - will give enema x1 now  - will start on bowel regimen after enema: miralax + senna bid.     Falls.   - Per patient, fall recently 2-3 days ago, presents with new Rib fracture, also here a week ago w/ fall and L clavicular and L 4th rib fracture; no signs of syncope/pre-syncope  - PT eval.     Closed fracture of one rib of right side, initial encounter.    - Noted on CT  - pain control: lidocaine patch, tylenol 650 q6h, oxycodone 2.5 mg q4h prn severe pain.     Constipation.   - As noted on CT  - enema and laxatives as above.     Right shoulder pain.   - Xray shoulder axillary view  - PT  - pain control.     Pulmonary emboli.  - Eliquis 2.5 mg bid  - unclear when PE was and if needs lifelong a/c vs how many months remaining.     Hypothyroidism.  - per son, pt on levothyroxine 75 mcg  - per chart review, levothyroxine reduced to 50 mcg on 10/16  - will c/w levothyroxine 50 mcg.     Confusion  - B12, Folate, TSH  - supportive care    Prophylactic measure.   - continue home a/c w/ Eliquis 2.5 mg bid.     d/w son at length    Art Carbajal MD pager [phone]

## 2020-11-06 NOTE — CONSULT NOTE ADULT - SUBJECTIVE AND OBJECTIVE BOX
Trauma Surgery Consult Note:      CC: Patient is a 89y old  Female who presents with a chief complaint of Abdominal pain, acute Rib fracture (06 Nov 2020 10:59)      Patient is a 89y year old female with PMHx of   HPI:  89 yr old female with mild dementia, HTN, HLD, PE (on a/c with eliquis) presents from home for concern of no BM x 4-5 days. Pt states she has had a diminished appetite but has been tolerating PO and passing gas. Currently denying abdominal pain but noted to have it at home. Her aide called her PMD who instructed her to come to the ED to r/o an obstruction.     Of note, patient was seen here last week for L clavicle fracture s/p fall, denies taking opioids for the pain, has not needed pain medications.    Pt also states that she fell ?again 2-3 days ago.  States it was unwitnessed while she was getting up from using the bathroom, unsure if she hit her head, but did not seek medical attention at the time. She is unsure how long she was on the floor for. She currently endorses left shoulder pain and some left sided chest pain with inspiration that is new. She denies shortness of breath. Son did not know about fall and was not able to provide information.     Pt denies any recent fevers, chills, lightheaded or dizzines, nausea, vomiting, abdominal pain, difficulty breathing, back pain, numbness, tingling, paresthesias.   Pt states she has been taking eliquis as prescribed daily, has a live in aid who is with the pt 24/7.    Son concerned about frequent falls and new fractures. Concerned if he needs to find a new aide or start considering nursing home for mom. Son and wife live in [place]. Patient currently lives in a two story home and has her bedroom and bathroom upstairs; using an electric chair to get to second floor.  (05 Nov 2020 23:12)    Interval events: trauma surgery consulted to evaluate for new acute 3rd rib fracture.     Primary Survey  A - airway intact  B - bilateral breath sounds and good chest rise  C - initially BP: 178/89 (11-06-20 @ 16:16), palpable pulses in all extremities  D - GCS 15 on arrival  Exposure obtained      Secondary survey  Gen: NAD  HEENT: NC/AT  Pulm: equal chest rise bilaterally, 500cc on IS  Chest: TTP over left thoracic cage and L clavicle   Abd: Soft, ND, NT, no rebound, no guarding  Groin: Normal appearing    PMH  Skin cancer    Hip fracture    Osteoporosis    Chronic anal fissure    Anal spasm    mild Dementia    Dyslipidemia    Hypertension    H/O: Hypothyroidism      PSH  H/O gastroesophageal reflux (GERD)    Anterior Cervical discectomy    left eye Retinal tear repair    After-Cataract of Both Eyes    left Elbow surgey secondary to injury    History of Tonsillectomy    History of  Hip surgery with hardware      MEDS    Allergies    penicillin (Rash)    Intolerances        Social    Labs:                        12.1   5.89  )-----------( 429      ( 06 Nov 2020 06:43 )             37.9     11-06    138  |  100  |  19  ----------------------------<  76  4.6   |  28  |  1.08    Ca    10.1      06 Nov 2020 06:42    TPro  6.4  /  Alb  3.7  /  TBili  0.7  /  DBili  x   /  AST  29  /  ALT  19  /  AlkPhos  60  11-05    PT/INR - ( 05 Nov 2020 18:04 )   PT: 15.1 sec;   INR: 1.27 ratio         PTT - ( 05 Nov 2020 18:04 )  PTT:34.4 sec  Urinalysis Basic - ( 05 Nov 2020 21:38 )    Color: Yellow / Appearance: Clear / SG: >1.050 / pH: x  Gluc: x / Ketone: Small  / Bili: Negative / Urobili: Negative   Blood: x / Protein: 30 mg/dL / Nitrite: Negative   Leuk Esterase: Moderate / RBC: 3 /hpf / WBC 31 /HPF   Sq Epi: x / Non Sq Epi: 7 /hpf / Bacteria: Negative            Imaging    < from: CT Abdomen and Pelvis w/ IV Cont (11.05.20 @ 19:41) >    EXAM:  CT ABDOMEN AND PELVIS IC                          EXAM:  CT CHEST IC                            PROCEDURE DATE:  11/05/2020            INTERPRETATION:  CLINICAL INFORMATION: Trauma. Fall on anticoagulation. Constipation.    COMPARISON: CTA of the chest from 10/12/2020. CT pelvis from 9/19/2017. CT abdomen pelvis from 11/18/2011.    PROCEDURE:  CT of the Chest, Abdomen and Pelvis was performed with intravenous contrast.  Intravenous contrast: 90 ml Omnipaque 350. 10 ml discarded.  Oral contrast: None.  Sagittal and coronal reformats were performed.    FINDINGS:  CHEST:  LUNGS AND LARGE AIRWAYS: Patent central airways. Incidental accessory fissure. Innumerable bilateral calcified granulomas, unchanged from prior study.  PLEURA: No pleural effusion.  VESSELS: Atherosclerotic calcifications of the aorta and coronary arteries.  HEART: Heart size is normal. Aortic valvular and mitral annular calcifications No pericardial effusion.  MEDIASTINUM AND MIGUEL A: No lymphadenopathy.  CHEST WALL AND LOWER NECK: Subcutaneous loop recorder within the left chest wall. Multiple bilateral old healed rib fractures.    ABDOMEN AND PELVIS:  LIVER: Within normal limits.  BILE DUCTS: Normal caliber.  GALLBLADDER: Cholelithiasis.  SPLEEN: Within normal limits.  PANCREAS: Within normal limits.  ADRENALS: Within normal limits.  KIDNEYS/URETERS: Bilateral cysts. No hydronephrosis.    BLADDER: Within normal limits.  REPRODUCTIVE ORGANS: Uterus and adnexa within normal limits.    BOWEL: Large amount of stool in the rectal vault with minimal surrounding inflammatory changes. Appendix is not visualized. No evidence of inflammation in the pericecal region.  PERITONEUM: No ascites.  VESSELS: Atherosclerotic changes.  RETROPERITONEUM/LYMPH NODES: No lymphadenopathy.  ABDOMINAL WALL: Within normal limits.  BONES: Acute fracture of the left 3rd rib is new from prior. Old fracture deformity of the left 4th, 5th, 6th and 7th ribs. Old fracture deformity of the right 3rd 4th 5th and 6th ribs. Right femoralfixation hardware. Old healed left superior and inferior pubic ramus fractures. Spinal degenerative changes. Cervical spinal fixation hardware.    IMPRESSION:    Acute fracture of the left 3rd rib. No pleural effusion or pneumothorax.    Large amountof stool in the rectal vault with minimal surrounding inflammatory changes.                DEMETRICE SANTANA M.D., RADIOLOGY RESIDENT  This document has been electronically signed.  SO WADE MD; Attending Radiologist  This document has been electronically signed. Nov 5 2020  8:38PM    < end of copied text >    < from: CT Head No Cont (11.05.20 @ 19:42) >    EXAM:  CT CERVICAL SPINE                          EXAM:  CT BRAIN                            PROCEDURE DATE:  11/05/2020            INTERPRETATION:  CLINICAL STATEMENT: Trauma..    TECHNIQUE: CT of the head and cervical spine were performed withoutIV contrast. 3-D/MIP images obtained    COMPARISON: CT head 10/14/2020. MRI cervical spine 10/16/2020    FINDINGS:  Head:  There is mild diffuse parenchymal volume loss. There are areas of low attenuation in the periventricular white matter likely related to mild chronic microvascular ischemic changes.    There is no acute intracranial hemorrhage, parenchymal mass, mass effect or midline shift. There is no acute territorial infarct. There is no hydrocephalus. Partial empty sella    The cranium isintact. The visualized paranasal sinuses are well-aerated.    Cervical spine:  Anterior cervical fusion of C5 and C6 vertebral bodies with fusion and intervertebral disc spacer. Vertebral body heights are maintained.    Grade 1 anterolisthesis C3 on C4, C4 on C5, C7 on T1, T2 on T3 unchanged    There is no prevertebral soft tissue swelling. The odontoid is intact. Small chronic fragments adjacent to the dens again noted.    Evaluation of the spinal canal is limited on a CT exam.  Multilevel degenerative changes noted resulting in multilevel spinal canal stenosis and neural foraminal narrowing.    Partially visualized fracture distal left clavicle.    IMPRESSION:  No acute intracranial hemorrhage    No acute fracture cervical spine. If pain persists, follow-up MRI exam recommended.    Partially visualized distal left clavicle fracture.                ALICJA THOMAS MD; Attending Radiologist  This document has been electronically signed. Nov 5 2020  7:57PM    < end of copied text >

## 2020-11-06 NOTE — PHYSICAL THERAPY INITIAL EVALUATION ADULT - ADDITIONAL COMMENTS
Patient lives alone  in pvt house with 24 hrs. HHA services. 3 steps to enter.  Patient ambulated with rolling walker independent. Patient lives alone  in pvt house with 24 hrs. HHA services. 3 steps to enter. chair lift inside. Patient ambulated with rolling walker independent.

## 2020-11-06 NOTE — PHYSICAL THERAPY INITIAL EVALUATION ADULT - PERTINENT HX OF CURRENT PROBLEM, REHAB EVAL
as per chart review: mild dementia, HTN, HLD, PE (on a/c with eliquis) presents from home for abdominal pain and concern of no BM x 4-5 days, also s/p fall. Imaging negative for SBO but does show large amount of stool in the rectal vault with minimal surrounding inflammatory changes, Left clavicle fx last week s/p fall as per chart review: mild dementia, HTN, HLD, PE (on a/c with eliquis) presents from home for abdominal pain and concern of no BM x 4-5 days, also s/p fall. Imaging negative for SBO but does show large amount of stool in the rectal vault with minimal surrounding inflammatory changes, Left clavicle fx last week s/p fall x ray r shoulder - no acute fx dislocation.

## 2020-11-07 LAB
ALBUMIN SERPL ELPH-MCNC: 4.1 G/DL — SIGNIFICANT CHANGE UP (ref 3.3–5)
ALP SERPL-CCNC: 78 U/L — SIGNIFICANT CHANGE UP (ref 40–120)
ALT FLD-CCNC: 18 U/L — SIGNIFICANT CHANGE UP (ref 10–45)
ANION GAP SERPL CALC-SCNC: 14 MMOL/L — SIGNIFICANT CHANGE UP (ref 5–17)
APPEARANCE UR: ABNORMAL
AST SERPL-CCNC: 33 U/L — SIGNIFICANT CHANGE UP (ref 10–40)
BACTERIA # UR AUTO: ABNORMAL
BASOPHILS # BLD AUTO: 0.02 K/UL — SIGNIFICANT CHANGE UP (ref 0–0.2)
BASOPHILS NFR BLD AUTO: 0.2 % — SIGNIFICANT CHANGE UP (ref 0–2)
BILIRUB SERPL-MCNC: 0.8 MG/DL — SIGNIFICANT CHANGE UP (ref 0.2–1.2)
BILIRUB UR-MCNC: NEGATIVE — SIGNIFICANT CHANGE UP
BUN SERPL-MCNC: 18 MG/DL — SIGNIFICANT CHANGE UP (ref 7–23)
CALCIUM SERPL-MCNC: 10 MG/DL — SIGNIFICANT CHANGE UP (ref 8.4–10.5)
CHLORIDE SERPL-SCNC: 97 MMOL/L — SIGNIFICANT CHANGE UP (ref 96–108)
CO2 SERPL-SCNC: 25 MMOL/L — SIGNIFICANT CHANGE UP (ref 22–31)
COLOR SPEC: YELLOW — SIGNIFICANT CHANGE UP
CREAT SERPL-MCNC: 0.9 MG/DL — SIGNIFICANT CHANGE UP (ref 0.5–1.3)
DIFF PNL FLD: ABNORMAL
EOSINOPHIL # BLD AUTO: 0 K/UL — SIGNIFICANT CHANGE UP (ref 0–0.5)
EOSINOPHIL NFR BLD AUTO: 0 % — SIGNIFICANT CHANGE UP (ref 0–6)
EPI CELLS # UR: 0 /HPF — SIGNIFICANT CHANGE UP
FOLATE SERPL-MCNC: >20 NG/ML — SIGNIFICANT CHANGE UP
GLUCOSE SERPL-MCNC: 145 MG/DL — HIGH (ref 70–99)
GLUCOSE UR QL: NEGATIVE — SIGNIFICANT CHANGE UP
HCT VFR BLD CALC: 38.9 % — SIGNIFICANT CHANGE UP (ref 34.5–45)
HGB BLD-MCNC: 12.5 G/DL — SIGNIFICANT CHANGE UP (ref 11.5–15.5)
HYALINE CASTS # UR AUTO: 19 /LPF — HIGH (ref 0–2)
IMM GRANULOCYTES NFR BLD AUTO: 0.7 % — SIGNIFICANT CHANGE UP (ref 0–1.5)
KETONES UR-MCNC: ABNORMAL
LEUKOCYTE ESTERASE UR-ACNC: ABNORMAL
LYMPHOCYTES # BLD AUTO: 1.04 K/UL — SIGNIFICANT CHANGE UP (ref 1–3.3)
LYMPHOCYTES # BLD AUTO: 9.6 % — LOW (ref 13–44)
MCHC RBC-ENTMCNC: 31.3 PG — SIGNIFICANT CHANGE UP (ref 27–34)
MCHC RBC-ENTMCNC: 32.1 GM/DL — SIGNIFICANT CHANGE UP (ref 32–36)
MCV RBC AUTO: 97.5 FL — SIGNIFICANT CHANGE UP (ref 80–100)
MONOCYTES # BLD AUTO: 0.85 K/UL — SIGNIFICANT CHANGE UP (ref 0–0.9)
MONOCYTES NFR BLD AUTO: 7.8 % — SIGNIFICANT CHANGE UP (ref 2–14)
NEUTROPHILS # BLD AUTO: 8.88 K/UL — HIGH (ref 1.8–7.4)
NEUTROPHILS NFR BLD AUTO: 81.7 % — HIGH (ref 43–77)
NITRITE UR-MCNC: NEGATIVE — SIGNIFICANT CHANGE UP
NRBC # BLD: 0 /100 WBCS — SIGNIFICANT CHANGE UP (ref 0–0)
PH UR: 6 — SIGNIFICANT CHANGE UP (ref 5–8)
PLATELET # BLD AUTO: 493 K/UL — HIGH (ref 150–400)
POTASSIUM SERPL-MCNC: 4.3 MMOL/L — SIGNIFICANT CHANGE UP (ref 3.5–5.3)
POTASSIUM SERPL-SCNC: 4.3 MMOL/L — SIGNIFICANT CHANGE UP (ref 3.5–5.3)
PROT SERPL-MCNC: 7.1 G/DL — SIGNIFICANT CHANGE UP (ref 6–8.3)
PROT UR-MCNC: ABNORMAL
RBC # BLD: 3.99 M/UL — SIGNIFICANT CHANGE UP (ref 3.8–5.2)
RBC # FLD: 13.9 % — SIGNIFICANT CHANGE UP (ref 10.3–14.5)
RBC CASTS # UR COMP ASSIST: 15 /HPF — HIGH (ref 0–4)
SODIUM SERPL-SCNC: 136 MMOL/L — SIGNIFICANT CHANGE UP (ref 135–145)
SP GR SPEC: 1.03 — HIGH (ref 1.01–1.02)
TSH SERPL-MCNC: 0.89 UIU/ML — SIGNIFICANT CHANGE UP (ref 0.27–4.2)
UROBILINOGEN FLD QL: NEGATIVE — SIGNIFICANT CHANGE UP
VIT B12 SERPL-MCNC: 1694 PG/ML — HIGH (ref 232–1245)
WBC # BLD: 10.87 K/UL — HIGH (ref 3.8–10.5)
WBC # FLD AUTO: 10.87 K/UL — HIGH (ref 3.8–10.5)
WBC UR QL: 152 /HPF — HIGH (ref 0–5)

## 2020-11-07 PROCEDURE — 71045 X-RAY EXAM CHEST 1 VIEW: CPT | Mod: 26,76

## 2020-11-07 RX ORDER — LIDOCAINE 4 G/100G
1 CREAM TOPICAL EVERY 24 HOURS
Refills: 0 | Status: DISCONTINUED | OUTPATIENT
Start: 2020-11-07 | End: 2020-11-12

## 2020-11-07 RX ORDER — ACETAMINOPHEN 500 MG
650 TABLET ORAL ONCE
Refills: 0 | Status: DISCONTINUED | OUTPATIENT
Start: 2020-11-07 | End: 2020-11-12

## 2020-11-07 RX ORDER — PIPERACILLIN AND TAZOBACTAM 4; .5 G/20ML; G/20ML
3.38 INJECTION, POWDER, LYOPHILIZED, FOR SOLUTION INTRAVENOUS ONCE
Refills: 0 | Status: COMPLETED | OUTPATIENT
Start: 2020-11-07 | End: 2020-11-07

## 2020-11-07 RX ORDER — PIPERACILLIN AND TAZOBACTAM 4; .5 G/20ML; G/20ML
3.38 INJECTION, POWDER, LYOPHILIZED, FOR SOLUTION INTRAVENOUS EVERY 12 HOURS
Refills: 0 | Status: DISCONTINUED | OUTPATIENT
Start: 2020-11-07 | End: 2020-11-09

## 2020-11-07 RX ADMIN — APIXABAN 2.5 MILLIGRAM(S): 2.5 TABLET, FILM COATED ORAL at 17:27

## 2020-11-07 RX ADMIN — Medication 650 MILLIGRAM(S): at 00:00

## 2020-11-07 RX ADMIN — Medication 650 MILLIGRAM(S): at 11:26

## 2020-11-07 RX ADMIN — OXYCODONE AND ACETAMINOPHEN 0.5 TABLET(S): 5; 325 TABLET ORAL at 12:23

## 2020-11-07 RX ADMIN — CHLORHEXIDINE GLUCONATE 1 APPLICATION(S): 213 SOLUTION TOPICAL at 11:30

## 2020-11-07 RX ADMIN — Medication 650 MILLIGRAM(S): at 18:30

## 2020-11-07 RX ADMIN — PIPERACILLIN AND TAZOBACTAM 200 GRAM(S): 4; .5 INJECTION, POWDER, LYOPHILIZED, FOR SOLUTION INTRAVENOUS at 20:31

## 2020-11-07 RX ADMIN — Medication 50 MICROGRAM(S): at 05:31

## 2020-11-07 RX ADMIN — POLYETHYLENE GLYCOL 3350 17 GRAM(S): 17 POWDER, FOR SOLUTION ORAL at 11:25

## 2020-11-07 RX ADMIN — SENNA PLUS 1 TABLET(S): 8.6 TABLET ORAL at 17:27

## 2020-11-07 RX ADMIN — Medication 650 MILLIGRAM(S): at 12:23

## 2020-11-07 RX ADMIN — Medication 650 MILLIGRAM(S): at 17:27

## 2020-11-07 RX ADMIN — Medication 650 MILLIGRAM(S): at 05:31

## 2020-11-07 RX ADMIN — LIDOCAINE 1 PATCH: 4 CREAM TOPICAL at 23:00

## 2020-11-07 RX ADMIN — Medication 650 MILLIGRAM(S): at 06:30

## 2020-11-07 RX ADMIN — Medication 0.25 MILLIGRAM(S): at 09:57

## 2020-11-07 RX ADMIN — OXYCODONE AND ACETAMINOPHEN 0.5 TABLET(S): 5; 325 TABLET ORAL at 11:25

## 2020-11-07 RX ADMIN — Medication 650 MILLIGRAM(S): at 01:00

## 2020-11-07 RX ADMIN — LOSARTAN POTASSIUM 25 MILLIGRAM(S): 100 TABLET, FILM COATED ORAL at 05:31

## 2020-11-07 RX ADMIN — LIDOCAINE 1 PATCH: 4 CREAM TOPICAL at 20:34

## 2020-11-07 RX ADMIN — APIXABAN 2.5 MILLIGRAM(S): 2.5 TABLET, FILM COATED ORAL at 05:31

## 2020-11-07 NOTE — PROGRESS NOTE ADULT - ASSESSMENT
89 yr old female with mild dementia, HTN, HLD, PE (on a/c with eliquis) presents from home for abdominal pain and concern of no BM x 4-5 days. Imaging negative for SBO but does show large amount of stool in the rectal vault with minimal surrounding inflammatory changes.    Abdominal pain.   - CT notable w/ stool in rectal vault w/o inflammation; no signs of SBO; nontender on palpation  - continue bowel regimen miralax + senna bid.     Falls.   - Per patient, fall recently 2-3 days ago, presents with new Rib fracture, also here a week ago w/ fall and L clavicular and L 4th rib fracture; no signs of syncope/pre-syncope  - PT eval.     Closed fracture of one rib of right side, initial encounter.    - Noted on CT  - pain control: lidocaine patch, tylenol 650 q6h, oxycodone 2.5 mg q4h prn severe pain.     Constipation.   - As noted on CT  - enema and laxatives as above.     Right shoulder pain/ Clavicle fracture.   - PT  - pain control.   - ortho eval noted    Pulmonary emboli.  - Eliquis 2.5 mg bid  - unclear when PE was and if needs lifelong a/c vs how many months remaining.     Hypothyroidism.  - per son, pt on levothyroxine 75 mcg  - per chart review, levothyroxine reduced to 50 mcg on 10/16  - will c/w levothyroxine 50 mcg.     Confusion  - supportive care    Prophylactic measure.   - continue home a/c w/ Eliquis 2.5 mg bid.     d/w son over phone    Art Carbajal MD pager [phone]

## 2020-11-07 NOTE — PROGRESS NOTE ADULT - SUBJECTIVE AND OBJECTIVE BOX
confused, agitatied, unable to obtain ROS  	    MEDICATIONS  (STANDING):  acetaminophen   Tablet .. 650 milliGRAM(s) Oral every 6 hours  apixaban 2.5 milliGRAM(s) Oral every 12 hours  chlorhexidine 2% Cloths 1 Application(s) Topical daily  influenza   Vaccine 0.5 milliLiter(s) IntraMuscular once  levothyroxine 50 MICROGram(s) Oral daily  lidocaine   Patch 1 Patch Transdermal every 24 hours  losartan 25 milliGRAM(s) Oral daily  polyethylene glycol 3350 17 Gram(s) Oral daily  senna 1 Tablet(s) Oral every 12 hours      LABS:	 	                          12.1   5.89  )-----------( 429      ( 06 Nov 2020 06:43 )             37.9     Hemoglobin: 12.1 g/dL (11-06 @ 06:43)  Hemoglobin: 11.8 g/dL (11-05 @ 18:04)    11-06    138  |  100  |  19  ----------------------------<  76  4.6   |  28  |  1.08    Ca    10.1      06 Nov 2020 06:42    TPro  6.4  /  Alb  3.7  /  TBili  0.7  /  DBili  x   /  AST  29  /  ALT  19  /  AlkPhos  60  11-05    Creatinine Trend: 1.08<--, 1.13<--, 0.96<--, 0.89<--, 0.91<--, 1.04<--    PHYSICAL EXAM:  T(C): 36.3 (11-07-20 @ 15:44), Max: 37.1 (11-07-20 @ 09:13)  HR: 75 (11-07-20 @ 15:44) (75 - 87)  BP: 141/78 (11-07-20 @ 15:44) (141/78 - 177/79)  RR: 18 (11-07-20 @ 15:44) (18 - 18)  SpO2: 96% (11-07-20 @ 15:44) (93% - 96%)    Gen: Appears well in NAD  HEENT:  (-)icterus (-)pallor  CV: N S1 S2 1/6 KIN (+)2 Pulses B/l  Resp:  Clear to ausculatation B/L, normal effort  GI: (+) BS Soft, NT, ND  Lymph:  (-)Edema, (-)obvious lymphadenopathy  Skin: Warm to touch, diffuse bruising  Psych: unable to assess    ASSESSMENT/PLAN: 	    89y Female with hypertension, pulmonary embolism on dose-adjusted apixaban for age/weight, experienced a mechanical fall at home.  Following on behalf of Dr Víctor Mancera.    Continue apixaban at 2.5mg BID.  Needs PT/OT eval for safe return home.  Does not need a repeat echo on this admission- last was ~3 wks ago with no significant change in examination.  Will follow.

## 2020-11-07 NOTE — CHART NOTE - NSCHARTNOTEFT_GEN_A_CORE
Notified by RN for fever of     . Pt seen and examined at bedside. Denies any chest pain, palpitations, SOB, abdominal pain, headache or urinary symptoms.     Vital Signs Last 24 Hrs  T(C): 38.3 (07 Nov 2020 18:25), Max: 38.3 (07 Nov 2020 18:25)  T(F): 101 (07 Nov 2020 18:25), Max: 101 (07 Nov 2020 18:25)  HR: 108 (07 Nov 2020 18:25) (75 - 108)  BP: 158/77 (07 Nov 2020 18:25) (141/78 - 177/79)  BP(mean): --  RR: 20 (07 Nov 2020 18:25) (18 - 20)  SpO2: 97% (07 Nov 2020 18:25) (93% - 97%)  PHYSICAL EXAM:     General: NAD, non-toxic appearance  Neuro: NC, AT, PERRLA, no focal deficits  CV: S1 S2 RRR  Resp: B/L Lungs CTA, nonlabored  Abd: soft, NT, ND, + BS X4 quadrants  Ext: no edema, +PP b/l LE, warm to touch                           12.1   5.89  )-----------( 429      ( 06 Nov 2020 06:43 )             37.9     11-06    138  |  100  |  19  ----------------------------<  76  4.6   |  28  |  1.08    Ca    10.1      06 Nov 2020 06:42      .Urine Clean Catch (Midstream)  11-06-20   <10,000 CFU/mL Normal Urogenital Maribel  --  --      .Urine Clean Catch (Midstream)  10-12-20   <10,000 CFU/mL Normal Urogenital Maribel  --  --          Assessment & Plan   HPI:  89 yr old female with mild dementia, HTN, HLD, PE (on a/c with eliquis) presents from home for concern of no BM x 4-5 days. Pt states she has had a diminished appetite but has been tolerating PO and passing gas. Currently denying abdominal pain but noted to have it at home. Her aide called her PMD who instructed her to come to the ED to r/o an obstruction.     Of note, patient was seen here last week for L clavicle fracture s/p fall, denies taking opioids for the pain, has not needed pain medications.    Pt also states that she fell ?again 2-3 days ago.  States it was unwitnessed while she was getting up from using the bathroom, unsure if she hit her head, but did not seek medical attention at the time. She is unsure how long she was on the floor for. She currently endorses left shoulder pain and some left sided chest pain with inspiration that is new. She denies shortness of breath. Son did not know about fall and was not able to provide information.     Pt denies any recent fevers, chills, lightheaded or dizzines, nausea, vomiting, abdominal pain, difficulty breathing, back pain, numbness, tingling, paresthesias.   Pt states she has been taking eliquis as prescribed daily, has a live in aid who is with the pt 24/7.    Son concerned about frequent falls and new fractures. Concerned if he needs to find a new aide or start considering nursing home for mom. Son and wife live in [place]. Patient currently lives in a two story home and has her bedroom and bathroom upstairs; using an electric chair to get to second floor.  (05 Nov 2020 23:12)      Pt acutely presenting with fever of     - Zosyn and ID consult  - Continue IVF for hydration   - Continue antipyretic   - Cooling measures  - Blood cx x2 and urine cx  - CXR   - Will continue to monitor

## 2020-11-07 NOTE — CHART NOTE - NSCHARTNOTEFT_GEN_A_CORE
TERTIARY TRAUMA SURVEY  ------------------------------------------------------------------------------------------    Date/Time: 11-07-20 @ 18:04  Admit date:   Trauma activation:   Admit GCS:  	E-  	V-  	M-      HPI:  89 yr old female with mild dementia, HTN, HLD, PE (on a/c with eliquis) presents from home for concern of no BM x 4-5 days. Pt states she has had a diminished appetite but has been tolerating PO and passing gas. Currently denying abdominal pain but noted to have it at home. Her aide called her PMD who instructed her to come to the ED to r/o an obstruction.     Of note, patient was seen here last week for L clavicle fracture s/p fall, denies taking opioids for the pain, has not needed pain medications.    Pt also states that she fell ?again 2-3 days ago.  States it was unwitnessed while she was getting up from using the bathroom, unsure if she hit her head, but did not seek medical attention at the time. She is unsure how long she was on the floor for. She currently endorses left shoulder pain and some left sided chest pain with inspiration that is new. She denies shortness of breath. Son did not know about fall and was not able to provide information.     Pt denies any recent fevers, chills, lightheaded or dizzines, nausea, vomiting, abdominal pain, difficulty breathing, back pain, numbness, tingling, paresthesias.   Pt states she has been taking eliquis as prescribed daily, has a live in aid who is with the pt 24/7.    Son concerned about frequent falls and new fractures. Concerned if he needs to find a new aide or start considering nursing home for mom. Son and wife live in [place]. Patient currently lives in a two story home and has her bedroom and bathroom upstairs; using an electric chair to get to second floor.  (05 Nov 2020 23:12)      INTERVAL EVENTS: ***    PAST MEDICAL & SURGICAL HISTORY:  Skin cancer  lower extremities    Hip fracture  Right hip- 1995    Osteoporosis    Chronic anal fissure    Anal spasm    mild Dementia    Dyslipidemia    Hypertension    H/O: Hypothyroidism    H/O gastroesophageal reflux (GERD)    Anterior Cervical discectomy    left eye Retinal tear repair    After-Cataract of Both Eyes    left Elbow surgey secondary to injury    History of Tonsillectomy    History of  Hip surgery with hardware      [] No significant past history as reviewed with the patient and family    FAMILY HISTORY:  No family history of cardiovascular disease      [] Family history not pertinent as reviewed with the patient and family    SOCIAL HISTORY: ***    ALLERGIES: penicillin (Rash)      HOME MEDICATIONS: ***    CURRENT MEDICATIONS:   MEDICATIONS (STANDING): acetaminophen   Tablet .. 650 milliGRAM(s) Oral every 6 hours  apixaban 2.5 milliGRAM(s) Oral every 12 hours  influenza   Vaccine 0.5 milliLiter(s) IntraMuscular once  levothyroxine 50 MICROGram(s) Oral daily  losartan 25 milliGRAM(s) Oral daily  polyethylene glycol 3350 17 Gram(s) Oral daily  senna 1 Tablet(s) Oral every 12 hours    MEDICATIONS (PRN):oxycodone    5 mG/acetaminophen 325 mG 0.5 Tablet(s) Oral every 4 hours PRN Severe Pain (7 - 10)    ------------------------------------------------------------------------------------------    VITAL SIGNS  T(C): 36.3 (11-07-20 @ 15:44), Max: 37.1 (11-07-20 @ 09:13)  HR: 75 (11-07-20 @ 15:44) (75 - 87)  BP: 141/78 (11-07-20 @ 15:44) (141/78 - 177/79)  RR: 18 (11-07-20 @ 15:44) (18 - 18)  SpO2: 96% (11-07-20 @ 15:44) (93% - 96%)  CAPILLARY BLOOD GLUCOSE          INS/OUTS:    11-06 @ 07:01  -  11-07 @ 07:00  --------------------------------------------------------  IN:    Oral Fluid: 250 mL  Total IN: 250 mL    OUT:  Total OUT: 0 mL    Total NET: 250 mL      11-07 @ 07:01  -  11-07 @ 18:04  --------------------------------------------------------  IN:    Oral Fluid: 140 mL  Total IN: 140 mL    OUT:  Total OUT: 0 mL    Total NET: 140 mL        Drug Dosing Weight  Height (cm): 142.2 (05 Nov 2020 22:50)  Weight (kg): 51.9 (05 Nov 2020 22:50)  BMI (kg/m2): 25.7 (05 Nov 2020 22:50)  BSA (m2): 1.4 (05 Nov 2020 22:50)    PHYSICAL EXAM:    General: NAD, Sitting in bed comfortably  HEENT: NC/AT, EOMI  Neck: Soft, supple  Cardio: RRR, nml S1/S2  Resp: Good effort, CTA b/l  Thorax: No chest wall tenderness  Breast: No lesions/masses, no drainage  GI/Abd: Soft, NT/ND, no rebound/guarding, no masses palpated  Vascular: Extremities warm, brisk cap refill, B/l radial pulses palpable, b/l DP palpable, no palpable abdominal pulsatile mass  Skin: Intact, significant ecchymoses on proximal chest above sternal notch and on bilateral upper extremities, nontender. skin breakdown on b/l legs c/w venous stasis ulcers, dressings c/d/i  Lymphatic/Nodes: No palpable lymphadenopathy  Musculoskeletal: All 4 extremities moving spontaneously, no limitations  ------------------------------------------------------------------------------------------    LABS  CBC (11-06 @ 06:43)                              12.1                           5.89    )----------------(  429<H>     --    % Neutrophils, --    % Lymphocytes, ANC: --                                  37.9      BMP (11-06 @ 06:42)             138     |  100     |  19    		Ca++ --      Ca 10.1               ---------------------------------( 76    		Mg --                 4.6     |  28      |  1.08  			Ph --                    MICROBIOLOGY  Urinalysis (11-05 @ 21:38):     Color: Yellow / Appearance: Clear / SG: >1.050<!> / pH: 7.5 / Gluc: Negative / Ketones: Small<!> / Bili: Negative / Urobili: Negative / Protein :30 mg/dL<!> / Nitrites: Negative / Leuk.Est: Moderate<!> / RBC: 3 / WBC: 31<H> / Sq Epi:  / Non Sq Epi: 7<H> / Bacteria Negative       -> .Urine Clean Catch (Midstream) Culture (11-06 @ 04:45)     NG    NG    <10,000 CFU/mL Normal Urogenital Maribel      ------------------------------------------------------------------------------------------    RADIOLOGICAL FINDINGS REVIEW:    CXR: < from: Xray Chest 1 View- PORTABLE-Urgent (Xray Chest 1 View- PORTABLE-Urgent .) (11.05.20 @ 18:48) >    IMPRESSION:    Left clavicular fracture.  No focal infiltrate.    < end of copied text >      Pelvis Films: < from: Xray Pelvis AP only (11.05.20 @ 18:48) >    IMPRESSION:  No acute fracture or displacement.  Chronic and degenerative changes as described above    < end of copied text >    Extremity Films: < from: Xray Shoulder Axillary View, Right (11.06.20 @ 11:23) >    IMPRESSION:  1. No fractures are appreciated on these axillary views. Consider additional views as warranted.  2. No dislocation is seen.      < end of copied text >      Head/C-Spine CT: < from: CT Head No Cont (11.05.20 @ 19:42) >      IMPRESSION:  No acute intracranial hemorrhage    No acute fracture cervical spine. If pain persists, follow-up MRI exam recommended.    Partially visualized distal left clavicle fracture.      < end of copied text >         Chest/ABD/Pelvis CT: < from: CT Chest w/ IV Cont (11.05.20 @ 19:23) >      IMPRESSION:    Acute fracture of the left 3rd rib. No pleural effusion or pneumothorax.    Large amountof stool in the rectal vault with minimal surrounding inflammatory changes.      < end of copied text >    List Injuries Identified to Date:    - L 3rd rib fracture  - L distal clavle fracture (seen on prior exam)      List Operative and Interventional Radiological Procedures:  none      Consults (Date):    [] Neurosurgery   [] Orthopedic Surgery  [] Spine Surgery  [] Plastic Surgery  [] ENT  [] Urology  [] PM&R  [] Social Work    INTERPRETATION: TERTIARY TRAUMA SURVEY  ------------------------------------------------------------------------------------------    Date/Time: 11-07-20 @ 18:04  Admit date:   Trauma activation:   Admit GCS:  	E-  	V-  	M-      HPI:  89 yr old female with mild dementia, HTN, HLD, PE (on a/c with eliquis) presents from home for concern of no BM x 4-5 days. Pt states she has had a diminished appetite but has been tolerating PO and passing gas. Currently denying abdominal pain but noted to have it at home. Her aide called her PMD who instructed her to come to the ED to r/o an obstruction.     Of note, patient was seen here last week for L clavicle fracture s/p fall, denies taking opioids for the pain, has not needed pain medications.    Pt also states that she fell ?again 2-3 days ago.  States it was unwitnessed while she was getting up from using the bathroom, unsure if she hit her head, but did not seek medical attention at the time. She is unsure how long she was on the floor for. She currently endorses left shoulder pain and some left sided chest pain with inspiration that is new. She denies shortness of breath. Son did not know about fall and was not able to provide information.     Pt denies any recent fevers, chills, lightheaded or dizziness, nausea, vomiting, abdominal pain, difficulty breathing, back pain, numbness, tingling, paresthesias.   Pt states she has been taking eliquis as prescribed daily, has a live in aid who is with the pt 24/7.    Son concerned about frequent falls and new fractures. Concerned if he needs to find a new aide or start considering nursing home for mom. Son and wife live in [place]. Patient currently lives in a two story home and has her bedroom and bathroom upstairs; using an electric chair to get to second floor.  (05 Nov 2020 23:12)      INTERVAL EVENTS: ***    PAST MEDICAL & SURGICAL HISTORY:  Skin cancer  lower extremities    Hip fracture  Right hip- 1995    Osteoporosis    Chronic anal fissure    Anal spasm    mild Dementia    Dyslipidemia    Hypertension    H/O: Hypothyroidism    H/O gastroesophageal reflux (GERD)    Anterior Cervical discectomy    left eye Retinal tear repair    After-Cataract of Both Eyes    left Elbow surgey secondary to injury    History of Tonsillectomy    History of  Hip surgery with hardware      [] No significant past history as reviewed with the patient and family    FAMILY HISTORY:  No family history of cardiovascular disease      [] Family history not pertinent as reviewed with the patient and family    SOCIAL HISTORY: Lives with [facility] 24/7; able to ambulate w/ assistive device; needs assistance with most ADLs    ALLERGIES: penicillin (Rash)      HOME MEDICATIONS:   Home Medications:  losartan 25 mg oral tablet: 1 tab(s) orally once a day (06 Nov 2020 11:59)  omeprazole 40 mg oral delayed release capsule: 1 cap(s) orally once a day  note: per pt family, pt on both omeprazole and pantoprazole at home (06 Nov 2020 11:59)  polyethylene glycol 3350 oral powder for reconstitution: 17 gram(s) orally 2 times a day, As Needed (06 Nov 2020 11:59)      CURRENT MEDICATIONS:   MEDICATIONS (STANDING): acetaminophen   Tablet .. 650 milliGRAM(s) Oral every 6 hours  apixaban 2.5 milliGRAM(s) Oral every 12 hours  influenza   Vaccine 0.5 milliLiter(s) IntraMuscular once  levothyroxine 50 MICROGram(s) Oral daily  losartan 25 milliGRAM(s) Oral daily  polyethylene glycol 3350 17 Gram(s) Oral daily  senna 1 Tablet(s) Oral every 12 hours    MEDICATIONS (PRN):oxycodone    5 mG/acetaminophen 325 mG 0.5 Tablet(s) Oral every 4 hours PRN Severe Pain (7 - 10)    ------------------------------------------------------------------------------------------    VITAL SIGNS  T(C): 36.3 (11-07-20 @ 15:44), Max: 37.1 (11-07-20 @ 09:13)  HR: 75 (11-07-20 @ 15:44) (75 - 87)  BP: 141/78 (11-07-20 @ 15:44) (141/78 - 177/79)  RR: 18 (11-07-20 @ 15:44) (18 - 18)  SpO2: 96% (11-07-20 @ 15:44) (93% - 96%)  CAPILLARY BLOOD GLUCOSE          INS/OUTS:    11-06 @ 07:01  -  11-07 @ 07:00  --------------------------------------------------------  IN:    Oral Fluid: 250 mL  Total IN: 250 mL    OUT:  Total OUT: 0 mL    Total NET: 250 mL      11-07 @ 07:01  -  11-07 @ 18:04  --------------------------------------------------------  IN:    Oral Fluid: 140 mL  Total IN: 140 mL    OUT:  Total OUT: 0 mL    Total NET: 140 mL        Drug Dosing Weight  Height (cm): 142.2 (05 Nov 2020 22:50)  Weight (kg): 51.9 (05 Nov 2020 22:50)  BMI (kg/m2): 25.7 (05 Nov 2020 22:50)  BSA (m2): 1.4 (05 Nov 2020 22:50)    PHYSICAL EXAM:    General: NAD, Sitting in bed comfortably  HEENT: NC/AT, EOMI  Neck: Soft, supple  Cardio: RRR, nml S1/S2  Resp: Good effort, CTA b/l  Thorax: No chest wall tenderness  Breast: No lesions/masses, no drainage  GI/Abd: Soft, NT/ND, no rebound/guarding, no masses palpated  Vascular: Extremities warm, brisk cap refill, B/l radial pulses palpable, b/l DP palpable, no palpable abdominal pulsatile mass  Skin: Intact, significant ecchymoses on proximal chest above sternal notch and on bilateral upper extremities, nontender. skin breakdown on b/l legs c/w venous stasis ulcers, dressings c/d/i  Lymphatic/Nodes: No palpable lymphadenopathy  Musculoskeletal: All 4 extremities moving spontaneously, no limitations  ------------------------------------------------------------------------------------------    LABS  CBC (11-06 @ 06:43)                              12.1                           5.89    )----------------(  429<H>     --    % Neutrophils, --    % Lymphocytes, ANC: --                                  37.9      BMP (11-06 @ 06:42)             138     |  100     |  19    		Ca++ --      Ca 10.1               ---------------------------------( 76    		Mg --                 4.6     |  28      |  1.08  			Ph --                    MICROBIOLOGY  Urinalysis (11-05 @ 21:38):     Color: Yellow / Appearance: Clear / SG: >1.050<!> / pH: 7.5 / Gluc: Negative / Ketones: Small<!> / Bili: Negative / Urobili: Negative / Protein :30 mg/dL<!> / Nitrites: Negative / Leuk.Est: Moderate<!> / RBC: 3 / WBC: 31<H> / Sq Epi:  / Non Sq Epi: 7<H> / Bacteria Negative       -> .Urine Clean Catch (Midstream) Culture (11-06 @ 04:45)     NG    NG    <10,000 CFU/mL Normal Urogenital Maribel      ------------------------------------------------------------------------------------------    RADIOLOGICAL FINDINGS REVIEW:    CXR: < from: Xray Chest 1 View- PORTABLE-Urgent (Xray Chest 1 View- PORTABLE-Urgent .) (11.05.20 @ 18:48) >    IMPRESSION:    Left clavicular fracture.  No focal infiltrate.    < end of copied text >      Pelvis Films: < from: Xray Pelvis AP only (11.05.20 @ 18:48) >    IMPRESSION:  No acute fracture or displacement.  Chronic and degenerative changes as described above    < end of copied text >    Extremity Films: < from: Xray Shoulder Axillary View, Right (11.06.20 @ 11:23) >    IMPRESSION:  1. No fractures are appreciated on these axillary views. Consider additional views as warranted.  2. No dislocation is seen.      < end of copied text >      Head/C-Spine CT: < from: CT Head No Cont (11.05.20 @ 19:42) >      IMPRESSION:  No acute intracranial hemorrhage    No acute fracture cervical spine. If pain persists, follow-up MRI exam recommended.    Partially visualized distal left clavicle fracture.      < end of copied text >         Chest/ABD/Pelvis CT: < from: CT Chest w/ IV Cont (11.05.20 @ 19:23) >      IMPRESSION:    Acute fracture of the left 3rd rib. No pleural effusion or pneumothorax.    Large amount of stool in the rectal vault with minimal surrounding inflammatory changes.      < end of copied text >    List Injuries Identified to Date:    - L 3rd rib fracture  - L distal clavicle fracture (seen on prior exam)      List Operative and Interventional Radiological Procedures:  none      Consults (Date):    [] Neurosurgery   [] Orthopedic Surgery  [] Spine Surgery  [] Plastic Surgery  [] ENT  [] Urology  [] PM&R  [] Social Work    INTERPRETATION: TERTIARY TRAUMA SURVEY  ------------------------------------------------------------------------------------------    Date/Time: 11-07-20 @ 18:04  Admit date: 11/7/2020  Trauma activation:     Consult  Admit GCS:  	E-  4	V-  5	M-  6    HPI:  89 yr old female with mild dementia, HTN, HLD, PE (on a/c with eliquis) presents from home for concern of no BM x 4-5 days. Pt states she has had a diminished appetite but has been tolerating PO and passing gas. Currently denying abdominal pain but noted to have it at home. Her aide called her PMD who instructed her to come to the ED to r/o an obstruction.     Of note, patient was seen here last week for L clavicle fracture s/p fall, denies taking opioids for the pain, has not needed pain medications.    Pt also states that she fell ?again 2-3 days ago.  States it was unwitnessed while she was getting up from using the bathroom, unsure if she hit her head, but did not seek medical attention at the time. She is unsure how long she was on the floor for. She currently endorses left shoulder pain and some left sided chest pain with inspiration that is new. She denies shortness of breath. Son did not know about fall and was not able to provide information.     Pt denies any recent fevers, chills, lightheaded or dizziness, nausea, vomiting, abdominal pain, difficulty breathing, back pain, numbness, tingling, paresthesias.   Pt states she has been taking eliquis as prescribed daily, has a live in aid who is with the pt 24/7.    Son concerned about frequent falls and new fractures. Concerned if he needs to find a new aide or start considering nursing home for mom. Son and wife live in [place]. Patient currently lives in a two story home and has her bedroom and bathroom upstairs; using an electric chair to get to second floor.  (05 Nov 2020 23:12)      INTERVAL EVENTS: patient confused and agitated, seen and evaluated by cardiology    PAST MEDICAL & SURGICAL HISTORY:  Skin cancer  lower extremities    Hip fracture  Right hip- 1995    Osteoporosis    Chronic anal fissure    Anal spasm    mild Dementia    Dyslipidemia    Hypertension    H/O: Hypothyroidism    H/O gastroesophageal reflux (GERD)    Anterior Cervical discectomy    left eye Retinal tear repair    After-Cataract of Both Eyes    left Elbow surgey secondary to injury    History of Tonsillectomy    History of  Hip surgery with hardware      [] No significant past history as reviewed with the patient and family    FAMILY HISTORY:  No family history of cardiovascular disease      [] Family history not pertinent as reviewed with the patient and family    SOCIAL HISTORY: Lives with [facility] 24/7; able to ambulate w/ assistive device; needs assistance with most ADLs    ALLERGIES: penicillin (Rash)      HOME MEDICATIONS:   Home Medications:  losartan 25 mg oral tablet: 1 tab(s) orally once a day (06 Nov 2020 11:59)  omeprazole 40 mg oral delayed release capsule: 1 cap(s) orally once a day  note: per pt family, pt on both omeprazole and pantoprazole at home (06 Nov 2020 11:59)  polyethylene glycol 3350 oral powder for reconstitution: 17 gram(s) orally 2 times a day, As Needed (06 Nov 2020 11:59)      CURRENT MEDICATIONS:   MEDICATIONS (STANDING): acetaminophen   Tablet .. 650 milliGRAM(s) Oral every 6 hours  apixaban 2.5 milliGRAM(s) Oral every 12 hours  influenza   Vaccine 0.5 milliLiter(s) IntraMuscular once  levothyroxine 50 MICROGram(s) Oral daily  losartan 25 milliGRAM(s) Oral daily  polyethylene glycol 3350 17 Gram(s) Oral daily  senna 1 Tablet(s) Oral every 12 hours    MEDICATIONS (PRN):oxycodone    5 mG/acetaminophen 325 mG 0.5 Tablet(s) Oral every 4 hours PRN Severe Pain (7 - 10)    ------------------------------------------------------------------------------------------    VITAL SIGNS  T(C): 36.3 (11-07-20 @ 15:44), Max: 37.1 (11-07-20 @ 09:13)  HR: 75 (11-07-20 @ 15:44) (75 - 87)  BP: 141/78 (11-07-20 @ 15:44) (141/78 - 177/79)  RR: 18 (11-07-20 @ 15:44) (18 - 18)  SpO2: 96% (11-07-20 @ 15:44) (93% - 96%)  CAPILLARY BLOOD GLUCOSE          INS/OUTS:    11-06 @ 07:01  -  11-07 @ 07:00  --------------------------------------------------------  IN:    Oral Fluid: 250 mL  Total IN: 250 mL    OUT:  Total OUT: 0 mL    Total NET: 250 mL      11-07 @ 07:01  -  11-07 @ 18:04  --------------------------------------------------------  IN:    Oral Fluid: 140 mL  Total IN: 140 mL    OUT:  Total OUT: 0 mL    Total NET: 140 mL        Drug Dosing Weight  Height (cm): 142.2 (05 Nov 2020 22:50)  Weight (kg): 51.9 (05 Nov 2020 22:50)  BMI (kg/m2): 25.7 (05 Nov 2020 22:50)  BSA (m2): 1.4 (05 Nov 2020 22:50)    PHYSICAL EXAM:    General: NAD, Resting in bed, agitated but able to localize to pain  HEENT: NC/AT, EOMI  Neck: Soft, supple  Cardio: RRR, nml S1/S2  Resp: Good effort, CTA b/l  Thorax: No chest wall tenderness  Breast: No lesions/masses, no drainage  GI/Abd: Soft, NT/ND, no rebound/guarding, no masses palpated  Vascular: Extremities warm, brisk cap refill, B/l radial pulses palpable, b/l DP palpable, no palpable abdominal pulsatile mass  Skin: Intact, significant ecchymoses on proximal chest above sternal notch and on bilateral upper extremities, nontender. skin breakdown on b/l legs c/w venous stasis ulcers, dressings c/d/i  Lymphatic/Nodes: No palpable lymphadenopathy  Musculoskeletal: All 4 extremities moving spontaneously, no limitations  ------------------------------------------------------------------------------------------    LABS  CBC (11-06 @ 06:43)                              12.1                           5.89    )----------------(  429<H>     --    % Neutrophils, --    % Lymphocytes, ANC: --                                  37.9      BMP (11-06 @ 06:42)             138     |  100     |  19    		Ca++ --      Ca 10.1               ---------------------------------( 76    		Mg --                 4.6     |  28      |  1.08  			Ph --                    MICROBIOLOGY  Urinalysis (11-05 @ 21:38):     Color: Yellow / Appearance: Clear / SG: >1.050<!> / pH: 7.5 / Gluc: Negative / Ketones: Small<!> / Bili: Negative / Urobili: Negative / Protein :30 mg/dL<!> / Nitrites: Negative / Leuk.Est: Moderate<!> / RBC: 3 / WBC: 31<H> / Sq Epi:  / Non Sq Epi: 7<H> / Bacteria Negative       -> .Urine Clean Catch (Midstream) Culture (11-06 @ 04:45)     NG    NG    <10,000 CFU/mL Normal Urogenital Maribel      ------------------------------------------------------------------------------------------    RADIOLOGICAL FINDINGS REVIEW:    CXR: < from: Xray Chest 1 View- PORTABLE-Urgent (Xray Chest 1 View- PORTABLE-Urgent .) (11.05.20 @ 18:48) >    IMPRESSION:    Left clavicular fracture.  No focal infiltrate.    < end of copied text >      Pelvis Films: < from: Xray Pelvis AP only (11.05.20 @ 18:48) >    IMPRESSION:  No acute fracture or displacement.  Chronic and degenerative changes as described above    < end of copied text >    Extremity Films: < from: Xray Shoulder Axillary View, Right (11.06.20 @ 11:23) >    IMPRESSION:  1. No fractures are appreciated on these axillary views. Consider additional views as warranted.  2. No dislocation is seen.      < end of copied text >      Head/C-Spine CT: < from: CT Head No Cont (11.05.20 @ 19:42) >      IMPRESSION:  No acute intracranial hemorrhage    No acute fracture cervical spine. If pain persists, follow-up MRI exam recommended.    Partially visualized distal left clavicle fracture.      < end of copied text >         Chest/ABD/Pelvis CT: < from: CT Chest w/ IV Cont (11.05.20 @ 19:23) >      IMPRESSION:    Acute fracture of the left 3rd rib. No pleural effusion or pneumothorax.    Large amount of stool in the rectal vault with minimal surrounding inflammatory changes.      < end of copied text >    List Injuries Identified to Date:    - L 3rd rib fracture  - L distal clavicle fracture (seen on prior exam)      List Operative and Interventional Radiological Procedures:  none      Consults (Date):    [x] Cardiology - 11/6    INTERPRETATION:  89y female with frequent falls and pmh of dementia, HTN, HLD, PE on NOAC found to have acute 3rd left rib fracture. No additional injuries identified on tertiary exam    - no acute surgical intervention  - recommend multimodal pain control to maintain adequate ventilation of lungs  - rec consider risk versus benefits of continued A/C in setting of frequent falls.   - continue incentive spirometry 10x / hr  - consider Ortho f/u for clavicular fracture    ACS Surgery  [id number] yes

## 2020-11-07 NOTE — PROGRESS NOTE ADULT - SUBJECTIVE AND OBJECTIVE BOX
Patient is a 89y old  Female who presents with a chief complaint of Abdominal pain, acute Rib fracture (07 Nov 2020 16:33)      SUBJECTIVE / OVERNIGHT EVENTS: No new complaints.   Review of Systems  chest pain no  palpitations no  sob no  nausea no  headache no    MEDICATIONS  (STANDING):  acetaminophen   Tablet .. 650 milliGRAM(s) Oral every 6 hours  acetaminophen   Tablet .. 650 milliGRAM(s) Oral once  apixaban 2.5 milliGRAM(s) Oral every 12 hours  chlorhexidine 2% Cloths 1 Application(s) Topical daily  influenza   Vaccine 0.5 milliLiter(s) IntraMuscular once  levothyroxine 50 MICROGram(s) Oral daily  lidocaine   Patch 1 Patch Transdermal every 24 hours  losartan 25 milliGRAM(s) Oral daily  piperacillin/tazobactam IVPB. 3.375 Gram(s) IV Intermittent once  piperacillin/tazobactam IVPB.. 3.337 Gram(s) IV Intermittent every 12 hours  polyethylene glycol 3350 17 Gram(s) Oral daily  senna 1 Tablet(s) Oral every 12 hours    MEDICATIONS  (PRN):  oxycodone    5 mG/acetaminophen 325 mG 0.5 Tablet(s) Oral every 4 hours PRN Severe Pain (7 - 10)      Vital Signs Last 24 Hrs  T(C): 38.3 (07 Nov 2020 18:25), Max: 38.3 (07 Nov 2020 18:25)  T(F): 101 (07 Nov 2020 18:25), Max: 101 (07 Nov 2020 18:25)  HR: 108 (07 Nov 2020 18:25) (75 - 108)  BP: 158/77 (07 Nov 2020 18:25) (141/78 - 177/79)  BP(mean): --  RR: 20 (07 Nov 2020 18:25) (18 - 20)  SpO2: 97% (07 Nov 2020 18:25) (93% - 97%)    PHYSICAL EXAM:  GENERAL: NAD  HEAD:  Atraumatic, Normocephalic  EYES: EOMI, PERRLA, conjunctiva and sclera clear  NECK: Supple, No JVD  CHEST/LUNG: Clear to auscultation bilaterally; No wheeze  HEART: Regular rate and rhythm; No murmurs, rubs, or gallops  ABDOMEN: Soft, Nontender, Nondistended; Bowel sounds present  EXTREMITIES:  2+ Peripheral Pulses, No clubbing, cyanosis, or edema  PSYCH: confused   NEUROLOGY: non-focal  SKIN: No rashes or lesions    LABS:                        12.1   5.89  )-----------( 429      ( 06 Nov 2020 06:43 )             37.9     11-06    138  |  100  |  19  ----------------------------<  76  4.6   |  28  |  1.08    Ca    10.1      06 Nov 2020 06:42            Urinalysis Basic - ( 05 Nov 2020 21:38 )    Color: Yellow / Appearance: Clear / SG: >1.050 / pH: x  Gluc: x / Ketone: Small  / Bili: Negative / Urobili: Negative   Blood: x / Protein: 30 mg/dL / Nitrite: Negative   Leuk Esterase: Moderate / RBC: 3 /hpf / WBC 31 /HPF   Sq Epi: x / Non Sq Epi: 7 /hpf / Bacteria: Negative        Culture - Urine (collected 06 Nov 2020 04:45)  Source: .Urine Clean Catch (Midstream)  Final Report (06 Nov 2020 23:27):    <10,000 CFU/mL Normal Urogenital Maribel        RADIOLOGY & ADDITIONAL TESTS:    Imaging Personally Reviewed:    Consultant(s) Notes Reviewed:      Care Discussed with Consultants/Other Providers:

## 2020-11-08 ENCOUNTER — TRANSCRIPTION ENCOUNTER (OUTPATIENT)
Age: 85
End: 2020-11-08

## 2020-11-08 PROCEDURE — 73000 X-RAY EXAM OF COLLAR BONE: CPT | Mod: 26,LT

## 2020-11-08 RX ORDER — KETOROLAC TROMETHAMINE 30 MG/ML
15 SYRINGE (ML) INJECTION ONCE
Refills: 0 | Status: DISCONTINUED | OUTPATIENT
Start: 2020-11-08 | End: 2020-11-08

## 2020-11-08 RX ORDER — ACETAMINOPHEN 500 MG
1000 TABLET ORAL ONCE
Refills: 0 | Status: COMPLETED | OUTPATIENT
Start: 2020-11-08 | End: 2020-11-08

## 2020-11-08 RX ADMIN — SENNA PLUS 1 TABLET(S): 8.6 TABLET ORAL at 05:37

## 2020-11-08 RX ADMIN — LIDOCAINE 1 PATCH: 4 CREAM TOPICAL at 07:36

## 2020-11-08 RX ADMIN — OXYCODONE AND ACETAMINOPHEN 0.5 TABLET(S): 5; 325 TABLET ORAL at 09:34

## 2020-11-08 RX ADMIN — Medication 50 MICROGRAM(S): at 05:37

## 2020-11-08 RX ADMIN — CHLORHEXIDINE GLUCONATE 1 APPLICATION(S): 213 SOLUTION TOPICAL at 13:03

## 2020-11-08 RX ADMIN — Medication 650 MILLIGRAM(S): at 00:00

## 2020-11-08 RX ADMIN — APIXABAN 2.5 MILLIGRAM(S): 2.5 TABLET, FILM COATED ORAL at 05:37

## 2020-11-08 RX ADMIN — PIPERACILLIN AND TAZOBACTAM 25 GRAM(S): 4; .5 INJECTION, POWDER, LYOPHILIZED, FOR SOLUTION INTRAVENOUS at 09:39

## 2020-11-08 RX ADMIN — OXYCODONE AND ACETAMINOPHEN 0.5 TABLET(S): 5; 325 TABLET ORAL at 10:15

## 2020-11-08 RX ADMIN — SENNA PLUS 1 TABLET(S): 8.6 TABLET ORAL at 20:19

## 2020-11-08 RX ADMIN — LIDOCAINE 1 PATCH: 4 CREAM TOPICAL at 11:05

## 2020-11-08 RX ADMIN — Medication 650 MILLIGRAM(S): at 05:37

## 2020-11-08 RX ADMIN — Medication 0.5 MILLIGRAM(S): at 19:14

## 2020-11-08 RX ADMIN — LIDOCAINE 1 PATCH: 4 CREAM TOPICAL at 20:30

## 2020-11-08 RX ADMIN — APIXABAN 2.5 MILLIGRAM(S): 2.5 TABLET, FILM COATED ORAL at 20:19

## 2020-11-08 RX ADMIN — Medication 400 MILLIGRAM(S): at 17:59

## 2020-11-08 RX ADMIN — Medication 0.5 MILLIGRAM(S): at 12:56

## 2020-11-08 RX ADMIN — PIPERACILLIN AND TAZOBACTAM 25 GRAM(S): 4; .5 INJECTION, POWDER, LYOPHILIZED, FOR SOLUTION INTRAVENOUS at 20:19

## 2020-11-08 RX ADMIN — LOSARTAN POTASSIUM 25 MILLIGRAM(S): 100 TABLET, FILM COATED ORAL at 05:37

## 2020-11-08 RX ADMIN — Medication 1000 MILLIGRAM(S): at 18:14

## 2020-11-08 NOTE — CONSULT NOTE ADULT - SUBJECTIVE AND OBJECTIVE BOX
89y Female PMH of dementia, HTN, HLD, PE (on a/c with eliquis) presents c/o L shoulder pain sp mechanical fall. She was previously seen on 10/28 for the left clavicle fracture following a fall. She states that she fell again 2-3 days ago. Denies Headstrike/LOC. Denies numbness, tingling paresthesias in affected extremity. She uses a walker and an electric chair to get to second floor. She has a new 3rd rib fracture seen on this admission. No other orthopedic injuries at this time    PAST MEDICAL & SURGICAL HISTORY:  Skin cancer  lower extremities    Hip fracture  Right hip- 1995    Osteoporosis    Chronic anal fissure    Anal spasm    mild Dementia    Dyslipidemia    Hypertension    H/O: Hypothyroidism    H/O gastroesophageal reflux (GERD)    Anterior Cervical discectomy    left eye Retinal tear repair    After-Cataract of Both Eyes    left Elbow surgey secondary to injury    History of Tonsillectomy    History of  Hip surgery with hardware      MEDICATIONS  (STANDING):  acetaminophen   Tablet .. 650 milliGRAM(s) Oral every 6 hours  acetaminophen   Tablet .. 650 milliGRAM(s) Oral once  apixaban 2.5 milliGRAM(s) Oral every 12 hours  chlorhexidine 2% Cloths 1 Application(s) Topical daily  influenza   Vaccine 0.5 milliLiter(s) IntraMuscular once  levothyroxine 50 MICROGram(s) Oral daily  lidocaine   Patch 1 Patch Transdermal every 24 hours  losartan 25 milliGRAM(s) Oral daily  piperacillin/tazobactam IVPB.. 3.375 Gram(s) IV Intermittent every 12 hours  polyethylene glycol 3350 17 Gram(s) Oral daily  senna 1 Tablet(s) Oral every 12 hours    Allergies    penicillin (Rash)    Intolerances                            12.5   10.87 )-----------( 493      ( 07 Nov 2020 20:03 )             38.9     07 Nov 2020 20:03    136    |  97     |  18     ----------------------------<  145    4.3     |  25     |  0.90     Ca    10.0       07 Nov 2020 20:03    TPro  7.1    /  Alb  4.1    /  TBili  0.8    /  DBili  x      /  AST  33     /  ALT  18     /  AlkPhos  78     07 Nov 2020 20:03        Imaging: XR personally reviewed and demonstates R/L Clavicle Fracture    Vital Signs Last 24 Hrs  T(C): 38.7 (11-08-20 @ 05:10), Max: 38.7 (11-08-20 @ 05:10)  T(F): 101.7 (11-08-20 @ 05:10), Max: 101.7 (11-08-20 @ 05:10)  HR: 91 (11-08-20 @ 05:10) (75 - 108)  BP: 147/64 (11-08-20 @ 05:10) (141/78 - 174/75)  BP(mean): --  RR: 18 (11-08-20 @ 05:10) (18 - 20)  SpO2: 100% (11-08-20 @ 05:10) (95% - 100%)  Gen: NAD    LUE: Skin intact, +ecchymosis over clavicle, no tenting of the skin, + TTP over clavicle,   Limited exam due to mental status and unable to follow commands. Unable to range shoulder 2/2 pain, wiggles fingers spontaneously, flexes and extends wrist spontaneously, SILT distally, radial pulse intact, compartments soft, brisk cap refill, no bony ttp at elbow/wrist/hand/fingers    Secondary Survey: Full ROM of unaffected extremities, Ecchymosis of bilateral lower extremities, SILT globally, compartments soft, no bony TTP over bony prominences, no calf TTP, able to SLR with contralateral leg,       Imaging: Xrays show left clavicle fracture

## 2020-11-08 NOTE — DISCHARGE NOTE PROVIDER - NSDCMRMEDTOKEN_GEN_ALL_CORE_FT
apixaban 2.5 mg oral tablet: 1 tab(s) orally every 12 hours  budesonide 180 mcg/inh inhalation powder: 1 puff(s) inhaled 2 times a day   fluticasone 50 mcg/inh nasal spray: 1 spray(s) nasal 2 times a day  levothyroxine 75 mcg (0.075 mg) oral tablet: 1 tab(s) orally once a day  losartan 25 mg oral tablet: 1 tab(s) orally once a day  omeprazole 40 mg oral delayed release capsule: 1 cap(s) orally once a day  note: per pt family, pt on both omeprazole and pantoprazole at home  pantoprazole 40 mg oral delayed release tablet: 1 tab(s) orally once a day (before a meal)  note: per pt family, pt on both omeprazole and pantoprazole at home  polyethylene glycol 3350 oral powder for reconstitution: 17 gram(s) orally 2 times a day, As Needed   apixaban 2.5 mg oral tablet: 1 tab(s) orally every 12 hours  bisacodyl 10 mg rectal suppository: 1 suppository(ies) rectal   budesonide 180 mcg/inh inhalation powder: 1 puff(s) inhaled 2 times a day   fluticasone 50 mcg/inh nasal spray: 1 spray(s) nasal 2 times a day  levothyroxine 75 mcg (0.075 mg) oral tablet: 1 tab(s) orally once a day  losartan 25 mg oral tablet: 1 tab(s) orally once a day  omeprazole 40 mg oral delayed release capsule: 1 cap(s) orally once a day  note: per pt family, pt on both omeprazole and pantoprazole at home  polyethylene glycol 3350 oral powder for reconstitution: 17 gram(s) orally 2 times a day, As Needed  senna oral tablet: 1 tab(s) orally every 12 hours   apixaban 2.5 mg oral tablet: 1 tab(s) orally every 12 hours  bisacodyl 10 mg rectal suppository: 1 suppository(ies) rectal   budesonide 180 mcg/inh inhalation powder: 1 puff(s) inhaled 2 times a day   fluticasone 50 mcg/inh nasal spray: 1 spray(s) nasal 2 times a day  levothyroxine 75 mcg (0.075 mg) oral tablet: 1 tab(s) orally once a day  lidocaine 5% topical film: Apply topically to affected area once a day  losartan 25 mg oral tablet: 1 tab(s) orally once a day  omeprazole 40 mg oral delayed release capsule: 1 cap(s) orally once a day  note: per pt family, pt on both omeprazole and pantoprazole at home  polyethylene glycol 3350 oral powder for reconstitution: 17 gram(s) orally 2 times a day, As Needed  senna oral tablet: 1 tab(s) orally every 12 hours

## 2020-11-08 NOTE — CHART NOTE - NSCHARTNOTEFT_GEN_A_CORE
Medicine PA Agitation Note    Notified by RN about patient being acutely agitated. Patient is A&O x0 at baseline, and is unable to be re-oriented. Patient poses a risk to both themselves and staff, attempting to get out of bed, pulling at lines. Will initiate constant observation to ensure patient safety.    Plan:  #Acute Agitation  C/W IV Ativan PRN agitation  Constant observation  Fall precautions  Bed alarms  Will endorse to AM team      Sheila Tadeo PA-C  Department of Medicine  [facility] [id number]

## 2020-11-08 NOTE — PROGRESS NOTE ADULT - ASSESSMENT
89 yr old female with mild dementia, HTN, HLD, PE (on a/c with eliquis) presents from home for abdominal pain and concern of no BM x 4-5 days. Imaging negative for SBO but does show large amount of stool in the rectal vault with minimal surrounding inflammatory changes.    Abdominal pain.   - CT notable w/ stool in rectal vault w/o inflammation; no signs of SBO; nontender on palpation  - continue bowel regimen miralax + senna bid.     Falls.   - Per patient, fall recently 2-3 days ago, presents with new Rib fracture, also here a week ago w/ fall and L clavicular and L 4th rib fracture; no signs of syncope/pre-syncope  - PT eval.     Closed fracture of one rib of right side, initial encounter.    - Noted on CT  - pain control: lidocaine patch, tylenol 650 q6h, oxycodone 2.5 mg q4h prn severe pain.   - Ortho evaluation    Constipation.   - As noted on CT  - enema and laxatives as above.     Right shoulder pain/ Clavicle fracture.   - PT  - pain control.   - ortho follow    Pulmonary emboli.  - Eliquis 2.5 mg bid  - unclear when PE was and if needs lifelong a/c vs how many months remaining.     Hypothyroidism.  - per son, pt on levothyroxine 75 mcg  - per chart review, levothyroxine reduced to 50 mcg on 10/16  - will c/w levothyroxine 50 mcg.     Confusion  - supportive care    UTI/ Fever  - antibiotics  - BC, UC  - ID evaluation     Prophylactic measure.   - continue home a/c w/ Eliquis 2.5 mg bid.     d/w son over phone    Art Carbajal MD pager [phone]

## 2020-11-08 NOTE — CONSULT NOTE ADULT - SUBJECTIVE AND OBJECTIVE BOX
HPI:   Patient is a 89y female with a past history of dementia, PE on eliquis, HTN,who was admitted  after her PCP received a call that she has abdominal pain.  She has been admitted and in ER a concern raised about her frequent falls.she was found to have a left 3rd rib fracture and a left clavicle fracture.  She has been seen by by ortho and trauma surgery.Her abdominal pain is of unclear etiology.She spike a fever to 101.7 yesterday and zosyn started.  She has received ativan as she was agitated earlier.She can not give a history as she is confused, restless, and agitated.    REVIEW OF SYSTEMS:  All other review of systems negative (Comprehensive ROS): limited secondary to confusion    PAST MEDICAL & SURGICAL HISTORY:  Skin cancer  lower extremities    Hip fracture  Right hip-     Osteoporosis    Chronic anal fissure    Anal spasm    mild Dementia    Dyslipidemia    Hypertension    H/O: Hypothyroidism    H/O gastroesophageal reflux (GERD)    Anterior Cervical discectomy    left eye Retinal tear repair    After-Cataract of Both Eyes    left Elbow surgey secondary to injury    History of Tonsillectomy    History of  Hip surgery with hardware        Allergies    penicillin (Rash)    Intolerances        Antimicrobials Day #  :day 2  piperacillin/tazobactam IVPB.. 3.375 Gram(s) IV Intermittent every 12 hours    Other Medications:  acetaminophen   Tablet .. 650 milliGRAM(s) Oral every 6 hours  acetaminophen   Tablet .. 650 milliGRAM(s) Oral once  apixaban 2.5 milliGRAM(s) Oral every 12 hours  chlorhexidine 2% Cloths 1 Application(s) Topical daily  influenza   Vaccine 0.5 milliLiter(s) IntraMuscular once  levothyroxine 50 MICROGram(s) Oral daily  lidocaine   Patch 1 Patch Transdermal every 24 hours  LORazepam   Injectable 0.5 milliGRAM(s) IV Push two times a day PRN  losartan 25 milliGRAM(s) Oral daily  oxycodone    5 mG/acetaminophen 325 mG 0.5 Tablet(s) Oral every 4 hours PRN  polyethylene glycol 3350 17 Gram(s) Oral daily  senna 1 Tablet(s) Oral every 12 hours      FAMILY HISTORY:  No family history of cardiovascular disease        SOCIAL HISTORY:  Smoking: x    ETOH: x    Drug Use:x        T(F): 98.8 (20 @ 09:05), Max: 101.7 (20 @ 05:10)  HR: 100 (20 @ 09:05)  BP: 153/80 (20 @ 09:05)  RR: 18 (20 @ 09:05)  SpO2: 96% (20 @ 09:05)  Wt(kg): --    PHYSICAL EXAM:  General: awake, no acute distress, frail and cachectic in appearance  Eyes:  anicteric, no conjunctival injection, no discharge  Oropharynx: no lesions or injection 	  Neck: supple, without adenopathy  Lungs: clear to auscultation  Heart: regular rate and rhythm;   Abdomen: soft, nondistended, nontender, without mass or organomegaly  Skin:multiple bruises and areas of skin hyperpigmentation  Extremities: no clubbing, cyanosis, or edema  Neurologic: alert, confused,, moves all extremities    LAB RESULTS:                        12.5   10.87 )-----------( 493      ( 2020 20:03 )             38.9     11-    136  |  97  |  18  ----------------------------<  145<H>  4.3   |  25  |  0.90    Ca    10.0      2020 20:03    TPro  7.1  /  Alb  4.1  /  TBili  0.8  /  DBili  x   /  AST  33  /  ALT  18  /  AlkPhos  78  11-07    LIVER FUNCTIONS - ( 2020 20:03 )  Alb: 4.1 g/dL / Pro: 7.1 g/dL / ALK PHOS: 78 U/L / ALT: 18 U/L / AST: 33 U/L / GGT: x           Urinalysis Basic - ( 2020 19:04 )    Color: Yellow / Appearance: Slightly Turbid / S.031 / pH: x  Gluc: x / Ketone: Moderate  / Bili: Negative / Urobili: Negative   Blood: x / Protein: 30 mg/dL / Nitrite: Negative   Leuk Esterase: Large / RBC: 15 /hpf /  /HPF   Sq Epi: x / Non Sq Epi: 0 /hpf / Bacteria: Many        MICROBIOLOGY:  RECENT CULTURES:   @ 04:45 .Urine Clean Catch (Midstream)     <10,000 CFU/mL Normal Urogenital Maribel            RADIOLOGY REVIEWED:  < from: Xray Clavicle, Left (20 @ 10:31) >  IMPRESSION:  1.  Impacted distal clavicular fracture as on prior study.  2.  Minimally displaced left fourth rib fracture, unchanged.  3.  Multiple subcentimeter nodular densities overlying the lung, unchanged.      < end of copied text >  < from: Xray Chest 1 View- PORTABLE-Routine (Xray Chest 1 View- PORTABLE-Routine in AM.) (20 @ 08:16) >  INTERPRETATION:  A single chest x-ray was obtained on 2020.    Indication: Left rib fracture.    Impression:    The heart is normal insize. The lungs appear to be clear. A left-sided rib fracture is evident. A left clavicle fracture is evident as well. No pleural effusion. No pneumothorax. No change in appearance of the chest when compared to previous study done on 2020.    < end of copied text >  < from: CT Head No Cont (20 @ 19:42) >  IMPRESSION:  No acute intracranial hemorrhage    No acute fracture cervical spine. If pain persists, follow-up MRI exam recommended.    Partially visualized distal left clavicle fracture.      < end of copied text >  < from: CT Abdomen and Pelvis w/ IV Cont (20 @ 19:41) >  IMPRESSION:    Acute fracture of the left 3rd rib. No pleural effusion or pneumothorax.    Large amountof stool in the rectal vault with minimal surrounding inflammatory changes.      < end of copied text >

## 2020-11-08 NOTE — PROGRESS NOTE ADULT - SUBJECTIVE AND OBJECTIVE BOX
remains confused, agitatied, unable to obtain ROS       acetaminophen   Tablet .. 650 milliGRAM(s) Oral every 6 hours  acetaminophen   Tablet .. 650 milliGRAM(s) Oral once  apixaban 2.5 milliGRAM(s) Oral every 12 hours  chlorhexidine 2% Cloths 1 Application(s) Topical daily  influenza   Vaccine 0.5 milliLiter(s) IntraMuscular once  levothyroxine 50 MICROGram(s) Oral daily  lidocaine   Patch 1 Patch Transdermal every 24 hours  LORazepam   Injectable 0.5 milliGRAM(s) IV Push two times a day PRN  losartan 25 milliGRAM(s) Oral daily  oxycodone    5 mG/acetaminophen 325 mG 0.5 Tablet(s) Oral every 4 hours PRN  piperacillin/tazobactam IVPB.. 3.375 Gram(s) IV Intermittent every 12 hours  polyethylene glycol 3350 17 Gram(s) Oral daily  senna 1 Tablet(s) Oral every 12 hours                            12.5   10.87 )-----------( 493      ( 07 Nov 2020 20:03 )             38.9       Hemoglobin: 12.5 g/dL (11-07 @ 20:03)  Hemoglobin: 12.1 g/dL (11-06 @ 06:43)  Hemoglobin: 11.8 g/dL (11-05 @ 18:04)      11-07    136  |  97  |  18  ----------------------------<  145<H>  4.3   |  25  |  0.90    Ca    10.0      07 Nov 2020 20:03    TPro  7.1  /  Alb  4.1  /  TBili  0.8  /  DBili  x   /  AST  33  /  ALT  18  /  AlkPhos  78  11-07    Creatinine Trend: 0.90<--, 1.08<--, 1.13<--, 0.96<--, 0.89<--, 0.91<--    COAGS:           T(C): 37.1 (11-08-20 @ 09:05), Max: 38.7 (11-08-20 @ 05:10)  HR: 100 (11-08-20 @ 09:05) (75 - 108)  BP: 153/80 (11-08-20 @ 09:05) (141/78 - 174/75)  RR: 18 (11-08-20 @ 09:05) (18 - 20)  SpO2: 96% (11-08-20 @ 09:05) (96% - 100%)  Wt(kg): --    I&O's Summary    07 Nov 2020 07:01  -  08 Nov 2020 07:00  --------------------------------------------------------  IN: 240 mL / OUT: 0 mL / NET: 240 mL    08 Nov 2020 07:01  -  08 Nov 2020 11:06  --------------------------------------------------------  IN: 60 mL / OUT: 0 mL / NET: 60 mL           Gen: Appears well in NAD  HEENT:  (-)icterus (-)pallor  CV: N S1 S2 1/6 KIN (+)2 Pulses B/l  Resp:  Clear to ausculatation B/L, normal effort  GI: (+) BS Soft, NT, ND  Lymph:  (-)Edema, (-)obvious lymphadenopathy  Skin: Warm to touch, diffuse bruising  Psych: unable to assess    ASSESSMENT/PLAN: 	    89y Female with hypertension, pulmonary embolism on dose-adjusted apixaban for age/weight, experienced a mechanical fall at home.  Following on behalf of Dr Víctor Mancera.    Continue apixaban at 2.5mg BID.  Needs PT/OT eval for safe return home.  Does not need a repeat echo on this admission- last was ~3 wks ago with no significant change in examination.  Will follow.

## 2020-11-08 NOTE — PROVIDER CONTACT NOTE (MEDICATION) - REASON
Continue: Refresh Celluvisc (carboxymethylcellulose sodium): dropperette,gel: 1% into both eyes pt refusing PO meds

## 2020-11-08 NOTE — DISCHARGE NOTE PROVIDER - HOSPITAL COURSE
89 yr old female with mild dementia, HTN, HLD, PE (on a/c with eliquis) presents from home for abdominal pain and concern of no BM x 4-5 days. Imaging negative for SBO but does show large amount of stool in the rectal vault with minimal surrounding inflammatory changes.    Abdominal pain.   - CT notable w/ stool in rectal vault w/o inflammation; no signs of SBO; nontender on palpation  - continue bowel regimen miralax + senna bid.   - GI evaluation    Falls.   - Per patient, fall recently 2-3 days ago, presents with new Rib fracture, also here a week ago w/ fall and L clavicular and L 4th rib fracture; no signs of syncope/pre-syncope  - PT eval.     Closed fracture of one rib of right side, initial encounter.    - Noted on CT  - pain control: lidocaine patch, tylenol 650 q6h, oxycodone 2.5 mg q4h prn severe pain.   - Ortho evaluation    Constipation.   - As noted on CT  - enema and laxatives as above.     Right shoulder pain/ Clavicle fracture.   - PT  - pain control.   - ortho follow    DCP with med rec discussed with [facility] PT cleared for DC   Follow up with out patient PCP 89 yr old female with mild dementia, HTN, HLD, PE (on a/c with eliquis) presents from home for abdominal pain and concern of no BM x 4-5 days. Imaging negative for SBO but does show large amount of stool in the rectal vault with minimal surrounding inflammatory changes.  Falls.   fall recently 2-3 days ago, presents with new Rib fracture, also here a week ago w/ fall and L clavicular and L 4th rib fracture; no signs of syncope/pre-syncope  Closed fracture of one rib of right side, initial encounter.    Noted on CT  pain control: lidocaine patch, tylenol 650 q6h, oxycodone 2.5 mg q4h prn severe pain.   Ortho evaluation  Right shoulder pain/ Clavicle fracture.   PT/pain control.  PT eval rec [facility]   Abdominal pain  GI evaluation  CT notable w/ stool in rectal vault w/o inflammation; no signs of SBO; nontender on palpation  Constipation.   enema and laxatives as above.   DCP with med rec discussed with [facility] PT cleared for DC   Follow up with out patient PCP 89 yr old female with mild dementia, HTN, HLD, PE (on a/c with eliquis) presents from home for abdominal pain and concern of no BM x 4-5 days. Imaging negative for SBO but does show large amount of stool in the rectal vault with minimal surrounding inflammatory changes.  Falls.   fall recently 2-3 days ago, presents with new Rib fracture, also here a week ago w/ fall and L clavicular and L 4th rib fracture; no signs of syncope/pre-syncope  Closed fracture of one rib of right side, initial encounter.    Noted on CT  pain control: lidocaine patch, tylenol 650 q6h, oxycodone 2.5 mg q4h prn severe pain.   Ortho evaluation  Right shoulder pain/ Clavicle fracture.   PT/pain control.  PT eval rec [facility]   Abdominal pain  GI evaluation  CT notable w/ stool in rectal vault w/o inflammation; no signs of SBO; nontender on palpation  Constipation.   enema and laxatives as above.   DCP with med rec discussed with [facility] PT cleared for DC [facility]/Home with care HCP uncertain   Follow up with out patient PCP 89 yr old female with mild dementia, HTN, HLD, PE (on a/c with eliquis) presents from home for abdominal pain and concern of no BM x 4-5 days. Imaging negative for SBO but does show large amount of stool in the rectal vault with minimal surrounding inflammatory changes.  Falls.   fall recently 2-3 days ago, presents with new Rib fracture, also here a week ago w/ fall and L clavicular and L 4th rib fracture; no signs of syncope/pre-syncope  Closed fracture of one rib of right side, initial encounter.    Noted on CT  pain control: lidocaine patch, tylenol 650 q6h, oxycodone 2.5 mg q4h prn severe pain.   Ortho evaluation  Right shoulder pain/ Clavicle fracture.   PT/pain control.  PT eval rec [facility]   Abdominal pain  GI evaluation  CT notable w/ stool in rectal vault w/o inflammation; no signs of SBO; nontender on palpation  Constipation.   enema and laxatives as above.   DCP with med rec discussed with [facility] PT cleared for DC [facility]/Home with care HCP uncertain   Follow up with out patient PCP.  After admission she was seen by Trauma Surgery, Orthop Surg, ID, Card, GI, and PT who recomm [facility]. She is hemodynamically stable to be transferred to rehab today, spoke to Attending. 89 yr old female with mild dementia, HTN, HLD, PE (on a/c with eliquis) presents from home for abdominal pain and concern of no BM x 4-5 days. Imaging negative for SBO but does show large amount of stool in the rectal vault with minimal surrounding inflammatory changes.  Falls.   fall recently 2-3 days ago, presents with new Rib fracture, also here a week ago w/ fall and L clavicular and L 4th rib fracture; no signs of syncope/pre-syncope  Closed fracture of one rib of right side, initial encounter.    Noted on CT  pain control: lidocaine patch, tylenol 650 q6h, oxycodone 2.5 mg q4h prn severe pain.   Ortho evaluation  Right shoulder pain/ Clavicle fracture.   PT/pain control.  PT eval rec [facility]   Abdominal pain  GI evaluation  CT notable w/ stool in rectal vault w/o inflammation; no signs of SBO; nontender on palpation  Constipation.   enema and laxatives as above.   DCP with med rec discussed with [facility] PT cleared for DC [facility]/Home with care HCP uncertain   Follow up with out patient PCP.  After admission she was seen by Trauma Surgery, Orthop Surg, ID, Card, GI, and PT who recomm [facility]. She is hemodynamically stable to be transferred to rehab today, spoke to Attending, and son-in-law.

## 2020-11-08 NOTE — DISCHARGE NOTE PROVIDER - NSDCFUADDAPPT_GEN_ALL_CORE_FT
AMOL LUE in sling, can take sling off in 3-4 weeks  Active movement of fingers/wrist/elbow encouraged  Follow up with Dr. March or Dr. Corley  within 1-2 weeks following discharge, call office for appointment. [phone]

## 2020-11-08 NOTE — CHART NOTE - NSCHARTNOTESELECT_GEN_ALL_CORE
Discharge Call Back      Person Contacted: patient    Mauricio Izquierdo. This is Shaye Meyers RN calling from [facility]'s Emergency Room.    Dr. Moffett  Wanted me to call you to see how you are doing.    Patient Condition: Improved    Did your discharge instructions answer all your questions yes    If applicable, have you made a follow-up appointment or received a call for follow up? Yes; with Urology    Recommendation: follow-up as previously planned.    Do you have any questions about your care in the Emergency Room, pain control or discharge instructions? no           Event Note/Agitation

## 2020-11-08 NOTE — CONSULT NOTE ADULT - ASSESSMENT
89 yo female with history of dementia, PE on eliquis, frequent falls, admitted 11/5 with ? abdominal pain.  CT of A/P essentially  just showed constipation.  ER evaluation extended secondary to frequent falls, left rib and clavicle fracture found on X rays.  She is not able to give a coherent history.  Certainly imaging studies have been negative for chest and abdominal infection.  Her urine culture is negative.  While fever may reflect occult hematoma, a limited course of antibiotics reasonable until we can exclude active infection.  Fevers can be seen in the presence of fractures.  Suggest:  1.Await blood culture results  2.zosyn okay, favor limiting to a 3 day course of cultures stay negative  3.Supportive care, additional w/u as indicated.

## 2020-11-08 NOTE — DISCHARGE NOTE PROVIDER - NSDCCPCAREPLAN_GEN_ALL_CORE_FT
PRINCIPAL DISCHARGE DIAGNOSIS  Diagnosis: Abdominal pain  Assessment and Plan of Treatment: resolved      SECONDARY DISCHARGE DIAGNOSES  Diagnosis: Clavicular fracture  Assessment and Plan of Treatment: NWB to Left Upper Extremity, maintain sling in place. F/u with orthopedics in 2- 3 weeks    Diagnosis: Closed fracture of one rib of right side, initial encounter  Assessment and Plan of Treatment: Continue pain management    Diagnosis: Falls  Assessment and Plan of Treatment: Falls    Diagnosis: Right shoulder pain  Assessment and Plan of Treatment: Right shoulder pain    Diagnosis: Pulmonary emboli  Assessment and Plan of Treatment: Pulmonary emboli    Diagnosis: Hypothyroidism  Assessment and Plan of Treatment: Hypothyroidism    Diagnosis: Unable to ambulate  Assessment and Plan of Treatment: F/U PT     PRINCIPAL DISCHARGE DIAGNOSIS  Diagnosis: Abdominal pain  Assessment and Plan of Treatment: resolved  Constipation bowel regimen as ordered   Take meds as directed   Follow up with PCP      SECONDARY DISCHARGE DIAGNOSES  Diagnosis: Clavicular fracture  Assessment and Plan of Treatment: NWB to Left Upper Extremity, maintain sling in place. F/u with orthopedics in 2- 3 weeks    Diagnosis: Closed fracture of one rib of right side, initial encounter  Assessment and Plan of Treatment: Continue pain management    Diagnosis: Falls  Assessment and Plan of Treatment: Falls    Diagnosis: Right shoulder pain  Assessment and Plan of Treatment: Right shoulder pain    Diagnosis: Pulmonary emboli  Assessment and Plan of Treatment: Pulmonary emboli    Diagnosis: Hypothyroidism  Assessment and Plan of Treatment: Hypothyroidism    Diagnosis: Unable to ambulate  Assessment and Plan of Treatment: F/U PT     PRINCIPAL DISCHARGE DIAGNOSIS  Diagnosis: Abdominal pain  Assessment and Plan of Treatment: resolved  Constipation bowel regimen as ordered   Take meds as directed   Follow up with PCP      SECONDARY DISCHARGE DIAGNOSES  Diagnosis: Clavicular fracture  Assessment and Plan of Treatment: NWB to Left Upper Extremity, maintain sling in place. F/u with orthopedics in 2- 3 weeks    Diagnosis: Closed fracture of one rib of right side, initial encounter  Assessment and Plan of Treatment: Continue pain management    Diagnosis: Falls  Assessment and Plan of Treatment: stable, need rehab    Diagnosis: Right shoulder pain  Assessment and Plan of Treatment: resolved, take tylenol as needed    Diagnosis: Pulmonary emboli  Assessment and Plan of Treatment: stable, cont eliquis    Diagnosis: Hypothyroidism  Assessment and Plan of Treatment: stable, cont synthroid    Diagnosis: Unable to ambulate  Assessment and Plan of Treatment: F/U PT

## 2020-11-08 NOTE — CONSULT NOTE ADULT - ASSESSMENT
A/P: 89y Female with left clavicle fracture    Pain control  NWB LUE in sling, can take sling off in 3-4 weeks  Ice  Active movement of fingers/wrist/elbow encouraged  Follow up with Dr. March or Dr. Corley  within 1-2 weeks following discharge, call office for appointment. [phone]   Ortho stable, no surgical intervention at this time  Outpatient follow up

## 2020-11-08 NOTE — DISCHARGE NOTE PROVIDER - CARE PROVIDER_API CALL
Nash Corley)  Orthopaedic Surgery  [address] [place] [address], [address]  [place] [address]  Phone: [phone]  Fax: [phone]  Follow Up Time:    Nash Corley)  Orthopaedic Surgery  [address] [place] [address], [address] [address]  Phone: [phone]  Fax: [phone]  Follow Up Time:     Carola March  ORTHOPAEDIC SURGERY  [address] [place] [address], [address] [address]  Phone: [phone]  Fax: [phone]  Follow Up Time: 2 weeks   Nash Corley)  Orthopaedic Surgery  [address] [facility], [address]  [place] [address]  Phone: [phone]  Fax: [phone]  Follow Up Time:     Carola March  ORTHOPAEDIC SURGERY  [address] [facility], [address]  [place] [address]  Phone: [phone]  Fax: [phone]  Follow Up Time: 2 weeks    David Jacobo  INTERNAL MEDICINE  [address] [facility], [facility] [address]  [place] [address]  Phone: [phone]  Fax: [phone]  Follow Up Time:

## 2020-11-08 NOTE — DISCHARGE NOTE PROVIDER - PROVIDER TOKENS
PROVIDER:[TOKEN:[[id number]]] PROVIDER:[TOKEN:[[id number]]],PROVIDER:[TOKEN:[[id number]],FOLLOWUP:[2 weeks]] PROVIDER:[TOKEN:[[id number]]],PROVIDER:[TOKEN:[[id number]],FOLLOWUP:[2 weeks]],PROVIDER:[TOKEN:[[id number]]]

## 2020-11-08 NOTE — PROGRESS NOTE ADULT - SUBJECTIVE AND OBJECTIVE BOX
Patient is a 89y old  Female who presents with a chief complaint of Abdominal pain, acute Rib fracture (2020 13:49)      SUBJECTIVE / OVERNIGHT EVENTS: confused  Review of Systems  unobtainable     MEDICATIONS  (STANDING):  acetaminophen   Tablet .. 650 milliGRAM(s) Oral every 6 hours  acetaminophen   Tablet .. 650 milliGRAM(s) Oral once  apixaban 2.5 milliGRAM(s) Oral every 12 hours  chlorhexidine 2% Cloths 1 Application(s) Topical daily  influenza   Vaccine 0.5 milliLiter(s) IntraMuscular once  levothyroxine 50 MICROGram(s) Oral daily  lidocaine   Patch 1 Patch Transdermal every 24 hours  losartan 25 milliGRAM(s) Oral daily  piperacillin/tazobactam IVPB.. 3.375 Gram(s) IV Intermittent every 12 hours  polyethylene glycol 3350 17 Gram(s) Oral daily  senna 1 Tablet(s) Oral every 12 hours    MEDICATIONS  (PRN):  LORazepam   Injectable 0.5 milliGRAM(s) IV Push two times a day PRN Agitation  oxycodone    5 mG/acetaminophen 325 mG 0.5 Tablet(s) Oral every 4 hours PRN Severe Pain (7 - 10)      Vital Signs Last 24 Hrs  T(C): 37.1 (2020 16:22), Max: 38.7 (2020 05:10)  T(F): 98.7 (2020 16:22), Max: 101.7 (2020 05:10)  HR: 98 (2020 16:22) (91 - 108)  BP: 141/77 (2020 16:22) (123/65 - 174/75)  BP(mean): --  RR: 18 (2020 16:22) (18 - 20)  SpO2: 92% (2020 16:22) (92% - 100%)    PHYSICAL EXAM:  GENERAL: sick  HEAD:  Atraumatic, Normocephalic  EYES: EOMI, PERRLA, conjunctiva and sclera clear  NECK: Supple, No JVD  CHEST/LUNG: Clear to auscultation bilaterally; No wheeze  HEART: Regular rate and rhythm; No murmurs, rubs, or gallops  ABDOMEN: Soft, Nontender, Nondistended; Bowel sounds present  EXTREMITIES:  2+ Peripheral Pulses, No clubbing, cyanosis, or edema  PSYCH: confused  NEUROLOGY: non-focal  SKIN: No rashes or lesions    LABS:                        12.5   10.87 )-----------( 493      ( 2020 20:03 )             38.9     11-07    136  |  97  |  18  ----------------------------<  145<H>  4.3   |  25  |  0.90    Ca    10.0      2020 20:03    TPro  7.1  /  Alb  4.1  /  TBili  0.8  /  DBili  x   /  AST  33  /  ALT  18  /  AlkPhos  78  11-07          Urinalysis Basic - ( 2020 19:04 )    Color: Yellow / Appearance: Slightly Turbid / S.031 / pH: x  Gluc: x / Ketone: Moderate  / Bili: Negative / Urobili: Negative   Blood: x / Protein: 30 mg/dL / Nitrite: Negative   Leuk Esterase: Large / RBC: 15 /hpf /  /HPF   Sq Epi: x / Non Sq Epi: 0 /hpf / Bacteria: Many        Culture - Urine (collected 2020 04:45)  Source: .Urine Clean Catch (Midstream)  Final Report (2020 23:27):    <10,000 CFU/mL Normal Urogenital Maribel        RADIOLOGY & ADDITIONAL TESTS:    Imaging Personally Reviewed:    Consultant(s) Notes Reviewed:      Care Discussed with Consultants/Other Providers:

## 2020-11-09 RX ADMIN — LIDOCAINE 1 PATCH: 4 CREAM TOPICAL at 21:34

## 2020-11-09 RX ADMIN — Medication 650 MILLIGRAM(S): at 05:36

## 2020-11-09 RX ADMIN — LOSARTAN POTASSIUM 25 MILLIGRAM(S): 100 TABLET, FILM COATED ORAL at 05:35

## 2020-11-09 RX ADMIN — APIXABAN 2.5 MILLIGRAM(S): 2.5 TABLET, FILM COATED ORAL at 18:44

## 2020-11-09 RX ADMIN — PIPERACILLIN AND TAZOBACTAM 25 GRAM(S): 4; .5 INJECTION, POWDER, LYOPHILIZED, FOR SOLUTION INTRAVENOUS at 18:44

## 2020-11-09 RX ADMIN — Medication 650 MILLIGRAM(S): at 19:29

## 2020-11-09 RX ADMIN — POLYETHYLENE GLYCOL 3350 17 GRAM(S): 17 POWDER, FOR SOLUTION ORAL at 12:00

## 2020-11-09 RX ADMIN — CHLORHEXIDINE GLUCONATE 1 APPLICATION(S): 213 SOLUTION TOPICAL at 11:53

## 2020-11-09 RX ADMIN — Medication 50 MICROGRAM(S): at 05:35

## 2020-11-09 RX ADMIN — Medication 650 MILLIGRAM(S): at 11:53

## 2020-11-09 RX ADMIN — PIPERACILLIN AND TAZOBACTAM 25 GRAM(S): 4; .5 INJECTION, POWDER, LYOPHILIZED, FOR SOLUTION INTRAVENOUS at 05:36

## 2020-11-09 RX ADMIN — Medication 0.5 MILLIGRAM(S): at 05:36

## 2020-11-09 RX ADMIN — Medication 650 MILLIGRAM(S): at 18:44

## 2020-11-09 RX ADMIN — Medication 650 MILLIGRAM(S): at 12:30

## 2020-11-09 RX ADMIN — APIXABAN 2.5 MILLIGRAM(S): 2.5 TABLET, FILM COATED ORAL at 05:35

## 2020-11-09 RX ADMIN — SENNA PLUS 1 TABLET(S): 8.6 TABLET ORAL at 05:35

## 2020-11-09 RX ADMIN — LIDOCAINE 1 PATCH: 4 CREAM TOPICAL at 07:25

## 2020-11-09 RX ADMIN — LIDOCAINE 1 PATCH: 4 CREAM TOPICAL at 09:36

## 2020-11-09 RX ADMIN — SENNA PLUS 1 TABLET(S): 8.6 TABLET ORAL at 18:45

## 2020-11-09 NOTE — PROGRESS NOTE ADULT - SUBJECTIVE AND OBJECTIVE BOX
CC: f/u for fever    Patient reports  she likes my mask and my hair  REVIEW OF SYSTEMS:  All other review of systems negative (Comprehensive ROS)    Antimicrobials Day #  :3  piperacillin/tazobactam IVPB.. 3.375 Gram(s) IV Intermittent every 12 hours    Other Medications Reviewed    T(F): 98 (11-09-20 @ 16:20), Max: 98.8 (11-08-20 @ 23:08)  HR: 63 (11-09-20 @ 16:20)  BP: 113/66 (11-09-20 @ 16:20)  RR: 18 (11-09-20 @ 16:20)  SpO2: 94% (11-09-20 @ 16:20)  Wt(kg): --    PHYSICAL EXAM:  General: alert, no acute distress  Eyes:  anicteric, no conjunctival injection, no discharge  Oropharynx: no lesions or injection 	  Neck: supple, without adenopathy  Lungs: clear to auscultation  Heart: regular rate and rhythm; no murmur, rubs or gallops  Abdomen: soft, nondistended, nontender, without mass or organomegaly  Skin: no lesions  Extremities: no clubbing, cyanosis,. multiple ecchymosis on left shoulder, arm  Neurologic: alert, moves all extremities    LAB RESULTS:              MICROBIOLOGY:  RECENT CULTURES:  11-08 @ 09:56 .Blood Blood-Peripheral     No growth to date.      11-07 @ 23:01 .Urine Catheterized     >100,000 CFU/ml Gram positive organisms  <10,000 CFU/ml Normal Urogenital issa present      11-07 @ 22:59 .Blood Blood-Peripheral     No growth to date.      11-06 @ 04:45 .Urine Clean Catch (Midstream)     <10,000 CFU/mL Normal Urogenital Issa    uUrinalysis + Microscopic Examination (11.07.20 @ 19:04)   Blood, Urine: Small   Glucose Qualitative, Urine: Negative   Ketone - Urine: Moderate   Bilirubin: Negative   Color: Yellow   Specific Gravity: 1.031   Protein, Urine: 30 mg/dL   Urine Appearance: Slightly Turbid   Urobilinogen: Negative   pH Urine: 6.0   Leukocyte Esterase Concentration: Large   Nitrite: Negative   Red Blood Cell - Urine: 15 /hpf   White Blood Cell - Urine: 152 /HPF   Epithelial Cells: 0 /hpf   Hyaline Casts: 19 /lpf       RADIOLOGY REVIEWED:  < from: Xray Chest 1 View- PORTABLE-Urgent (Xray Chest 1 View- PORTABLE-Urgent .) (11.07.20 @ 19:28) >    EXAM:  XR CHEST PORTABLE URGENT 1V                            PROCEDURE DATE:  11/07/2020            INTERPRETATION:  A single chest x-ray was obtained on November 7, 2020.    Indication: Dementia.    Impression:    The heart is normal in size. Multiple calcified granuloma is seen throughout both lungs. Correlate with CT scan that was performed on November 5, 2020. No pleural effusion. Fractured left fourth rib and left clavicle. No pneumothorax.    < end of copied text >      < from: CT Abdomen and Pelvis w/ IV Cont (11.05.20 @ 19:41) >  EXAM:  CT ABDOMEN AND PELVIS IC                          EXAM:  CT CHEST IC                            PROCEDURE DATE:  11/05/2020            INTERPRETATION:  CLINICAL INFORMATION: Trauma. Fall on anticoagulation. Constipation.    COMPARISON: CTA of the chest from 10/12/2020. CT pelvis from 9/19/2017. CT abdomen pelvis from 11/18/2011.    PROCEDURE:  CT of the Chest, Abdomen and Pelvis was performed with intravenous contrast.  Intravenous contrast: 90 ml Omnipaque 350. 10 ml discarded.  Oral contrast: None.  Sagittal and coronal reformats were performed.    FINDINGS:  CHEST:  LUNGS AND LARGE AIRWAYS: Patent central airways. Incidental accessory fissure. Innumerable bilateral calcified granulomas, unchanged from prior study.  PLEURA: No pleural effusion.  VESSELS: Atherosclerotic calcifications of the aorta and coronary arteries.  HEART: Heart size is normal. Aortic valvular and mitral annular calcifications No pericardial effusion.  MEDIASTINUM AND MIGUEL A: No lymphadenopathy.  CHEST WALL AND LOWER NECK: Subcutaneous loop recorder within the left chest wall. Multiple bilateral old healed rib fractures.    ABDOMEN AND PELVIS:  LIVER: Within normal limits.  BILE DUCTS: Normal caliber.  GALLBLADDER: Cholelithiasis.  SPLEEN: Within normal limits.  PANCREAS: Within normal limits.  ADRENALS: Within normal limits.  KIDNEYS/URETERS: Bilateral cysts. No hydronephrosis.    BLADDER: Within normal limits.  REPRODUCTIVE ORGANS: Uterus and adnexa within normal limits.    BOWEL: Large amount of stool in the rectal vault with minimal surrounding inflammatory changes. Appendix is not visualized. No evidence of inflammation in the pericecal region.  PERITONEUM: No ascites.  VESSELS: Atherosclerotic changes.  RETROPERITONEUM/LYMPH NODES: No lymphadenopathy.  ABDOMINAL WALL: Within normal limits.  BONES: Acute fracture of the left 3rd rib is new from prior. Old fracture deformity of the left 4th, 5th, 6th and 7th ribs. Old fracture deformity of the right 3rd 4th 5th and 6th ribs. Right femoralfixation hardware. Old healed left superior and inferior pubic ramus fractures. Spinal degenerative changes. Cervical spinal fixation hardware.    IMPRESSION:    Acute fracture of the left 3rd rib. No pleural effusion or pneumothorax.    Large amountof stool in the rectal vault with minimal surrounding inflammatory changes.        < end of copied text >          Assessment:  Elderly woman with dementia admitted for complaints of abdomen pain, has had frequent falls with multiple fractures. CT shows fecal impaction and suspect cause of the pain. Had a transinet fever but blood cx neg. Urine with gram pos organisms of unclear significance. There is no report or gu symptoms , no pyelo on ct. suspect fecal impaction cause for fever and maybe some mild stercoral colitis  Plan:  bowel regimen  stop antibiotics for now and monitor off  monitor for any gu symptoms or retention

## 2020-11-09 NOTE — PROGRESS NOTE ADULT - SUBJECTIVE AND OBJECTIVE BOX
S: Resting comfortably in no distress, confused, unable to obtain ROS.       MEDICATIONS  (STANDING):  acetaminophen   Tablet .. 650 milliGRAM(s) Oral every 6 hours  acetaminophen   Tablet .. 650 milliGRAM(s) Oral once  apixaban 2.5 milliGRAM(s) Oral every 12 hours  chlorhexidine 2% Cloths 1 Application(s) Topical daily  influenza   Vaccine 0.5 milliLiter(s) IntraMuscular once  levothyroxine 50 MICROGram(s) Oral daily  lidocaine   Patch 1 Patch Transdermal every 24 hours  losartan 25 milliGRAM(s) Oral daily  piperacillin/tazobactam IVPB.. 3.375 Gram(s) IV Intermittent every 12 hours  polyethylene glycol 3350 17 Gram(s) Oral daily  senna 1 Tablet(s) Oral every 12 hours    MEDICATIONS  (PRN):  LORazepam   Injectable 0.5 milliGRAM(s) IV Push two times a day PRN Agitation  oxycodone    5 mG/acetaminophen 325 mG 0.5 Tablet(s) Oral every 4 hours PRN Severe Pain (7 - 10)      LABS:                          12.5   10.87 )-----------( 493      ( 07 Nov 2020 20:03 )             38.9     Hemoglobin: 12.5 g/dL (11-07 @ 20:03)  Hemoglobin: 12.1 g/dL (11-06 @ 06:43)  Hemoglobin: 11.8 g/dL (11-05 @ 18:04)    11-07    136  |  97  |  18  ----------------------------<  145<H>  4.3   |  25  |  0.90    Ca    10.0      07 Nov 2020 20:03    TPro  7.1  /  Alb  4.1  /  TBili  0.8  /  DBili  x   /  AST  33  /  ALT  18  /  AlkPhos  78  11-07    Creatinine Trend: 0.90<--, 1.08<--, 1.13<--, 0.96<--, 0.89<--, 0.91<--             11-08-20 @ 07:01  -  11-09-20 @ 07:00  --------------------------------------------------------  IN: 340 mL / OUT: 0 mL / NET: 340 mL        PHYSICAL EXAM  Vital Signs Last 24 Hrs  T(C): 37.1 (08 Nov 2020 23:08), Max: 37.1 (08 Nov 2020 16:22)  T(F): 98.8 (08 Nov 2020 23:08), Max: 98.8 (08 Nov 2020 23:08)  HR: 87 (09 Nov 2020 04:43) (86 - 102)  BP: 156/86 (09 Nov 2020 04:43) (123/65 - 156/86)  BP(mean): --  RR: 18 (09 Nov 2020 04:43) (18 - 18)  SpO2: 91% (09 Nov 2020 04:43) (91% - 97%)        Gen: Appears well in NAD  HEENT:  (-)icterus (-)pallor  CV: N S1 S2 1/6 KIN (+)2 Pulses B/l  Resp:  Clear to auscultation B/L, normal effort  GI: (+) BS Soft, NT, ND  Lymph:  (-)Edema, (-)obvious lymphadenopathy  Skin: Warm to touch, diffuse bruising  Psych: unable to assess    ASSESSMENT/PLAN: 89y Female with hypertension, pulmonary embolism on dose-adjusted apixaban for age/weight, experienced a mechanical fall at home.  Following on behalf of Dr Víctor Mancera.    - No evidence of clinical HF  - Continue apixaban at 2.5mg BID if no contraindications  - Recent echocardiogram with no significant changes  - No need for repeat cardiac imaging at this time  - Will follow with you    Florentin Juarez PA-C  Pager: [phone]

## 2020-11-09 NOTE — PROGRESS NOTE ADULT - ASSESSMENT
89 yr old female with mild dementia, HTN, HLD, PE (on a/c with eliquis) presents from home for abdominal pain and concern of no BM x 4-5 days. Imaging negative for SBO but does show large amount of stool in the rectal vault with minimal surrounding inflammatory changes.    Abdominal pain.   - CT notable w/ stool in rectal vault w/o inflammation; no signs of SBO; nontender on palpation  - continue bowel regimen miralax + senna bid.   - GI evaluation    Falls.   - Per patient, fall recently 2-3 days ago, presents with new Rib fracture, also here a week ago w/ fall and L clavicular and L 4th rib fracture; no signs of syncope/pre-syncope  - PT eval.     Closed fracture of one rib of right side, initial encounter.    - Noted on CT  - pain control: lidocaine patch, tylenol 650 q6h, oxycodone 2.5 mg q4h prn severe pain.   - Ortho evaluation    Constipation.   - As noted on CT  - enema and laxatives as above.     Right shoulder pain/ Clavicle fracture.   - PT  - pain control.   - ortho follow    Pulmonary emboli.  - Eliquis 2.5 mg bid  - unclear when PE was and if needs lifelong a/c vs how many months remaining.     Hypothyroidism.  - per son, pt on levothyroxine 75 mcg  - per chart review, levothyroxine reduced to 50 mcg on 10/16  - will c/w levothyroxine 50 mcg.     Confusion  - supportive care    UTI/ Fever  - antibiotics  - BC, UC  - ID evaluation     Prophylactic measure.   - continue home a/c w/ Eliquis 2.5 mg bid.     d/w son over phone    Art Carbajal MD pager [phone]

## 2020-11-09 NOTE — PROGRESS NOTE ADULT - SUBJECTIVE AND OBJECTIVE BOX
Patient is a 89y old  Female who presents with a chief complaint of Abdominal pain, acute Rib fracture (09 Nov 2020 20:37)      SUBJECTIVE / OVERNIGHT EVENTS: Comfortable without new complaints. Less agitated.  Review of Systems  chest pain no  palpitations no  sob no  nausea no  headache no    MEDICATIONS  (STANDING):  acetaminophen   Tablet .. 650 milliGRAM(s) Oral every 6 hours  acetaminophen   Tablet .. 650 milliGRAM(s) Oral once  apixaban 2.5 milliGRAM(s) Oral every 12 hours  chlorhexidine 2% Cloths 1 Application(s) Topical daily  influenza   Vaccine 0.5 milliLiter(s) IntraMuscular once  levothyroxine 50 MICROGram(s) Oral daily  lidocaine   Patch 1 Patch Transdermal every 24 hours  losartan 25 milliGRAM(s) Oral daily  polyethylene glycol 3350 17 Gram(s) Oral daily  senna 1 Tablet(s) Oral every 12 hours    MEDICATIONS  (PRN):  LORazepam   Injectable 0.5 milliGRAM(s) IV Push two times a day PRN Agitation  oxycodone    5 mG/acetaminophen 325 mG 0.5 Tablet(s) Oral every 4 hours PRN Severe Pain (7 - 10)      Vital Signs Last 24 Hrs  T(C): 36.7 (09 Nov 2020 16:20), Max: 37.1 (08 Nov 2020 23:08)  T(F): 98 (09 Nov 2020 16:20), Max: 98.8 (08 Nov 2020 23:08)  HR: 63 (09 Nov 2020 16:20) (63 - 87)  BP: 113/66 (09 Nov 2020 16:20) (110/74 - 156/86)  BP(mean): --  RR: 18 (09 Nov 2020 16:20) (18 - 18)  SpO2: 94% (09 Nov 2020 16:20) (91% - 97%)    PHYSICAL EXAM:  GENERAL: NAD  HEAD:  Atraumatic, Normocephalic  EYES: EOMI, PERRLA, conjunctiva and sclera clear  NECK: Supple, No JVD  CHEST/LUNG: Clear to auscultation bilaterally; No wheeze  HEART: Regular rate and rhythm; No murmurs, rubs, or gallops  ABDOMEN: Soft, Nontender, Nondistended; Bowel sounds present  EXTREMITIES:  2+ Peripheral Pulses, No clubbing, cyanosis, or edema  PSYCH: confused   NEUROLOGY: non-focal  SKIN: No rashes or lesions    LABS:                    Culture - Blood (collected 08 Nov 2020 09:56)  Source: .Blood Blood-Peripheral  Preliminary Report (09 Nov 2020 10:01):    No growth to date.    Culture - Urine (collected 07 Nov 2020 23:01)  Source: .Urine Catheterized  Preliminary Report (09 Nov 2020 18:26):    >100,000 CFU/ml Gram positive organisms    <10,000 CFU/ml Normal Urogenital issa present    Culture - Blood (collected 07 Nov 2020 22:59)  Source: .Blood Blood-Peripheral  Preliminary Report (08 Nov 2020 23:01):    No growth to date.        RADIOLOGY & ADDITIONAL TESTS:    Imaging Personally Reviewed:    Consultant(s) Notes Reviewed:      Care Discussed with Consultants/Other Providers:

## 2020-11-10 LAB
-  AMPICILLIN: SIGNIFICANT CHANGE UP
-  CIPROFLOXACIN: SIGNIFICANT CHANGE UP
-  LEVOFLOXACIN: SIGNIFICANT CHANGE UP
-  NITROFURANTOIN: SIGNIFICANT CHANGE UP
-  TETRACYCLINE: SIGNIFICANT CHANGE UP
-  VANCOMYCIN: SIGNIFICANT CHANGE UP
ANION GAP SERPL CALC-SCNC: 13 MMOL/L — SIGNIFICANT CHANGE UP (ref 5–17)
BUN SERPL-MCNC: 26 MG/DL — HIGH (ref 7–23)
CALCIUM SERPL-MCNC: 9.8 MG/DL — SIGNIFICANT CHANGE UP (ref 8.4–10.5)
CHLORIDE SERPL-SCNC: 101 MMOL/L — SIGNIFICANT CHANGE UP (ref 96–108)
CO2 SERPL-SCNC: 27 MMOL/L — SIGNIFICANT CHANGE UP (ref 22–31)
CREAT SERPL-MCNC: 1.1 MG/DL — SIGNIFICANT CHANGE UP (ref 0.5–1.3)
CULTURE RESULTS: SIGNIFICANT CHANGE UP
GLUCOSE SERPL-MCNC: 86 MG/DL — SIGNIFICANT CHANGE UP (ref 70–99)
HCT VFR BLD CALC: 37.3 % — SIGNIFICANT CHANGE UP (ref 34.5–45)
HGB BLD-MCNC: 11.7 G/DL — SIGNIFICANT CHANGE UP (ref 11.5–15.5)
MCHC RBC-ENTMCNC: 31.4 GM/DL — LOW (ref 32–36)
MCHC RBC-ENTMCNC: 31.8 PG — SIGNIFICANT CHANGE UP (ref 27–34)
MCV RBC AUTO: 101.4 FL — HIGH (ref 80–100)
METHOD TYPE: SIGNIFICANT CHANGE UP
NRBC # BLD: 0 /100 WBCS — SIGNIFICANT CHANGE UP (ref 0–0)
ORGANISM # SPEC MICROSCOPIC CNT: SIGNIFICANT CHANGE UP
ORGANISM # SPEC MICROSCOPIC CNT: SIGNIFICANT CHANGE UP
PLATELET # BLD AUTO: 451 K/UL — HIGH (ref 150–400)
POTASSIUM SERPL-MCNC: 3.6 MMOL/L — SIGNIFICANT CHANGE UP (ref 3.5–5.3)
POTASSIUM SERPL-SCNC: 3.6 MMOL/L — SIGNIFICANT CHANGE UP (ref 3.5–5.3)
RBC # BLD: 3.68 M/UL — LOW (ref 3.8–5.2)
RBC # FLD: 13.9 % — SIGNIFICANT CHANGE UP (ref 10.3–14.5)
SODIUM SERPL-SCNC: 141 MMOL/L — SIGNIFICANT CHANGE UP (ref 135–145)
SPECIMEN SOURCE: SIGNIFICANT CHANGE UP
WBC # BLD: 7.25 K/UL — SIGNIFICANT CHANGE UP (ref 3.8–10.5)
WBC # FLD AUTO: 7.25 K/UL — SIGNIFICANT CHANGE UP (ref 3.8–10.5)

## 2020-11-10 RX ORDER — SENNA PLUS 8.6 MG/1
1 TABLET ORAL
Qty: 0 | Refills: 0 | DISCHARGE
Start: 2020-11-10

## 2020-11-10 RX ORDER — PANTOPRAZOLE SODIUM 20 MG/1
1 TABLET, DELAYED RELEASE ORAL
Qty: 30 | Refills: 0

## 2020-11-10 RX ADMIN — SENNA PLUS 1 TABLET(S): 8.6 TABLET ORAL at 05:11

## 2020-11-10 RX ADMIN — Medication 650 MILLIGRAM(S): at 05:12

## 2020-11-10 RX ADMIN — APIXABAN 2.5 MILLIGRAM(S): 2.5 TABLET, FILM COATED ORAL at 05:11

## 2020-11-10 RX ADMIN — LIDOCAINE 1 PATCH: 4 CREAM TOPICAL at 10:38

## 2020-11-10 RX ADMIN — Medication 650 MILLIGRAM(S): at 17:58

## 2020-11-10 RX ADMIN — APIXABAN 2.5 MILLIGRAM(S): 2.5 TABLET, FILM COATED ORAL at 17:58

## 2020-11-10 RX ADMIN — CHLORHEXIDINE GLUCONATE 1 APPLICATION(S): 213 SOLUTION TOPICAL at 12:10

## 2020-11-10 RX ADMIN — LIDOCAINE 1 PATCH: 4 CREAM TOPICAL at 07:16

## 2020-11-10 RX ADMIN — Medication 650 MILLIGRAM(S): at 12:40

## 2020-11-10 RX ADMIN — Medication 50 MICROGRAM(S): at 05:11

## 2020-11-10 RX ADMIN — SENNA PLUS 1 TABLET(S): 8.6 TABLET ORAL at 17:58

## 2020-11-10 RX ADMIN — Medication 650 MILLIGRAM(S): at 18:30

## 2020-11-10 RX ADMIN — LIDOCAINE 1 PATCH: 4 CREAM TOPICAL at 21:10

## 2020-11-10 RX ADMIN — Medication 650 MILLIGRAM(S): at 12:09

## 2020-11-10 RX ADMIN — LOSARTAN POTASSIUM 25 MILLIGRAM(S): 100 TABLET, FILM COATED ORAL at 05:10

## 2020-11-10 NOTE — PROGRESS NOTE ADULT - SUBJECTIVE AND OBJECTIVE BOX
CC: f/u for fever    Patient reports: she is afebrile, offers no complaints.less anxious than on admission    REVIEW OF SYSTEMS:  All other review of systems negative (Comprehensive ROS)    Antimicrobials Day #  :off    Other Medications Reviewed    T(F): 97.7 (11-10-20 @ 07:42), Max: 98 (11-09-20 @ 16:20)  HR: 64 (11-10-20 @ 07:42)  BP: 133/76 (11-10-20 @ 07:42)  RR: 18 (11-10-20 @ 07:42)  SpO2: 100% (11-10-20 @ 07:42)  Wt(kg): --    PHYSICAL EXAM:  General: alert, no acute distress, frail female   Eyes:  anicteric, no conjunctival injection, no discharge  Oropharynx: no lesions or injection 	  Neck: supple, without adenopathy  Lungs: clear to auscultation  Heart: regular rate and rhythm; no murmur, rubs or gallops  Abdomen: soft, nondistended, nontender, without mass or organomegaly  Skin: multiple bruises  Extremities: no clubbing, cyanosis, or edema  Neurologic: alert, oriented, moves all extremities    LAB RESULTS:                        11.7   7.25  )-----------( 451      ( 10 Nov 2020 07:14 )             37.3     11-10    141  |  101  |  26<H>  ----------------------------<  86  3.6   |  27  |  1.10    Ca    9.8      10 Nov 2020 07:15          MICROBIOLOGY:  RECENT CULTURES:  11-08 @ 09:56 .Blood Blood-Peripheral     No growth to date.      11-07 @ 23:01 .Urine Catheterized     >100,000 CFU/ml Gram positive organisms  <10,000 CFU/ml Normal Urogenital issa present      11-07 @ 22:59 .Blood Blood-Peripheral     No growth to date.      11-06 @ 04:45 .Urine Clean Catch (Midstream)     <10,000 CFU/mL Normal Urogenital Issa          RADIOLOGY REVIEWED:    aden

## 2020-11-10 NOTE — PROGRESS NOTE ADULT - ASSESSMENT
89 yr old female with mild dementia, HTN, HLD, PE (on a/c with eliquis) presents from home for abdominal pain and concern of no BM x 4-5 days. Imaging negative for SBO but does show large amount of stool in the rectal vault with minimal surrounding inflammatory changes.    Abdominal pain.   - CT notable w/ stool in rectal vault w/o inflammation; no signs of SBO; nontender on palpation  - continue bowel regimen miralax + senna bid.   - GI evaluation     Falls.   - Per patient, fall recently 2-3 days ago, presents with new Rib fracture, also here a week ago w/ fall and L clavicular and L 4th rib fracture; no signs of syncope/pre-syncope  - PT eval.     Closed fracture of one rib of right side, initial encounter.    - Noted on CT  - pain control: lidocaine patch, tylenol 650 q6h, oxycodone 2.5 mg q4h prn severe pain.   - Ortho evaluation    Constipation.   - As noted on CT  - enema and laxatives as above.     Right shoulder pain/ Clavicle fracture.   - PT  - pain control.   - ortho follow    Pulmonary emboli.  - Eliquis 2.5 mg bid  - unclear when PE was and if needs lifelong a/c vs how many months remaining.     Hypothyroidism.  - per son, pt on levothyroxine 75 mcg  - per chart review, levothyroxine reduced to 50 mcg on 10/16  - will c/w levothyroxine 50 mcg.     Confusion  - supportive care    UTI/ Fever  - off antibiotics  - BC, UC  - ID evaluation noted    Prophylactic measure.   - continue home a/c w/ Eliquis 2.5 mg bid.     message left for son over phone    rAt Carbajal MD pager [phone]

## 2020-11-10 NOTE — PROGRESS NOTE ADULT - ASSESSMENT
89 yo female with history of dementia, PE on eliquis, frequent falls, admitted 11/5 with ? abdominal pain.  CT of A/P essentially  just showed constipation.  ER evaluation extended secondary to frequent falls, left rib and clavicle fracture found on X rays.  She is not able to give a coherent history.  Certainly imaging studies have been negative for chest and abdominal infection.  Her urine culture is negative.  While fever may reflect occult hematoma, a limited course of antibiotics reasonable until we can exclude active infection.  Fevers can be seen in the presence of fractures.  Fever resolved, zosyn stopped 11/8, 3 day course  Suggest:  1.monitor off antibiotics  2.No signs of active infection  3.disposition per primary service..

## 2020-11-10 NOTE — CONSULT NOTE ADULT - REASON FOR ADMISSION
Abdominal pain, acute Rib fracture

## 2020-11-10 NOTE — PROGRESS NOTE ADULT - SUBJECTIVE AND OBJECTIVE BOX
S: Resting comfortably in no distress.  not having any angina, shortness of breath or orthopnea.  no complaints today.    acetaminophen   Tablet .. 650 milliGRAM(s) Oral every 6 hours  acetaminophen   Tablet .. 650 milliGRAM(s) Oral once  apixaban 2.5 milliGRAM(s) Oral every 12 hours  chlorhexidine 2% Cloths 1 Application(s) Topical daily  influenza   Vaccine 0.5 milliLiter(s) IntraMuscular once  levothyroxine 50 MICROGram(s) Oral daily  lidocaine   Patch 1 Patch Transdermal every 24 hours  LORazepam   Injectable 0.5 milliGRAM(s) IV Push two times a day PRN  losartan 25 milliGRAM(s) Oral daily  oxycodone    5 mG/acetaminophen 325 mG 0.5 Tablet(s) Oral every 4 hours PRN  polyethylene glycol 3350 17 Gram(s) Oral daily  senna 1 Tablet(s) Oral every 12 hours                        11.7   7.25  )-----------( 451      ( 10 Nov 2020 07:14 )             37.3       11-10    141  |  101  |  26<H>  ----------------------------<  86  3.6   |  27  |  1.10    Ca    9.8      10 Nov 2020 07:15      T(C): 36.5 (11-10-20 @ 07:42), Max: 36.7 (11-09-20 @ 16:20)  HR: 64 (11-10-20 @ 07:42) (63 - 73)  BP: 133/76 (11-10-20 @ 07:42) (110/74 - 138/78)  RR: 18 (11-10-20 @ 07:42) (18 - 18)  SpO2: 100% (11-10-20 @ 07:42) (93% - 100%)  Wt(kg): --    I&O's Summary    09 Nov 2020 07:01  -  10 Nov 2020 07:00  --------------------------------------------------------  IN: 120 mL / OUT: 150 mL / NET: -30 mL    10 Nov 2020 07:01  -  10 Nov 2020 12:51  --------------------------------------------------------  IN: 120 mL / OUT: 0 mL / NET: 120 mL      Gen: Appears well in NAD  HEENT:  (-)icterus (-)pallor  CV: N S1 S2 1/6 KIN (+)2 Pulses B/l  Resp:  Clear to auscultation B/L, normal effort  GI: (+) BS Soft, NT, ND  Lymph:  (-)Edema, (-)obvious lymphadenopathy  Skin: Warm to touch, diffuse bruising  Psych: unable to assess    ASSESSMENT/PLAN: 89y Female with hypertension, pulmonary embolism on dose-adjusted apixaban for age/weight, experienced a mechanical fall at home.  Following on behalf of Dr Víctor Mancera.    - No evidence of clinical HF  - Continue apixaban at 2.5mg BID  - In-hospital echocardiogram reassuring, with no significant changes  - No need for further cardiac imaging at this time  - Will follow with you    Vasile Muñoz M.D.  Cardiac Electrophysiology  [phone]

## 2020-11-10 NOTE — CONSULT NOTE ADULT - ATTENDING COMMENTS
Gloria Jefferson MD, FACP, FACG, AGAF  [facility]  [phone]     After hours and weekend coverage GI service : [phone]

## 2020-11-10 NOTE — CONSULT NOTE ADULT - ASSESSMENT
89 yr old female with mild dementia, HTN, HLD, PE (on a/c with eliquis) presents from home for concern of no BM x 4-5 days s/p recent fall with clavicular fracture - no reported opiod/narcotic analgesics  Admitted 11/5     +frequent falls +rib fractures  +large fecal stool burden with associated minimal surrounding inflammatory changes on CT 11/5  - ADDI 11/10 without fecal impaction    RECS;  -Continue Miralax and Senna, will add Dulcolax supp Q other day to help passage of stool given history of anal fissure and decreased mobility given acute fractures  -ok for PO diet as tolerated    Will follow with you  Discussed with pt   Discussed with Medicine attending    Andrey Emanuel PA-C    [facility]  [phone]  After hours and weekend coverage [phone]   89 yr old female with mild dementia, HTN, HLD, PE (on a/c with eliquis) presents from home for concern of no BM x 4-5 days s/p recent fall with clavicular fracture - no reported opiod/narcotic analgesics  Admitted 11/5     +frequent falls +rib fractures  +large fecal stool burden with associated minimal surrounding inflammatory changes on CT 11/5  - ADDI 11/10 without fecal impaction    RECS;  -Continue Miralax and Senna, will add Dulcolax supp Q other day to help passage of stool given history of anal fissure and decreased mobility given acute fractures  -ok for PO diet as tolerated  -no GI objection to AC; defer to primary team    Will follow with you  Discussed with pt   Discussed with Medicine attending    Andrey Emanuel PA-C    [facility]  [phone]  After hours and weekend coverage [phone]   89 yr old female with mild dementia, HTN, HLD, PE (on a/c with eliquis) presents from home for concern of no BM x 4-5 days s/p recent fall with clavicular fracture - no reported opiod/narcotic analgesics  Admitted 11/5     +frequent falls +rib fractures  +large fecal stool burden with associated minimal surrounding inflammatory changes on CT 11/5  - ADDI 11/10 without fecal impaction    RECS;  -Continue Miralax and Senna, will add Dulcolax supp Q other day to help passage of stool given history of anal fissure and decreased mobility given acute fractures  -ok for PO diet as tolerated  -No GI objection to AC; defer to primary team    Will follow with you  Discussed with pt   Discussed with Medicine attending    Andrey Emanuel PA-C    [facility]  [phone]  After hours and weekend coverage [phone]

## 2020-11-10 NOTE — CONSULT NOTE ADULT - SUBJECTIVE AND OBJECTIVE BOX
Patient is a 89y old  Female who presented with a chief complaint of Abdominal pain, acute Rib fracture (10 Nov 2020 12:50)      HPI:  89 yr old female with mild dementia, HTN, HLD, PE (on a/c with eliquis) presents from home for concern of no BM x 4-5 days. Pt states she has had a diminished appetite but has been tolerating PO and passing gas. Currently denying abdominal pain but noted to have it at home. Her aide called her PMD who instructed her to come to the ED to r/o an obstruction.     Of note, patient was seen here last week for L clavicle fracture s/p fall, denies taking opioids for the pain, has not needed pain medications.    Pt also states that she fell ?again 2-3 days ago.  States it was unwitnessed while she was getting up from using the bathroom, unsure if she hit her head, but did not seek medical attention at the time. She is unsure how long she was on the floor for. She currently endorses left shoulder pain and some left sided chest pain with inspiration that is new. She denies shortness of breath. Son did not know about fall and was not able to provide information.     Pt denies any recent fevers, chills, lightheaded or dizziness, nausea, vomiting, abdominal pain, difficulty breathing, back pain, numbness, tingling, paresthesias.   Pt states she has been taking eliquis as prescribed daily, has a live in aid who is with the pt 24/7.    Son concerned about frequent falls and new fractures. Concerned if he needs to find a new aide or start considering nursing home for mom. Son and wife live in [place]. Patient currently lives in a two story home and has her bedroom and bathroom upstairs; using an electric chair to get to second floor.  (05 Nov 2020 23:12)      Pt with multiple documented BMs since admission - noted to have significant stool burden in rectum on CT at admission  c/o rectal discomfort - but no nausea, vomiting or abdominal pain  no rectal bleeding or melena  Multiple areas of ecchymoses on arms and legs  Pt with known dementia - admits frequent falls but no further details; pleasantly confused   prior history of anal fissure  on AC for Hx PE      Found to have acute rib fracture and clavicle fracture    PAST MEDICAL & SURGICAL HISTORY:  Hx PE (on AC)    Skin cancer  lower extremities    Hip fracture  Right hip- 1995    Osteoporosis    Chronic anal fissure    Anal spasm    mild Dementia    Dyslipidemia    Hypertension    H/O: Hypothyroidism    H/O gastroesophageal reflux (GERD)    Anterior Cervical discectomy    left eye Retinal tear repair    After-Cataract of Both Eyes    left Elbow surgery secondary to injury    History of Tonsillectomy    History of  Hip surgery with hardware      Allergies  penicillin (Rash)      MEDICATIONS  (STANDING):  acetaminophen   Tablet .. 650 milliGRAM(s) Oral every 6 hours  acetaminophen   Tablet .. 650 milliGRAM(s) Oral once  apixaban 2.5 milliGRAM(s) Oral every 12 hours  chlorhexidine 2% Cloths 1 Application(s) Topical daily  influenza   Vaccine 0.5 milliLiter(s) IntraMuscular once  levothyroxine 50 MICROGram(s) Oral daily  lidocaine   Patch 1 Patch Transdermal every 24 hours  losartan 25 milliGRAM(s) Oral daily  polyethylene glycol 3350 17 Gram(s) Oral daily  senna 1 Tablet(s) Oral every 12 hours    MEDICATIONS  (PRN):  LORazepam   Injectable 0.5 milliGRAM(s) IV Push two times a day PRN Agitation  oxycodone    5 mG/acetaminophen 325 mG 0.5 Tablet(s) Oral every 4 hours PRN Severe Pain (7 - 10)      Social History:  lives with 24/7 private aide    Family History   IBD (  ) Yes   (X  ) No  GI Malignancy (  )  Yes    (X  ) No    Health Management  Last Colonoscopy - unknown      Advanced Directives: (   X  ) None    (      ) DNR    (     ) DNI    (     ) Health Care Proxy:     Review of Systems:    General:  No wt loss, fevers, chills, night sweats  CV:  No pain, palpitations, +falls +AF  Resp:  No dyspnea, cough, tachypnea, wheezing  GI:  +constipation +rectal pain, no rectal bleeding no abdominal pain, nausea or vomiting  :  No pain, bleeding, incontinence, nocturia  Muscle:  No pain, weakness +falls  Neuro:  No weakness, tingling, memory problems  Psych:  No fatigue, insomnia, mood problems, depression  Endocrine:  No polyuria, polydypsia, cold/heat intolerance  Heme:  No petechiae, ecchymosis, easy bruisability  Skin:  No rash, tattoos, scars, edema      Vital Signs Last 24 Hrs  T(C): 36.5 (10 Nov 2020 07:42), Max: 36.7 (09 Nov 2020 16:20)  T(F): 97.7 (10 Nov 2020 07:42), Max: 98 (09 Nov 2020 16:20)  HR: 64 (10 Nov 2020 07:42) (63 - 71)  BP: 133/76 (10 Nov 2020 07:42) (113/66 - 138/78)  BP(mean): --  RR: 18 (10 Nov 2020 07:42) (18 - 18)  SpO2: 100% (10 Nov 2020 07:42) (93% - 100%)    PHYSICAL EXAM:    Constitutional:  frail appearing elderly WF pleasantly confused, alert and responsive  Neck: No LAD, supple no JVD  Respiratory: b/l air entry  Cardiovascular: S1 and S2, Regular  Gastrointestinal: BS+, soft, NT/ND, no rebound guarding or rigidity  Rectal: normal tone, no lesions appreciated on inspection and palpation +sl distended rectal vault, ++loose brown stool with ADDI assist, no palpable lesions. post ADDI rectal vault collapsed around clinician's finger  Extremities: No peripheral edema, neg clubbing, +b/l healing ecchymoses   Vascular: 2+ peripheral pulses  Neurological: alert and responsive, forgetful. no focal asymmetry  Psychiatric: Normal mood, normal affect  Skin: anicteric, extensive areas of healing ecchymoses on b/l arms and legs. moves all extremities        LABS:                        11.7   7.25  )-----------( 451      ( 10 Nov 2020 07:14 )             37.3     11-10    141  |  101  |  26<H>  ----------------------------<  86  3.6   |  27  |  1.10    Ca    9.8      10 Nov 2020 07:15      LIVER FUNCTIONS - ( 07 Nov 2020 20:03 )  Alb: 4.1 g/dL / Pro: 7.1 g/dL / ALK PHOS: 78 U/L / ALT: 18 U/L / AST: 33 U/L / GGT: x           Culture - Blood (11.08.20 @ 09:56)   Specimen Source: .Blood Blood-Peripheral   Culture Results:   No growth to date.     Culture - Blood (11.07.20 @ 22:59)   Specimen Source: .Blood Blood-Peripheral   Culture Results:   No growth to date.     Culture - Urine (11.07.20 @ 23:01)   Specimen Source: .Urine Catheterized   Culture Results:   >100,000 CFU/ml Gram positive organisms   <10,000 CFU/ml Normal Urogenital issa present       RADIOLOGY & ADDITIONAL TESTS:  EXAM:  CT CERVICAL SPINE                        EXAM:  CT BRAIN                       PROCEDURE DATE:  11/05/2020      IMPRESSION:  No acute intracranial hemorrhage    No acute fracture cervical spine. If pain persists, follow-up MRI exam recommended.    Partially visualized distal left clavicle fracture.    EXAM:  CLAVICLE LEFT                        PROCEDURE DATE:  11/08/2020    IMPRESSION:  1.  Impacted distal clavicular fracture as on prior study.  2.  Minimally displaced left fourth rib fracture, unchanged.  3.  Multiple subcentimeter nodular densities overlying the lung, unchanged.    EXAM:  SHOULDER AXILLARY VIEW RT                        PROCEDURE DATE:  11/06/2020      IMPRESSION:  1. No fractures are appreciated on these axillary views. Consider additional views as warranted.  2. No dislocation is seen.        EXAM:  CT ABDOMEN AND PELVIS IC                        EXAM:  CT CHEST IC                          PROCEDURE DATE:  11/05/2020          INTERPRETATION:  CLINICAL INFORMATION: Trauma. Fall on anticoagulation. Constipation.    COMPARISON: CTA of the chest from 10/12/2020. CT pelvis from 9/19/2017. CT abdomen pelvis from 11/18/2011.      FINDINGS:  CHEST:  LUNGS AND LARGE AIRWAYS: Patent central airways. Incidental accessory fissure. Innumerable bilateral calcified granulomas, unchanged from prior study.  PLEURA: No pleural effusion.  VESSELS: Atherosclerotic calcifications of the aorta and coronary arteries.  HEART: Heart size is normal. Aortic valvular and mitral annular calcifications No pericardial effusion.  MEDIASTINUM AND MIGUEL A: No lymphadenopathy.  CHEST WALL AND LOWER NECK: Subcutaneous loop recorder within the left chest wall. Multiple bilateral old healed rib fractures.    ABDOMEN AND PELVIS:  LIVER: Within normal limits.  BILE DUCTS: Normal caliber.  GALLBLADDER: Cholelithiasis.  SPLEEN: Within normal limits.  PANCREAS: Within normal limits.  ADRENALS: Within normal limits.  KIDNEYS/URETERS: Bilateral cysts. No hydronephrosis.    BLADDER: Within normal limits.  REPRODUCTIVE ORGANS: Uterus and adnexa within normal limits.    BOWEL: Large amount of stool in the rectal vault with minimal surrounding inflammatory changes. Appendix is not visualized. No evidence of inflammation in the pericecal region.  PERITONEUM: No ascites.  VESSELS: Atherosclerotic changes.  RETROPERITONEUM/LYMPH NODES: No lymphadenopathy.  ABDOMINAL WALL: Within normal limits.  BONES: Acute fracture of the left 3rd rib is new from prior. Old fracture deformity of the left 4th, 5th, 6th and 7th ribs. Old fracture deformity of the right 3rd 4th 5th and 6th ribs. Right femoralfixation hardware. Old healed left superior and inferior pubic ramus fractures. Spinal degenerative changes. Cervical spinal fixation hardware.    IMPRESSION:    Acute fracture of the left 3rd rib. No pleural effusion or pneumothorax.    Large amountof stool in the rectal vault with minimal surrounding inflammatory changes.   Patient is a 89y old  Female who presented with a chief complaint of Abdominal pain, acute Rib fracture (10 Nov 2020 12:50)    HPI:  89 yr old female with mild dementia, HTN, HLD, PE (on a/c with eliquis) presents from home for concern of no BM x 4-5 days. Pt states she has had a diminished appetite but has been tolerating PO and passing gas. Currently denying abdominal pain but noted to have it at home. Her aide called her PMD who instructed her to come to the ED to r/o an obstruction.     Of note, patient was seen here last week for L clavicle fracture s/p fall, denies taking opioids for the pain, has not needed pain medications.    Pt also states that she fell ?again 2-3 days ago.  States it was unwitnessed while she was getting up from using the bathroom, unsure if she hit her head, but did not seek medical attention at the time. She is unsure how long she was on the floor for. She currently endorses left shoulder pain and some left sided chest pain with inspiration that is new. She denies shortness of breath. Son did not know about fall and was not able to provide information.     Pt denies any recent fevers, chills, lightheaded or dizziness, nausea, vomiting, abdominal pain, difficulty breathing, back pain, numbness, tingling, paresthesias.   Pt states she has been taking eliquis as prescribed daily, has a live in aid who is with the pt 24/7.    Son concerned about frequent falls and new fractures. Concerned if he needs to find a new aide or start considering nursing home for mom. Son and wife live in [place]. Patient currently lives in a two story home and has her bedroom and bathroom upstairs; using an electric chair to get to second floor.  (05 Nov 2020 23:12)    Pt with multiple documented BMs since admission - noted to have significant stool burden in rectum on CT at admission  c/o rectal discomfort - but no nausea, vomiting or abdominal pain  no rectal bleeding or melena  Multiple areas of ecchymoses on arms and legs  Pt with known dementia - admits frequent falls but no further details; pleasantly confused   prior history of anal fissure  on AC for Hx PE    Found to have acute rib fracture and clavicle fracture    PAST MEDICAL & SURGICAL HISTORY:  Hx PE (on AC)    Skin cancer  lower extremities    Hip fracture  Right hip- 1995    Osteoporosis    Chronic anal fissure    Anal spasm    mild Dementia    Dyslipidemia    Hypertension    H/O: Hypothyroidism    H/O gastroesophageal reflux (GERD)    Anterior Cervical discectomy    left eye Retinal tear repair    After-Cataract of Both Eyes    left Elbow surgery secondary to injury    History of Tonsillectomy    History of  Hip surgery with hardware    Allergies  penicillin (Rash)    MEDICATIONS  (STANDING):  acetaminophen   Tablet .. 650 milliGRAM(s) Oral every 6 hours  acetaminophen   Tablet .. 650 milliGRAM(s) Oral once  apixaban 2.5 milliGRAM(s) Oral every 12 hours  chlorhexidine 2% Cloths 1 Application(s) Topical daily  influenza   Vaccine 0.5 milliLiter(s) IntraMuscular once  levothyroxine 50 MICROGram(s) Oral daily  lidocaine   Patch 1 Patch Transdermal every 24 hours  losartan 25 milliGRAM(s) Oral daily  polyethylene glycol 3350 17 Gram(s) Oral daily  senna 1 Tablet(s) Oral every 12 hours    MEDICATIONS  (PRN):  LORazepam   Injectable 0.5 milliGRAM(s) IV Push two times a day PRN Agitation  oxycodone    5 mG/acetaminophen 325 mG 0.5 Tablet(s) Oral every 4 hours PRN Severe Pain (7 - 10)    Social History:  lives with 24/7 private aide    Family History   IBD (  ) Yes   (X  ) No  GI Malignancy (  )  Yes    (X  ) No    Health Management  Last Colonoscopy - unknown    Advanced Directives: (   X  ) None    (      ) DNR    (     ) DNI    (     ) Health Care Proxy:     Review of Systems:  General:  No wt loss, fevers, chills, night sweats  CV:  No pain, palpitations, +falls +AF  Resp:  No dyspnea, cough, tachypnea, wheezing  GI:  +constipation +rectal pain, no rectal bleeding no abdominal pain, nausea or vomiting  :  No pain, bleeding, incontinence, nocturia  Muscle:  No pain, weakness +falls  Neuro:  No weakness, tingling, memory problems  Psych:  No fatigue, insomnia, mood problems, depression  Endocrine:  No polyuria, polydypsia, cold/heat intolerance  Heme:  No petechiae, ecchymosis, easy bruisability  Skin:  No rash, tattoos, scars, edema    Vital Signs Last 24 Hrs  T(C): 36.5 (10 Nov 2020 07:42), Max: 36.7 (09 Nov 2020 16:20)  T(F): 97.7 (10 Nov 2020 07:42), Max: 98 (09 Nov 2020 16:20)  HR: 64 (10 Nov 2020 07:42) (63 - 71)  BP: 133/76 (10 Nov 2020 07:42) (113/66 - 138/78)  BP(mean): --  RR: 18 (10 Nov 2020 07:42) (18 - 18)  SpO2: 100% (10 Nov 2020 07:42) (93% - 100%)    PHYSICAL EXAM:  Constitutional:  frail appearing elderly WF pleasantly confused, alert and responsive  Neck: No LAD, supple no JVD  Respiratory: b/l air entry  Cardiovascular: S1 and S2, Regular  Gastrointestinal: BS+, soft, NT/ND, no rebound guarding or rigidity  Rectal: normal tone, no lesions appreciated on inspection and palpation +sl distended rectal vault, ++loose brown stool with ADDI assist, no palpable lesions. post ADDI rectal vault collapsed around clinician's finger  Extremities: No peripheral edema, neg clubbing, +b/l healing ecchymoses   Vascular: 2+ peripheral pulses  Neurological: alert and responsive, forgetful. no focal asymmetry  Psychiatric: Normal mood, normal affect  Skin: anicteric, extensive areas of healing ecchymoses on b/l arms and legs. moves all extremities    LABS:                      11.7   7.25  )-----------( 451      ( 10 Nov 2020 07:14 )             37.3     11-10    141  |  101  |  26<H>  ----------------------------<  86  3.6   |  27  |  1.10    Ca    9.8      10 Nov 2020 07:15    LIVER FUNCTIONS - ( 07 Nov 2020 20:03 )  Alb: 4.1 g/dL / Pro: 7.1 g/dL / ALK PHOS: 78 U/L / ALT: 18 U/L / AST: 33 U/L / GGT: x         Culture - Blood (11.08.20 @ 09:56)   Specimen Source: .Blood Blood-Peripheral   Culture Results:   No growth to date.   Culture - Blood (11.07.20 @ 22:59)   Specimen Source: .Blood Blood-Peripheral   Culture Results:   No growth to date.   Culture - Urine (11.07.20 @ 23:01)   Specimen Source: .Urine Catheterized   Culture Results:   >100,000 CFU/ml Gram positive organisms   <10,000 CFU/ml Normal Urogenital issa present     RADIOLOGY & ADDITIONAL TESTS:  EXAM:  CT CERVICAL SPINE                        EXAM:  CT BRAIN                       PROCEDURE DATE:  11/05/2020      IMPRESSION:  No acute intracranial hemorrhage    No acute fracture cervical spine. If pain persists, follow-up MRI exam recommended.    Partially visualized distal left clavicle fracture.    EXAM:  CLAVICLE LEFT                        PROCEDURE DATE:  11/08/2020    IMPRESSION:  1.  Impacted distal clavicular fracture as on prior study.  2.  Minimally displaced left fourth rib fracture, unchanged.  3.  Multiple subcentimeter nodular densities overlying the lung, unchanged.    EXAM:  SHOULDER AXILLARY VIEW RT                        PROCEDURE DATE:  11/06/2020      IMPRESSION:  1. No fractures are appreciated on these axillary views. Consider additional views as warranted.  2. No dislocation is seen.      EXAM:  CT ABDOMEN AND PELVIS IC                        EXAM:  CT CHEST IC                          PROCEDURE DATE:  11/05/2020      INTERPRETATION:  CLINICAL INFORMATION: Trauma. Fall on anticoagulation. Constipation.    COMPARISON: CTA of the chest from 10/12/2020. CT pelvis from 9/19/2017. CT abdomen pelvis from 11/18/2011.    FINDINGS:  CHEST:  LUNGS AND LARGE AIRWAYS: Patent central airways. Incidental accessory fissure. Innumerable bilateral calcified granulomas, unchanged from prior study.  PLEURA: No pleural effusion.  VESSELS: Atherosclerotic calcifications of the aorta and coronary arteries.  HEART: Heart size is normal. Aortic valvular and mitral annular calcifications No pericardial effusion.  MEDIASTINUM AND MIGUEL A: No lymphadenopathy.  CHEST WALL AND LOWER NECK: Subcutaneous loop recorder within the left chest wall. Multiple bilateral old healed rib fractures.    ABDOMEN AND PELVIS:  LIVER: Within normal limits.  BILE DUCTS: Normal caliber.  GALLBLADDER: Cholelithiasis.  SPLEEN: Within normal limits.  PANCREAS: Within normal limits.  ADRENALS: Within normal limits.  KIDNEYS/URETERS: Bilateral cysts. No hydronephrosis.    BLADDER: Within normal limits.  REPRODUCTIVE ORGANS: Uterus and adnexa within normal limits.    BOWEL: Large amount of stool in the rectal vault with minimal surrounding inflammatory changes. Appendix is not visualized. No evidence of inflammation in the pericecal region.  PERITONEUM: No ascites.  VESSELS: Atherosclerotic changes.  RETROPERITONEUM/LYMPH NODES: No lymphadenopathy.  ABDOMINAL WALL: Within normal limits.  BONES: Acute fracture of the left 3rd rib is new from prior. Old fracture deformity of the left 4th, 5th, 6th and 7th ribs. Old fracture deformity of the right 3rd 4th 5th and 6th ribs. Right femoralfixation hardware. Old healed left superior and inferior pubic ramus fractures. Spinal degenerative changes. Cervical spinal fixation hardware.    IMPRESSION:    Acute fracture of the left 3rd rib. No pleural effusion or pneumothorax.    Large amountof stool in the rectal vault with minimal surrounding inflammatory changes.

## 2020-11-11 LAB — SARS-COV-2 RNA SPEC QL NAA+PROBE: SIGNIFICANT CHANGE UP

## 2020-11-11 PROCEDURE — 99231 SBSQ HOSP IP/OBS SF/LOW 25: CPT

## 2020-11-11 RX ADMIN — POLYETHYLENE GLYCOL 3350 17 GRAM(S): 17 POWDER, FOR SOLUTION ORAL at 18:50

## 2020-11-11 RX ADMIN — APIXABAN 2.5 MILLIGRAM(S): 2.5 TABLET, FILM COATED ORAL at 18:50

## 2020-11-11 RX ADMIN — Medication 650 MILLIGRAM(S): at 12:56

## 2020-11-11 RX ADMIN — OXYCODONE AND ACETAMINOPHEN 0.5 TABLET(S): 5; 325 TABLET ORAL at 03:52

## 2020-11-11 RX ADMIN — APIXABAN 2.5 MILLIGRAM(S): 2.5 TABLET, FILM COATED ORAL at 05:16

## 2020-11-11 RX ADMIN — OXYCODONE AND ACETAMINOPHEN 0.5 TABLET(S): 5; 325 TABLET ORAL at 04:56

## 2020-11-11 RX ADMIN — LIDOCAINE 1 PATCH: 4 CREAM TOPICAL at 08:51

## 2020-11-11 RX ADMIN — Medication 650 MILLIGRAM(S): at 18:50

## 2020-11-11 RX ADMIN — CHLORHEXIDINE GLUCONATE 1 APPLICATION(S): 213 SOLUTION TOPICAL at 12:55

## 2020-11-11 RX ADMIN — LIDOCAINE 1 PATCH: 4 CREAM TOPICAL at 21:23

## 2020-11-11 RX ADMIN — SENNA PLUS 1 TABLET(S): 8.6 TABLET ORAL at 05:16

## 2020-11-11 RX ADMIN — LOSARTAN POTASSIUM 25 MILLIGRAM(S): 100 TABLET, FILM COATED ORAL at 05:26

## 2020-11-11 RX ADMIN — LIDOCAINE 1 PATCH: 4 CREAM TOPICAL at 07:27

## 2020-11-11 RX ADMIN — Medication 50 MICROGRAM(S): at 05:16

## 2020-11-11 RX ADMIN — Medication 650 MILLIGRAM(S): at 05:15

## 2020-11-11 RX ADMIN — Medication 650 MILLIGRAM(S): at 05:25

## 2020-11-11 NOTE — PROGRESS NOTE ADULT - SUBJECTIVE AND OBJECTIVE BOX
Patient is a 89y old  Female who presented with a chief complaint of Abdominal pain, acute Rib fracture (11 Nov 2020 10:15)      INTERVAL HPI/OVERNIGHT EVENTS:  no further rectal pain  brown loose stools  no nausea or vomiting  appetite good    MEDICATIONS  (STANDING):  acetaminophen   Tablet .. 650 milliGRAM(s) Oral every 6 hours  acetaminophen   Tablet .. 650 milliGRAM(s) Oral once  apixaban 2.5 milliGRAM(s) Oral every 12 hours  bisacodyl Suppository 10 milliGRAM(s) Rectal <User Schedule>  chlorhexidine 2% Cloths 1 Application(s) Topical daily  influenza   Vaccine 0.5 milliLiter(s) IntraMuscular once  levothyroxine 50 MICROGram(s) Oral daily  lidocaine   Patch 1 Patch Transdermal every 24 hours  losartan 25 milliGRAM(s) Oral daily  polyethylene glycol 3350 17 Gram(s) Oral daily  senna 1 Tablet(s) Oral every 12 hours    MEDICATIONS  (PRN):  LORazepam   Injectable 0.5 milliGRAM(s) IV Push two times a day PRN Agitation  oxycodone    5 mG/acetaminophen 325 mG 0.5 Tablet(s) Oral every 4 hours PRN Severe Pain (7 - 10)      Allergies  penicillin (Rash)      Review of Systems:  General:  No wt loss, fevers, chills, night sweats  CV:  No pain, palpitations, +falls +AF  Resp:  No dyspnea, cough, tachypnea, wheezing  GI:  +constipation +rectal pain, no rectal bleeding no abdominal pain, nausea or vomiting  :  No pain, bleeding, incontinence, nocturia  Muscle:  No pain, weakness +falls  Neuro:  No weakness, tingling, memory problems  Psych:  No fatigue, insomnia, mood problems, depression  Endocrine:  No polyuria, polydypsia, cold/heat intolerance  Heme:  No petechiae, ecchymosis, +easy bruisability  Skin:  No rash, tattoos, scars, edema +bruising    Vital Signs Last 24 Hrs  T(C): 36.9 (11 Nov 2020 09:12), Max: 36.9 (10 Nov 2020 15:59)  T(F): 98.4 (11 Nov 2020 09:12), Max: 98.4 (10 Nov 2020 15:59)  HR: 88 (11 Nov 2020 09:12) (73 - 88)  BP: 135/84 (11 Nov 2020 09:12) (122/80 - 167/87)  BP(mean): --  RR: 18 (11 Nov 2020 09:12) (18 - 18)  SpO2: 100% (11 Nov 2020 09:12) (94% - 100%)    PHYSICAL EXAM:  Constitutional:  frail appearing elderly WF pleasantly confused, alert and responsive. on commode +loose brown stool   Neck: No LAD, supple no JVD  Respiratory: b/l air entry  Cardiovascular: S1 and S2, Regular  Gastrointestinal: BS+, soft, NT/ND, no rebound guarding or rigidity  Extremities: No peripheral edema, neg clubbing, +b/l healing ecchymoses   Vascular: 2+ peripheral pulses  Neurological: alert and responsive, forgetful. no focal asymmetry  Psychiatric: Normal mood, normal affect  Skin: anicteric, extensive areas of healing ecchymoses on b/l arms and legs. moves all extremities    LABS:                        11.7   7.25  )-----------( 451      ( 10 Nov 2020 07:14 )             37.3     11-10    141  |  101  |  26<H>  ----------------------------<  86  3.6   |  27  |  1.10    Ca    9.8      10 Nov 2020 07:15        RADIOLOGY & ADDITIONAL TESTS:

## 2020-11-11 NOTE — PROGRESS NOTE ADULT - SUBJECTIVE AND OBJECTIVE BOX
CARDIOLOGY ATTENDING    she denies palpitations, syncope, nor angina, ROS otherwise -      Review of Systems:   Constitutional: [ ] fevers, [ ] chills.   Skin: [ ] dry skin. [ ] rashes.  Psychiatric: [ ] depression, [ ] anxiety.   Gastrointestinal: [ ] BRBPR, [ ] melena.   Neurological: [ ] confusion. [ ] seizures. [ ] shuffling gait.   Ears,Nose,Mouth and Throat: [ ] ear pain [ ] sore throat.   Eyes: [ ] diplopia.   Respiratory: [ ] hemoptysis. [ ] shortness of breath  Cardiovascular: See HPI above  Hematologic/Lymphatic: [ ] anemia. [ ] painful nodes. [ ] prolonged bleeding.   Genitourinary: [ ] hematuria. [ ] flank pain.   Endocrine: [ ] significant change in weight. [ ] intolerance to heat and cold.     Review of systems [x ] otherwise negative, [ ] otherwise unable to obtain    FH: no family history of sudden cardiac death in first degree relatives    SH: [ ] tobacco, [ ] alcohol, [ ] drugs    acetaminophen   Tablet .. 650 milliGRAM(s) Oral every 6 hours  acetaminophen   Tablet .. 650 milliGRAM(s) Oral once  apixaban 2.5 milliGRAM(s) Oral every 12 hours  bisacodyl Suppository 10 milliGRAM(s) Rectal <User Schedule>  chlorhexidine 2% Cloths 1 Application(s) Topical daily  influenza   Vaccine 0.5 milliLiter(s) IntraMuscular once  levothyroxine 50 MICROGram(s) Oral daily  lidocaine   Patch 1 Patch Transdermal every 24 hours  LORazepam   Injectable 0.5 milliGRAM(s) IV Push two times a day PRN  losartan 25 milliGRAM(s) Oral daily  oxycodone    5 mG/acetaminophen 325 mG 0.5 Tablet(s) Oral every 4 hours PRN  polyethylene glycol 3350 17 Gram(s) Oral daily                            11.7   7.25  )-----------( 451      ( 10 Nov 2020 07:14 )             37.3       11-10    141  |  101  |  26<H>  ----------------------------<  86  3.6   |  27  |  1.10    Ca    9.8      10 Nov 2020 07:15              T(C): 36.9 (11-11-20 @ 09:12), Max: 36.9 (11-10-20 @ 15:59)  HR: 88 (11-11-20 @ 09:12) (73 - 88)  BP: 135/84 (11-11-20 @ 09:12) (122/80 - 167/87)  RR: 18 (11-11-20 @ 09:12) (18 - 18)  SpO2: 100% (11-11-20 @ 09:12) (94% - 100%)  Wt(kg): --    I&O's Summary    10 Nov 2020 07:01  -  11 Nov 2020 07:00  --------------------------------------------------------  IN: 660 mL / OUT: 300 mL / NET: 360 mL    11 Nov 2020 07:01  -  11 Nov 2020 11:05  --------------------------------------------------------  IN: 250 mL / OUT: 0 mL / NET: 250 mL        General: Well nourished in no acute distress. Alert and Oriented * 3.   Head: Normocephalic and atraumatic.   Neck: No JVD. No bruits. Supple. Does not appear to be enlarged.   Cardiovascular: + S1,S2 ; RRR Soft systolic murmur at the left lower sternal border. No rubs noted.    Lungs: CTA b/l. No rhonchi, rales or wheezes.   Abdomen: + BS, soft. Non tender. Non distended. No rebound. No guarding.   Extremities: No clubbing/cyanosis/edema.   Neurologic: Moves all four extremities. Full range of motion.   Skin: Warm and moist. The patient's skin has normal elasticity and good skin turgor.   Psychiatric: Appropriate mood and affect.  Musculoskeletal: Normal range of motion, normal strength      ASSESSMENT/PLAN: 89y Female with hypertension, hyperlipidemia, and hypothyroidism who has an ILR for unexplained syncope. ILR later detected Afib, for which she was started on eliquis, but has otherwise been unremarkable. She is now a/w a mechanical fall at home.     - No evidence of clinical HF  - Continue apixaban at 2.5mg BID for lifelong a/c given PAF with elevated chads-vasc score  - In-hospital echocardiogram reassuring, with no significant changes  - No need for further cardiac imaging at this time  - Will follow with you  - f/u with Dr Mancera after discharge within 2 weeks of discharge

## 2020-11-11 NOTE — PROGRESS NOTE ADULT - ASSESSMENT
89 yo female with history of dementia, PE on eliquis, frequent falls, admitted 11/5 with ? abdominal pain.  CT of A/P essentially  just showed constipation.  ER evaluation extended secondary to frequent falls, left rib and clavicle fracture found on X rays.  She is not able to give a coherent history.  Certainly imaging studies have been negative for chest and abdominal infection.  Her urine culture is negative.  While fever may reflect occult hematoma, a limited course of antibiotics reasonable until we can exclude active infection.  Fevers can be seen in the presence of fractures.  Fever resolved, zosyn stopped 11/8, 3 day course  Second urine with enterococcus, adequately treated  Suggest:  1.monitor off antibiotics  2.No signs of active infection  3.disposition per primary service..With no additional ID w/u planned we will stop actively following, please call if ID issues arise

## 2020-11-11 NOTE — PROGRESS NOTE ADULT - ASSESSMENT
89 yr old female with mild dementia, HTN, HLD, PE (on a/c with eliquis) presents from home for abdominal pain and concern of no BM x 4-5 days. Imaging negative for SBO but does show large amount of stool in the rectal vault with minimal surrounding inflammatory changes.    Abdominal pain.   - CT notable w/ stool in rectal vault w/o inflammation; no signs of SBO; nontender on palpation  - continue bowel regimen miralax + senna bid.   - GI evaluation     Falls.   - Per patient, fall recently 2-3 days ago, presents with new Rib fracture, also here a week ago w/ fall and L clavicular and L 4th rib fracture; no signs of syncope/pre-syncope  - PT eval.     Closed fracture of one rib of right side, initial encounter.    - Noted on CT  - pain control: lidocaine patch, tylenol 650 q6h, oxycodone 2.5 mg q4h prn severe pain.   - Ortho evaluation    Constipation.   - As noted on CT  - enema and laxatives as above.     Right shoulder pain/ Clavicle fracture.   - PT  - pain control.   - ortho follow    Pulmonary emboli.  - Eliquis 2.5 mg bid  - unclear when PE was and if needs lifelong a/c vs how many months remaining.     Hypothyroidism.  - per son, pt on levothyroxine 75 mcg  - per chart review, levothyroxine reduced to 50 mcg on 10/16  - will c/w levothyroxine 50 mcg.     Confusion  - supportive care    UTI/ Fever  - off antibiotics  - BC, UC  - ID evaluation noted    Prophylactic measure.   - continue home a/c w/ Eliquis 2.5 mg bid.     Art Carbajal MD pager [phone]

## 2020-11-11 NOTE — PROGRESS NOTE ADULT - SUBJECTIVE AND OBJECTIVE BOX
CC: f/u for fever    Patient reports: she is confused, afebrile off antibiotics    REVIEW OF SYSTEMS:  All other review of systems negative (Comprehensive ROS): limited by confusion    Antimicrobials Day #  :off    Other Medications Reviewed    T(F): 98.4 (11-11-20 @ 09:12), Max: 98.4 (11-10-20 @ 15:59)  HR: 88 (11-11-20 @ 09:12)  BP: 135/84 (11-11-20 @ 09:12)  RR: 18 (11-11-20 @ 09:12)  SpO2: 100% (11-11-20 @ 09:12)  Wt(kg): --    PHYSICAL EXAM:  General: alert, no acute distress, confused  Eyes:  anicteric, no conjunctival injection, no discharge  Oropharynx: no lesions or injection 	  Neck: supple, without adenopathy  Lungs: clear to auscultation  Heart: regular rate and rhythm; no murmur, rubs or gallops  Abdomen: soft, nondistended, nontender, without mass or organomegaly  Skin: no lesions  Extremities: no clubbing, cyanosis, or edema  Neurologic: alert, confused, moves all extremities    LAB RESULTS:                        11.7   7.25  )-----------( 451      ( 10 Nov 2020 07:14 )             37.3     11-10    141  |  101  |  26<H>  ----------------------------<  86  3.6   |  27  |  1.10    Ca    9.8      10 Nov 2020 07:15          MICROBIOLOGY:  RECENT CULTURES:  11-08 @ 09:56 .Blood Blood-Peripheral     No growth to date.      11-07 @ 23:01 .Urine Catheterized Enterococcus faecalis    >100,000 CFU/ml Enterococcus faecalis  <10,000 CFU/ml Normal Urogenital issa present      11-07 @ 22:59 .Blood Blood-Peripheral     No growth to date.          RADIOLOGY REVIEWED:

## 2020-11-11 NOTE — PROGRESS NOTE ADULT - ASSESSMENT
89 yr old female with mild dementia, HTN, HLD, PE (on a/c with eliquis) presents from home for concern of no BM x 4-5 days s/p recent fall with clavicular fracture - no reported opiod/narcotic analgesics  Admitted 11/5     +frequent falls +rib fractures  +large fecal stool burden with associated minimal surrounding inflammatory changes on CT 11/5  - ADDI 11/10 without fecal impaction    RECS;  -Continue Miralax and  Dulcolax supp Q other day to help passage of stool given history of anal fissure and decreased mobility given acute fractures. will d/c senna  -PO diet as tolerated  -No GI objection to AC; defer to primary team    Discussed with pt   Discussed with Medicine attending    Andrey Emanuel PA-C    [facility]  [phone]  After hours and weekend coverage [phone]

## 2020-11-11 NOTE — PROGRESS NOTE ADULT - SUBJECTIVE AND OBJECTIVE BOX
Patient is a 89y old  Female who presents with a chief complaint of Abdominal pain, acute Rib fracture (11 Nov 2020 11:04)      SUBJECTIVE / OVERNIGHT EVENTS: Comfortable without new complaints.   Review of Systems  chest pain no  palpitations no  sob no  nausea no  headache no    MEDICATIONS  (STANDING):  acetaminophen   Tablet .. 650 milliGRAM(s) Oral every 6 hours  acetaminophen   Tablet .. 650 milliGRAM(s) Oral once  apixaban 2.5 milliGRAM(s) Oral every 12 hours  bisacodyl Suppository 10 milliGRAM(s) Rectal <User Schedule>  chlorhexidine 2% Cloths 1 Application(s) Topical daily  influenza   Vaccine 0.5 milliLiter(s) IntraMuscular once  levothyroxine 50 MICROGram(s) Oral daily  lidocaine   Patch 1 Patch Transdermal every 24 hours  losartan 25 milliGRAM(s) Oral daily  polyethylene glycol 3350 17 Gram(s) Oral daily    MEDICATIONS  (PRN):  LORazepam   Injectable 0.5 milliGRAM(s) IV Push two times a day PRN Agitation  oxycodone    5 mG/acetaminophen 325 mG 0.5 Tablet(s) Oral every 4 hours PRN Severe Pain (7 - 10)      Vital Signs Last 24 Hrs  T(C): 36.8 (11 Nov 2020 14:29), Max: 36.9 (11 Nov 2020 09:12)  T(F): 98.3 (11 Nov 2020 14:29), Max: 98.4 (11 Nov 2020 09:12)  HR: 76 (11 Nov 2020 14:29) (76 - 88)  BP: 146/88 (11 Nov 2020 14:29) (135/84 - 167/87)  BP(mean): --  RR: 18 (11 Nov 2020 14:29) (18 - 18)  SpO2: 98% (11 Nov 2020 14:29) (94% - 100%)    PHYSICAL EXAM:  GENERAL: NAD, well-developed  HEAD:  Atraumatic, Normocephalic  EYES: EOMI, PERRLA, conjunctiva and sclera clear  NECK: Supple, No JVD  CHEST/LUNG: Clear to auscultation bilaterally; No wheeze  HEART: Regular rate and rhythm; No murmurs, rubs, or gallops  ABDOMEN: Soft, Nontender, Nondistended; Bowel sounds present  EXTREMITIES:  2+ Peripheral Pulses, No clubbing, cyanosis, or edema  PSYCH: less confused.  NEUROLOGY: non-focal  SKIN: No rashes or lesions    LABS:                        11.7   7.25  )-----------( 451      ( 10 Nov 2020 07:14 )             37.3     11-10    141  |  101  |  26<H>  ----------------------------<  86  3.6   |  27  |  1.10    Ca    9.8      10 Nov 2020 07:15                  RADIOLOGY & ADDITIONAL TESTS:    Imaging Personally Reviewed:    Consultant(s) Notes Reviewed:      Care Discussed with Consultants/Other Providers:

## 2020-11-12 ENCOUNTER — TRANSCRIPTION ENCOUNTER (OUTPATIENT)
Age: 85
End: 2020-11-12

## 2020-11-12 VITALS
OXYGEN SATURATION: 94 % | TEMPERATURE: 98 F | HEART RATE: 65 BPM | RESPIRATION RATE: 18 BRPM | DIASTOLIC BLOOD PRESSURE: 68 MMHG | SYSTOLIC BLOOD PRESSURE: 149 MMHG

## 2020-11-12 LAB
ANION GAP SERPL CALC-SCNC: 12 MMOL/L — SIGNIFICANT CHANGE UP (ref 5–17)
BUN SERPL-MCNC: 23 MG/DL — SIGNIFICANT CHANGE UP (ref 7–23)
CALCIUM SERPL-MCNC: 9.9 MG/DL — SIGNIFICANT CHANGE UP (ref 8.4–10.5)
CHLORIDE SERPL-SCNC: 101 MMOL/L — SIGNIFICANT CHANGE UP (ref 96–108)
CO2 SERPL-SCNC: 28 MMOL/L — SIGNIFICANT CHANGE UP (ref 22–31)
CREAT SERPL-MCNC: 0.97 MG/DL — SIGNIFICANT CHANGE UP (ref 0.5–1.3)
CULTURE RESULTS: SIGNIFICANT CHANGE UP
GLUCOSE SERPL-MCNC: 85 MG/DL — SIGNIFICANT CHANGE UP (ref 70–99)
HCT VFR BLD CALC: 39.8 % — SIGNIFICANT CHANGE UP (ref 34.5–45)
HGB BLD-MCNC: 12.7 G/DL — SIGNIFICANT CHANGE UP (ref 11.5–15.5)
MCHC RBC-ENTMCNC: 31.6 PG — SIGNIFICANT CHANGE UP (ref 27–34)
MCHC RBC-ENTMCNC: 31.9 GM/DL — LOW (ref 32–36)
MCV RBC AUTO: 99 FL — SIGNIFICANT CHANGE UP (ref 80–100)
NRBC # BLD: 0 /100 WBCS — SIGNIFICANT CHANGE UP (ref 0–0)
PLATELET # BLD AUTO: 606 K/UL — HIGH (ref 150–400)
POTASSIUM SERPL-MCNC: 3.4 MMOL/L — LOW (ref 3.5–5.3)
POTASSIUM SERPL-SCNC: 3.4 MMOL/L — LOW (ref 3.5–5.3)
RBC # BLD: 4.02 M/UL — SIGNIFICANT CHANGE UP (ref 3.8–5.2)
RBC # FLD: 13.9 % — SIGNIFICANT CHANGE UP (ref 10.3–14.5)
SODIUM SERPL-SCNC: 141 MMOL/L — SIGNIFICANT CHANGE UP (ref 135–145)
SPECIMEN SOURCE: SIGNIFICANT CHANGE UP
WBC # BLD: 5.59 K/UL — SIGNIFICANT CHANGE UP (ref 3.8–10.5)
WBC # FLD AUTO: 5.59 K/UL — SIGNIFICANT CHANGE UP (ref 3.8–10.5)

## 2020-11-12 PROCEDURE — 82330 ASSAY OF CALCIUM: CPT

## 2020-11-12 PROCEDURE — 82947 ASSAY GLUCOSE BLOOD QUANT: CPT

## 2020-11-12 PROCEDURE — 87086 URINE CULTURE/COLONY COUNT: CPT

## 2020-11-12 PROCEDURE — 84295 ASSAY OF SERUM SODIUM: CPT

## 2020-11-12 PROCEDURE — 73020 X-RAY EXAM OF SHOULDER: CPT

## 2020-11-12 PROCEDURE — 80053 COMPREHEN METABOLIC PANEL: CPT

## 2020-11-12 PROCEDURE — 74177 CT ABD & PELVIS W/CONTRAST: CPT

## 2020-11-12 PROCEDURE — 73000 X-RAY EXAM OF COLLAR BONE: CPT

## 2020-11-12 PROCEDURE — 72125 CT NECK SPINE W/O DYE: CPT

## 2020-11-12 PROCEDURE — 85610 PROTHROMBIN TIME: CPT

## 2020-11-12 PROCEDURE — 70450 CT HEAD/BRAIN W/O DYE: CPT

## 2020-11-12 PROCEDURE — 82746 ASSAY OF FOLIC ACID SERUM: CPT

## 2020-11-12 PROCEDURE — 84443 ASSAY THYROID STIM HORMONE: CPT

## 2020-11-12 PROCEDURE — 72170 X-RAY EXAM OF PELVIS: CPT

## 2020-11-12 PROCEDURE — 84132 ASSAY OF SERUM POTASSIUM: CPT

## 2020-11-12 PROCEDURE — 81001 URINALYSIS AUTO W/SCOPE: CPT

## 2020-11-12 PROCEDURE — 97110 THERAPEUTIC EXERCISES: CPT

## 2020-11-12 PROCEDURE — 82607 VITAMIN B-12: CPT

## 2020-11-12 PROCEDURE — 82803 BLOOD GASES ANY COMBINATION: CPT

## 2020-11-12 PROCEDURE — 99232 SBSQ HOSP IP/OBS MODERATE 35: CPT

## 2020-11-12 PROCEDURE — 97116 GAIT TRAINING THERAPY: CPT

## 2020-11-12 PROCEDURE — 85025 COMPLETE CBC W/AUTO DIFF WBC: CPT

## 2020-11-12 PROCEDURE — 87040 BLOOD CULTURE FOR BACTERIA: CPT

## 2020-11-12 PROCEDURE — 82435 ASSAY OF BLOOD CHLORIDE: CPT

## 2020-11-12 PROCEDURE — 85730 THROMBOPLASTIN TIME PARTIAL: CPT

## 2020-11-12 PROCEDURE — 85018 HEMOGLOBIN: CPT

## 2020-11-12 PROCEDURE — 86769 SARS-COV-2 COVID-19 ANTIBODY: CPT

## 2020-11-12 PROCEDURE — 83690 ASSAY OF LIPASE: CPT

## 2020-11-12 PROCEDURE — 97162 PT EVAL MOD COMPLEX 30 MIN: CPT

## 2020-11-12 PROCEDURE — 99285 EMERGENCY DEPT VISIT HI MDM: CPT | Mod: 25

## 2020-11-12 PROCEDURE — 85027 COMPLETE CBC AUTOMATED: CPT

## 2020-11-12 PROCEDURE — 80048 BASIC METABOLIC PNL TOTAL CA: CPT

## 2020-11-12 PROCEDURE — U0003: CPT

## 2020-11-12 PROCEDURE — 87186 SC STD MICRODIL/AGAR DIL: CPT

## 2020-11-12 PROCEDURE — 71260 CT THORAX DX C+: CPT

## 2020-11-12 PROCEDURE — 71045 X-RAY EXAM CHEST 1 VIEW: CPT

## 2020-11-12 PROCEDURE — 85014 HEMATOCRIT: CPT

## 2020-11-12 PROCEDURE — 83605 ASSAY OF LACTIC ACID: CPT

## 2020-11-12 RX ORDER — LIDOCAINE 4 G/100G
1 CREAM TOPICAL
Qty: 0 | Refills: 0 | DISCHARGE
Start: 2020-11-12

## 2020-11-12 RX ADMIN — APIXABAN 2.5 MILLIGRAM(S): 2.5 TABLET, FILM COATED ORAL at 05:11

## 2020-11-12 RX ADMIN — LIDOCAINE 1 PATCH: 4 CREAM TOPICAL at 09:16

## 2020-11-12 RX ADMIN — LOSARTAN POTASSIUM 25 MILLIGRAM(S): 100 TABLET, FILM COATED ORAL at 05:11

## 2020-11-12 RX ADMIN — Medication 650 MILLIGRAM(S): at 16:52

## 2020-11-12 RX ADMIN — Medication 650 MILLIGRAM(S): at 00:12

## 2020-11-12 RX ADMIN — Medication 650 MILLIGRAM(S): at 12:29

## 2020-11-12 RX ADMIN — CHLORHEXIDINE GLUCONATE 1 APPLICATION(S): 213 SOLUTION TOPICAL at 12:32

## 2020-11-12 RX ADMIN — POLYETHYLENE GLYCOL 3350 17 GRAM(S): 17 POWDER, FOR SOLUTION ORAL at 12:30

## 2020-11-12 RX ADMIN — APIXABAN 2.5 MILLIGRAM(S): 2.5 TABLET, FILM COATED ORAL at 16:52

## 2020-11-12 RX ADMIN — Medication 50 MICROGRAM(S): at 05:10

## 2020-11-12 RX ADMIN — Medication 650 MILLIGRAM(S): at 05:12

## 2020-11-12 NOTE — PROGRESS NOTE ADULT - SUBJECTIVE AND OBJECTIVE BOX
CARDIOLOGY ATTENDING    she denies palpitations, syncope, nor angina, ROS otherwise -    acetaminophen   Tablet .. 650 milliGRAM(s) Oral every 6 hours  acetaminophen   Tablet .. 650 milliGRAM(s) Oral once  apixaban 2.5 milliGRAM(s) Oral every 12 hours  bisacodyl Suppository 10 milliGRAM(s) Rectal <User Schedule>  chlorhexidine 2% Cloths 1 Application(s) Topical daily  influenza   Vaccine 0.5 milliLiter(s) IntraMuscular once  levothyroxine 50 MICROGram(s) Oral daily  lidocaine   Patch 1 Patch Transdermal every 24 hours  LORazepam   Injectable 0.5 milliGRAM(s) IV Push two times a day PRN  losartan 25 milliGRAM(s) Oral daily  oxycodone    5 mG/acetaminophen 325 mG 0.5 Tablet(s) Oral every 4 hours PRN  polyethylene glycol 3350 17 Gram(s) Oral daily                         12.7   5.59  )-----------( 606      ( 12 Nov 2020 07:39 )             39.8       11-12    141  |  101  |  23  ----------------------------<  85  3.4<L>   |  28  |  0.97    Ca    9.9      12 Nov 2020 07:39      T(C): 37 (11-12-20 @ 07:38), Max: 37 (11-12-20 @ 07:38)  HR: 72 (11-12-20 @ 07:38) (72 - 81)  BP: 147/84 (11-12-20 @ 07:38) (146/88 - 152/89)  RR: 18 (11-12-20 @ 07:38) (18 - 18)  SpO2: 96% (11-12-20 @ 07:38) (96% - 98%)  Wt(kg): --    I&O's Summary    11 Nov 2020 07:01  -  12 Nov 2020 07:00  --------------------------------------------------------  IN: 910 mL / OUT: 5 mL / NET: 905 mL    Review of Systems:   Constitutional: [- ] fevers, [- ] chills.   Skin: [ -] dry skin. [ -] rashes.  Psychiatric: [ -] depression, [ -] anxiety.   Gastrointestinal: [- ] BRBPR, [- ] melena.   Neurological: [- ] confusion. [ -] seizures. [- ] shuffling gait.   Review of systems [x ] otherwise negative,     General: Well nourished in no acute distress. Alert and Oriented * 3.   Head: Normocephalic and atraumatic.   Neck: No JVD. No bruits. Supple. Does not appear to be enlarged.   Cardiovascular: + S1,S2 ; RRR Soft systolic murmur at the left lower sternal border. No rubs noted.    Lungs: CTA b/l. No rhonchi, rales or wheezes.   Abdomen: + BS, soft. Non tender. Non distended. No rebound. No guarding.   Extremities: No clubbing/cyanosis/edema.   Neurologic: Moves all four extremities. Full range of motion.   Skin: Warm and moist. The patient's skin has normal elasticity and good skin turgor.   Psychiatric: Appropriate mood and affect.  Musculoskeletal: Normal range of motion, normal strength    ASSESSMENT/PLAN: 89y Female with hypertension, hyperlipidemia, and hypothyroidism who has an ILR for unexplained syncope. ILR later detected Afib, for which she was started on eliquis, but has otherwise been unremarkable. She is now a/w a mechanical fall at home.     - No evidence of clinical HF  - Continue apixaban at 2.5mg BID for lifelong a/c given PAF with elevated chads-vasc score  - In-hospital echocardiogram reassuring, with no significant changes  - No need for further cardiac imaging at this time  - Will follow with you  - f/u with Dr Mancera after discharge within 2 weeks of discharge    Vasile Muñoz M.D.  Cardiac Electrophysiology  [phone]

## 2020-11-12 NOTE — PROGRESS NOTE ADULT - ATTENDING COMMENTS
Agree with above  No further inpatient cardiac workup needed at this time.     Ron Quintanilla MD
Patient seen and examined.  Agree with above.   Recent echocardiogram with no significant changes  No need for repeat cardiac imaging at this time    Ron Quintanilla MD
I have seen, examined and discussed the patient with CINTHIA Balderas. I agree with above assessment and plan. Patient is now having BMs, she needs a daily bowel regimen moving forward which can be prn miralax daily, plus daily senna + colace.    Steve Mascorro MD  [facility]
I have seen, examined and discussed with CINTHIA Balderas. Patient's constipation being resolved with laxatives. Would continue these and transition to daily bowel regimen once constipation has resolved.    Steve Mascorro MD  [facility]

## 2020-11-12 NOTE — DISCHARGE NOTE NURSING/CASE MANAGEMENT/SOCIAL WORK - NSDCFUADDAPPT_GEN_ALL_CORE_FT
AMOL LUE in sling, can take sling off in 3-4 weeks  Active movement of fingers/wrist/elbow encouraged  Follow up with Dr. March or Dr. Corley  within 1-2 weeks following discharge, call office for appointment. [phone]

## 2020-11-12 NOTE — PROGRESS NOTE ADULT - SUBJECTIVE AND OBJECTIVE BOX
Patient is a 89y old  Female who presented with a chief complaint of Abdominal pain, acute Rib fracture (12 Nov 2020 10:37)      INTERVAL HPI/OVERNIGHT EVENTS:  BMs yesterday AM, then no further during 11/11  no abdominal or rectal pain  no nausea or vomiting      MEDICATIONS  (STANDING):  acetaminophen   Tablet .. 650 milliGRAM(s) Oral every 6 hours  acetaminophen   Tablet .. 650 milliGRAM(s) Oral once  apixaban 2.5 milliGRAM(s) Oral every 12 hours  bisacodyl Suppository 10 milliGRAM(s) Rectal <User Schedule>  chlorhexidine 2% Cloths 1 Application(s) Topical daily  influenza   Vaccine 0.5 milliLiter(s) IntraMuscular once  levothyroxine 50 MICROGram(s) Oral daily  lidocaine   Patch 1 Patch Transdermal every 24 hours  losartan 25 milliGRAM(s) Oral daily  polyethylene glycol 3350 17 Gram(s) Oral daily    MEDICATIONS  (PRN):  LORazepam   Injectable 0.5 milliGRAM(s) IV Push two times a day PRN Agitation  oxycodone    5 mG/acetaminophen 325 mG 0.5 Tablet(s) Oral every 4 hours PRN Severe Pain (7 - 10)      Allergies  penicillin (Rash)      Review of Systems:  General:  No wt loss, fevers, chills, night sweats  CV:  No pain, palpitations, +falls +AF  Resp:  No dyspnea, cough, tachypnea, wheezing +left sided rib/chest wall pain  GI:  +constipation +rectal pain, no rectal bleeding no abdominal pain, nausea or vomiting  :  No pain, bleeding, incontinence, nocturia  Muscle:  No pain, weakness +falls  Neuro:  No weakness, tingling, memory problems  Psych:  No fatigue, insomnia, mood problems, depression  Endocrine:  No polyuria, polydypsia, cold/heat intolerance  Heme:  No petechiae, ecchymosis, +easy bruisability  Skin:  No rash, tattoos, scars, edema +bruising      Vital Signs Last 24 Hrs  T(C): 37 (12 Nov 2020 07:38), Max: 37 (12 Nov 2020 07:38)  T(F): 98.6 (12 Nov 2020 07:38), Max: 98.6 (12 Nov 2020 07:38)  HR: 72 (12 Nov 2020 07:38) (72 - 81)  BP: 147/84 (12 Nov 2020 07:38) (146/88 - 152/89)  BP(mean): --  RR: 18 (12 Nov 2020 07:38) (18 - 18)  SpO2: 96% (12 Nov 2020 07:38) (96% - 98%)    PHYSICAL EXAM:  Constitutional:  frail appearing elderly WF pleasantly confused, alert and responsive  Neck: No LAD, supple no JVD  Respiratory: b/l air entry  Cardiovascular: S1 and S2, Regular  Gastrointestinal: BS+, soft, NT/ND, no rebound guarding or rigidity  Extremities: No peripheral edema, neg clubbing, +b/l healing ecchymoses   Vascular: 2+ peripheral pulses  Neurological: alert and responsive, forgetful. no focal asymmetry  Psychiatric: Normal mood, normal affect  Skin: anicteric, extensive areas of healing ecchymoses on b/l arms and legs. moves all extremities    LABS:                        12.7   5.59  )-----------( 606      ( 12 Nov 2020 07:39 )             39.8     11-12    141  |  101  |  23  ----------------------------<  85  3.4<L>   |  28  |  0.97    Ca    9.9      12 Nov 2020 07:39      RADIOLOGY & ADDITIONAL TESTS:

## 2020-11-12 NOTE — PROGRESS NOTE ADULT - ASSESSMENT
89 yr old female with mild dementia, HTN, HLD, PE (on a/c with eliquis) presents from home for abdominal pain and concern of no BM x 4-5 days. Imaging negative for SBO but does show large amount of stool in the rectal vault with minimal surrounding inflammatory changes.    Abdominal pain.   - CT notable w/ stool in rectal vault w/o inflammation; no signs of SBO; nontender on palpation  - continue bowel regimen miralax + senna bid.   - GI evaluation     Falls.   - Per patient, fall recently 2-3 days ago, presents with new Rib fracture, also here a week ago w/ fall and L clavicular and L 4th rib fracture; no signs of syncope/pre-syncope  - PT eval.     Closed fracture of one rib of right side, initial encounter.    - Noted on CT  - pain control: lidocaine patch, tylenol 650 q6h, oxycodone 2.5 mg q4h prn severe pain.   - Ortho evaluation    Constipation.   - As noted on CT  - enema and laxatives as above.     Right shoulder pain/ Clavicle fracture.   - PT  - pain control.   - ortho follow    Pulmonary emboli.  - Eliquis 2.5 mg bid  - unclear when PE was and if needs lifelong a/c vs how many months remaining.     Hypothyroidism.  - per son, pt on levothyroxine 75 mcg  - per chart review, levothyroxine reduced to 50 mcg on 10/16  - will c/w levothyroxine 50 mcg.     Confusion  - supportive care    UTI/ Fever  - off antibiotics  - BC, UC  - ID evaluation noted    Prophylactic measure.   - continue home a/c w/ Eliquis 2.5 mg bid.     PA rehab.    Art Carbajal MD pager [phone]

## 2020-11-12 NOTE — PROGRESS NOTE ADULT - SUBJECTIVE AND OBJECTIVE BOX
Patient is a 89y old  Female who presents with a chief complaint of Abdominal pain, acute Rib fracture (12 Nov 2020 10:59)      SUBJECTIVE / OVERNIGHT EVENTS: Comfortable without new complaints. More alert.   Review of Systems  chest pain no  palpitations no  sob no  nausea no  headache no    MEDICATIONS  (STANDING):  acetaminophen   Tablet .. 650 milliGRAM(s) Oral every 6 hours  acetaminophen   Tablet .. 650 milliGRAM(s) Oral once  apixaban 2.5 milliGRAM(s) Oral every 12 hours  bisacodyl Suppository 10 milliGRAM(s) Rectal <User Schedule>  chlorhexidine 2% Cloths 1 Application(s) Topical daily  influenza   Vaccine 0.5 milliLiter(s) IntraMuscular once  levothyroxine 50 MICROGram(s) Oral daily  lidocaine   Patch 1 Patch Transdermal every 24 hours  losartan 25 milliGRAM(s) Oral daily  polyethylene glycol 3350 17 Gram(s) Oral daily    MEDICATIONS  (PRN):  LORazepam   Injectable 0.5 milliGRAM(s) IV Push two times a day PRN Agitation  oxycodone    5 mG/acetaminophen 325 mG 0.5 Tablet(s) Oral every 4 hours PRN Severe Pain (7 - 10)      Vital Signs Last 24 Hrs  T(C): 36.5 (12 Nov 2020 16:49), Max: 37 (12 Nov 2020 07:38)  T(F): 97.7 (12 Nov 2020 16:49), Max: 98.6 (12 Nov 2020 07:38)  HR: 65 (12 Nov 2020 16:49) (65 - 81)  BP: 149/68 (12 Nov 2020 16:49) (147/84 - 152/89)  BP(mean): --  RR: 18 (12 Nov 2020 16:49) (18 - 18)  SpO2: 94% (12 Nov 2020 16:49) (94% - 96%)    PHYSICAL EXAM:  GENERAL: NAD, frail  HEAD:  Atraumatic, Normocephalic  EYES: EOMI, PERRLA, conjunctiva and sclera clear  NECK: Supple, No JVD  CHEST/LUNG: Clear to auscultation bilaterally; No wheeze  HEART: Regular rate and rhythm; No murmurs, rubs, or gallops  ABDOMEN: Soft, Nontender, Nondistended; Bowel sounds present  EXTREMITIES:  2+ Peripheral Pulses, No clubbing, cyanosis, or edema  PSYCH: AAOx2  NEUROLOGY: non-focal  SKIN: No rashes or lesions    LABS:                        12.7   5.59  )-----------( 606      ( 12 Nov 2020 07:39 )             39.8     11-12    141  |  101  |  23  ----------------------------<  85  3.4<L>   |  28  |  0.97    Ca    9.9      12 Nov 2020 07:39                  RADIOLOGY & ADDITIONAL TESTS:    Imaging Personally Reviewed:    Consultant(s) Notes Reviewed:      Care Discussed with Consultants/Other Providers:

## 2020-11-12 NOTE — DISCHARGE NOTE NURSING/CASE MANAGEMENT/SOCIAL WORK - PATIENT PORTAL LINK FT
You can access the FollowMyHealth Patient Portal offered by [facility] by registering at the following website: [email or web address]. By joining [facility]’s FollowMyHealth portal, you will also be able to view your health information using other applications (apps) compatible with our system.

## 2020-11-12 NOTE — PROGRESS NOTE ADULT - REASON FOR ADMISSION
Abdominal pain, acute Rib fracture

## 2020-11-13 LAB
CULTURE RESULTS: SIGNIFICANT CHANGE UP
SPECIMEN SOURCE: SIGNIFICANT CHANGE UP

## 2020-11-23 ENCOUNTER — APPOINTMENT (OUTPATIENT)
Dept: ORTHOPEDIC SURGERY | Facility: CLINIC | Age: 85
End: 2020-11-23

## 2020-12-30 NOTE — CONSULT NOTE ADULT - CONSULT REQUESTED DATE/TIME
Medicare Wellness Visit patient outreach outcome:  Attempted outreach and message left     Lynnette Andrew  [facility] Asst  [phone]   24-Jul-2017 14:00

## 2021-01-10 ENCOUNTER — INPATIENT (INPATIENT)
Facility: HOSPITAL | Age: 86
LOS: 3 days | Discharge: NURSING, REHAB OR LONG-TERM CARE | DRG: 689 | End: 2021-01-14
Attending: INTERNAL MEDICINE | Admitting: INTERNAL MEDICINE
Payer: MEDICARE

## 2021-01-10 VITALS
HEART RATE: 75 BPM | TEMPERATURE: 98 F | HEIGHT: 56 IN | OXYGEN SATURATION: 100 % | SYSTOLIC BLOOD PRESSURE: 160 MMHG | WEIGHT: 119.93 LBS | DIASTOLIC BLOOD PRESSURE: 91 MMHG | RESPIRATION RATE: 19 BRPM

## 2021-01-10 DIAGNOSIS — Z87.19 PERSONAL HISTORY OF OTHER DISEASES OF THE DIGESTIVE SYSTEM: Chronic | ICD-10-CM

## 2021-01-10 DIAGNOSIS — N39.0 URINARY TRACT INFECTION, SITE NOT SPECIFIED: ICD-10-CM

## 2021-01-10 LAB
ALBUMIN SERPL ELPH-MCNC: 3.9 G/DL — SIGNIFICANT CHANGE UP (ref 3.3–5)
ALP SERPL-CCNC: 82 U/L — SIGNIFICANT CHANGE UP (ref 40–120)
ALT FLD-CCNC: 14 U/L — SIGNIFICANT CHANGE UP (ref 10–45)
ANION GAP SERPL CALC-SCNC: 10 MMOL/L — SIGNIFICANT CHANGE UP (ref 5–17)
ANION GAP SERPL CALC-SCNC: 10 MMOL/L — SIGNIFICANT CHANGE UP (ref 5–17)
APPEARANCE UR: CLEAR — SIGNIFICANT CHANGE UP
APTT BLD: 28.1 SEC — SIGNIFICANT CHANGE UP (ref 27.5–35.5)
AST SERPL-CCNC: 44 U/L — HIGH (ref 10–40)
BACTERIA # UR AUTO: ABNORMAL
BASE EXCESS BLDV CALC-SCNC: 4.2 MMOL/L — HIGH (ref -2–2)
BASOPHILS # BLD AUTO: 0.05 K/UL — SIGNIFICANT CHANGE UP (ref 0–0.2)
BASOPHILS NFR BLD AUTO: 0.9 % — SIGNIFICANT CHANGE UP (ref 0–2)
BILIRUB SERPL-MCNC: 0.6 MG/DL — SIGNIFICANT CHANGE UP (ref 0.2–1.2)
BILIRUB UR-MCNC: NEGATIVE — SIGNIFICANT CHANGE UP
BUN SERPL-MCNC: 24 MG/DL — HIGH (ref 7–23)
BUN SERPL-MCNC: 25 MG/DL — HIGH (ref 7–23)
CA-I SERPL-SCNC: 1.29 MMOL/L — SIGNIFICANT CHANGE UP (ref 1.12–1.3)
CALCIUM SERPL-MCNC: 9.7 MG/DL — SIGNIFICANT CHANGE UP (ref 8.4–10.5)
CALCIUM SERPL-MCNC: 9.9 MG/DL — SIGNIFICANT CHANGE UP (ref 8.4–10.5)
CHLORIDE BLDV-SCNC: 107 MMOL/L — SIGNIFICANT CHANGE UP (ref 96–108)
CHLORIDE SERPL-SCNC: 105 MMOL/L — SIGNIFICANT CHANGE UP (ref 96–108)
CHLORIDE SERPL-SCNC: 106 MMOL/L — SIGNIFICANT CHANGE UP (ref 96–108)
CO2 BLDV-SCNC: 32 MMOL/L — HIGH (ref 22–30)
CO2 SERPL-SCNC: 22 MMOL/L — SIGNIFICANT CHANGE UP (ref 22–31)
CO2 SERPL-SCNC: 26 MMOL/L — SIGNIFICANT CHANGE UP (ref 22–31)
COLOR SPEC: YELLOW — SIGNIFICANT CHANGE UP
CREAT SERPL-MCNC: 0.94 MG/DL — SIGNIFICANT CHANGE UP (ref 0.5–1.3)
CREAT SERPL-MCNC: 1.01 MG/DL — SIGNIFICANT CHANGE UP (ref 0.5–1.3)
DIFF PNL FLD: ABNORMAL
EOSINOPHIL # BLD AUTO: 0.12 K/UL — SIGNIFICANT CHANGE UP (ref 0–0.5)
EOSINOPHIL NFR BLD AUTO: 2.1 % — SIGNIFICANT CHANGE UP (ref 0–6)
EPI CELLS # UR: 0 /HPF — SIGNIFICANT CHANGE UP
GAS PNL BLDV: 141 MMOL/L — SIGNIFICANT CHANGE UP (ref 135–145)
GAS PNL BLDV: SIGNIFICANT CHANGE UP
GLUCOSE BLDV-MCNC: 105 MG/DL — HIGH (ref 70–99)
GLUCOSE SERPL-MCNC: 107 MG/DL — HIGH (ref 70–99)
GLUCOSE SERPL-MCNC: 116 MG/DL — HIGH (ref 70–99)
GLUCOSE UR QL: NEGATIVE — SIGNIFICANT CHANGE UP
HCO3 BLDV-SCNC: 31 MMOL/L — HIGH (ref 21–29)
HCT VFR BLD CALC: 39.8 % — SIGNIFICANT CHANGE UP (ref 34.5–45)
HCT VFR BLDA CALC: 40 % — SIGNIFICANT CHANGE UP (ref 39–50)
HGB BLD CALC-MCNC: 12.9 G/DL — SIGNIFICANT CHANGE UP (ref 11.5–15.5)
HGB BLD-MCNC: 12.9 G/DL — SIGNIFICANT CHANGE UP (ref 11.5–15.5)
HYALINE CASTS # UR AUTO: 12 /LPF — HIGH (ref 0–2)
IMM GRANULOCYTES NFR BLD AUTO: 0.2 % — SIGNIFICANT CHANGE UP (ref 0–1.5)
INR BLD: 1.11 RATIO — SIGNIFICANT CHANGE UP (ref 0.88–1.16)
KETONES UR-MCNC: SIGNIFICANT CHANGE UP
LACTATE BLDV-MCNC: 1.3 MMOL/L — SIGNIFICANT CHANGE UP (ref 0.7–2)
LEUKOCYTE ESTERASE UR-ACNC: ABNORMAL
LYMPHOCYTES # BLD AUTO: 1.91 K/UL — SIGNIFICANT CHANGE UP (ref 1–3.3)
LYMPHOCYTES # BLD AUTO: 34 % — SIGNIFICANT CHANGE UP (ref 13–44)
MCHC RBC-ENTMCNC: 31.7 PG — SIGNIFICANT CHANGE UP (ref 27–34)
MCHC RBC-ENTMCNC: 32.4 GM/DL — SIGNIFICANT CHANGE UP (ref 32–36)
MCV RBC AUTO: 97.8 FL — SIGNIFICANT CHANGE UP (ref 80–100)
MONOCYTES # BLD AUTO: 0.65 K/UL — SIGNIFICANT CHANGE UP (ref 0–0.9)
MONOCYTES NFR BLD AUTO: 11.6 % — SIGNIFICANT CHANGE UP (ref 2–14)
NEUTROPHILS # BLD AUTO: 2.87 K/UL — SIGNIFICANT CHANGE UP (ref 1.8–7.4)
NEUTROPHILS NFR BLD AUTO: 51.2 % — SIGNIFICANT CHANGE UP (ref 43–77)
NITRITE UR-MCNC: POSITIVE
NRBC # BLD: 0 /100 WBCS — SIGNIFICANT CHANGE UP (ref 0–0)
PCO2 BLDV: 57 MMHG — HIGH (ref 35–50)
PH BLDV: 7.35 — SIGNIFICANT CHANGE UP (ref 7.35–7.45)
PH UR: 6.5 — SIGNIFICANT CHANGE UP (ref 5–8)
PLATELET # BLD AUTO: 318 K/UL — SIGNIFICANT CHANGE UP (ref 150–400)
PO2 BLDV: 20 MMHG — LOW (ref 25–45)
POTASSIUM BLDV-SCNC: 4.3 MMOL/L — SIGNIFICANT CHANGE UP (ref 3.5–5.3)
POTASSIUM SERPL-MCNC: 4.6 MMOL/L — SIGNIFICANT CHANGE UP (ref 3.5–5.3)
POTASSIUM SERPL-MCNC: 7.7 MMOL/L — CRITICAL HIGH (ref 3.5–5.3)
POTASSIUM SERPL-SCNC: 4.6 MMOL/L — SIGNIFICANT CHANGE UP (ref 3.5–5.3)
POTASSIUM SERPL-SCNC: 7.7 MMOL/L — CRITICAL HIGH (ref 3.5–5.3)
PROT SERPL-MCNC: 6.7 G/DL — SIGNIFICANT CHANGE UP (ref 6–8.3)
PROT UR-MCNC: ABNORMAL
PROTHROM AB SERPL-ACNC: 13.3 SEC — SIGNIFICANT CHANGE UP (ref 10.6–13.6)
RBC # BLD: 4.07 M/UL — SIGNIFICANT CHANGE UP (ref 3.8–5.2)
RBC # FLD: 14.5 % — SIGNIFICANT CHANGE UP (ref 10.3–14.5)
RBC CASTS # UR COMP ASSIST: 9 /HPF — HIGH (ref 0–4)
SAO2 % BLDV: 24 % — LOW (ref 67–88)
SODIUM SERPL-SCNC: 137 MMOL/L — SIGNIFICANT CHANGE UP (ref 135–145)
SODIUM SERPL-SCNC: 142 MMOL/L — SIGNIFICANT CHANGE UP (ref 135–145)
SP GR SPEC: 1.03 — HIGH (ref 1.01–1.02)
TROPONIN T, HIGH SENSITIVITY RESULT: 16 NG/L — SIGNIFICANT CHANGE UP (ref 0–51)
UROBILINOGEN FLD QL: ABNORMAL
WBC # BLD: 5.61 K/UL — SIGNIFICANT CHANGE UP (ref 3.8–10.5)
WBC # FLD AUTO: 5.61 K/UL — SIGNIFICANT CHANGE UP (ref 3.8–10.5)
WBC UR QL: 53 /HPF — HIGH (ref 0–5)

## 2021-01-10 PROCEDURE — 70496 CT ANGIOGRAPHY HEAD: CPT | Mod: 26

## 2021-01-10 PROCEDURE — 70450 CT HEAD/BRAIN W/O DYE: CPT | Mod: 26,59

## 2021-01-10 PROCEDURE — 70498 CT ANGIOGRAPHY NECK: CPT | Mod: 26

## 2021-01-10 PROCEDURE — 99285 EMERGENCY DEPT VISIT HI MDM: CPT

## 2021-01-10 PROCEDURE — 71045 X-RAY EXAM CHEST 1 VIEW: CPT | Mod: 26

## 2021-01-10 RX ORDER — LEVOTHYROXINE SODIUM 125 MCG
75 TABLET ORAL DAILY
Refills: 0 | Status: DISCONTINUED | OUTPATIENT
Start: 2021-01-10 | End: 2021-01-14

## 2021-01-10 RX ORDER — CEFTRIAXONE 500 MG/1
1000 INJECTION, POWDER, FOR SOLUTION INTRAMUSCULAR; INTRAVENOUS ONCE
Refills: 0 | Status: DISCONTINUED | OUTPATIENT
Start: 2021-01-10 | End: 2021-01-10

## 2021-01-10 RX ORDER — ACETAMINOPHEN 500 MG
650 TABLET ORAL EVERY 6 HOURS
Refills: 0 | Status: DISCONTINUED | OUTPATIENT
Start: 2021-01-10 | End: 2021-01-14

## 2021-01-10 RX ORDER — POLYETHYLENE GLYCOL 3350 17 G/17G
17 POWDER, FOR SOLUTION ORAL DAILY
Refills: 0 | Status: DISCONTINUED | OUTPATIENT
Start: 2021-01-10 | End: 2021-01-14

## 2021-01-10 RX ORDER — BUDESONIDE, MICRONIZED 100 %
0.5 POWDER (GRAM) MISCELLANEOUS
Refills: 0 | Status: DISCONTINUED | OUTPATIENT
Start: 2021-01-10 | End: 2021-01-14

## 2021-01-10 RX ORDER — CEFTRIAXONE 500 MG/1
1000 INJECTION, POWDER, FOR SOLUTION INTRAMUSCULAR; INTRAVENOUS EVERY 24 HOURS
Refills: 0 | Status: COMPLETED | OUTPATIENT
Start: 2021-01-10 | End: 2021-01-13

## 2021-01-10 RX ORDER — LOSARTAN POTASSIUM 100 MG/1
25 TABLET, FILM COATED ORAL DAILY
Refills: 0 | Status: DISCONTINUED | OUTPATIENT
Start: 2021-01-10 | End: 2021-01-13

## 2021-01-10 RX ORDER — PANTOPRAZOLE SODIUM 20 MG/1
40 TABLET, DELAYED RELEASE ORAL
Refills: 0 | Status: DISCONTINUED | OUTPATIENT
Start: 2021-01-10 | End: 2021-01-14

## 2021-01-10 RX ORDER — SODIUM CHLORIDE 9 MG/ML
500 INJECTION INTRAMUSCULAR; INTRAVENOUS; SUBCUTANEOUS ONCE
Refills: 0 | Status: COMPLETED | OUTPATIENT
Start: 2021-01-10 | End: 2021-01-10

## 2021-01-10 RX ORDER — CEFTRIAXONE 500 MG/1
1000 INJECTION, POWDER, FOR SOLUTION INTRAMUSCULAR; INTRAVENOUS ONCE
Refills: 0 | Status: COMPLETED | OUTPATIENT
Start: 2021-01-10 | End: 2021-01-10

## 2021-01-10 RX ORDER — FLUTICASONE PROPIONATE 50 MCG
1 SPRAY, SUSPENSION NASAL
Refills: 0 | Status: DISCONTINUED | OUTPATIENT
Start: 2021-01-10 | End: 2021-01-14

## 2021-01-10 RX ORDER — SENNA PLUS 8.6 MG/1
2 TABLET ORAL AT BEDTIME
Refills: 0 | Status: DISCONTINUED | OUTPATIENT
Start: 2021-01-10 | End: 2021-01-14

## 2021-01-10 RX ORDER — APIXABAN 2.5 MG/1
2.5 TABLET, FILM COATED ORAL EVERY 12 HOURS
Refills: 0 | Status: DISCONTINUED | OUTPATIENT
Start: 2021-01-10 | End: 2021-01-14

## 2021-01-10 RX ORDER — BUDESONIDE, MICRONIZED 100 %
1 POWDER (GRAM) MISCELLANEOUS
Qty: 1 | Refills: 0

## 2021-01-10 RX ADMIN — SENNA PLUS 2 TABLET(S): 8.6 TABLET ORAL at 23:10

## 2021-01-10 RX ADMIN — SODIUM CHLORIDE 500 MILLILITER(S): 9 INJECTION INTRAMUSCULAR; INTRAVENOUS; SUBCUTANEOUS at 18:23

## 2021-01-10 RX ADMIN — APIXABAN 2.5 MILLIGRAM(S): 2.5 TABLET, FILM COATED ORAL at 23:10

## 2021-01-10 RX ADMIN — CEFTRIAXONE 100 MILLIGRAM(S): 500 INJECTION, POWDER, FOR SOLUTION INTRAMUSCULAR; INTRAVENOUS at 18:36

## 2021-01-10 NOTE — ED PROVIDER NOTE - PROGRESS NOTE DETAILS
Attending Jackelyn Jimenes: pt found to have a UTI,d/w neuro. recommend PT eval. will start abx, tba

## 2021-01-10 NOTE — ED ADULT NURSE REASSESSMENT NOTE - NS ED NURSE REASSESS COMMENT FT1
Report received from SURESH Hillman. Pt AAOx2 disoriented to time, NAD, resp nonlabored, skin warm/dry, on CM, resting comfortably in bed. Pt awaiting bed assignment. Safety maintained.

## 2021-01-10 NOTE — ED PROVIDER NOTE - CLINICAL SUMMARY MEDICAL DECISION MAKING FREE TEXT BOX
pt presenting for slurred speech found to have RUE weakness compared to right. code stroke called. already on eliquis. CTH, CTA head and neck, infectious w/u, neurology consult and reassess. pt presenting for slurred speech found to have RUE weakness compared to right. code stroke called. already on eliquis. CTH, CTA head and neck, infectious w/u, neurology consult and reassess.  Attending Jackelyn Jimenes: 89 y/o female presenting with concern for speech changes, and rue weakness. code stroke code by triage. pt on blood thinners. upon arrival pt hemodynamicaly stable. ct head showed no evidence of hemorrhage. pt with mild upper extremity weakness. seen by neurology. pt found to have UTI and given abx which could be source of weakness. optimized for stroke protection based on current medicaitons

## 2021-01-10 NOTE — CONSULT NOTE ADULT - ASSESSMENT
89 y/o RH woman with PMHx pulmonary embolism (June '20), atrial fibrillation on Eliquis, hypothyroidism, OA, multiple falls with recent clavicular, scapular, and rib fractures presents from nursing home as code stroke for sudden onset slurred speech which has been intermittent since arrival to [facility], also found to have RUE weakness. LKN 1500 1/10/21, preMRS 5, NIHSS 6. Pt was not a candidate for tPA (Eliquis with abnormal coags) or mechanical thrombectomy (high MRS score). Neuro exam is significant for RUE with some effort against gravity. CT head demonstrated age-indeterminate L. cerebellar infarct, which would not explain pt's right sided symptoms.      Impression: RUE paresis and dysarthria secondary to likely small ischemic infarct in L. MCA territory (possibly internal capsule vs. basis pontis), mechanism most likely cardioembolic 2/2 atrial fibrillation    Recommendations:  -No further inpatient neurological workup as management would not change  -Continue Eliquis 2.5mg BID as there would be a low risk of hemorrhagic conversion of a small ischemic infarct, if present  -PT at nursing home  -Pt can follow up with Dr. Payam Husain as outpatient  [address], [place] [id number] [phone]    Plan discussed with Dr. Libman, Stroke Attending.    Lisa Hameed,   PGY-2  Neurology Resident

## 2021-01-10 NOTE — ED PROVIDER NOTE - NEUROLOGICAL, MLM
Alert and oriented x2, sensation intact. LE strength intact, RUE weaker compared to LUE. crainal nerves grossly intact.

## 2021-01-10 NOTE — PATIENT PROFILE ADULT - VISION (WITH CORRECTIVE LENSES IF THE PATIENT USUALLY WEARS THEM):
Patient states that she wears glasses though they are not present at bedside/Partially impaired: cannot see medication labels or newsprint, but can see obstacles in path, and the surrounding layout; can count fingers at arm's length

## 2021-01-10 NOTE — H&P ADULT - HISTORY OF PRESENT ILLNESS
89 y/o RH woman with PMHx PE (June '20), atrial fibrillation on Eliquis, hypothyroidism, OA, multiple falls s/p recent clavicular, scapular, and rib fractures presents from nursing home as code stroke for sudden onset slurred speech which has been intermittent since arrival to [facility]. Dysarthria was noted by nursing staff at home at approximately 1500 and pt was reported to be in her normal state of health immediately before this. Upon EMS arrival, they noted RUE weakness as well. Lower extremities symmetric and antigravity bilaterally. Slurred speech resolved in 10 minutes.     Pt denied lower extremity weakness, left upper extremity weakness, numbness. headache, vision changes, nausea, vomiting, abdominal pain, dysuria, dizziness, chest pain.    At baseline, pt requires assistance to walk 2 steps and now uses a wheelchair (used walker in Oct '20).

## 2021-01-10 NOTE — CONSULT NOTE ADULT - ATTENDING COMMENTS
code stroke code and neurology emergently assessed patient. dysarthria which improved. +UA likely toxic/metabolic to UTI.   - eliquis 2.5mg BID and statin therapy.  - treat infection  - PT/OT  Payam Husain MD  Vascular Neurology  Office: [phone]

## 2021-01-10 NOTE — H&P ADULT - NSHPSOCIALHISTORY_GEN_ALL_CORE
Social History:    Marital Status:  (   )    (   ) Single    (   )    ( x )   Occupation:   Lives with: (  ) alone  (  ) children   (  ) spouse   (  ) parents  ( x ) other    Substance Use (street drugs): ( x ) never used  (  ) other:  Tobacco Usage:  ( x  ) never smoked   (   ) former smoker   (   ) current smoker  (     ) pack years  (        ) last cigarette date  Alcohol Usage: denies    (     ) Advanced Directives: (     ) None    (      ) DNR    (     ) DNI    (     ) Health Care Proxy:

## 2021-01-10 NOTE — ED PROVIDER NOTE - OBJECTIVE STATEMENT
Attending Jackelyn Jimenes: 89 y/o female with multiple medical issues including h/o PE, afib on eloquis, dementia presenting after concern for slurred speech. pt was noticed by staff to have slurred speech at atpproximately 3pm. symptoms lasted approximately 10 minutes and then resolved. per ems when arrived aaox1 and then improved to aaox2. no fevers or chills. per report pt did fall a few weeks ago. upon arrival pt denies any complaints

## 2021-01-10 NOTE — ED ADULT TRIAGE NOTE - SPO2 (%)
100

## 2021-01-10 NOTE — H&P ADULT - NSHPLABSRESULTS_GEN_ALL_CORE
12.9   5.61  )-----------( 318      ( 10 Michael 2021 16:26 )             39.8       01-10    142  |  106  |  25<H>  ----------------------------<  107<H>  4.6   |  26  |  1.01    Ca    9.9      10 Michael 2021 17:23    TPro  6.7  /  Alb  3.9  /  TBili  0.6  /  DBili  x   /  AST  44<H>  /  ALT  14  /  AlkPhos  82  01-10              Urinalysis Basic - ( 10 Michael 2021 17:24 )    Color: Yellow / Appearance: Clear / S.032 / pH: x  Gluc: x / Ketone: Trace  / Bili: Negative / Urobili: 2 mg/dL   Blood: x / Protein: 30 mg/dL / Nitrite: Positive   Leuk Esterase: Large / RBC: 9 /hpf / WBC 53 /HPF   Sq Epi: x / Non Sq Epi: 0 /hpf / Bacteria: Many        PT/INR - ( 10 Michael 2021 16:26 )   PT: 13.3 sec;   INR: 1.11 ratio         PTT - ( 10 Michael 2021 16:26 )  PTT:28.1 sec    < from: CT Angio Neck w/ IV Cont (01.10.21 @ 17:53) >      IMPRESSION:  Mild (under 50%) focal stenosis at origin of left internal carotid artery.    Narrowing at the distal cervical segment of right internal carotid artery.    No intracranial hemodynamically significant stenosis    < end of copied text >    < from: Xray Chest 1 View AP/PA (01.10.21 @ 17:21) >    INTERPRETATION:  No interval change in the appearance of the lung since prior exam on 2020.  Multiple scattered bilateral granulomas as on prior exam.    < end of copied text >    EKG

## 2021-01-10 NOTE — ED PROVIDER NOTE - PHYSICAL EXAMINATION
Attending Jackelyn Jimenes: Gen: frail appearing, heent: atrauamtic, eomi, perrla, mmm, op pink, uvula midline, neck; nttp, no nuchal rigidity, chest: nttp, no crepitus, cv: rrr, no murmurs, lungs: ctab, abd: soft, nontender, nondistended, no peritoneal signs, no guarding, ext: wwp, neg homans, skin: no rash, neuro: awake and alert, following commands, speech clear, sensation and strength intact, RUE slightly weakner then left, moving all extremities

## 2021-01-10 NOTE — ED ADULT NURSE NOTE - NSIMPLEMENTINTERV_GEN_ALL_ED
Implemented All Fall Risk Interventions:  [place] to call system. Call bell, personal items and telephone within reach. Instruct patient to call for assistance. Room bathroom lighting operational. Non-slip footwear when patient is off stretcher. Physically safe environment: no spills, clutter or unnecessary equipment. Stretcher in lowest position, wheels locked, appropriate side rails in place. Provide visual cue, wrist band, yellow gown, etc. Monitor gait and stability. Monitor for mental status changes and reorient to person, place, and time. Review medications for side effects contributing to fall risk. Reinforce activity limits and safety measures with patient and family.

## 2021-01-10 NOTE — H&P ADULT - NSHPPHYSICALEXAM_GEN_ALL_CORE
PHYSICAL EXAMINATION:  Vital Signs Last 24 Hrs  T(C): 37.3 (10 Michael 2021 17:20), Max: 37.3 (10 Michael 2021 17:20)  T(F): 99.1 (10 Michael 2021 17:20), Max: 99.1 (10 Michael 2021 17:20)  HR: 70 (10 Michael 2021 16:40) (70 - 75)  BP: 177/77 (10 Michael 2021 16:40) (160/91 - 177/77)  BP(mean): --  RR: 19 (10 Michael 2021 16:40) (19 - 19)  SpO2: 98% (10 Michael 2021 16:40) (98% - 100%)  CAPILLARY BLOOD GLUCOSE  98 (10 Michael 2021 16:27)      POCT Blood Glucose.: 98 mg/dL (10 Michael 2021 16:15)      GENERAL: NAD  HEAD:  atraumatic, normocephalic  EYES: sclera anicteric  ENMT: mucous membranes moist  NECK: supple, No JVD  CHEST/LUNG: clear to auscultation bilaterally; no rales, rhonchi, or wheezing b/l  HEART: normal S1, S2  ABDOMEN: BS+, soft, ND, NT   EXTREMITIES:  pulses palpable; no clubbing, cyanosis, or edema b/l LEs Multiple ecchymosis.   NEURO: awake, alert, interactive; moves all extremities  SKIN: no rashes or lesions

## 2021-01-10 NOTE — ED ADULT NURSE NOTE - OBJECTIVE STATEMENT
89 y/o female PMH dementia, HTN, HLD, PE, afib on eliquis presents to ED from nursing home via EMS c/o R arm weakness and slurred speech at 3pm. Code stroke called upon arrival at 1614 and taken to CT. Per EMS, staff at home noted her to have slurred speech and R arm weakness at 3om that lasted 10 minutes and resolved. Pt is normally A&O x 2 at baseline, when EMS arrived she was A&O x 1. Pt is A&O x 2, disoriented to time. No reported fevers, chills, SOB, chest pain. Per EMS, pt has hx multiple falls - the last being a few weeks ago with bruising to R under eye noted. Pt states "I don't know why I'm here." Pt says "I feel weak in my right arm." Initially had some effort  against gravity to b/l legs - improved approx. an hour later with strong legs. Pt continues with weakness to R arm. Pt straight cathed for residual urine with approx. 50cc output. Tolerated procedure well. Skin warm, dry. Generalized bruising noted to b/l arms. Skin otherwise intact. Speech is clear. No facial droop noted. 18G IV placed in RAC. Safety and comfort provided.

## 2021-01-10 NOTE — ED PROVIDER NOTE - ATTENDING CONTRIBUTION TO CARE
Attending MD Jackelyn Jimenes:  I personally have seen and examined this patient.  Resident note reviewed and agree on plan of care and except where noted.  See HPI, PE, and MDM for details.

## 2021-01-11 LAB
ANION GAP SERPL CALC-SCNC: 12 MMOL/L — SIGNIFICANT CHANGE UP (ref 5–17)
BUN SERPL-MCNC: 18 MG/DL — SIGNIFICANT CHANGE UP (ref 7–23)
CALCIUM SERPL-MCNC: 9.9 MG/DL — SIGNIFICANT CHANGE UP (ref 8.4–10.5)
CHLORIDE SERPL-SCNC: 103 MMOL/L — SIGNIFICANT CHANGE UP (ref 96–108)
CO2 SERPL-SCNC: 25 MMOL/L — SIGNIFICANT CHANGE UP (ref 22–31)
CREAT SERPL-MCNC: 0.87 MG/DL — SIGNIFICANT CHANGE UP (ref 0.5–1.3)
GLUCOSE BLDC GLUCOMTR-MCNC: 100 MG/DL — HIGH (ref 70–99)
GLUCOSE SERPL-MCNC: 71 MG/DL — SIGNIFICANT CHANGE UP (ref 70–99)
HCT VFR BLD CALC: 38.3 % — SIGNIFICANT CHANGE UP (ref 34.5–45)
HGB BLD-MCNC: 12.2 G/DL — SIGNIFICANT CHANGE UP (ref 11.5–15.5)
MCHC RBC-ENTMCNC: 31.4 PG — SIGNIFICANT CHANGE UP (ref 27–34)
MCHC RBC-ENTMCNC: 31.9 GM/DL — LOW (ref 32–36)
MCV RBC AUTO: 98.7 FL — SIGNIFICANT CHANGE UP (ref 80–100)
NRBC # BLD: 0 /100 WBCS — SIGNIFICANT CHANGE UP (ref 0–0)
PLATELET # BLD AUTO: 304 K/UL — SIGNIFICANT CHANGE UP (ref 150–400)
POTASSIUM SERPL-MCNC: 3.7 MMOL/L — SIGNIFICANT CHANGE UP (ref 3.5–5.3)
POTASSIUM SERPL-SCNC: 3.7 MMOL/L — SIGNIFICANT CHANGE UP (ref 3.5–5.3)
RBC # BLD: 3.88 M/UL — SIGNIFICANT CHANGE UP (ref 3.8–5.2)
RBC # FLD: 14.3 % — SIGNIFICANT CHANGE UP (ref 10.3–14.5)
SARS-COV-2 RNA SPEC QL NAA+PROBE: SIGNIFICANT CHANGE UP
SODIUM SERPL-SCNC: 140 MMOL/L — SIGNIFICANT CHANGE UP (ref 135–145)
TSH SERPL-MCNC: 1.5 UIU/ML — SIGNIFICANT CHANGE UP (ref 0.27–4.2)
WBC # BLD: 5.48 K/UL — SIGNIFICANT CHANGE UP (ref 3.8–10.5)
WBC # FLD AUTO: 5.48 K/UL — SIGNIFICANT CHANGE UP (ref 3.8–10.5)

## 2021-01-11 PROCEDURE — 70450 CT HEAD/BRAIN W/O DYE: CPT | Mod: 26

## 2021-01-11 PROCEDURE — 93010 ELECTROCARDIOGRAM REPORT: CPT

## 2021-01-11 RX ORDER — LANOLIN ALCOHOL/MO/W.PET/CERES
3 CREAM (GRAM) TOPICAL ONCE
Refills: 0 | Status: COMPLETED | OUTPATIENT
Start: 2021-01-11 | End: 2021-01-12

## 2021-01-11 RX ORDER — HYDRALAZINE HCL 50 MG
5 TABLET ORAL ONCE
Refills: 0 | Status: COMPLETED | OUTPATIENT
Start: 2021-01-11 | End: 2021-01-11

## 2021-01-11 RX ORDER — HYDRALAZINE HCL 50 MG
5 TABLET ORAL ONCE
Refills: 0 | Status: DISCONTINUED | OUTPATIENT
Start: 2021-01-11 | End: 2021-01-14

## 2021-01-11 RX ADMIN — CEFTRIAXONE 100 MILLIGRAM(S): 500 INJECTION, POWDER, FOR SOLUTION INTRAMUSCULAR; INTRAVENOUS at 18:16

## 2021-01-11 RX ADMIN — Medication 0.5 MILLIGRAM(S): at 05:19

## 2021-01-11 RX ADMIN — SENNA PLUS 2 TABLET(S): 8.6 TABLET ORAL at 21:30

## 2021-01-11 RX ADMIN — LOSARTAN POTASSIUM 25 MILLIGRAM(S): 100 TABLET, FILM COATED ORAL at 00:36

## 2021-01-11 RX ADMIN — Medication 0.5 MILLIGRAM(S): at 18:19

## 2021-01-11 RX ADMIN — Medication 75 MICROGRAM(S): at 05:19

## 2021-01-11 RX ADMIN — PANTOPRAZOLE SODIUM 40 MILLIGRAM(S): 20 TABLET, DELAYED RELEASE ORAL at 05:19

## 2021-01-11 RX ADMIN — APIXABAN 2.5 MILLIGRAM(S): 2.5 TABLET, FILM COATED ORAL at 21:29

## 2021-01-11 RX ADMIN — APIXABAN 2.5 MILLIGRAM(S): 2.5 TABLET, FILM COATED ORAL at 11:55

## 2021-01-11 NOTE — RAPID RESPONSE TEAM SUMMARY - NSSITUATIONBACKGROUNDRRT_GEN_ALL_CORE
89 y/o RH woman with PMHx PE (June '20), atrial fibrillation on Eliquis, hypothyroidism, OA, multiple falls s/p recent clavicular, scapular, and rib fractures admitted for new small ischemic infarct in L. MCA    RRT called for slurred speech.  Nursing reports that pt was not slurring speech previously.  Upon evaluation pt AAO x 1 (name) and mild slurring noted.  Pupils equal and reacting and pt moving all extremities equally.  Primary team called neurology who recommended CTH but did not recommend calling code stroke.      Vitals 193/93 HR 73, RR 22 SpO2 97% on RA.    Pt was given 5mg IVP hydralazine and CTH ordered.

## 2021-01-11 NOTE — SWALLOW BEDSIDE ASSESSMENT ADULT - SLP PERTINENT HISTORY OF CURRENT PROBLEM
89 y/o RH woman with PMHx PE (June '20), atrial fibrillation on Eliquis, hypothyroidism, OA, multiple falls s/p recent clavicular, scapular, and rib fractures presents from nursing home as code stroke for sudden onset slurred speech which has been intermittent since arrival to [facility]. Dysarthria was noted by nursing staff at home at approximately 1500 and pt was reported to be in her normal state of health immediately before this. Upon EMS arrival, they noted RUE weakness as well. Lower extremities symmetric and antigravity bilaterally. Slurred speech resolved in 10 minutes.

## 2021-01-11 NOTE — SWALLOW BEDSIDE ASSESSMENT ADULT - SLP GENERAL OBSERVATIONS
Pt encountered in bed, awake, on 2L NC. A&Ox2-3, able to follow commands. Vocal quality WFL for age/gender.

## 2021-01-11 NOTE — CONSULT NOTE ADULT - ATTENDING COMMENTS
Patient seen and examined.  Agree with above.   Continue with lifelong ac if no contraindications for cva prevention  No further inpatient cardiac workup needed at this time.     Ron Quintanilla MD

## 2021-01-11 NOTE — PHYSICAL THERAPY INITIAL EVALUATION ADULT - PRECAUTIONS/LIMITATIONS, REHAB EVAL
CONTINUED.., also found to have RUE weakness. LKN 1500 1/10/21, preMRS 5, NIHSS 6. Pt was not a candidate for tPA (Eliquis with abnormal coags) or mechanical thrombectomy (high MRS score). Neuro exam is significant for RUE with some effort against gravity. CT head demonstrated age-indeterminate L. cerebellar infarct, which would not explain pt's right sided symptoms. CT: Comparison  11/5/2020, new left lateral cerebellum decreased attenuation, sulcal effacement, loss  gray-white distinction. Redemonstration lung loss, microvascular disease, age indeterminate lacunar infarcts, no large new hemorrhage or midline shift. If symptoms persist consider follow-up head CT or MR if no contraindications. CONTINUED.., also found to have RUE weakness. LKN 1500 1/10/21, preMRS 5, NIHSS 6. Pt was not a candidate for tPA (Eliquis with abnormal coags) or mechanical thrombectomy (high MRS score). Neuro exam is significant for RUE with some effort against gravity. CT head demonstrated age-indeterminate L. cerebellar infarct, which would not explain pt's right sided symptoms. CT: Comparison  11/5/2020, new left lateral cerebellum decreased attenuation, sulcal effacement, loss  gray-white distinction. Redemonstration lung loss, microvascular disease, age indeterminate lacunar infarcts, no large new hemorrhage or midline shift. If symptoms persist consider follow-up head CT or MR if no contraindications./fall precautions

## 2021-01-11 NOTE — SWALLOW BEDSIDE ASSESSMENT ADULT - COMMENTS
1/10 CT Brain: Comparison 11/5/2020, new left lateral cerebellum decreased attenuation, sulcal effacement, loss  gray-white distinction. Redemonstration lung loss, microvascular disease, age indeterminate lacunar infarcts, no large new hemorrhage or midline shift. If symptoms persist consider follow-up head CT or MR if no contraindications.    Pt known to this service, seen for MBS on 10/16/20 with recommendations for a regular diet with thin liquids via single sips.

## 2021-01-11 NOTE — CONSULT NOTE ADULT - SUBJECTIVE AND OBJECTIVE BOX
HPI:  89 y/o RH woman with PMHx PE (June '20), atrial fibrillation on Eliquis, hypothyroidism, OA, multiple falls s/p recent clavicular, scapular, and rib fractures presents from nursing home as code stroke for sudden onset slurred speech which has been intermittent since arrival to [facility]. Dysarthria was noted by nursing staff at home at approximately 1500 and pt was reported to be in her normal state of health immediately before this. Upon EMS arrival, they noted RUE weakness as well. Lower extremities symmetric and antigravity bilaterally. Slurred speech resolved in 10 minutes.     Pt denied lower extremity weakness, left upper extremity weakness, numbness. headache, vision changes, nausea, vomiting, abdominal pain, dysuria, dizziness, chest pain.    At baseline, pt requires assistance to walk 2 steps and now uses a wheelchair (used walker in Oct '20).     Of note, pt had right shoulder pain for which Neurology was consulted for in Oct 2020. However, on this visit, pt denies pain and attributes inability to lift up RUE due to weakness rather than pain.    LKN 1500 1/10/21, preMRS 5, NIHSS 6. CT head showed age indeterminate L. cerebellar infarct. Pt was not a candidate for tPA due to Eliquis use and abnormal coags. Thrombectomy was not considered due to high MRS score.    MEDICATIONS  (STANDING):  sodium chloride 0.9% Bolus 500 milliLiter(s) IV Bolus once    PAST MEDICAL & SURGICAL HISTORY:  Skin cancer  lower extremities  Hip fracture, Right hip- 1995  Osteoporosis  Chronic anal fissure  Anal spasm  Dyslipidemia  Hypertension  H/O: Hypothyroidism  H/O gastroesophageal reflux (GERD)  Anterior Cervical discectomy  Left eye Retinal tear repair  Cataracts bilaterally  Left elbow surgery secondary to injury  History of Tonsillectomy  History of  Hip surgery with hardware    FAMILY HISTORY:  No family history of cardiovascular disease      Allergies  penicillin (Rash)    SHx - No smoking, No ETOH, No drug abuse    Review of Systems:  CONSTITUTIONAL: No fevers, chills, night sweats  HEENT:  No visual loss, blurred vision, double vision.  No hearing loss, sneezing, congestion, runny nose or sore throat.  SKIN:  No rash or itching.  CARDIOVASCULAR:  No chest pain, chest pressure or chest discomfort. No palpitations or edema.  RESPIRATORY:  No shortness of breath, cough or sputum.  GASTROINTESTINAL:  No anorexia, nausea, vomiting or diarrhea. No abdominal pain.  GENITOURINARY:  No dysuria. No increased frequency. No retention. No incontinence.  NEUROLOGICAL:  See HPI  MUSCULOSKELETAL:  No muscle, back pain, joint pain or stiffness.  HEMATOLOGIC:  No anemia, bleeding or bruising.    Vital Signs Last 24 Hrs  T(C): 37.3 (10 Michael 2021 17:20), Max: 37.3 (10 Michael 2021 17:20)  T(F): 99.1 (10 Michael 2021 17:20), Max: 99.1 (10 Michael 2021 17:20)  HR: 70 (10 Michael 2021 16:40) (70 - 75)  BP: 177/77 (10 Michael 2021 16:40) (160/91 - 177/77)  BP(mean): --  RR: 19 (10 Michael 2021 16:40) (19 - 19)  SpO2: 98% (10 Michael 2021 16:40) (98% - 100%)    PHYSICAL EXAM:  GENERAL: No acute distress  HEENT: Normocephalic; conjunctivae and sclerae clear; moist mucous membranes  NECK: Supple  EXTREMITIES: No cyanosis; no edema; no calf tenderness  SKIN: Warm and dry; diffuse bruising, ecchymoses     Neurological Exam:  - Mental Status: Alert and oriented to person, place (knew hospital and name), month, and year (2020, then said 2021); speech is mildly dysarthric, fluent with intact naming, repetition, and comprehension  - Cranial Nerves II-XII:    II:  cataracts b/l; visual fields are full to confrontation  III, IV, VI:  EOMI, no nystagmus  V:  facial sensation is intact in the V1-V3 distribution bilaterally.  VII:  face is symmetric with normal eye closure and smile  VIII:  hearing is intact to finger rub  IX, X:  uvula is midline and soft palate rises symmetrically  XI:  head turning and shoulder shrug are intact bilaterally  XII:  tongue protrudes in the midline  - Motor: some effort against gravity for RUE, at least 4/5 for LUE, antigravity in RLE and LLE; normal muscle bulk and tone throughout   - Reflexes:  2+ and symmetric at the biceps, triceps, brachioradialis, knees, and ankles;  plantar reflexes downgoing bilaterally  - Sensory:  intact to light touch and symmetric throughout  - Coordination: difficult to assess dysmetria as pt has a prominent kinetic tremor, possible dysmetria on left with FTN  - Gait:  normal steps, base, arm swing, and turning; heel and toe walking are normal; tandem gait is normal; Romberg testing is negative    Other:    01-10    142  |  106  |  25<H>  ----------------------------<  107<H>  4.6   |  26  |  1.01    Ca    9.9      10 Michael 2021 17:23    TPro  6.7  /  Alb  3.9  /  TBili  0.6  /  DBili  x   /  AST  44<H>  /  ALT  14  /  AlkPhos  82  01-10                            12.9   5.61  )-----------( 318      ( 10 Michael 2021 16:26 )             39.8       Radiology  CT Brain Stroke Protocol (01.10.21 @ 16:32) >  IMPRESSION:  Comparison  11/5/2020, new left lateral cerebellum decreased attenuation, sulcal effacement, loss  gray-white distinction. Redemonstration lung loss, microvascular disease, age indeterminate lacunar infarcts, no large new hemorrhage or midline shift. If symptoms persist consider follow-up head CT or MR if no contraindications.             
HISTORY OF PRESENT ILLNESS: HPI:  Patient is a 89 y/o Female well known to our office (Cardiologist - Dr. Mancera) with PMH of hypertension, hyperlipidemia, hypothyroidism, unexplained syncope s/p ILR which detected Afib now on Eliquis as well as for history of PE. She is now admitted with slurred speech and RUE weakness s/p code stroke with likely small infarct per neuro. Patient also being treated for UTI. Cardiology following for management of afib. Patient comfortable in no distress. She has no complaints besides being tired. Denies chest pain, SOB, palpitations, syncope, abd pain, n/v, back pain, fever/chills.    PAST MEDICAL & SURGICAL HISTORY:  Skin cancer  lower extremities    Hip fracture  Right hip- 1995    Osteoporosis    Chronic anal fissure    Anal spasm    mild Dementia    Dyslipidemia    Hypertension    H/O: Hypothyroidism    H/O gastroesophageal reflux (GERD)    Anterior Cervical discectomy    left eye Retinal tear repair    After-Cataract of Both Eyes    left Elbow surgey secondary to injury    History of Tonsillectomy    History of  Hip surgery with hardware      MEDICATIONS:  MEDICATIONS  (STANDING):  apixaban 2.5 milliGRAM(s) Oral every 12 hours  buDESOnide    Inhalation Suspension 0.5 milliGRAM(s) Inhalation two times a day  cefTRIAXone   IVPB 1000 milliGRAM(s) IV Intermittent every 24 hours  levothyroxine 75 MICROGram(s) Oral daily  losartan 25 milliGRAM(s) Oral daily  pantoprazole    Tablet 40 milliGRAM(s) Oral before breakfast  senna 2 Tablet(s) Oral at bedtime      Allergies    penicillin (Rash)    Intolerances        FAMILY HISTORY:  No family history of cardiovascular disease      Non-contributary for premature coronary disease or sudden cardiac death    SOCIAL HISTORY:    [x ] Non-smoker  [ ] Smoker  [ ] Alcohol    FLU VACCINE THIS YEAR STARTS IN AUGUST:  [ ] Yes    [ ] No    IF OVER 65 HAVE YOU EVER HAD A PNA VACCINE:  [ ] Yes    [ ] No       [ ] N/A      REVIEW OF SYSTEMS:  [ ]chest pain  [  ]shortness of breath  [  ]palpitations  [  ]syncope  [ ]near syncope [x ]upper extremity weakness   [ ] lower extremity weakness  [  ]diplopia  [  ]altered mental status   [  ]fevers  [ ]chills [ ]nausea  [ ]vomitting  [  ]dysphagia    [ ]abdominal pain  [ ]melena  [ ]BRBPR    [  ]epistaxis  [  ]rash    [ ]lower extremity edema    +slurred speech      [x ] All others negative	  [ ] Unable to obtain      LABS:	 	    CARDIAC MARKERS:  CARDIAC MARKERS ( 10 Michael 2021 17:23 )  x     / x     / 80 U/L / x     / 3.1 ng/mL                              12.2   5.48  )-----------( 304      ( 11 Jan 2021 07:13 )             38.3     Hb Trend: 12.2<--, 12.9<--    01-11    140  |  103  |  18  ----------------------------<  71  3.7   |  25  |  0.87    Ca    9.9      11 Jan 2021 07:12    TPro  6.7  /  Alb  3.9  /  TBili  0.6  /  DBili  x   /  AST  44<H>  /  ALT  14  /  AlkPhos  82  01-10    Creatinine Trend: 0.87<--, 1.01<--, 0.94<--    Coags:      proBNP:   Lipid Profile:   HgA1c:   TSH: Thyroid Stimulating Hormone, Serum: 1.50 uIU/mL (01-11 @ 09:58)          PHYSICAL EXAM:  T(C): 36.7 (01-11-21 @ 16:05), Max: 37.3 (01-10-21 @ 17:20)  HR: 80 (01-11-21 @ 16:05) (54 - 80)  BP: 177/74 (01-11-21 @ 16:05) (145/84 - 183/71)  RR: 18 (01-11-21 @ 16:05) (18 - 20)  SpO2: 97% (01-11-21 @ 16:05) (96% - 100%)  Wt(kg): --   BMI (kg/m2): 26.9 (01-10-21 @ 16:13)  I&O's Summary    10 Michael 2021 07:01  -  11 Jan 2021 07:00  --------------------------------------------------------  IN: 0 mL / OUT: 75 mL / NET: -75 mL    11 Jan 2021 07:01  -  11 Jan 2021 16:17  --------------------------------------------------------  IN: 300 mL / OUT: 200 mL / NET: 100 mL        Gen: Appears well in NAD  HEENT:  (-)icterus (-)pallor  CV: N S1 S2 1/6 KIN (+)2 Pulses B/l  Resp:  Clear to auscultation B/L, normal effort  GI: (+) BS Soft, NT, ND  Lymph:  (-)Edema, (-)obvious lymphadenopathy  Skin: Warm to touch, Normal turgor  Psych: Appropriate mood and affect    TELEMETRY: None	      ECG: Pending (ordered) 	    RADIOLOGY:         CXR: < from: Xray Chest 1 View AP/PA (01.10.21 @ 17:21) >  IMPRESSION:    Stable diffuse bilateral granulomas. No change when compared to previous study done November 17, 2020 at 7:14 PM.    < end of copied text >      ASSESSMENT/PLAN: Patient is a 89 y/o Female well known to our office (Cardiologist - Dr. Mancera) with PMH of hypertension, hyperlipidemia, hypothyroidism, unexplained syncope s/p ILR which detected Afib now on Eliquis as well as for history of PE. She is now admitted with slurred speech and RUE weakness s/p code stroke with likely small infarct per neuro. Patient also being treated for UTI. Cardiology following for management of afib.    - No evidence of clinical HF or anginal symptoms  - Continue AC with Eliquis for CVA prevention in setting of afib (cleared by neuro)  - Neuro follow up  - Abx per ID - f/u BCx  - Patient to f/u with Dr. Mancera after d/c    IRINEO RealC  Pager: [phone]  
HPI:   Patient is a 89y female admitted yesterday from nursing home as a code stroke, ct shows possible cerebellar ischemia but would not explain stroke symptoms. Its thought she had a small left sided infecrct from afib.  She has pyuria and urinary frequency raising concern for a uti . She has not had fever or chills. She cannot give a coherent history. She does have a fib. No n,v,d,sob,cp, ha    REVIEW OF SYSTEMS:  All other review of systems negative (Comprehensive ROS)    PAST MEDICAL & SURGICAL HISTORY:  Skin cancer  lower extremities    Hip fracture  Right hip-     Osteoporosis    Chronic anal fissure    Anal spasm    mild Dementia    Dyslipidemia    Hypertension    H/O: Hypothyroidism    H/O gastroesophageal reflux (GERD)    Anterior Cervical discectomy    left eye Retinal tear repair    After-Cataract of Both Eyes    left Elbow surgey secondary to injury    History of Tonsillectomy    History of  Hip surgery with hardware        Allergies    penicillin (Rash)    Intolerances        Antimicrobials Day #  :2  cefTRIAXone   IVPB 1000 milliGRAM(s) IV Intermittent every 24 hours    Other Medications:  acetaminophen   Tablet .. 650 milliGRAM(s) Oral every 6 hours PRN  apixaban 2.5 milliGRAM(s) Oral every 12 hours  bisacodyl Suppository 10 milliGRAM(s) Rectal daily PRN  buDESOnide    Inhalation Suspension 0.5 milliGRAM(s) Inhalation two times a day  fluticasone propionate 50 MICROgram(s)/spray Nasal Spray 1 Spray(s) Both Nostrils two times a day PRN  levothyroxine 75 MICROGram(s) Oral daily  losartan 25 milliGRAM(s) Oral daily  pantoprazole    Tablet 40 milliGRAM(s) Oral before breakfast  polyethylene glycol 3350 17 Gram(s) Oral daily PRN  senna 2 Tablet(s) Oral at bedtime      FAMILY HISTORY:  No family history of cardiovascular disease        SOCIAL HISTORY:  Smoking: [ ]Yes x[ ]No  ETOH: [ ]Yes [ ]xNo  Drug Use: [ ]Yes [ ]xNo   [x ] Single[ ]    T(F): 97.7 (21 @ 09:40), Max: 99.1 (01-10-21 @ 17:20)  HR: 64 (21 @ 09:40)  BP: 164/74 (21 @ 09:40)  RR: 20 (21 @ 09:40)  SpO2: 99% (21 @ 09:40)  Wt(kg): --    PHYSICAL EXAM:  General: alert, no acute distress  Eyes:  anicteric, no conjunctival injection, no discharge  Oropharynx: no lesions or injection 	  Neck: supple, without adenopathy  Lungs: clear to auscultation  Heart: regular rate and rhythm; no murmur, rubs or gallops  Abdomen: soft, nondistended, nontender, without mass or organomegaly  Skin: no lesions  Extremities: no clubbing, cyanosis, or edema  Neurologic: alert, confused moves all extremities    LAB RESULTS:                        12.2   5.48  )-----------( 304      ( 2021 07:13 )             38.3         140  |  103  |  18  ----------------------------<  71  3.7   |  25  |  0.87    Ca    9.9      2021 07:12    TPro  6.7  /  Alb  3.9  /  TBili  0.6  /  DBili  x   /  AST  44<H>  /  ALT  14  /  AlkPhos  82  10    LIVER FUNCTIONS - ( 10 Michael 2021 16:26 )  Alb: 3.9 g/dL / Pro: 6.7 g/dL / ALK PHOS: 82 U/L / ALT: 14 U/L / AST: 44 U/L / GGT: x           Urinalysis Basic - ( 10 Michael 2021 17:24 )    Color: Yellow / Appearance: Clear / S.032 / pH: x  Gluc: x / Ketone: Trace  / Bili: Negative / Urobili: 2 mg/dL   Blood: x / Protein: 30 mg/dL / Nitrite: Positive   Leuk Esterase: Large / RBC: 9 /hpf / WBC 53 /HPF   Sq Epi: x / Non Sq Epi: 0 /hpf / Bacteria: Many        MICROBIOLOGY:  RECENT CULTURES:        RADIOLOGY REVIEWED:  < from: CT Angio Neck w/ IV Cont (01.10.21 @ 17:53) >  EXAM:  CT ANGIO NECK (W)AW IC                          EXAM:  CT ANGIO BRAIN (W)AW IC                            PROCEDURE DATE:  01/10/2021            INTERPRETATION:  CLINICAL STATEMENT: Stroke code    TECHNIQUE: CTA of the head and neck performed with IV contrast.  3-D MIP imaging obtained. Approximately 90 cc of Omnipaque 350 administered.    COMPARISON: None.    FINDINGS:  CTA of the neck:  The common carotid and internal carotid arteries are patent. Calcified atherosclerotic plaques carotid bulbs noted. Mild focal stenosis origin of left internal carotid artery. Calcified atherosclerotic plaque distal cervical segment of right internal carotid artery resulting in narrowing.    The extracranial vertebral arteries are patent.    Heterogeneous thyroid gland with small calcifications. Degenerative changes noted. Anterior cervical fusion noted at C5-C6.    CTA Head:  Calcified atherosclerotic plaques noted in the cavernous and supraclinoid distal bilateral internal carotid arteries.    The proximal anterior, middle and posterior cerebral arteries are patent without hemodynamically significant stenosis.    The intracranial vertebral and basilar arteries are patent.    There is no intracranial aneurysm.        IMPRESSION:  Mild (under 50%) focal stenosis at origin of left internal carotid artery.    Narrowing at the distal cervical segment of right internal carotid artery.    No intracranial hemodynamically significant stenosis      < end of copied text >  < from: CT Brain Stroke Protocol (01.10.21 @ 16:32) >    EXAM:  CT BRAIN STROKE PROTOCOL                            PROCEDURE DATE:  01/10/2021            INTERPRETATION:  CLINICAL INDICATION: 89-year-old female, slurred speech, now resolved, stroke code    Technique: Noncontrast CT of the head was performed.    Multiple contiguous axial images were acquired from the skull base to the vertex without the administration of intravenous contrast. Coronal and sagittal reformations were made.    COMPARISON: Head CT, 2020    FINDINGS:  In comparison with 2020,  new loss  gray-white distinction left lateral cerebellum concerning for new ischemia compared to the prior study. Redemonstration of enlarged ventricles and sulci greater than expected for age consistent with volume loss with remote andage indeterminate lacunar infarcts in the centrum semiovale, cerebellum, julissa and midbrain. Unchanged atherosclerotic basilar calcification of the carotid siphons. No new intraparenchymal hematoma, mass, extra axial collection or midline shift. Basalcisterns remain patent.    Intact calvarium, absent lenses with cataract surgery, paranasal sinuses and mastoids are unremarkable, dental streak artifact limits  assessment, lucency along teeth, likely treated and ongoing dental disease.    IMPRESSION:    Comparison  2020, new left lateral cerebellum decreased attenuation, sulcal effacement, loss  gray-white distinction. Redemonstration lung loss, microvascular disease, age indeterminate lacunar infarcts, no large new hemorrhage or midline shift. If symptoms persist consider follow-up head CT or MR if no contraindications.    Findings discussed stroke neurology Dr. Ocampo at immediate time of review, 1/10/2021 at 4:51 PM.        Impression:  Patient with admission for code stroke, probably had a small cardioembolic event per neuro, has pyuria raising concern for uti. SHe does have some frequency for a few days so possible.     Recommendations:  Await urine culture  continue ceftriaxone  will reorder blood cx since had a stroke but now index of suspicion for endocarditis  check echo  check pvr  stroke per neuro

## 2021-01-11 NOTE — SWALLOW BEDSIDE ASSESSMENT ADULT - ASPIRATION PRECAUTIONS
Monitor for s/s aspiration/laryngeal penetration. If noted:  D/C p.o. intake, provide non-oral nutrition/hydration/meds, and contact this service @ [phone]/[id number]

## 2021-01-11 NOTE — PROVIDER CONTACT NOTE (OTHER) - ASSESSMENT
Pt bp elevated and pt having slurred and garbled speech. Pt also more restless and agitated than at any point in the shift. Pt speech also more slurred and garbled than had been throughout shift. Neuro check done, pupils reactive, right upper extremity weak when doing hand  and unable to raise RUE.  Unknown if pt is just not following commands at this time due to sundowning.  /74. Upon re-checking blood pressure it was 188/80.  RRT called for new acute onset of stroke. See RRT sheet for interventions. Pt bp elevated and pt having slurred and garbled speech. Pt also more restless and agitated than at any point in the shift. Pt speech also more slurred and garbled than had been throughout shift. Neuro check done, pupils reactive, right upper extremity weak when doing hand  and unable to raise RUE.  Unknown if pt is just not following commands at this time due to sundowning. Pt A&Ox2 at this time. /74. Upon re-checking blood pressure it was 188/80.  RRT called for new acute onset symptoms of stroke. See RRT sheet for interventions. Pt bp elevated and pt having slurred and garbled speech. Pt also more restless and agitated than at any point in the shift. Pt speech also more slurred and garbled than had been throughout shift. Neuro check done, pupils reactive, right upper extremity weak when doing hand  and unable to raise RUE.  Unknown if pt is just not following commands at this time due to sundowning. Pt A&Ox2 at this time. /74. Upon re-checking blood pressure it was 188/80.  RRT called for new acute onset symptoms of stroke. See RRT sheet for interventions. MOLST to also be clarified. Not signed by attending. As per NP Robyn VALENZUELA Abrudescu aware and will clarify MOLST, until that time pt FULL CODE

## 2021-01-11 NOTE — PROVIDER CONTACT NOTE (OTHER) - ACTION/TREATMENT ORDERED:
Pt went down for urgent head CT which showed no difference from previous scan. Team attributing acute onset of symptoms to UTI an sundowning. Will continue to monitor.

## 2021-01-11 NOTE — PHYSICAL THERAPY INITIAL EVALUATION ADULT - GENERAL OBSERVATIONS, REHAB EVAL
Pt recvd supine, appears restless- wiggling around bed, pt without c/o pain, +pulse ox +wound R lateral knee with dressing, A&Ox1

## 2021-01-11 NOTE — PROGRESS NOTE ADULT - ASSESSMENT
89 f with    Aphasia resolved/ Possible TIA vs small CVA  - AC with Eliquis  - Neurology evaluation  - neuro checks    HTN control    UTI  - antibiotic  - ID evaluation noted    Falls.   - Per patient, fall recently. Has Rib fracture, L clavicular and L 4th rib fracture; no signs of syncope/pre-syncope  - PT eval.     Constipation.   - bowel regimen    Pulmonary emboli.  - continue Eliquis 2.5 mg bid     Hypothyroidism.  - c/w levothyroxine 50 mcg.   - TSH noted    Dementia/ Confusion  - supportive care    Prophylactic measure.   - continue home a/c w/ Eliquis 2.5 mg bid.     DNR    d/w son Luke KING    Art Carbajal MD pager [phone]

## 2021-01-11 NOTE — PHYSICAL THERAPY INITIAL EVALUATION ADULT - PERTINENT HX OF CURRENT PROBLEM, REHAB EVAL
89 y/o RH woman with PMHx pulmonary embolism (June '20), atrial fibrillation on Eliquis, hypothyroidism, OA, multiple falls with recent clavicular, scapular, and rib fractures presents from nursing home as code stroke for sudden onset slurred speech which has been intermittent since arrival to [facility]...

## 2021-01-11 NOTE — CHART NOTE - NSCHARTNOTEFT_GEN_A_CORE
3 y/o RH woman with PMHx PE (June '20), atrial fibrillation on Eliquis, hypothyroidism, OA, multiple falls s/p recent clavicular, scapular, and rib fractures presented wit mental status charge, slurred speech and RUE weakness--found for new small ischemic infarct in L. MCA on   RRT called by primary RN concerns for recur slurred. Vitals 193/93 HR 73, RR 22 SpO2 97% on RA. Seen and examined pt at the bedside no apparent changes noted from primary assessment ( Aox1, mild slurring, pupils are equal and reactive; pos weakness RUE). Neuro called for f/u, repeat CTH negative:  No significant change from the prior study; No mass effect or hemorrhage. No apparent distress noted. The above discussed with Dr. Carbajal.

## 2021-01-11 NOTE — PHYSICAL THERAPY INITIAL EVALUATION ADULT - ADDITIONAL COMMENTS
per care coord note: Pt had been recently admission to [facility] then d/c to sub acute rehab@[facility].  Patient had been@[facility] since Thanksgiving. Prior to sub acute rehab, pt resides in a private 2 floor home in [place]. Patient has 24 hour HHA to assist patient with ADLs. Patient has a chair lift and was ambulating with a walker. Daughter  also states she is willing to make changes to patient's home to handicap accessiable.

## 2021-01-11 NOTE — PROGRESS NOTE ADULT - SUBJECTIVE AND OBJECTIVE BOX
Patient is a 89y old  Female who presents with a chief complaint of stroke (2021 13:53)      SUBJECTIVE / OVERNIGHT EVENTS: Comfortable   Review of Systems  chest pain no  palpitations no  sob no  nausea no  headache no    MEDICATIONS  (STANDING):  apixaban 2.5 milliGRAM(s) Oral every 12 hours  buDESOnide    Inhalation Suspension 0.5 milliGRAM(s) Inhalation two times a day  cefTRIAXone   IVPB 1000 milliGRAM(s) IV Intermittent every 24 hours  hydrALAZINE Injectable 5 milliGRAM(s) IV Push once  levothyroxine 75 MICROGram(s) Oral daily  losartan 25 milliGRAM(s) Oral daily  pantoprazole    Tablet 40 milliGRAM(s) Oral before breakfast  senna 2 Tablet(s) Oral at bedtime    MEDICATIONS  (PRN):  acetaminophen   Tablet .. 650 milliGRAM(s) Oral every 6 hours PRN Temp greater or equal to 38.5C (101.3F), Mild Pain (1 - 3)  bisacodyl Suppository 10 milliGRAM(s) Rectal daily PRN Constipation  fluticasone propionate 50 MICROgram(s)/spray Nasal Spray 1 Spray(s) Both Nostrils two times a day PRN nasal congestion  polyethylene glycol 3350 17 Gram(s) Oral daily PRN Constipation      Vital Signs Last 24 Hrs  T(C): 36.7 (2021 18:45), Max: 36.7 (10 Michael 2021 20:23)  T(F): 98 (2021 18:45), Max: 98 (10 Michael 2021 20:23)  HR: 101 (2021 18:45) (54 - 101)  BP: 165/87 (2021 18:45) (145/84 - 183/71)  BP(mean): --  RR: 20 (2021 18:45) (18 - 20)  SpO2: 96% (2021 18:45) (96% - 100%)    PHYSICAL EXAM:  GENERAL: NAD, well-developed  HEAD:  Atraumatic, Normocephalic  EYES: EOMI, PERRLA, conjunctiva and sclera clear  NECK: Supple, No JVD  CHEST/LUNG: Clear to auscultation bilaterally; No wheeze  HEART: Regular rate and rhythm; No murmurs, rubs, or gallops  ABDOMEN: Soft, Nontender, Nondistended; Bowel sounds present  EXTREMITIES:  2+ Peripheral Pulses, No clubbing, cyanosis, or edema  PSYCH confused  NEUROLOGY: non-focal  SKIN: No rashes or lesions    LABS:                        12.2   5.48  )-----------( 304      ( 2021 07:13 )             38.3         140  |  103  |  18  ----------------------------<  71  3.7   |  25  |  0.87    Ca    9.9      2021 07:12    TPro  6.7  /  Alb  3.9  /  TBili  0.6  /  DBili  x   /  AST  44<H>  /  ALT  14  /  AlkPhos  82  01-10    PT/INR - ( 10 Michael 2021 16:26 )   PT: 13.3 sec;   INR: 1.11 ratio         PTT - ( 10 Michael 2021 16:26 )  PTT:28.1 sec  CARDIAC MARKERS ( 10 Michael 2021 17:23 )  x     / x     / 80 U/L / x     / 3.1 ng/mL      Urinalysis Basic - ( 10 Michael 2021 17:24 )    Color: Yellow / Appearance: Clear / S.032 / pH: x  Gluc: x / Ketone: Trace  / Bili: Negative / Urobili: 2 mg/dL   Blood: x / Protein: 30 mg/dL / Nitrite: Positive   Leuk Esterase: Large / RBC: 9 /hpf / WBC 53 /HPF   Sq Epi: x / Non Sq Epi: 0 /hpf / Bacteria: Many          RADIOLOGY & ADDITIONAL TESTS:    Imaging Personally Reviewed:    Consultant(s) Notes Reviewed:      Care Discussed with Consultants/Other Providers:

## 2021-01-11 NOTE — SWALLOW BEDSIDE ASSESSMENT ADULT - SWALLOW EVAL: DIAGNOSIS
Pt is a 89 y/o woman arriving as a code stroke s/p sudden onset slurred speech at NH. Pt presents with an overtly functional oropharyngeal swallow. There is adequate oral prep/transfer. No overt signs of laryngeal penetration/aspiration observed. Pt denies difficulty chewing/swallowing

## 2021-01-12 LAB
ANION GAP SERPL CALC-SCNC: 11 MMOL/L — SIGNIFICANT CHANGE UP (ref 5–17)
BUN SERPL-MCNC: 17 MG/DL — SIGNIFICANT CHANGE UP (ref 7–23)
CALCIUM SERPL-MCNC: 9.1 MG/DL — SIGNIFICANT CHANGE UP (ref 8.4–10.5)
CHLORIDE SERPL-SCNC: 102 MMOL/L — SIGNIFICANT CHANGE UP (ref 96–108)
CHOLEST SERPL-MCNC: 223 MG/DL — HIGH
CO2 SERPL-SCNC: 26 MMOL/L — SIGNIFICANT CHANGE UP (ref 22–31)
CREAT SERPL-MCNC: 0.91 MG/DL — SIGNIFICANT CHANGE UP (ref 0.5–1.3)
GLUCOSE SERPL-MCNC: 90 MG/DL — SIGNIFICANT CHANGE UP (ref 70–99)
HCT VFR BLD CALC: 35.1 % — SIGNIFICANT CHANGE UP (ref 34.5–45)
HDLC SERPL-MCNC: 64 MG/DL — SIGNIFICANT CHANGE UP
HGB BLD-MCNC: 11.5 G/DL — SIGNIFICANT CHANGE UP (ref 11.5–15.5)
LIPID PNL WITH DIRECT LDL SERPL: 147 MG/DL — HIGH
MCHC RBC-ENTMCNC: 31.9 PG — SIGNIFICANT CHANGE UP (ref 27–34)
MCHC RBC-ENTMCNC: 32.8 GM/DL — SIGNIFICANT CHANGE UP (ref 32–36)
MCV RBC AUTO: 97.5 FL — SIGNIFICANT CHANGE UP (ref 80–100)
NON HDL CHOLESTEROL: 159 MG/DL — HIGH
NRBC # BLD: 0 /100 WBCS — SIGNIFICANT CHANGE UP (ref 0–0)
PLATELET # BLD AUTO: 303 K/UL — SIGNIFICANT CHANGE UP (ref 150–400)
POTASSIUM SERPL-MCNC: 3.8 MMOL/L — SIGNIFICANT CHANGE UP (ref 3.5–5.3)
POTASSIUM SERPL-SCNC: 3.8 MMOL/L — SIGNIFICANT CHANGE UP (ref 3.5–5.3)
RBC # BLD: 3.6 M/UL — LOW (ref 3.8–5.2)
RBC # FLD: 14.1 % — SIGNIFICANT CHANGE UP (ref 10.3–14.5)
SODIUM SERPL-SCNC: 139 MMOL/L — SIGNIFICANT CHANGE UP (ref 135–145)
TRIGL SERPL-MCNC: 57 MG/DL — SIGNIFICANT CHANGE UP
WBC # BLD: 6.95 K/UL — SIGNIFICANT CHANGE UP (ref 3.8–10.5)
WBC # FLD AUTO: 6.95 K/UL — SIGNIFICANT CHANGE UP (ref 3.8–10.5)

## 2021-01-12 RX ADMIN — LOSARTAN POTASSIUM 25 MILLIGRAM(S): 100 TABLET, FILM COATED ORAL at 05:54

## 2021-01-12 RX ADMIN — APIXABAN 2.5 MILLIGRAM(S): 2.5 TABLET, FILM COATED ORAL at 17:01

## 2021-01-12 RX ADMIN — PANTOPRAZOLE SODIUM 40 MILLIGRAM(S): 20 TABLET, DELAYED RELEASE ORAL at 05:54

## 2021-01-12 RX ADMIN — Medication 0.5 MILLIGRAM(S): at 17:04

## 2021-01-12 RX ADMIN — APIXABAN 2.5 MILLIGRAM(S): 2.5 TABLET, FILM COATED ORAL at 05:54

## 2021-01-12 RX ADMIN — Medication 3 MILLIGRAM(S): at 21:27

## 2021-01-12 RX ADMIN — Medication 75 MICROGRAM(S): at 05:54

## 2021-01-12 RX ADMIN — CEFTRIAXONE 100 MILLIGRAM(S): 500 INJECTION, POWDER, FOR SOLUTION INTRAMUSCULAR; INTRAVENOUS at 17:01

## 2021-01-12 RX ADMIN — Medication 0.5 MILLIGRAM(S): at 05:54

## 2021-01-12 NOTE — PROGRESS NOTE ADULT - SUBJECTIVE AND OBJECTIVE BOX
S: RRT events noted. Restless, uncooperative, no distress. unable to obtain ROS.    Review of Systems:   Constitutional: [ ] fevers, [ ] chills.   Skin: [ ] dry skin. [ ] rashes.  Psychiatric: [ ] depression, [ ] anxiety.   Gastrointestinal: [ ] BRBPR, [ ] melena.   Neurological: [ ] confusion. [ ] seizures. [ ] shuffling gait.   Ears,Nose,Mouth and Throat: [ ] ear pain [ ] sore throat.   Eyes: [ ] diplopia.   Respiratory: [ ] hemoptysis. [ ] shortness of breath  Cardiovascular: See HPI above  Hematologic/Lymphatic: [ ] anemia. [ ] painful nodes. [ ] prolonged bleeding.   Genitourinary: [ ] hematuria. [ ] flank pain.   Endocrine: [ ] significant change in weight. [ ] intolerance to heat and cold.     Review of systems [ ] otherwise negative, [x ] otherwise unable to obtain    FH: no family history of sudden cardiac death in first degree relatives    SH: [ ] tobacco, [ ] alcohol, [ ] drugs    acetaminophen   Tablet .. 650 milliGRAM(s) Oral every 6 hours PRN  apixaban 2.5 milliGRAM(s) Oral every 12 hours  bisacodyl Suppository 10 milliGRAM(s) Rectal daily PRN  buDESOnide    Inhalation Suspension 0.5 milliGRAM(s) Inhalation two times a day  cefTRIAXone   IVPB 1000 milliGRAM(s) IV Intermittent every 24 hours  fluticasone propionate 50 MICROgram(s)/spray Nasal Spray 1 Spray(s) Both Nostrils two times a day PRN  hydrALAZINE Injectable 5 milliGRAM(s) IV Push once  levothyroxine 75 MICROGram(s) Oral daily  losartan 25 milliGRAM(s) Oral daily  melatonin 3 milliGRAM(s) Oral once  pantoprazole    Tablet 40 milliGRAM(s) Oral before breakfast  polyethylene glycol 3350 17 Gram(s) Oral daily PRN  senna 2 Tablet(s) Oral at bedtime                            11.5   6.95  )-----------( 303      ( 12 Jan 2021 07:26 )             35.1       01-12    139  |  102  |  17  ----------------------------<  90  3.8   |  26  |  0.91    Ca    9.1      12 Jan 2021 07:35    TPro  6.7  /  Alb  3.9  /  TBili  0.6  /  DBili  x   /  AST  44<H>  /  ALT  14  /  AlkPhos  82  01-10      CARDIAC MARKERS ( 10 Michael 2021 17:23 )  x     / x     / 80 U/L / x     / 3.1 ng/mL        T(C): 36.4 (01-12-21 @ 12:44), Max: 37.5 (01-12-21 @ 01:03)  HR: 86 (01-12-21 @ 12:44) (77 - 108)  BP: 142/74 (01-12-21 @ 12:44) (111/60 - 177/74)  RR: 18 (01-12-21 @ 12:44) (16 - 20)  SpO2: 96% (01-12-21 @ 12:44) (94% - 99%)  Wt(kg): --    I&O's Summary    11 Jan 2021 07:01  -  12 Jan 2021 07:00  --------------------------------------------------------  IN: 590 mL / OUT: 200 mL / NET: 390 mL    12 Jan 2021 07:01  -  12 Jan 2021 14:26  --------------------------------------------------------  IN: 220 mL / OUT: 0 mL / NET: 220 mL        General: NAD  Head: Normocephalic and atraumatic.   Neck: No JVD. No bruits. Supple. Does not appear to be enlarged.   Cardiovascular: + S1,S2 ; RRR Soft systolic murmur at the left lower sternal border. No rubs noted.    Lungs: CTA b/l. No rhonchi, rales or wheezes.   Abdomen: + BS, soft. Non tender. Non distended. No rebound. No guarding.   Extremities: No clubbing/cyanosis/edema.   Neurologic: Unable to assess  Skin: Warm and moist. The patient's skin has normal elasticity and good skin turgor.   Psychiatric: Unable to assess  Musculoskeletal: Normal range of motion, normal strength    ASSESSMENT/PLAN: Patient is a 89 y/o Female well known to our office (Cardiologist - Dr. Mancera) with PMH of hypertension, hyperlipidemia, hypothyroidism, unexplained syncope s/p ILR which detected Afib now on Eliquis as well as for history of PE. She is now admitted with slurred speech and RUE weakness s/p code stroke with likely small infarct per neuro. Patient also being treated for UTI. Cardiology following for management of afib.    - No evidence of clinical HF or anginal symptoms  - Continue AC with Eliquis for CVA prevention in setting of afib (cleared by neuro)  - Neuro follow up - CT head unchanged  - Abx per ID - f/u BCx  - Can increase Losartan to 50mg daily for better BP control  - Patient to f/u with Dr. Mancera after d/c    Florentin Juarez PA-C  Pager: [phone]   S: RRT events noted. Restless, uncooperative, no distress. unable to obtain ROS.    Review of Systems:   Constitutional: [ ] fevers, [ ] chills.   Skin: [ ] dry skin. [ ] rashes.  Psychiatric: [ ] depression, [ ] anxiety.   Gastrointestinal: [ ] BRBPR, [ ] melena.   Neurological: [ ] confusion. [ ] seizures. [ ] shuffling gait.   Ears,Nose,Mouth and Throat: [ ] ear pain [ ] sore throat.   Eyes: [ ] diplopia.   Respiratory: [ ] hemoptysis. [ ] shortness of breath  Cardiovascular: See HPI above  Hematologic/Lymphatic: [ ] anemia. [ ] painful nodes. [ ] prolonged bleeding.   Genitourinary: [ ] hematuria. [ ] flank pain.   Endocrine: [ ] significant change in weight. [ ] intolerance to heat and cold.     Review of systems [ ] otherwise negative, [x ] otherwise unable to obtain    FH: no family history of sudden cardiac death in first degree relatives    SH: [ ] tobacco, [ ] alcohol, [ ] drugs    acetaminophen   Tablet .. 650 milliGRAM(s) Oral every 6 hours PRN  apixaban 2.5 milliGRAM(s) Oral every 12 hours  bisacodyl Suppository 10 milliGRAM(s) Rectal daily PRN  buDESOnide    Inhalation Suspension 0.5 milliGRAM(s) Inhalation two times a day  cefTRIAXone   IVPB 1000 milliGRAM(s) IV Intermittent every 24 hours  fluticasone propionate 50 MICROgram(s)/spray Nasal Spray 1 Spray(s) Both Nostrils two times a day PRN  hydrALAZINE Injectable 5 milliGRAM(s) IV Push once  levothyroxine 75 MICROGram(s) Oral daily  losartan 25 milliGRAM(s) Oral daily  melatonin 3 milliGRAM(s) Oral once  pantoprazole    Tablet 40 milliGRAM(s) Oral before breakfast  polyethylene glycol 3350 17 Gram(s) Oral daily PRN  senna 2 Tablet(s) Oral at bedtime                            11.5   6.95  )-----------( 303      ( 12 Jan 2021 07:26 )             35.1       01-12    139  |  102  |  17  ----------------------------<  90  3.8   |  26  |  0.91    Ca    9.1      12 Jan 2021 07:35    TPro  6.7  /  Alb  3.9  /  TBili  0.6  /  DBili  x   /  AST  44<H>  /  ALT  14  /  AlkPhos  82  01-10      CARDIAC MARKERS ( 10 Michael 2021 17:23 )  x     / x     / 80 U/L / x     / 3.1 ng/mL        T(C): 36.4 (01-12-21 @ 12:44), Max: 37.5 (01-12-21 @ 01:03)  HR: 86 (01-12-21 @ 12:44) (77 - 108)  BP: 142/74 (01-12-21 @ 12:44) (111/60 - 177/74)  RR: 18 (01-12-21 @ 12:44) (16 - 20)  SpO2: 96% (01-12-21 @ 12:44) (94% - 99%)  Wt(kg): --    I&O's Summary    11 Jan 2021 07:01  -  12 Jan 2021 07:00  --------------------------------------------------------  IN: 590 mL / OUT: 200 mL / NET: 390 mL    12 Jan 2021 07:01  -  12 Jan 2021 14:26  --------------------------------------------------------  IN: 220 mL / OUT: 0 mL / NET: 220 mL        General: NAD  Head: Normocephalic and atraumatic.   Neck: No JVD. No bruits. Supple. Does not appear to be enlarged.   Cardiovascular: + S1,S2 ; RRR Soft systolic murmur at the left lower sternal border. No rubs noted.    Lungs: CTA b/l. No rhonchi, rales or wheezes.   Abdomen: + BS, soft. Non tender. Non distended. No rebound. No guarding.   Extremities: No clubbing/cyanosis/edema.   Neurologic: Unable to assess  Skin: Warm and moist. The patient's skin has normal elasticity and good skin turgor.   Psychiatric: Unable to assess  Musculoskeletal: Normal range of motion, normal strength    ASSESSMENT/PLAN: Patient is a 89 y/o Female well known to our office (Cardiologist - Dr. Mancera) with PMH of hypertension, hyperlipidemia, hypothyroidism, unexplained syncope s/p ILR which detected Afib now on Eliquis as well as for history of PE. She is now admitted with slurred speech and RUE weakness s/p code stroke with likely small infarct per neuro. Patient also being treated for UTI. Cardiology following for management of afib.    - No evidence of clinical HF or anginal symptoms  - Continue AC with Eliquis for CVA prevention in setting of afib (cleared by neuro)  - Neuro follow up - CT head unchanged  - Abx per ID - f/u BCx  - Can increase Losartan to 50mg daily for better BP control if no permissive HTN recommended by neuro  - Patient to f/u with Dr. Mancera after d/c    Florentin Juarez PA-C  Pager: [phone]

## 2021-01-12 NOTE — PROGRESS NOTE ADULT - SUBJECTIVE AND OBJECTIVE BOX
KEL DALTON  Female  MRN-[id number]    Subjective: Overnight, RRT was called for dysarthria. Patient had systolic 193, for which hydralazine 5mg IV was given.  Repeat CTH was ordered and showed stable findings from prior CT.       VITAL SIGNS:  T(F): 97.8  HR: 78  BP: 111/60  RR: 18  SpO2: 94%    Exam:   Oriented to name and place. PERRLA. EOMI. No dysarthria. No facial asymmetry     LABS:                          11.5   6.95  )-----------( 303      ( 12 Jan 2021 07:26 )             35.1     01-12    139  |  102  |  17  ----------------------------<  90  3.8   |  26  |  0.91    Ca    9.1      12 Jan 2021 07:35    TPro  6.7  /  Alb  3.9  /  TBili  0.6  /  DBili  x   /  AST  44<H>  /  ALT  14  /  AlkPhos  82  01-10    PT/INR - ( 10 Michael 2021 16:26 )   PT: 13.3 sec;   INR: 1.11 ratio         PTT - ( 10 Michael 2021 16:26 )  PTT:28.1 sec    RADIOLOGY & ADDITIONAL STUDIES:    CT head 1/11/20: Motion degraded exam. No significant change from the prior study. No mass effect or hemorrhage.

## 2021-01-12 NOTE — PROGRESS NOTE ADULT - SUBJECTIVE AND OBJECTIVE BOX
CC: f/u for uti    Patient reports she is ok on phone with son  REVIEW OF SYSTEMS:  All other review of systems negative (Comprehensive ROS)    Antimicrobials Day #  :3  cefTRIAXone   IVPB 1000 milliGRAM(s) IV Intermittent every 24 hours    Other Medications Reviewed    T(F): 98.2 (01-12-21 @ 20:05), Max: 99.5 (01-12-21 @ 01:03)  HR: 87 (01-12-21 @ 20:05)  BP: 132/71 (01-12-21 @ 20:05)  RR: 18 (01-12-21 @ 20:05)  SpO2: 95% (01-12-21 @ 20:05)  Wt(kg): --    PHYSICAL EXAM:  General: alert, no acute distress  Eyes:  anicteric, no conjunctival injection, no discharge  Oropharynx: no lesions or injection 	  Neck: supple, without adenopathy  Lungs: clear to auscultation  Heart: regular rate and rhythm; no murmur, rubs or gallops  Abdomen: soft, nondistended, nontender, without mass or organomegaly  Skin: no lesions  Extremities: no clubbing, cyanosis, or edema  Neurologic: alert, , moves all extremities    LAB RESULTS:                        11.5   6.95  )-----------( 303      ( 12 Jan 2021 07:26 )             35.1     01-12    139  |  102  |  17  ----------------------------<  90  3.8   |  26  |  0.91    Ca    9.1      12 Jan 2021 07:35          MICROBIOLOGY:  RECENT CULTURES:  01-11 @ 19:36 .Blood Blood-Venous     No growth to date.      01-11 @ 19:34 .Blood Blood-Peripheral     No growth to date.      01-10 @ 21:56 .Urine Clean Catch (Midstream)     >100,000 CFU/ml Gram Negative Rods          RADIOLOGY REVIEWED:        < from: CT Head No Cont (01.11.21 @ 17:18) >  EXAM:  CT BRAIN                            PROCEDURE DATE:  01/11/2021            INTERPRETATION:  INDICATIONS:  r/o cva  . Slurred speech now resolved.    TECHNIQUE:  Serial axial images were obtained from the skull base to the vertex without intravenous contrast. Coronal and sagittal reformatted images were obtained.    COMPARISON EXAMINATION: 1/10/2021    FINDINGS: The study is degraded by motion artifact.  VENTRICLES AND SULCI:  Prominent in size compatible with cerebral volume loss  INTRA-AXIAL:  An area of lucency is again seen within the left cerebellum compatible with an area of infarction, unchanged in appearance. There is no evidence of hemorrhagic transformation. Patchy areas of white matter hypodensity are seen compatible with mild to moderate microvascular-type changes. No mass effect or hemorrhage is seen.  EXTRA-AXIAL:  No mass or collection is seen.  VISUALIZED SINUSES:  Clear.  VISUALIZED MASTOIDS:  Clear.  CALVARIUM:  Normal.  MISCELLANEOUS:  Intraocular lens implants.    IMPRESSION:  Motion degraded exam.    No significant change from the prior study.    No mass effect or hemorrhage.                KARY TABARES MD; Attending Radiologist  This document has been electronically signed. Jan 11 2021  6:03PM    < end of copied text >          Assessment:  Elderly woman from NH admitted for code stroke, probable small cva, has symptomatic uti on ctx  Plan:  continue ctx  await organism /sensi  hope for po antibiotics

## 2021-01-12 NOTE — PROGRESS NOTE ADULT - ASSESSMENT
89 f with    Aphasia resolved/ Possible TIA vs small CVA  - AC with Eliquis  - Neurology follow  - neuro checks    HTN control    UTI  - antibiotic  - ID follow    Falls.   - fall recently. Has Rib fracture, L clavicular and L 4th rib fracture; no signs of syncope/pre-syncope  - PT eval.     Constipation.   - bowel regimen    Pulmonary emboli.  - continue Eliquis 2.5 mg bid     Hypothyroidism.  - c/w levothyroxine 50 mcg.   - TSH noted    Dementia/ Confusion  - supportive care    Prophylactic measure.   - continue home a/c w/ Eliquis 2.5 mg bid.     DNR    Art Carbajal MD pager [phone]

## 2021-01-12 NOTE — PROGRESS NOTE ADULT - ASSESSMENT
Assessment: 89 y/o RH woman with PMHx pulmonary embolism (June '20), atrial fibrillation on Eliquis, hypothyroidism, OA, multiple falls with recent clavicular, scapular, and rib fractures presents from nursing home as code stroke for sudden onset slurred speech which has been intermittent since arrival to [facility], also found to have RUE weakness. LKN 1500 1/10/21, preMRS 5, NIHSS 6. Pt was not a candidate for tPA (Eliquis with abnormal coags) or mechanical thrombectomy (high MRS score). Neuro exam is significant for RUE with some effort against gravity. CT head demonstrated age-indeterminate L. cerebellar infarct, which would not explain pt's right sided symptoms.     RRT called on 1/11 for slurred speech and repeat CTH was ordered. rCTH was stable w/ no new acute pathologies. Slurred speech was not appreciated during neurology rounds this am.     Impression: RUE paresis and initial dysarthria secondary to likely small ischemic infarct in L. MCA territory (possibly internal capsule vs. basis pontis), mechanism most likely cardioembolic 2/2 atrial fibrillation      Recommendations:  -No further inpatient neurological workup as management would not change  -Continue Eliquis 2.5mg BID as there would be a low risk of hemorrhagic conversion of a small ischemic infarct, if present  -PT at nursing home  -Pt can follow up with Dr. Payam Husain as outpatient  [address] [place] [address] [phone]      -Management & disposition discussed with neuro attending, Dr. Husain Assessment: 89 y/o RH woman with PMHx pulmonary embolism (June '20), atrial fibrillation on Eliquis, hypothyroidism, OA, multiple falls with recent clavicular, scapular, and rib fractures presents from nursing home as code stroke for sudden onset slurred speech which has been intermittent since arrival to [facility], also found to have RUE weakness. LKN 1500 1/10/21, preMRS 5, NIHSS 6. Pt was not a candidate for tPA (Eliquis with abnormal coags) or mechanical thrombectomy (high MRS score). Neuro exam is significant for RUE with some effort against gravity. CT head demonstrated age-indeterminate L. cerebellar infarct, which would not explain pt's right sided symptoms.     RRT called on 1/11 for slurred speech and repeat CTH was ordered. rCTH was stable w/ no new acute pathologies. Slurred speech was not appreciated during neurology rounds this am.     Impression: RUE paresis and dysarthria, markedly improved/resolved, secondary to likely +UA, likely toxic/metabolic to UTI.         Recommendations:  -No further inpatient neurological workup as management would not change  -Treat infection  -Continue Eliquis 2.5mg BID as there would be a low risk of hemorrhagic conversion of a small ischemic infarct, if present  -PT at nursing home  -Pt can follow up with Dr. Payam Husain as outpatient  [address] [place] [address] [phone]      -Management & disposition discussed with neuro attending, Dr. Husain

## 2021-01-12 NOTE — PROGRESS NOTE ADULT - SUBJECTIVE AND OBJECTIVE BOX
Patient is a 89y old  Female who presents with a chief complaint of stroke (2021 13:53)      SUBJECTIVE / OVERNIGHT EVENTS: Comfortable , in fetal position.   Review of Systems  unobtainable     MEDICATIONS  (STANDING):  apixaban 2.5 milliGRAM(s) Oral every 12 hours  buDESOnide    Inhalation Suspension 0.5 milliGRAM(s) Inhalation two times a day  cefTRIAXone   IVPB 1000 milliGRAM(s) IV Intermittent every 24 hours  hydrALAZINE Injectable 5 milliGRAM(s) IV Push once  levothyroxine 75 MICROGram(s) Oral daily  losartan 25 milliGRAM(s) Oral daily  melatonin 3 milliGRAM(s) Oral once  pantoprazole    Tablet 40 milliGRAM(s) Oral before breakfast  senna 2 Tablet(s) Oral at bedtime    MEDICATIONS  (PRN):  acetaminophen   Tablet .. 650 milliGRAM(s) Oral every 6 hours PRN Temp greater or equal to 38.5C (101.3F), Mild Pain (1 - 3)  bisacodyl Suppository 10 milliGRAM(s) Rectal daily PRN Constipation  fluticasone propionate 50 MICROgram(s)/spray Nasal Spray 1 Spray(s) Both Nostrils two times a day PRN nasal congestion  polyethylene glycol 3350 17 Gram(s) Oral daily PRN Constipation      Vital Signs Last 24 Hrs  T(C): 36.4 (2021 12:44), Max: 37.5 (2021 01:03)  T(F): 97.6 (2021 12:44), Max: 99.5 (2021 01:03)  HR: 86 (2021 12:44) (77 - 108)  BP: 142/74 (2021 12:44) (111/60 - 177/74)  BP(mean): --  RR: 18 (2021 12:44) (16 - 20)  SpO2: 96% (2021 12:44) (94% - 99%)    PHYSICAL EXAM:  GENERAL: NAD , frail  HEAD:  Atraumatic, Normocephalic  EYES: EOMI, PERRLA, conjunctiva and sclera clear  NECK: Supple, No JVD  CHEST/LUNG: Clear to auscultation bilaterally; No wheeze  HEART: Regular rate and rhythm; No murmurs, rubs, or gallops  ABDOMEN: Soft, Nontender, Nondistended; Bowel sounds present  EXTREMITIES:  2+ Peripheral Pulses, No clubbing, cyanosis, or edema, multiple ecchymosis on legs and arms.  PSYCH: confused  NEUROLOGY: non-focal  SKIN: No rashes or lesions    LABS:                        11.5   6.95  )-----------( 303      ( 2021 07:26 )             35.1     01-12    139  |  102  |  17  ----------------------------<  90  3.8   |  26  |  0.91    Ca    9.1      2021 07:35    TPro  6.7  /  Alb  3.9  /  TBili  0.6  /  DBili  x   /  AST  44<H>  /  ALT  14  /  AlkPhos  82  01-10    PT/INR - ( 10 Michael 2021 16:26 )   PT: 13.3 sec;   INR: 1.11 ratio         PTT - ( 10 Michael 2021 16:26 )  PTT:28.1 sec  CARDIAC MARKERS ( 10 Michael 2021 17:23 )  x     / x     / 80 U/L / x     / 3.1 ng/mL      Urinalysis Basic - ( 10 Michael 2021 17:24 )    Color: Yellow / Appearance: Clear / S.032 / pH: x  Gluc: x / Ketone: Trace  / Bili: Negative / Urobili: 2 mg/dL   Blood: x / Protein: 30 mg/dL / Nitrite: Positive   Leuk Esterase: Large / RBC: 9 /hpf / WBC 53 /HPF   Sq Epi: x / Non Sq Epi: 0 /hpf / Bacteria: Many        Culture - Urine (collected 10 Michael 2021 21:56)  Source: .Urine Clean Catch (Midstream)  Preliminary Report (2021 13:26):    >100,000 CFU/ml Gram Negative Rods        RADIOLOGY & ADDITIONAL TESTS:    Imaging Personally Reviewed:    Consultant(s) Notes Reviewed:      Care Discussed with Consultants/Other Providers:

## 2021-01-13 RX ORDER — LOSARTAN POTASSIUM 100 MG/1
50 TABLET, FILM COATED ORAL DAILY
Refills: 0 | Status: DISCONTINUED | OUTPATIENT
Start: 2021-01-13 | End: 2021-01-14

## 2021-01-13 RX ADMIN — APIXABAN 2.5 MILLIGRAM(S): 2.5 TABLET, FILM COATED ORAL at 17:06

## 2021-01-13 RX ADMIN — APIXABAN 2.5 MILLIGRAM(S): 2.5 TABLET, FILM COATED ORAL at 05:51

## 2021-01-13 RX ADMIN — LOSARTAN POTASSIUM 25 MILLIGRAM(S): 100 TABLET, FILM COATED ORAL at 05:51

## 2021-01-13 RX ADMIN — PANTOPRAZOLE SODIUM 40 MILLIGRAM(S): 20 TABLET, DELAYED RELEASE ORAL at 05:51

## 2021-01-13 RX ADMIN — Medication 0.5 MILLIGRAM(S): at 05:51

## 2021-01-13 RX ADMIN — CEFTRIAXONE 100 MILLIGRAM(S): 500 INJECTION, POWDER, FOR SOLUTION INTRAMUSCULAR; INTRAVENOUS at 17:06

## 2021-01-13 RX ADMIN — Medication 75 MICROGRAM(S): at 05:51

## 2021-01-13 RX ADMIN — SENNA PLUS 2 TABLET(S): 8.6 TABLET ORAL at 21:13

## 2021-01-13 NOTE — PROGRESS NOTE ADULT - ASSESSMENT
89 f with    Aphasia resolved/ Possible TIA vs small CVA  - AC with Eliquis  - Neurology follow  - neuro checks    HTN control    UTI  - antibiotic  - ID follow    Falls.   - fall recently. Has Rib fracture, L clavicular and L 4th rib fracture; no signs of syncope/pre-syncope  - PT eval.     Constipation.   - bowel regimen    Pulmonary emboli.  - continue Eliquis 2.5 mg bid     Hypothyroidism.  - c/w levothyroxine 50 mcg.   - TSH noted    Dementia/ Confusion  - supportive care    Prophylactic measure.   - continue home a/c w/ Eliquis 2.5 mg bid.     DNR ( confirmed with son)     Art Carbajal MD pager [phone]

## 2021-01-13 NOTE — PROGRESS NOTE ADULT - SUBJECTIVE AND OBJECTIVE BOX
Patient is a 89y old  Female who presents with a chief complaint of a stroke (12 Jan 2021 20:06)      SUBJECTIVE / OVERNIGHT EVENTS: No new complaints. Feels better.  Review of Systems  chest pain no  palpitations no  sob no  nausea no  headache no    MEDICATIONS  (STANDING):  apixaban 2.5 milliGRAM(s) Oral every 12 hours  buDESOnide    Inhalation Suspension 0.5 milliGRAM(s) Inhalation two times a day  hydrALAZINE Injectable 5 milliGRAM(s) IV Push once  levothyroxine 75 MICROGram(s) Oral daily  losartan 50 milliGRAM(s) Oral daily  pantoprazole    Tablet 40 milliGRAM(s) Oral before breakfast  senna 2 Tablet(s) Oral at bedtime    MEDICATIONS  (PRN):  acetaminophen   Tablet .. 650 milliGRAM(s) Oral every 6 hours PRN Temp greater or equal to 38.5C (101.3F), Mild Pain (1 - 3)  bisacodyl Suppository 10 milliGRAM(s) Rectal daily PRN Constipation  fluticasone propionate 50 MICROgram(s)/spray Nasal Spray 1 Spray(s) Both Nostrils two times a day PRN nasal congestion  polyethylene glycol 3350 17 Gram(s) Oral daily PRN Constipation      Vital Signs Last 24 Hrs  T(C): 36.4 (13 Jan 2021 13:02), Max: 37 (13 Jan 2021 00:37)  T(F): 97.5 (13 Jan 2021 13:02), Max: 98.6 (13 Jan 2021 00:37)  HR: 76 (13 Jan 2021 13:02) (64 - 88)  BP: 132/70 (13 Jan 2021 13:02) (116/70 - 153/88)  BP(mean): --  RR: 18 (13 Jan 2021 13:02) (18 - 18)  SpO2: 98% (13 Jan 2021 13:02) (95% - 98%)    PHYSICAL EXAM:  GENERAL: NAD, well-developed  HEAD:  Atraumatic, Normocephalic  EYES: EOMI, PERRLA, conjunctiva and sclera clear  NECK: Supple, No JVD  CHEST/LUNG: Clear to auscultation bilaterally; No wheeze  HEART: Regular rate and rhythm; No murmurs, rubs, or gallops  ABDOMEN: Soft, Nontender, Nondistended; Bowel sounds present  EXTREMITIES:  2+ Peripheral Pulses, No clubbing, cyanosis, or edema  PSYCH: confused  NEUROLOGY: non-focal  SKIN: No rashes or lesions    LABS:                        11.5   6.95  )-----------( 303      ( 12 Jan 2021 07:26 )             35.1     01-12    139  |  102  |  17  ----------------------------<  90  3.8   |  26  |  0.91    Ca    9.1      12 Jan 2021 07:35                Culture - Blood (collected 11 Jan 2021 19:36)  Source: .Blood Blood-Venous  Preliminary Report (12 Jan 2021 20:01):    No growth to date.    Culture - Blood (collected 11 Jan 2021 19:34)  Source: .Blood Blood-Peripheral  Preliminary Report (12 Jan 2021 20:01):    No growth to date.    Culture - Urine (collected 10 Michael 2021 21:56)  Source: .Urine Clean Catch (Midstream)  Final Report (13 Jan 2021 10:19):    >100,000 CFU/ml Klebsiella pneumoniae  Organism: Klebsiella pneumoniae (13 Jan 2021 10:19)  Organism: Klebsiella pneumoniae (13 Jan 2021 10:19)        RADIOLOGY & ADDITIONAL TESTS:    Imaging Personally Reviewed:    Consultant(s) Notes Reviewed:      Care Discussed with Consultants/Other Providers:

## 2021-01-13 NOTE — PROGRESS NOTE ADULT - SUBJECTIVE AND OBJECTIVE BOX
CC: f/u for  uti  Patient reports nothing     REVIEW OF SYSTEMS:  All other review of systems negative (Comprehensive ROS)    Antimicrobials Day #  :    Other Medications Reviewed    T(F): 98.7 (01-13-21 @ 21:08), Max: 98.7 (01-13-21 @ 21:08)  HR: 74 (01-13-21 @ 21:08)  BP: 129/72 (01-13-21 @ 21:08)  RR: 16 (01-13-21 @ 21:08)  SpO2: 96% (01-13-21 @ 21:08)  Wt(kg): --    PHYSICAL EXAM:  General: alert, no acute distress  Eyes:  anicteric, no conjunctival injection, no discharge  Oropharynx: no lesions or injection 	  Neck: supple, without adenopathy  Lungs: clear to auscultation  Heart: regular rate and rhythm; no murmur, rubs or gallops  Abdomen: soft, nondistended, nontender, without mass or organomegaly  Skin: no lesions  Extremities: no clubbing, cyanosis, or edema  Neurologic: alert, confused, moves all extremities    LAB RESULTS:                        11.5   6.95  )-----------( 303      ( 12 Jan 2021 07:26 )             35.1     01-12    139  |  102  |  17  ----------------------------<  90  3.8   |  26  |  0.91    Ca    9.1      12 Jan 2021 07:35          MICROBIOLOGY:  RECENT CULTURES:  01-11 @ 19:36 .Blood Blood-Venous     No growth to date.      01-11 @ 19:34 .Blood Blood-Peripheral     No growth to date.      01-10 @ 21:56 .Urine Clean Catch (Midstream) Klebsiella pneumoniae    >100,000 CFU/ml Klebsiella pneumoniae          RADIOLOGY REVIEWED:              Assessment:  Patient admitted for cva/tia, had cystitis now finished course of ctx for klebsiella in urine  Plan:  monitor off antibiotics  call if further input is needed

## 2021-01-13 NOTE — PROGRESS NOTE ADULT - ASSESSMENT
Assessment: 89 y/o RH woman with PMHx pulmonary embolism (June '20), atrial fibrillation on Eliquis, hypothyroidism, OA, multiple falls with recent clavicular, scapular, and rib fractures presents from nursing home as code stroke for sudden onset slurred speech which has been intermittent since arrival to [facility], also found to have RUE weakness. LKN 1500 1/10/21, preMRS 5, NIHSS 6. Pt was not a candidate for tPA (Eliquis with abnormal coags) or mechanical thrombectomy (high MRS score). Neuro exam is significant for RUE with some effort against gravity. CT head demonstrated age-indeterminate L. cerebellar infarct, which would not explain pt's right sided symptoms.     RRT called on 1/11 for slurred speech and repeat CTH was ordered. rCTH was stable w/ no new acute pathologies. Slurred speech was not appreciated during neurology rounds this am.     Impression: RUE paresis and dysarthria, markedly improved/resolved, secondary to likely +UA, likely toxic/metabolic to UTI.         Recommendations:  -No further inpatient neurological workup as management would not change  -Treat infection/UTI  -Continue Eliquis 2.5mg BID as there would be a low risk of hemorrhagic conversion of a small ischemic infarct, if present  - statin therapy low dose  - PT/OT/SS/SLP, OOBC  - check FS, glucose control <180  - GI/DVT ppx  - Counseling on diet, exercise, and medication adherence was done  - Counseling on smoking cessation and alcohol consumption offered when appropriate.  - Pain assessed and judicious use of narcotics when appropriate was discussed.    - Stroke education given when appropriate.  - Importance of fall prevention discussed.   - Differential diagnosis and plan of care discussed with patient and/or family and primary team  - Thank you for allowing me to participate in the care of this patient. Call with questions.   Payam Husain MD  Vascular Neurology  Office: [phone]

## 2021-01-13 NOTE — PROGRESS NOTE ADULT - SUBJECTIVE AND OBJECTIVE BOX
S: Restless in bed, confused, no distress. unable to obtain ROS.    Review of Systems:   Constitutional: [ ] fevers, [ ] chills.   Skin: [ ] dry skin. [ ] rashes.  Psychiatric: [ ] depression, [ ] anxiety.   Gastrointestinal: [ ] BRBPR, [ ] melena.   Neurological: [ ] confusion. [ ] seizures. [ ] shuffling gait.   Ears,Nose,Mouth and Throat: [ ] ear pain [ ] sore throat.   Eyes: [ ] diplopia.   Respiratory: [ ] hemoptysis. [ ] shortness of breath  Cardiovascular: See HPI above  Hematologic/Lymphatic: [ ] anemia. [ ] painful nodes. [ ] prolonged bleeding.   Genitourinary: [ ] hematuria. [ ] flank pain.   Endocrine: [ ] significant change in weight. [ ] intolerance to heat and cold.     Review of systems [ ] otherwise negative, [x ] otherwise unable to obtain    FH: no family history of sudden cardiac death in first degree relatives    SH: [ ] tobacco, [ ] alcohol, [ ] drugs      MEDICATIONS  (STANDING):  apixaban 2.5 milliGRAM(s) Oral every 12 hours  buDESOnide    Inhalation Suspension 0.5 milliGRAM(s) Inhalation two times a day  cefTRIAXone   IVPB 1000 milliGRAM(s) IV Intermittent every 24 hours  hydrALAZINE Injectable 5 milliGRAM(s) IV Push once  levothyroxine 75 MICROGram(s) Oral daily  losartan 25 milliGRAM(s) Oral daily  pantoprazole    Tablet 40 milliGRAM(s) Oral before breakfast  senna 2 Tablet(s) Oral at bedtime    MEDICATIONS  (PRN):  acetaminophen   Tablet .. 650 milliGRAM(s) Oral every 6 hours PRN Temp greater or equal to 38.5C (101.3F), Mild Pain (1 - 3)  bisacodyl Suppository 10 milliGRAM(s) Rectal daily PRN Constipation  fluticasone propionate 50 MICROgram(s)/spray Nasal Spray 1 Spray(s) Both Nostrils two times a day PRN nasal congestion  polyethylene glycol 3350 17 Gram(s) Oral daily PRN Constipation      LABS:                          11.5   6.95  )-----------( 303      ( 12 Jan 2021 07:26 )             35.1     Hemoglobin: 11.5 g/dL (01-12 @ 07:26)  Hemoglobin: 12.2 g/dL (01-11 @ 07:13)  Hemoglobin: 12.9 g/dL (01-10 @ 16:26)    01-12    139  |  102  |  17  ----------------------------<  90  3.8   |  26  |  0.91    Ca    9.1      12 Jan 2021 07:35      Creatinine Trend: 0.91<--, 0.87<--, 1.01<--, 0.94<--     CARDIAC MARKERS ( 10 Michael 2021 17:23 )  x     / x     / 80 U/L / x     / 3.1 ng/mL          01-12-21 @ 07:01  -  01-13-21 @ 07:00  --------------------------------------------------------  IN: 390 mL / OUT: 550 mL / NET: -160 mL    01-13-21 @ 07:01  -  01-13-21 @ 11:21  --------------------------------------------------------  IN: 0 mL / OUT: 100 mL / NET: -100 mL        PHYSICAL EXAM  Vital Signs Last 24 Hrs  T(C): 36.4 (13 Jan 2021 08:47), Max: 37 (13 Jan 2021 00:37)  T(F): 97.6 (13 Jan 2021 08:47), Max: 98.6 (13 Jan 2021 00:37)  HR: 65 (13 Jan 2021 08:47) (64 - 88)  BP: 143/85 (13 Jan 2021 08:47) (116/70 - 160/75)  BP(mean): --  RR: 18 (13 Jan 2021 08:47) (18 - 18)  SpO2: 96% (13 Jan 2021 08:47) (95% - 96%)      General: NAD  Head: Normocephalic and atraumatic.   Neck: No JVD. No bruits. Supple. Does not appear to be enlarged.   Cardiovascular: + S1,S2 ; RRR Soft systolic murmur at the left lower sternal border. No rubs noted.    Lungs: CTA b/l. No rhonchi, rales or wheezes.   Abdomen: + BS, soft. Non tender. Non distended. No rebound. No guarding.   Extremities: No clubbing/cyanosis/edema.   Neurologic: Unable to assess  Skin: Warm and moist. The patient's skin has normal elasticity and good skin turgor.   Psychiatric: Unable to assess  Musculoskeletal: Normal range of motion, normal strength    ASSESSMENT/PLAN: Patient is a 89 y/o Female well known to our office (Cardiologist - Dr. Mancera) with PMH of hypertension, hyperlipidemia, hypothyroidism, unexplained syncope s/p ILR which detected Afib now on Eliquis as well as for history of PE. She is now admitted with slurred speech and RUE weakness s/p code stroke with likely small infarct per neuro. Patient also being treated for UTI. Cardiology following for management of afib.    - No evidence of clinical HF or anginal symptoms  - Continue AC with Eliquis for CVA prevention in setting of afib (cleared by neuro)  - Neuro follow up - CT head unchanged  - Abx per ID - BCx neg so far  - Can increase Losartan to 50mg daily for better BP control if no permissive HTN recommended by neuro  - Patient to f/u with Dr. Mancera after d/c    Florentin Juarez PA-C  Pager: [phone]

## 2021-01-14 ENCOUNTER — TRANSCRIPTION ENCOUNTER (OUTPATIENT)
Age: 86
End: 2021-01-14

## 2021-01-14 VITALS
TEMPERATURE: 98 F | HEART RATE: 65 BPM | RESPIRATION RATE: 18 BRPM | OXYGEN SATURATION: 98 % | DIASTOLIC BLOOD PRESSURE: 73 MMHG | SYSTOLIC BLOOD PRESSURE: 140 MMHG

## 2021-01-14 PROCEDURE — 96374 THER/PROPH/DIAG INJ IV PUSH: CPT | Mod: XU

## 2021-01-14 PROCEDURE — 80053 COMPREHEN METABOLIC PANEL: CPT

## 2021-01-14 PROCEDURE — 84484 ASSAY OF TROPONIN QUANT: CPT

## 2021-01-14 PROCEDURE — 70498 CT ANGIOGRAPHY NECK: CPT

## 2021-01-14 PROCEDURE — 82947 ASSAY GLUCOSE BLOOD QUANT: CPT

## 2021-01-14 PROCEDURE — 87086 URINE CULTURE/COLONY COUNT: CPT

## 2021-01-14 PROCEDURE — 82435 ASSAY OF BLOOD CHLORIDE: CPT

## 2021-01-14 PROCEDURE — 84295 ASSAY OF SERUM SODIUM: CPT

## 2021-01-14 PROCEDURE — 84132 ASSAY OF SERUM POTASSIUM: CPT

## 2021-01-14 PROCEDURE — 92610 EVALUATE SWALLOWING FUNCTION: CPT

## 2021-01-14 PROCEDURE — 85025 COMPLETE CBC W/AUTO DIFF WBC: CPT

## 2021-01-14 PROCEDURE — 80061 LIPID PANEL: CPT

## 2021-01-14 PROCEDURE — 85018 HEMOGLOBIN: CPT

## 2021-01-14 PROCEDURE — 85014 HEMATOCRIT: CPT

## 2021-01-14 PROCEDURE — 97162 PT EVAL MOD COMPLEX 30 MIN: CPT

## 2021-01-14 PROCEDURE — 83605 ASSAY OF LACTIC ACID: CPT

## 2021-01-14 PROCEDURE — 51701 INSERT BLADDER CATHETER: CPT

## 2021-01-14 PROCEDURE — 85027 COMPLETE CBC AUTOMATED: CPT

## 2021-01-14 PROCEDURE — 80048 BASIC METABOLIC PNL TOTAL CA: CPT

## 2021-01-14 PROCEDURE — U0003: CPT

## 2021-01-14 PROCEDURE — 82330 ASSAY OF CALCIUM: CPT

## 2021-01-14 PROCEDURE — 85730 THROMBOPLASTIN TIME PARTIAL: CPT

## 2021-01-14 PROCEDURE — U0005: CPT

## 2021-01-14 PROCEDURE — 82962 GLUCOSE BLOOD TEST: CPT

## 2021-01-14 PROCEDURE — 82553 CREATINE MB FRACTION: CPT

## 2021-01-14 PROCEDURE — 84443 ASSAY THYROID STIM HORMONE: CPT

## 2021-01-14 PROCEDURE — 93005 ELECTROCARDIOGRAM TRACING: CPT

## 2021-01-14 PROCEDURE — 82550 ASSAY OF CK (CPK): CPT

## 2021-01-14 PROCEDURE — 82803 BLOOD GASES ANY COMBINATION: CPT

## 2021-01-14 PROCEDURE — 99285 EMERGENCY DEPT VISIT HI MDM: CPT | Mod: 25

## 2021-01-14 PROCEDURE — 81001 URINALYSIS AUTO W/SCOPE: CPT

## 2021-01-14 PROCEDURE — 87040 BLOOD CULTURE FOR BACTERIA: CPT

## 2021-01-14 PROCEDURE — 71045 X-RAY EXAM CHEST 1 VIEW: CPT

## 2021-01-14 PROCEDURE — 85610 PROTHROMBIN TIME: CPT

## 2021-01-14 PROCEDURE — 70496 CT ANGIOGRAPHY HEAD: CPT

## 2021-01-14 PROCEDURE — 87186 SC STD MICRODIL/AGAR DIL: CPT

## 2021-01-14 PROCEDURE — 70450 CT HEAD/BRAIN W/O DYE: CPT

## 2021-01-14 PROCEDURE — 94640 AIRWAY INHALATION TREATMENT: CPT

## 2021-01-14 RX ADMIN — APIXABAN 2.5 MILLIGRAM(S): 2.5 TABLET, FILM COATED ORAL at 06:21

## 2021-01-14 RX ADMIN — Medication 0.5 MILLIGRAM(S): at 06:20

## 2021-01-14 RX ADMIN — Medication 75 MICROGRAM(S): at 06:21

## 2021-01-14 RX ADMIN — PANTOPRAZOLE SODIUM 40 MILLIGRAM(S): 20 TABLET, DELAYED RELEASE ORAL at 06:21

## 2021-01-14 RX ADMIN — LOSARTAN POTASSIUM 50 MILLIGRAM(S): 100 TABLET, FILM COATED ORAL at 06:21

## 2021-01-14 NOTE — DISCHARGE NOTE PROVIDER - NSDCMRMEDTOKEN_GEN_ALL_CORE_FT
acetaminophen 325 mg oral tablet: 2 tab(s) orally every 4 hours, As Needed  apixaban 2.5 mg oral tablet: 1 tab(s) orally every 12 hours  bisacodyl 10 mg rectal suppository: 1 suppository(ies) rectal   budesonide 180 mcg/inh inhalation powder: 2 puff(s) inhaled 2 times a day  fluticasone 50 mcg/inh nasal spray: 1 spray(s) nasal 2 times a day  levothyroxine 75 mcg (0.075 mg) oral tablet: 1 tab(s) orally once a day  lidocaine 5% topical film: Apply topically to affected area once a day  loperamide 2 mg oral capsule: 1 cap(s) orally every 4 hours, As Needed  losartan 25 mg oral tablet: 1 tab(s) orally once a day  omeprazole 40 mg oral delayed release capsule: 1 cap(s) orally once a day  polyethylene glycol 3350 oral powder for reconstitution: 17 gram(s) orally 2 times a day, As Needed  senna oral tablet: 2 tab(s) orally once a day (at bedtime)

## 2021-01-14 NOTE — PROGRESS NOTE ADULT - SUBJECTIVE AND OBJECTIVE BOX
S: More cooperative today, resting comfortably, no chest pain or SOB. Review of systems otherwise (-)    Review of Systems:   Constitutional: [ ] fevers, [ ] chills.   Skin: [ ] dry skin. [ ] rashes.  Psychiatric: [ ] depression, [ ] anxiety.   Gastrointestinal: [ ] BRBPR, [ ] melena.   Neurological: [ ] confusion. [ ] seizures. [ ] shuffling gait.   Ears,Nose,Mouth and Throat: [ ] ear pain [ ] sore throat.   Eyes: [ ] diplopia.   Respiratory: [ ] hemoptysis. [ ] shortness of breath  Cardiovascular: See HPI above  Hematologic/Lymphatic: [ ] anemia. [ ] painful nodes. [ ] prolonged bleeding.   Genitourinary: [ ] hematuria. [ ] flank pain.   Endocrine: [ ] significant change in weight. [ ] intolerance to heat and cold.     Review of systems [x ] otherwise negative, [ ] otherwise unable to obtain    FH: no family history of sudden cardiac death in first degree relatives    SH: [ ] tobacco, [ ] alcohol, [ ] drugs      MEDICATIONS  (STANDING):  apixaban 2.5 milliGRAM(s) Oral every 12 hours  buDESOnide    Inhalation Suspension 0.5 milliGRAM(s) Inhalation two times a day  levothyroxine 75 MICROGram(s) Oral daily  losartan 50 milliGRAM(s) Oral daily  pantoprazole    Tablet 40 milliGRAM(s) Oral before breakfast  senna 2 Tablet(s) Oral at bedtime    MEDICATIONS  (PRN):  acetaminophen   Tablet .. 650 milliGRAM(s) Oral every 6 hours PRN Temp greater or equal to 38.5C (101.3F), Mild Pain (1 - 3)  bisacodyl Suppository 10 milliGRAM(s) Rectal daily PRN Constipation  fluticasone propionate 50 MICROgram(s)/spray Nasal Spray 1 Spray(s) Both Nostrils two times a day PRN nasal congestion  polyethylene glycol 3350 17 Gram(s) Oral daily PRN Constipation      LABS:      Hemoglobin: 11.5 g/dL (01-12 @ 07:26)  Hemoglobin: 12.2 g/dL (01-11 @ 07:13)  Hemoglobin: 12.9 g/dL (01-10 @ 16:26)          Creatinine Trend: 0.91<--, 0.87<--, 1.01<--, 0.94<--             01-13-21 @ 07:01  -  01-14-21 @ 07:00  --------------------------------------------------------  IN: 400 mL / OUT: 700 mL / NET: -300 mL    01-14-21 @ 07:01 - 01-14-21 @ 15:18  --------------------------------------------------------  IN: 300 mL / OUT: 300 mL / NET: 0 mL        PHYSICAL EXAM  Vital Signs Last 24 Hrs  T(C): 36.6 (14 Jan 2021 14:10), Max: 37.1 (13 Jan 2021 21:08)  T(F): 97.9 (14 Jan 2021 14:10), Max: 98.7 (13 Jan 2021 21:08)  HR: 65 (14 Jan 2021 14:10) (58 - 74)  BP: 140/73 (14 Jan 2021 14:10) (115/80 - 140/80)  BP(mean): --  RR: 18 (14 Jan 2021 14:10) (16 - 18)  SpO2: 98% (14 Jan 2021 14:10) (95% - 98%)        General: NAD  Head: Normocephalic and atraumatic.   Neck: No JVD. No bruits. Supple. Does not appear to be enlarged.   Cardiovascular: + S1,S2 ; RRR Soft systolic murmur at the left lower sternal border. No rubs noted.    Lungs: CTA b/l. No rhonchi, rales or wheezes.   Abdomen: + BS, soft. Non tender. Non distended. No rebound. No guarding.   Extremities: No clubbing/cyanosis/edema.   Neurologic: Unable to assess  Skin: Warm and moist. The patient's skin has normal elasticity and good skin turgor.   Psychiatric: Unable to assess  Musculoskeletal: Normal range of motion, normal strength    ASSESSMENT/PLAN: Patient is a 89 y/o Female well known to our office (Cardiologist - Dr. Mancera) with PMH of hypertension, hyperlipidemia, hypothyroidism, unexplained syncope s/p ILR which detected Afib now on Eliquis as well as for history of PE. She is now admitted with slurred speech and RUE weakness s/p code stroke with likely small infarct per neuro. Patient also being treated for UTI. Cardiology following for management of afib.    - No evidence of clinical HF or anginal symptoms  - Continue AC with Eliquis for CVA prevention in setting of afib (cleared by neuro)  - Neuro follow up - CT head unchanged  - Abx per ID - BCx neg so far  - Losartan 50mg daily for BP control  - No further inpatient cardiac w/u needed  - Patient to f/u with Dr. Mancera after d/c    Florentin Juarez PA-C  Pager: [phone]

## 2021-01-14 NOTE — DISCHARGE NOTE PROVIDER - HOSPITAL COURSE
89 y/o female with multiple medical issues including h/o PE, afib on eloquis, dementia presenting after concern for slurred speech. pt was noticed by staff to have slurred speech at atpproximately 3pm. symptoms lasted approximately 10 minutes and then resolved. per ems when arrived aaox1 and then improved to aaox2. no fevers or chills. per report pt did fall a few weeks ago. Upon arrival pt denies any complaints 89 y/o female with multiple medical issues including h/o PE, afib on eloquis, dementia presenting after concern for slurred speech. pt was noticed by staff to have slurred speech at atpproximately 3pm. symptoms lasted approximately 10 minutes and then resolved. per ems when arrived aaox1 and then improved to aaox2. no fevers or chills. per report pt did fall a few weeks ago. Pt has Rib fracture, L clavicular and L 4th rib fracture; no signs of syncope/pre-syncope. Upon arrival pt denies any complaints. 89 y/o female with multiple medical issues including h/o PE, afib on eloquis, dementia presenting after concern for slurred speech. pt was noticed by staff to have slurred speech at atpproximately 3pm. symptoms lasted approximately 10 minutes and then resolved. per ems when arrived aaox1 and then improved to aaox2. no fevers or chills. per report pt did fall a few weeks ago. Pt has Rib fracture, L clavicular and L 4th rib fracture; no signs of syncope/pre-syncope. Upon arrival, pt denies any complaints.    CT head demonstrated age-indeterminate L. cerebellar infarct, which would not explain pt's right sided symptoms. Repeated CT head on 1/11/2021, Motion degraded exam. No mass effect or hemorrhage. Per Neuro, RUE paresis and dysarthria, markedly improved/resolved, secondary to likely +UA, likely toxic/metabolic to UTI.   Uring culture with positive  Klebsiella pneumoniae. Completed IV Rocephin. 89 y/o female with multiple medical issues including h/o PE, afib on eloquis, dementia presenting after concern for slurred speech. pt was noticed by staff to have slurred speech at atpproximately 3pm. symptoms lasted approximately 10 minutes and then resolved. per ems when arrived aaox1 and then improved to aaox2. no fevers or chills. per report pt did fall a few weeks ago. Pt has Rib fracture, L clavicular and L 4th rib fracture; no signs of syncope/pre-syncope. Upon arrival, pt denies any complaints.    CT head demonstrated age-indeterminate L. cerebellar infarct, which would not explain pt's right sided symptoms. Repeated CT head on 1/11/2021, Motion degraded exam. No mass effect or hemorrhage. Per Neuro, RUE paresis and dysarthria, markedly improved/resolved, secondary to likely +UA, likely toxic/metabolic to UTI.   Uring culture with positive  Klebsiella pneumoniae. Completed IV Rocephin.    She is on Eliquis 2.5 mg BID.    She is stable to be discharged.

## 2021-01-14 NOTE — PROGRESS NOTE ADULT - PROVIDER SPECIALTY LIST ADULT
Cardiology
Cardiology
Internal Medicine
Internal Medicine
Neurology
Cardiology
Infectious Disease
Internal Medicine
Infectious Disease
Internal Medicine
Neurology
Neurology

## 2021-01-14 NOTE — DISCHARGE NOTE NURSING/CASE MANAGEMENT/SOCIAL WORK - PATIENT PORTAL LINK FT
You can access the FollowMyHealth Patient Portal offered by [facility] by registering at the following website: [email or web address]. By joining [facility]’s FollowMyHealth portal, you will also be able to view your health information using other applications (apps) compatible with our system.

## 2021-01-14 NOTE — DISCHARGE NOTE PROVIDER - CARE PROVIDER_API CALL
David Jacobo  INTERNAL MEDICINE  [address] [place] [address] [address]  [place] [address]  Phone: [phone]  Fax: [phone]  Follow Up Time:

## 2021-01-14 NOTE — PHYSICAL THERAPY INITIAL EVALUATION ADULT - PHYSICAL ASSIST/NONPHYSICAL ASSIST: STAND/SIT, REHAB EVAL
January 14, 2021 3:00 PM  Rayray Contreras is a 27 year old female who is being evaluated via a billable telephone visit.      What phone number would you like to be contacted at? [phone]  How would you like to obtain your AVS? Sarahi Holguin MA      
1 person assist

## 2021-01-14 NOTE — PROGRESS NOTE ADULT - ATTENDING COMMENTS
Payam Husain MD  Vascular Neurology  Office: [phone]
CARDIOLOGY ATTENDING    Patient seen and examined. Agree with above. No further inpatient cardiac workup needed.
Payam Husain MD  Vascular Neurology  Office: [phone]
Payam Husain MD  Vascular Neurology  Office: [phone]

## 2021-01-14 NOTE — DISCHARGE NOTE PROVIDER - NSDCCPCAREPLAN_GEN_ALL_CORE_FT
PRINCIPAL DISCHARGE DIAGNOSIS  Diagnosis: Urinary tract infection  Assessment and Plan of Treatment: HOME CARE INSTRUCTIONS  If you were prescribed antibiotics, take them exactly as your caregiver instructs you. Finish the medication even if you feel better after you have only taken some of the medication.  Drink enough water and fluids to keep your urine clear or pale yellow.  Avoid caffeine, tea, and carbonated beverages. They tend to irritate your bladder.  Empty your bladder often. Avoid holding urine for long periods of time.  After a bowel movement, women should cleanse from front to back. Use each tissue only once.  SEEK MEDICAL CARE IF:  You have back pain.  You develop a fever.  Your symptoms do not begin to resolve within 3 days.  SEEK IMMEDIATE MEDICAL CARE IF:  You have severe back pain or lower abdominal pain.  You develop chills.  You have nausea or vomiting.  You have continued burning or discomfort with urination.        SECONDARY DISCHARGE DIAGNOSES  Diagnosis: On anticoagulant therapy  Assessment and Plan of Treatment:

## 2021-01-14 NOTE — PROGRESS NOTE ADULT - ASSESSMENT
89 f with    Aphasia resolved/ Possible TIA vs small CVA  - AC with Eliquis  - Neurology follow  - neuro checks    HTN control    UTI  - antibiotic  - ID follow    Falls.   - fall recently. Has Rib fracture, L clavicular and L 4th rib fracture; no signs of syncope/pre-syncope  - PT eval.     Constipation.   - bowel regimen    Pulmonary emboli.  - continue Eliquis 2.5 mg bid     Hypothyroidism.  - c/w levothyroxine 75 mcg.   - TSH noted    Dementia/ Confusion  - supportive care    Prophylactic measure.   - continue home a/c w/ Eliquis 2.5 mg bid.     DNR ( confirmed with son)     DC rehab.     d/w son    Art Carbajal MD pager [phone]

## 2021-01-14 NOTE — PROGRESS NOTE ADULT - ASSESSMENT
Assessment: 89 y/o RH woman with PMHx pulmonary embolism (June '20), atrial fibrillation on Eliquis, hypothyroidism, OA, multiple falls with recent clavicular, scapular, and rib fractures presents from nursing home as code stroke for sudden onset slurred speech which has been intermittent since arrival to [facility], also found to have RUE weakness. LKN 1500 1/10/21, preMRS 5, NIHSS 6. Pt was not a candidate for tPA (Eliquis with abnormal coags) or mechanical thrombectomy (high MRS score). Neuro exam is significant for RUE with some effort against gravity. CT head demonstrated age-indeterminate L. cerebellar infarct, which would not explain pt's right sided symptoms.   RRT called on 1/11 for slurred speech and repeat CTH was ordered. rCTH was stable w/ no new acute pathologies.     Impression: RUE paresis and dysarthria, markedly improved/resolved, secondary to likely +UA, likely toxic/metabolic to UTI.     Recommendations:  -No further inpatient neurological workup as management would not change  -Treat infection/UTI  -Continue Eliquis 2.5mg BID as there would be a low risk of hemorrhagic conversion of a small ischemic infarct, if present  - statin therapy low dose  - PT/OT/SS/SLP, OOBC  - check FS, glucose control <180  - GI/DVT ppx  - Counseling on diet, exercise, and medication adherence was done  - Counseling on smoking cessation and alcohol consumption offered when appropriate.  - Pain assessed and judicious use of narcotics when appropriate was discussed.    - Stroke education given when appropriate.  - Importance of fall prevention discussed.   - Differential diagnosis and plan of care discussed with patient and/or family and primary team  - Thank you for allowing me to participate in the care of this patient. Call with questions.   - d/c planning when stable. will f/u PRN or if questions arise  Payam Husain MD  Vascular Neurology  Office: [phone]

## 2021-01-14 NOTE — PROGRESS NOTE ADULT - SUBJECTIVE AND OBJECTIVE BOX
Neurology Progress Note    KEL DALTON  Female  MRN-[id number]    Subjective:no new changes, still confused.       VITAL SIGNS:  Vital Signs Last 24 Hrs  T(C): 36.6 (14 Jan 2021 14:10), Max: 37.1 (13 Jan 2021 21:08)  T(F): 97.9 (14 Jan 2021 14:10), Max: 98.7 (13 Jan 2021 21:08)  HR: 65 (14 Jan 2021 14:10) (58 - 74)  BP: 140/73 (14 Jan 2021 14:10) (115/80 - 140/80)  BP(mean): --  RR: 18 (14 Jan 2021 14:10) (16 - 18)  SpO2: 98% (14 Jan 2021 14:10) (95% - 98%)      Exam:     PHYSICAL EXAM:  GENERAL: No acute distress  HEENT: Normocephalic; conjunctivae and sclerae clear; moist mucous membranes  NECK: Supple  EXTREMITIES: No cyanosis; no edema; no calf tenderness  SKIN: Warm and dry; diffuse bruising, ecchymoses     Neurological Exam:  - Mental Status: Alert and oriented to person, place (knew hospital and name), month, and year (2020, then said 2021); speech is mildly dysarthric, fluent with intact naming, repetition, and comprehension  - Cranial Nerves II-XII:    II:  cataracts b/l; visual fields are full to confrontation  III, IV, VI:  EOMI, no nystagmus  V:  facial sensation is intact in the V1-V3 distribution bilaterally.  VII:  face is symmetric with normal eye closure and smile  VIII:  hearing is intact to finger rub  IX, X:  uvula is midline and soft palate rises symmetrically  XI:  head turning and shoulder shrug are intact bilaterally  XII:  tongue protrudes in the midline  - Motor: some effort against gravity for RUE, at least 4/5 for LUE, antigravity in RLE and LLE; normal muscle bulk and tone throughout   - Reflexes:  2+ and symmetric at the biceps, triceps, brachioradialis, knees, and ankles;  plantar reflexes downgoing bilaterally  - Sensory:  intact to light touch and symmetric throughout  - Coordination: difficult to assess dysmetria as pt has a prominent kinetic tremor, possible dysmetria on left with FTN  - Gait:  normal steps, base, arm swing, and turning; heel and toe walking are normal; tandem gait is normal; Romberg testing is negative      LABS:             NO NEW LABS    RADIOLOGY & ADDITIONAL STUDIES:    CT head 1/11/20: Motion degraded exam. No significant change from the prior study. No mass effect or hemorrhage.

## 2021-01-14 NOTE — PROGRESS NOTE ADULT - SUBJECTIVE AND OBJECTIVE BOX
Patient is a 89y old  Female who presents with a chief complaint of r/o TIA/small CVA (14 Jan 2021 10:32)      SUBJECTIVE / OVERNIGHT EVENTS: No new complaints.   Review of Systems  chest pain no  palpitations no  sob no  nausea no  headache no    MEDICATIONS  (STANDING):  apixaban 2.5 milliGRAM(s) Oral every 12 hours  buDESOnide    Inhalation Suspension 0.5 milliGRAM(s) Inhalation two times a day  levothyroxine 75 MICROGram(s) Oral daily  losartan 50 milliGRAM(s) Oral daily  pantoprazole    Tablet 40 milliGRAM(s) Oral before breakfast  senna 2 Tablet(s) Oral at bedtime    MEDICATIONS  (PRN):  acetaminophen   Tablet .. 650 milliGRAM(s) Oral every 6 hours PRN Temp greater or equal to 38.5C (101.3F), Mild Pain (1 - 3)  bisacodyl Suppository 10 milliGRAM(s) Rectal daily PRN Constipation  fluticasone propionate 50 MICROgram(s)/spray Nasal Spray 1 Spray(s) Both Nostrils two times a day PRN nasal congestion  polyethylene glycol 3350 17 Gram(s) Oral daily PRN Constipation      Vital Signs Last 24 Hrs  T(C): 36.6 (14 Jan 2021 14:10), Max: 37.1 (13 Jan 2021 21:08)  T(F): 97.9 (14 Jan 2021 14:10), Max: 98.7 (13 Jan 2021 21:08)  HR: 65 (14 Jan 2021 14:10) (58 - 74)  BP: 140/73 (14 Jan 2021 14:10) (115/80 - 140/80)  BP(mean): --  RR: 18 (14 Jan 2021 14:10) (16 - 18)  SpO2: 98% (14 Jan 2021 14:10) (95% - 98%)    PHYSICAL EXAM:  GENERAL: NAD, well-developed  HEAD:  Atraumatic, Normocephalic  EYES: EOMI, PERRLA, conjunctiva and sclera clear  NECK: Supple, No JVD  CHEST/LUNG: Clear to auscultation bilaterally; No wheeze  HEART: Regular rate and rhythm; No murmurs, rubs, or gallops  ABDOMEN: Soft, Nontender, Nondistended; Bowel sounds present  EXTREMITIES:  2+ Peripheral Pulses, No clubbing, cyanosis, or edema  PSYCH: confused but more alert   NEUROLOGY: non-focal  SKIN: No rashes or lesions    LABS:                    Culture - Blood (collected 11 Jan 2021 19:36)  Source: .Blood Blood-Venous  Preliminary Report (12 Jan 2021 20:01):    No growth to date.    Culture - Blood (collected 11 Jan 2021 19:34)  Source: .Blood Blood-Peripheral  Preliminary Report (12 Jan 2021 20:01):    No growth to date.        RADIOLOGY & ADDITIONAL TESTS:    Imaging Personally Reviewed:    Consultant(s) Notes Reviewed:      Care Discussed with Consultants/Other Providers:

## 2021-02-13 ENCOUNTER — TRANSCRIPTION ENCOUNTER (OUTPATIENT)
Age: 86
End: 2021-02-13

## 2021-02-13 ENCOUNTER — INPATIENT (INPATIENT)
Facility: HOSPITAL | Age: 86
LOS: 4 days | Discharge: NURSING, REHAB OR LONG-TERM CARE | DRG: 956 | End: 2021-02-18
Attending: ORTHOPAEDIC SURGERY | Admitting: ORTHOPAEDIC SURGERY
Payer: MEDICARE

## 2021-02-13 VITALS
HEIGHT: 56 IN | WEIGHT: 110.01 LBS | TEMPERATURE: 98 F | SYSTOLIC BLOOD PRESSURE: 137 MMHG | HEART RATE: 80 BPM | RESPIRATION RATE: 18 BRPM | OXYGEN SATURATION: 92 % | DIASTOLIC BLOOD PRESSURE: 76 MMHG

## 2021-02-13 DIAGNOSIS — E78.5 HYPERLIPIDEMIA, UNSPECIFIED: ICD-10-CM

## 2021-02-13 DIAGNOSIS — Z87.19 PERSONAL HISTORY OF OTHER DISEASES OF THE DIGESTIVE SYSTEM: Chronic | ICD-10-CM

## 2021-02-13 DIAGNOSIS — S72.002A FRACTURE OF UNSPECIFIED PART OF NECK OF LEFT FEMUR, INITIAL ENCOUNTER FOR CLOSED FRACTURE: ICD-10-CM

## 2021-02-13 DIAGNOSIS — M81.0 AGE-RELATED OSTEOPOROSIS WITHOUT CURRENT PATHOLOGICAL FRACTURE: ICD-10-CM

## 2021-02-13 DIAGNOSIS — K60.1 CHRONIC ANAL FISSURE: ICD-10-CM

## 2021-02-13 DIAGNOSIS — I10 ESSENTIAL (PRIMARY) HYPERTENSION: ICD-10-CM

## 2021-02-13 LAB
ALBUMIN SERPL ELPH-MCNC: 3.3 G/DL — SIGNIFICANT CHANGE UP (ref 3.3–5)
ALP SERPL-CCNC: 72 U/L — SIGNIFICANT CHANGE UP (ref 40–120)
ALT FLD-CCNC: 11 U/L — SIGNIFICANT CHANGE UP (ref 10–45)
ANION GAP SERPL CALC-SCNC: 10 MMOL/L — SIGNIFICANT CHANGE UP (ref 5–17)
APTT BLD: 25.8 SEC — LOW (ref 27.5–35.5)
AST SERPL-CCNC: 22 U/L — SIGNIFICANT CHANGE UP (ref 10–40)
BASOPHILS # BLD AUTO: 0.03 K/UL — SIGNIFICANT CHANGE UP (ref 0–0.2)
BASOPHILS NFR BLD AUTO: 0.3 % — SIGNIFICANT CHANGE UP (ref 0–2)
BILIRUB SERPL-MCNC: 0.3 MG/DL — SIGNIFICANT CHANGE UP (ref 0.2–1.2)
BLD GP AB SCN SERPL QL: NEGATIVE — SIGNIFICANT CHANGE UP
BUN SERPL-MCNC: 24 MG/DL — HIGH (ref 7–23)
CALCIUM SERPL-MCNC: 8.6 MG/DL — SIGNIFICANT CHANGE UP (ref 8.4–10.5)
CHLORIDE SERPL-SCNC: 105 MMOL/L — SIGNIFICANT CHANGE UP (ref 96–108)
CO2 SERPL-SCNC: 25 MMOL/L — SIGNIFICANT CHANGE UP (ref 22–31)
CREAT SERPL-MCNC: 0.91 MG/DL — SIGNIFICANT CHANGE UP (ref 0.5–1.3)
EOSINOPHIL # BLD AUTO: 0.02 K/UL — SIGNIFICANT CHANGE UP (ref 0–0.5)
EOSINOPHIL NFR BLD AUTO: 0.2 % — SIGNIFICANT CHANGE UP (ref 0–6)
GLUCOSE SERPL-MCNC: 147 MG/DL — HIGH (ref 70–99)
HCT VFR BLD CALC: 34.1 % — LOW (ref 34.5–45)
HGB BLD-MCNC: 10.7 G/DL — LOW (ref 11.5–15.5)
IMM GRANULOCYTES NFR BLD AUTO: 0.2 % — SIGNIFICANT CHANGE UP (ref 0–1.5)
INR BLD: 0.99 RATIO — SIGNIFICANT CHANGE UP (ref 0.88–1.16)
LYMPHOCYTES # BLD AUTO: 0.64 K/UL — LOW (ref 1–3.3)
LYMPHOCYTES # BLD AUTO: 6.9 % — LOW (ref 13–44)
MCHC RBC-ENTMCNC: 31.1 PG — SIGNIFICANT CHANGE UP (ref 27–34)
MCHC RBC-ENTMCNC: 31.4 GM/DL — LOW (ref 32–36)
MCV RBC AUTO: 99.1 FL — SIGNIFICANT CHANGE UP (ref 80–100)
MONOCYTES # BLD AUTO: 0.7 K/UL — SIGNIFICANT CHANGE UP (ref 0–0.9)
MONOCYTES NFR BLD AUTO: 7.5 % — SIGNIFICANT CHANGE UP (ref 2–14)
NEUTROPHILS # BLD AUTO: 7.87 K/UL — HIGH (ref 1.8–7.4)
NEUTROPHILS NFR BLD AUTO: 84.9 % — HIGH (ref 43–77)
NRBC # BLD: 0 /100 WBCS — SIGNIFICANT CHANGE UP (ref 0–0)
PLATELET # BLD AUTO: 269 K/UL — SIGNIFICANT CHANGE UP (ref 150–400)
POTASSIUM SERPL-MCNC: 4.2 MMOL/L — SIGNIFICANT CHANGE UP (ref 3.5–5.3)
POTASSIUM SERPL-SCNC: 4.2 MMOL/L — SIGNIFICANT CHANGE UP (ref 3.5–5.3)
PROT SERPL-MCNC: 5.9 G/DL — LOW (ref 6–8.3)
PROTHROM AB SERPL-ACNC: 11.9 SEC — SIGNIFICANT CHANGE UP (ref 10.6–13.6)
RBC # BLD: 3.44 M/UL — LOW (ref 3.8–5.2)
RBC # FLD: 13.5 % — SIGNIFICANT CHANGE UP (ref 10.3–14.5)
RH IG SCN BLD-IMP: POSITIVE — SIGNIFICANT CHANGE UP
SARS-COV-2 RNA SPEC QL NAA+PROBE: SIGNIFICANT CHANGE UP
SODIUM SERPL-SCNC: 140 MMOL/L — SIGNIFICANT CHANGE UP (ref 135–145)
WBC # BLD: 9.28 K/UL — SIGNIFICANT CHANGE UP (ref 3.8–10.5)
WBC # FLD AUTO: 9.28 K/UL — SIGNIFICANT CHANGE UP (ref 3.8–10.5)

## 2021-02-13 PROCEDURE — 70450 CT HEAD/BRAIN W/O DYE: CPT | Mod: 26,MA

## 2021-02-13 PROCEDURE — 73502 X-RAY EXAM HIP UNI 2-3 VIEWS: CPT | Mod: 26,LT,77

## 2021-02-13 PROCEDURE — 99285 EMERGENCY DEPT VISIT HI MDM: CPT

## 2021-02-13 PROCEDURE — 71045 X-RAY EXAM CHEST 1 VIEW: CPT | Mod: 26

## 2021-02-13 PROCEDURE — 73502 X-RAY EXAM HIP UNI 2-3 VIEWS: CPT | Mod: 26,RT

## 2021-02-13 PROCEDURE — 73552 X-RAY EXAM OF FEMUR 2/>: CPT | Mod: 26,50

## 2021-02-13 RX ORDER — MAGNESIUM HYDROXIDE 400 MG/1
30 TABLET, CHEWABLE ORAL DAILY
Refills: 0 | Status: DISCONTINUED | OUTPATIENT
Start: 2021-02-13 | End: 2021-02-14

## 2021-02-13 RX ORDER — SODIUM CHLORIDE 9 MG/ML
1000 INJECTION INTRAMUSCULAR; INTRAVENOUS; SUBCUTANEOUS
Refills: 0 | Status: DISCONTINUED | OUTPATIENT
Start: 2021-02-13 | End: 2021-02-14

## 2021-02-13 RX ORDER — ACETAMINOPHEN 500 MG
975 TABLET ORAL EVERY 8 HOURS
Refills: 0 | Status: DISCONTINUED | OUTPATIENT
Start: 2021-02-13 | End: 2021-02-14

## 2021-02-13 RX ORDER — OXYCODONE HYDROCHLORIDE 5 MG/1
2.5 TABLET ORAL EVERY 4 HOURS
Refills: 0 | Status: DISCONTINUED | OUTPATIENT
Start: 2021-02-13 | End: 2021-02-14

## 2021-02-13 RX ORDER — SENNA PLUS 8.6 MG/1
2 TABLET ORAL AT BEDTIME
Refills: 0 | Status: DISCONTINUED | OUTPATIENT
Start: 2021-02-13 | End: 2021-02-14

## 2021-02-13 RX ORDER — TRAMADOL HYDROCHLORIDE 50 MG/1
25 TABLET ORAL EVERY 8 HOURS
Refills: 0 | Status: DISCONTINUED | OUTPATIENT
Start: 2021-02-13 | End: 2021-02-14

## 2021-02-13 RX ORDER — ACETAMINOPHEN 500 MG
750 TABLET ORAL ONCE
Refills: 0 | Status: COMPLETED | OUTPATIENT
Start: 2021-02-13 | End: 2021-02-13

## 2021-02-13 RX ORDER — OXYCODONE HYDROCHLORIDE 5 MG/1
5 TABLET ORAL EVERY 4 HOURS
Refills: 0 | Status: DISCONTINUED | OUTPATIENT
Start: 2021-02-13 | End: 2021-02-14

## 2021-02-13 RX ORDER — LOSARTAN POTASSIUM 100 MG/1
25 TABLET, FILM COATED ORAL DAILY
Refills: 0 | Status: DISCONTINUED | OUTPATIENT
Start: 2021-02-13 | End: 2021-02-14

## 2021-02-13 RX ORDER — ACETAMINOPHEN 500 MG
650 TABLET ORAL ONCE
Refills: 0 | Status: DISCONTINUED | OUTPATIENT
Start: 2021-02-13 | End: 2021-02-13

## 2021-02-13 RX ORDER — LOPERAMIDE HCL 2 MG
2 TABLET ORAL EVERY 4 HOURS
Refills: 0 | Status: DISCONTINUED | OUTPATIENT
Start: 2021-02-13 | End: 2021-02-14

## 2021-02-13 RX ORDER — LEVOTHYROXINE SODIUM 125 MCG
75 TABLET ORAL DAILY
Refills: 0 | Status: DISCONTINUED | OUTPATIENT
Start: 2021-02-13 | End: 2021-02-14

## 2021-02-13 RX ORDER — PANTOPRAZOLE SODIUM 20 MG/1
40 TABLET, DELAYED RELEASE ORAL
Refills: 0 | Status: DISCONTINUED | OUTPATIENT
Start: 2021-02-13 | End: 2021-02-14

## 2021-02-13 RX ORDER — FLUTICASONE PROPIONATE 50 MCG
1 SPRAY, SUSPENSION NASAL
Refills: 0 | Status: DISCONTINUED | OUTPATIENT
Start: 2021-02-13 | End: 2021-02-14

## 2021-02-13 RX ORDER — BUDESONIDE, MICRONIZED 100 %
0.25 POWDER (GRAM) MISCELLANEOUS
Refills: 0 | Status: DISCONTINUED | OUTPATIENT
Start: 2021-02-13 | End: 2021-02-14

## 2021-02-13 RX ADMIN — Medication 975 MILLIGRAM(S): at 23:25

## 2021-02-13 RX ADMIN — OXYCODONE HYDROCHLORIDE 5 MILLIGRAM(S): 5 TABLET ORAL at 23:25

## 2021-02-13 RX ADMIN — SENNA PLUS 2 TABLET(S): 8.6 TABLET ORAL at 23:25

## 2021-02-13 RX ADMIN — SODIUM CHLORIDE 125 MILLILITER(S): 9 INJECTION INTRAMUSCULAR; INTRAVENOUS; SUBCUTANEOUS at 23:26

## 2021-02-13 RX ADMIN — Medication 300 MILLIGRAM(S): at 17:28

## 2021-02-13 NOTE — ED PROVIDER NOTE - OBJECTIVE STATEMENT
89 yo female PMhx 89 yo female PMhx multiple falls, PE, afib on eliquis, dementia, hypothyroidism, HTN, HLD, presents to the ED BIBEMS from nursing home for fall. 89 yo female PMhx multiple falls, PE, afib on eliquis, dementia, hypothyroidism, HTN, HLD, presents to the ED BIBEMS from nursing home for fall. Pt states she was getting out of her wheelchair to attempt to go to bathroom and fell onto floor and hit L back of her head on the floor, sat up and noted bleeding and was helped off floor by staff. Hx corroborated w/ staff over the phone, confirm pt had unwitnessed fall, staff came in and found her awake and sitting up on the floor c/o bleeding from head and leg pain. Per staff she was last seen sitting in wheelchair ~10 minutes beforehand. Currently c/o pain in both hips/thighs, worse with movement. Denies LOC, cp, sob, dizziness, headache, n/v, abd pain, neck pain, back pain.

## 2021-02-13 NOTE — ED PROVIDER NOTE - SKIN WOUND DESCRIPTION
+small superficial abrasion noted to L occiput with scant dark red blood oozing, no open laceration.

## 2021-02-13 NOTE — ED PROVIDER NOTE - PROGRESS NOTE DETAILS
Per chart review, tdap given in 2017 in system. - Arley Grant PA-C +prox femur fx on xray review, final read pending. Ortho made aware of consult. - Arley Grant PA-C

## 2021-02-13 NOTE — ED PROVIDER NOTE - CPE EDP NEURO NORM
Danny Dunbar MD [facility]I [facility]                         02/14/2019    HPI:  Nelsy Marino is a 78 y.o. female with   Patient Active Problem List   Diagnosis    Lichen sclerosus et atrophicus    being managed by PCP and specialists who is here today for evaluation of RLE pain.  She is s/p fem-fem approx 20 yrs ago without issues until recently.  Fem-fem that was performed for RLE ALI was noted to be occluded on US at [facility].  Was scheduled for an angiogram at [facility] although was unable to proceed due to insurance purposes.  Patient states location is R calf and thigh occurring for 2 mo.  Associated signs and symptoms include none.  Quality is throbbing and severity is 9/10 at worst.  Symptoms began 2 mo ago.  Alleviating factors include rest.  Worsening factors include ambulation.  Pt states RLE claudication x 1 block.  States it is lifestyle limiting.     no MI  no Stroke  Tobacco use: quit 35 yrs ago    1/14/19: cont to c/o RLE pain, 1/2 block calf claudication.  Intermittent rest pain, no tissue loss.  Compliant with Pletal, has not noticed significant improvement.    Interval history:  Cont to experience 1/2 block R calf claudication.  No wounds.      Past Medical History:   Diagnosis Date    Diabetes mellitus     Hypertension     Miscarriage      Past Surgical History:   Procedure Laterality Date    HYSTERECTOMY      ?unsure cervix present     Family History   Problem Relation Age of Onset    Diabetes Father     Hypertension Father      Social History     Socioeconomic History    Marital status:      Spouse name: Not on file    Number of children: Not on file    Years of education: Not on file    Highest education level: Not on file   Social Needs    Financial resource strain: Not on file    Food insecurity - worry: Not on file    Food insecurity - inability: Not on file    Transportation needs - medical: Not on file     "Transportation needs - non-medical: Not on file   Occupational History    Not on file   Tobacco Use    Smoking status: Former Smoker     Last attempt to quit:      Years since quittin.1    Smokeless tobacco: Never Used   Substance and Sexual Activity    Alcohol use: No    Drug use: No    Sexual activity: No   Other Topics Concern    Not on file   Social History Narrative    Not on file       Current Outpatient Medications:     amlodipine (NORVASC) 10 MG tablet, Take 10 mg by mouth once daily., Disp: , Rfl:     aspirin (ECOTRIN) 81 MG EC tablet, Take 81 mg by mouth once daily., Disp: , Rfl:     BLOOD SUGAR DIAGNOSTIC (TRUE METRIX GLUCOSE TEST STRIP MISC), by Misc.(Non-Drug; Combo Route) route., Disp: , Rfl:     cilostazol (PLETAL) 50 MG Tab, Take 1 tablet (50 mg total) by mouth 2 (two) times daily. If patient tolerate medication after 4 weeks, increase dose to 100 mg twice daily., Disp: 60 tablet, Rfl: 11    diphenhydrAMINE (BENADRYL) 25 mg capsule, Take 25 mg by mouth every 6 (six) hours as needed for Itching., Disp: , Rfl:     glipiZIDE (GLUCOTROL) 10 MG tablet, Take 10 mg by mouth 2 (two) times daily before meals., Disp: , Rfl:     hydrOXYzine pamoate (VISTARIL) 25 MG Cap, Take 1 capsule (25 mg total) by mouth every evening., Disp: 30 capsule, Rfl: 2    insulin aspart protamine-insulin aspart (NOVOLOG 70/30) 100 unit/mL (70-30) InPn pen, Inject into the skin 2 (two) times daily before meals., Disp: , Rfl:     insulin syringe-needle,dispos. 0.3 mL 30 gauge x 5/16" Syrg, by Misc.(Non-Drug; Combo Route) route., Disp: , Rfl:     losartan-hydrochlorothiazide 100-25 mg (HYZAAR) 100-25 mg per tablet, Take 1 tablet by mouth once daily., Disp: , Rfl:     metformin (GLUCOPHAGE) 1000 MG tablet, Take 1,000 mg by mouth 2 (two) times daily with meals., Disp: , Rfl:     acetaminophen (TYLENOL) 500 MG tablet, Take 500 mg by mouth once daily., Disp: , Rfl:     REVIEW OF SYSTEMS:  General: No fevers or " chills; ENT: No sore throat; Allergy and Immunology: no persistent infections; Hematological and Lymphatic: No history of bleeding or easy bruising; Endocrine: negative; Respiratory: no cough, shortness of breath, or wheezing; Cardiovascular: no chest pain or dyspnea on exertion; Gastrointestinal: no abdominal pain/back, change in bowel habits, or bloody stools; Genito-Urinary: no dysuria, trouble voiding, or hematuria; Musculoskeletal: negative; Neurological: no TIA or stroke symptoms; Psychiatric: no nervousness, anxiety or depression.    PHYSICAL EXAM:   Right Arm BP - Sittin/58 (19 1342)  Left Arm BP - Sittin/60 (19 1342)            General appearance:  Alert, well-appearing, and in no distress.  Oriented to person, place, and time                    Neurological: Normal speech, no focal findings noted; CN II - XII grossly intact. RLE with sensation to light touch, LLE with sensation to light touch.            Musculoskeletal: Digits/nail without cyanosis/clubbing.  Strength 5/5 BLE.                    Neck: Supple, no significant adenopathy                  Chest:  Clear to auscultation, no wheezes, rales or rhonchi, symmetric air entry. No use of accessory muscles               Cardiac: Normal rate and regular rhythm, S1 and S2 normal            Abdomen: Soft, nontender, nondistended, no masses or organomegaly, no hernia     No rebound tenderness noted; bowel sounds normal     No groin adenopathy      Extremities:   1+ R femoral pulse, 2+ L femoral pulse     doppler+ R popliteal pulse, doppler+ L popliteal pulse     doppler+ R PT pulse, doppler+ L PT pulse     doppler+ R DP pulse, doppler+ L DP pulse     no RLE edema, no LLE edema    Skin: RLE without tissue loss; LLE without tissue loss    LAB RESULTS:  No results found for: CBC  Lab Results   Component Value Date    LABPROT 10.7 2018    INR 1.0 2018     Lab Results   Component Value Date     2019    K 5.5 (H)  01/29/2019     01/29/2019    CO2 24 01/29/2019     (H) 01/29/2019    BUN 28 (H) 01/29/2019    CREATININE 1.2 01/29/2019    CALCIUM 9.7 01/29/2019    ANIONGAP 7 (L) 01/29/2019    EGFRNONAA 43 (A) 01/29/2019     Lab Results   Component Value Date    WBC 6.75 11/08/2018    RBC 4.41 11/08/2018    HGB 10.9 (L) 11/08/2018    HCT 34.1 (L) 11/08/2018    MCV 77 (L) 11/08/2018    MCH 24.7 (L) 11/08/2018    MCHC 32.0 11/08/2018    RDW 15.7 (H) 11/08/2018     11/08/2018    MPV 10.0 11/08/2018    GRAN 4.1 11/08/2018    GRAN 60.1 11/08/2018    LYMPH 2.0 11/08/2018    LYMPH 29.8 11/08/2018    MONO 0.4 11/08/2018    MONO 6.4 11/08/2018    EOS 0.2 11/08/2018    BASO 0.01 11/08/2018    EOSINOPHIL 3.6 11/08/2018    BASOPHIL 0.1 11/08/2018    DIFFMETHOD Automated 11/08/2018     .No results found for: HGBA1C    IMAGING:  All pertinent imaging has been reviewed and interpreted independently.    11/8/18: US showing occluded fem-fem, decreased velocity R CFA, HD significant stenosis R SFA    [facility] ARCHIE: 0.4/1    1/14/19: ARCHIE R 0.4/1.1, flat L toe waveform, R toe pressure 64 mmHg    2/2019: Stress test without evidence of ischemia.  Echo with normal function.    IMP/PLAN:  78 y.o. female with   Patient Active Problem List   Diagnosis    Lichen sclerosus et atrophicus    being managed by PCP and specialists who is here today for evaluation of RLE claudication, lifestyle limiting.    -RLE claudication remains lifestyle limiting with intermittent rest pain, Cardiology has cleared pt for general anesthesia and revascularization; pt would benefit from R iliofemoral bypass  -Cont ASA, statin and Pletal  -Heart healthy lifestyle  -Cont smoking cessation  -Pt desires to consider her options - will call pt to determine plan of care    I spent 20 minutes evaluating this patient and greater than 50% of the time was spent counseling, coordinator care and discussing the plan of care.  All questions were answered and patient stated  understanding with agreement with the above treatment plan.    Danny Dunbar MD RPVI  Vascular and Endovascular Surgery   normal...

## 2021-02-13 NOTE — ED PROVIDER NOTE - LOWER EXTREMITY EXAM, LEFT
No obvious shortening, IR/ER. +ttp L hip joint and distal L femur without deformity. No bony knee, tib/fib, ankle or foot ttp. Reports pain with passive flexion of leg at hip and flexion/extension of knee. Sensation intact. 2+ DP pulses b/l.

## 2021-02-13 NOTE — ED PROVIDER NOTE - HEAD SHAPE
+2cm x 2cm area of faint ecchymosis and swelling noted to posterior L occiput with small superficial overlying abrasion, minimal oozing, no open laceration./normal cephalic

## 2021-02-13 NOTE — ED PROVIDER NOTE - EXTREMITY EXAM
UE exam: No deformities noted ot UE b/l. No bony or muscle joint ttp UE b/l. Full AROM all joints of UE b/l w/ 5/5 strength./left lower extremity findings/right lower extremity findings

## 2021-02-13 NOTE — ED ADULT NURSE REASSESSMENT NOTE - NS ED NURSE REASSESS COMMENT FT1
Pt A+Ox3 with period of confusion, VSS, admitted to surgery for left hip fracture. Report given to SURESH Kong in ED Holding.

## 2021-02-13 NOTE — ED PROVIDER NOTE - ATTENDING CONTRIBUTION TO CARE
I have personally performed a face to face medical and diagnostic evaluation of the patient. I have discussed with and reviewed the ACP's note and agree with the History, ROS, Physical Exam and MDM unless otherwise indicated. A brief summary of my personal evaluation and impression can be found below.    89F presents from nursing facility w prior hx of falls, afib on AC, dementia, presents with a cc of fall/found down per staff was found approx 15 min prior to fall in front of wheelchair, per pt recalls entire event, +head trauma, no LOC, denies cp, sob, feeling weak prior to fall, reports "tripped over" per pt c/o L>R leg pain, has not ambulated since event. No other complaints.     All other ROS negative, except as above and as per HPI and ROS section.    VITALS: Initial triage and subsequent vitals have been reviewed by me.  GEN APPEARANCE: WDWN, alert, non-toxic, NAD  HEAD: small abrasion to post scalp, bleeding controlled   EYES: PERRLa, EOMI, vision grossly intact.   NECK: Supple  CV: RRR, S1S2, no c/r/m/g. Cap refill <2 seconds. No bruits.   LUNGS: CTAB. No abnormal breath sounds.  ABDOMEN: Soft, NTND. No guarding or rebound.   MSK/EXT: No spinal or extremity point tenderness. L>R Hip pain, diffuse L femur ttp   NEURO: Alert, follows commands.  Speech normal. Sensation and motor normal x4 extremities.   SKIN: Warm, dry and intact. No rash.  PSYCH: Appropriate        Plan/MDM: 89F presents s/p fall on AC w head trauma c/o L>R hip pain. Exam as above, ddx c/f ICH vs hip fx, pt recalls entire event, reports mechanical trip and fall in front of wheelchair, on chart review tetanus up to date last give 2017, will check labs, cth xr hip/LE eval for fx, pain control, reassess thereafter.

## 2021-02-13 NOTE — ED ADULT NURSE NOTE - OBJECTIVE STATEMENT
89 y f came to the ed by ems from nursing home after a mechanical fall. ems reports patient fell out of a wheelchair although patient said she fell while walking. states she lost her balance using her walker. patient c/o left head pain and bilateral distal femur pain. patient states hitting her head and using anticoagulants. patient is a/ox3. denies fevers, chills, chest pain, sob. abdomen is soft and nontender. able to move all extremities although movement elicits pain. skin is warm and dry.

## 2021-02-13 NOTE — ED ADULT NURSE NOTE - NSIMPLEMENTINTERV_GEN_ALL_ED
Implemented All Fall with Harm Risk Interventions:  [place] to call system. Call bell, personal items and telephone within reach. Instruct patient to call for assistance. Room bathroom lighting operational. Non-slip footwear when patient is off stretcher. Physically safe environment: no spills, clutter or unnecessary equipment. Stretcher in lowest position, wheels locked, appropriate side rails in place. Provide visual cue, wrist band, yellow gown, etc. Monitor gait and stability. Monitor for mental status changes and reorient to person, place, and time. Review medications for side effects contributing to fall risk. Reinforce activity limits and safety measures with patient and family. Provide visual clues: red socks.

## 2021-02-13 NOTE — ED ADULT NURSE REASSESSMENT NOTE - NS ED NURSE REASSESS COMMENT FT1
Report received from SURESH Najera. Pt A+Ox3, VSS, c/o fall, found to have hip and pubic fractures. Pt resting comfortably in stretcher, call bell in reach, aware of plan of care. Pending ortho consult.

## 2021-02-13 NOTE — CONSULT NOTE ADULT - PROBLEM SELECTOR RECOMMENDATION 9
No contraindication to scheduled procedure  Pain meds as needed  NPO after MN  DVT and GI prophylaxis

## 2021-02-13 NOTE — CONSULT NOTE ADULT - ASSESSMENT
89 yo woman with a hx of dementia presents after a fall. Patient brought to [facility] for further evaluation where she was found to have a Left hip fx. Patient is scheduled for an Open reduction and internal fixation of the left hip

## 2021-02-14 LAB
ANION GAP SERPL CALC-SCNC: 11 MMOL/L — SIGNIFICANT CHANGE UP (ref 5–17)
ANION GAP SERPL CALC-SCNC: 8 MMOL/L — SIGNIFICANT CHANGE UP (ref 5–17)
APTT BLD: 27.1 SEC — LOW (ref 27.5–35.5)
BLD GP AB SCN SERPL QL: NEGATIVE — SIGNIFICANT CHANGE UP
BUN SERPL-MCNC: 19 MG/DL — SIGNIFICANT CHANGE UP (ref 7–23)
BUN SERPL-MCNC: 21 MG/DL — SIGNIFICANT CHANGE UP (ref 7–23)
CALCIUM SERPL-MCNC: 8.5 MG/DL — SIGNIFICANT CHANGE UP (ref 8.4–10.5)
CALCIUM SERPL-MCNC: 8.9 MG/DL — SIGNIFICANT CHANGE UP (ref 8.4–10.5)
CHLORIDE SERPL-SCNC: 105 MMOL/L — SIGNIFICANT CHANGE UP (ref 96–108)
CHLORIDE SERPL-SCNC: 106 MMOL/L — SIGNIFICANT CHANGE UP (ref 96–108)
CO2 SERPL-SCNC: 23 MMOL/L — SIGNIFICANT CHANGE UP (ref 22–31)
CO2 SERPL-SCNC: 26 MMOL/L — SIGNIFICANT CHANGE UP (ref 22–31)
CREAT SERPL-MCNC: 0.76 MG/DL — SIGNIFICANT CHANGE UP (ref 0.5–1.3)
CREAT SERPL-MCNC: 0.77 MG/DL — SIGNIFICANT CHANGE UP (ref 0.5–1.3)
GLUCOSE SERPL-MCNC: 118 MG/DL — HIGH (ref 70–99)
GLUCOSE SERPL-MCNC: 138 MG/DL — HIGH (ref 70–99)
HCT VFR BLD CALC: 29.2 % — LOW (ref 34.5–45)
HCT VFR BLD CALC: 31.8 % — LOW (ref 34.5–45)
HGB BLD-MCNC: 10 G/DL — LOW (ref 11.5–15.5)
HGB BLD-MCNC: 9.3 G/DL — LOW (ref 11.5–15.5)
INR BLD: 1 RATIO — SIGNIFICANT CHANGE UP (ref 0.88–1.16)
MCHC RBC-ENTMCNC: 31.3 PG — SIGNIFICANT CHANGE UP (ref 27–34)
MCHC RBC-ENTMCNC: 31.4 GM/DL — LOW (ref 32–36)
MCHC RBC-ENTMCNC: 31.5 PG — SIGNIFICANT CHANGE UP (ref 27–34)
MCHC RBC-ENTMCNC: 31.8 GM/DL — LOW (ref 32–36)
MCV RBC AUTO: 99 FL — SIGNIFICANT CHANGE UP (ref 80–100)
MCV RBC AUTO: 99.4 FL — SIGNIFICANT CHANGE UP (ref 80–100)
NRBC # BLD: 0 /100 WBCS — SIGNIFICANT CHANGE UP (ref 0–0)
NRBC # BLD: 0 /100 WBCS — SIGNIFICANT CHANGE UP (ref 0–0)
PLATELET # BLD AUTO: 237 K/UL — SIGNIFICANT CHANGE UP (ref 150–400)
PLATELET # BLD AUTO: 276 K/UL — SIGNIFICANT CHANGE UP (ref 150–400)
POTASSIUM SERPL-MCNC: 4 MMOL/L — SIGNIFICANT CHANGE UP (ref 3.5–5.3)
POTASSIUM SERPL-MCNC: 4.2 MMOL/L — SIGNIFICANT CHANGE UP (ref 3.5–5.3)
POTASSIUM SERPL-SCNC: 4 MMOL/L — SIGNIFICANT CHANGE UP (ref 3.5–5.3)
POTASSIUM SERPL-SCNC: 4.2 MMOL/L — SIGNIFICANT CHANGE UP (ref 3.5–5.3)
PROTHROM AB SERPL-ACNC: 12 SEC — SIGNIFICANT CHANGE UP (ref 10.6–13.6)
RBC # BLD: 2.95 M/UL — LOW (ref 3.8–5.2)
RBC # BLD: 3.2 M/UL — LOW (ref 3.8–5.2)
RBC # FLD: 13.4 % — SIGNIFICANT CHANGE UP (ref 10.3–14.5)
RBC # FLD: 13.5 % — SIGNIFICANT CHANGE UP (ref 10.3–14.5)
RH IG SCN BLD-IMP: POSITIVE — SIGNIFICANT CHANGE UP
SARS-COV-2 IGG SERPL QL IA: NEGATIVE — SIGNIFICANT CHANGE UP
SARS-COV-2 IGM SERPL IA-ACNC: 0.07 INDEX — SIGNIFICANT CHANGE UP
SODIUM SERPL-SCNC: 139 MMOL/L — SIGNIFICANT CHANGE UP (ref 135–145)
SODIUM SERPL-SCNC: 140 MMOL/L — SIGNIFICANT CHANGE UP (ref 135–145)
WBC # BLD: 6.53 K/UL — SIGNIFICANT CHANGE UP (ref 3.8–10.5)
WBC # BLD: 7.79 K/UL — SIGNIFICANT CHANGE UP (ref 3.8–10.5)
WBC # FLD AUTO: 6.53 K/UL — SIGNIFICANT CHANGE UP (ref 3.8–10.5)
WBC # FLD AUTO: 7.79 K/UL — SIGNIFICANT CHANGE UP (ref 3.8–10.5)

## 2021-02-14 RX ORDER — OXYCODONE HYDROCHLORIDE 5 MG/1
2.5 TABLET ORAL EVERY 4 HOURS
Refills: 0 | Status: DISCONTINUED | OUTPATIENT
Start: 2021-02-14 | End: 2021-02-18

## 2021-02-14 RX ORDER — POLYETHYLENE GLYCOL 3350 17 G/17G
17 POWDER, FOR SOLUTION ORAL DAILY
Refills: 0 | Status: DISCONTINUED | OUTPATIENT
Start: 2021-02-14 | End: 2021-02-18

## 2021-02-14 RX ORDER — TRAMADOL HYDROCHLORIDE 50 MG/1
25 TABLET ORAL EVERY 8 HOURS
Refills: 0 | Status: DISCONTINUED | OUTPATIENT
Start: 2021-02-14 | End: 2021-02-18

## 2021-02-14 RX ORDER — BUDESONIDE, MICRONIZED 100 %
0.25 POWDER (GRAM) MISCELLANEOUS
Refills: 0 | Status: DISCONTINUED | OUTPATIENT
Start: 2021-02-14 | End: 2021-02-18

## 2021-02-14 RX ORDER — FLUTICASONE PROPIONATE 50 MCG
1 SPRAY, SUSPENSION NASAL
Refills: 0 | Status: DISCONTINUED | OUTPATIENT
Start: 2021-02-14 | End: 2021-02-18

## 2021-02-14 RX ORDER — OXYCODONE HYDROCHLORIDE 5 MG/1
5 TABLET ORAL EVERY 4 HOURS
Refills: 0 | Status: DISCONTINUED | OUTPATIENT
Start: 2021-02-14 | End: 2021-02-18

## 2021-02-14 RX ORDER — HYDROMORPHONE HYDROCHLORIDE 2 MG/ML
0.25 INJECTION INTRAMUSCULAR; INTRAVENOUS; SUBCUTANEOUS
Refills: 0 | Status: DISCONTINUED | OUTPATIENT
Start: 2021-02-14 | End: 2021-02-14

## 2021-02-14 RX ORDER — ACETAMINOPHEN 500 MG
975 TABLET ORAL EVERY 8 HOURS
Refills: 0 | Status: DISCONTINUED | OUTPATIENT
Start: 2021-02-14 | End: 2021-02-18

## 2021-02-14 RX ORDER — LOPERAMIDE HCL 2 MG
2 TABLET ORAL EVERY 4 HOURS
Refills: 0 | Status: DISCONTINUED | OUTPATIENT
Start: 2021-02-14 | End: 2021-02-18

## 2021-02-14 RX ORDER — SODIUM CHLORIDE 9 MG/ML
1000 INJECTION INTRAMUSCULAR; INTRAVENOUS; SUBCUTANEOUS
Refills: 0 | Status: DISCONTINUED | OUTPATIENT
Start: 2021-02-14 | End: 2021-02-14

## 2021-02-14 RX ORDER — SENNA PLUS 8.6 MG/1
2 TABLET ORAL AT BEDTIME
Refills: 0 | Status: DISCONTINUED | OUTPATIENT
Start: 2021-02-14 | End: 2021-02-14

## 2021-02-14 RX ORDER — APIXABAN 2.5 MG/1
2.5 TABLET, FILM COATED ORAL EVERY 12 HOURS
Refills: 0 | Status: DISCONTINUED | OUTPATIENT
Start: 2021-02-15 | End: 2021-02-18

## 2021-02-14 RX ORDER — ONDANSETRON 8 MG/1
4 TABLET, FILM COATED ORAL ONCE
Refills: 0 | Status: DISCONTINUED | OUTPATIENT
Start: 2021-02-14 | End: 2021-02-14

## 2021-02-14 RX ORDER — MAGNESIUM HYDROXIDE 400 MG/1
30 TABLET, CHEWABLE ORAL DAILY
Refills: 0 | Status: DISCONTINUED | OUTPATIENT
Start: 2021-02-14 | End: 2021-02-18

## 2021-02-14 RX ORDER — PANTOPRAZOLE SODIUM 20 MG/1
40 TABLET, DELAYED RELEASE ORAL
Refills: 0 | Status: DISCONTINUED | OUTPATIENT
Start: 2021-02-14 | End: 2021-02-18

## 2021-02-14 RX ORDER — SODIUM CHLORIDE 9 MG/ML
1000 INJECTION INTRAMUSCULAR; INTRAVENOUS; SUBCUTANEOUS
Refills: 0 | Status: COMPLETED | OUTPATIENT
Start: 2021-02-14 | End: 2021-02-15

## 2021-02-14 RX ORDER — SENNA PLUS 8.6 MG/1
2 TABLET ORAL AT BEDTIME
Refills: 0 | Status: DISCONTINUED | OUTPATIENT
Start: 2021-02-14 | End: 2021-02-18

## 2021-02-14 RX ORDER — LEVOTHYROXINE SODIUM 125 MCG
75 TABLET ORAL DAILY
Refills: 0 | Status: DISCONTINUED | OUTPATIENT
Start: 2021-02-14 | End: 2021-02-18

## 2021-02-14 RX ORDER — LOSARTAN POTASSIUM 100 MG/1
25 TABLET, FILM COATED ORAL DAILY
Refills: 0 | Status: DISCONTINUED | OUTPATIENT
Start: 2021-02-14 | End: 2021-02-18

## 2021-02-14 RX ADMIN — LOSARTAN POTASSIUM 25 MILLIGRAM(S): 100 TABLET, FILM COATED ORAL at 05:04

## 2021-02-14 RX ADMIN — Medication 75 MICROGRAM(S): at 05:04

## 2021-02-14 RX ADMIN — Medication 100 MILLIGRAM(S): at 21:04

## 2021-02-14 RX ADMIN — Medication 975 MILLIGRAM(S): at 21:03

## 2021-02-14 RX ADMIN — SENNA PLUS 2 TABLET(S): 8.6 TABLET ORAL at 21:03

## 2021-02-14 RX ADMIN — PANTOPRAZOLE SODIUM 40 MILLIGRAM(S): 20 TABLET, DELAYED RELEASE ORAL at 05:04

## 2021-02-14 RX ADMIN — Medication 0.25 MILLIGRAM(S): at 17:30

## 2021-02-14 RX ADMIN — Medication 975 MILLIGRAM(S): at 05:04

## 2021-02-14 RX ADMIN — Medication 1 SPRAY(S): at 17:18

## 2021-02-14 RX ADMIN — OXYCODONE HYDROCHLORIDE 2.5 MILLIGRAM(S): 5 TABLET ORAL at 09:23

## 2021-02-14 RX ADMIN — Medication 1 SPRAY(S): at 05:04

## 2021-02-14 RX ADMIN — SODIUM CHLORIDE 60 MILLILITER(S): 9 INJECTION INTRAMUSCULAR; INTRAVENOUS; SUBCUTANEOUS at 14:45

## 2021-02-14 NOTE — CONSULT NOTE ADULT - ATTENDING COMMENTS
Agree with above assessment and plan as outlined above.    - no clinical CHF or unstable cardiac syndromes  - Low risk for hip surgery  - Restart Eliquis when ok with Ortho    Mumtaz Dixon MD, [facility]  BEEPER [phone]

## 2021-02-14 NOTE — H&P ADULT - ASSESSMENT
89y Female with L  intertroch fracture  - Pain control  - NPO/IVF  - CBC/BMP/Coags/UA/T+S x2  - EKG/CXR  - Medically cleared  - consent in chart (son, HCP)  - Plan for OR for ORIF

## 2021-02-14 NOTE — PROGRESS NOTE ADULT - SUBJECTIVE AND OBJECTIVE BOX
89 yo female PMhx multiple falls, PE, afib on eliquis, dementia, hypothyroidism, HTN, HLD, presents to the ED BIBEMS from nursing home for fall. Pt states she was getting out of her wheelchair to attempt to go to bathroom and fell onto floor and hit L back of her head on the floor, sat up and noted bleeding and was helped off floor by staff. Hx corroborated w/ staff over the phone, confirm pt had unwitnessed fall, staff came in and found her awake and sitting up on the floor c/o bleeding from head and leg pain. Per staff she was last seen sitting in wheelchair ~10 minutes beforehand. Currently c/o pain in both hips/thighs, worse with movement. Denies LOC, cp, sob, dizziness, headache, n/v, abd pain, neck pain, back pain. Patient was found to have a left hip fx. Patient is s/p an Open reduction and internal fixation of the right hip. Patient seen post op tolerated procedure well.     MEDICATIONS  (STANDING):  acetaminophen   Tablet .. 975 milliGRAM(s) Oral every 8 hours  buDESOnide    Inhalation Suspension 0.25 milliGRAM(s) Inhalation two times a day  clindamycin IVPB 900 milliGRAM(s) IV Intermittent every 8 hours  fluticasone propionate 50 MICROgram(s)/spray Nasal Spray 1 Spray(s) Both Nostrils two times a day  levothyroxine 75 MICROGram(s) Oral daily  losartan 25 milliGRAM(s) Oral daily  pantoprazole    Tablet 40 milliGRAM(s) Oral before breakfast  polyethylene glycol 3350 17 Gram(s) Oral daily  senna 2 Tablet(s) Oral at bedtime  senna 2 Tablet(s) Oral at bedtime  sodium chloride 0.9%. 1000 milliLiter(s) (60 mL/Hr) IV Continuous <Continuous>    MEDICATIONS  (PRN):  loperamide 2 milliGRAM(s) Oral every 4 hours PRN Diarrhea  magnesium hydroxide Suspension 30 milliLiter(s) Oral daily PRN Constipation  oxyCODONE    IR 2.5 milliGRAM(s) Oral every 4 hours PRN Moderate Pain (4 - 6)  oxyCODONE    IR 5 milliGRAM(s) Oral every 4 hours PRN Severe Pain (7 - 10)  traMADol 25 milliGRAM(s) Oral every 8 hours PRN Mild Pain (1 - 3)          VITALS:   T(C): 36.4 (02-14-21 @ 16:49), Max: 37.1 (02-14-21 @ 00:38)  HR: 63 (02-14-21 @ 16:49) (61 - 80)  BP: 119/73 (02-14-21 @ 16:49) (119/73 - 166/91)  RR: 18 (02-14-21 @ 16:49) (18 - 20)  SpO2: 97% (02-14-21 @ 16:49) (94% - 98%)  Wt(kg): --    PHYSICAL EXAM:  GENERAL: NAD, well-groomed, well-developed  HEAD:  Atraumatic, Normocephalic  EYES: EOMI, PERRLA, conjunctiva and sclera clear  ENMT: No tonsillar erythema, exudates, or enlargement; Moist mucous membranes, Good dentition, No lesions  NECK: Supple, No JVD, Normal thyroid  NERVOUS SYSTEM:  Alert & Oriented X3, Good concentration; Motor Strength 5/5 B/L upper and lower extremities; DTRs 2+ intact and symmetric  CHEST/LUNG: Clear to percussion bilaterally; No rales, rhonchi, wheezing, or rubs  HEART: Regular rate and rhythm; No murmurs, rubs, or gallops  ABDOMEN: Soft, Nontender, Nondistended; Bowel sounds present  EXTREMITIES: Shorting and eternal rotation of the left LE  LYMPH: No lymphadenopathy noted  SKIN: No rashes or lesions    LABS:        CBC Full  -  ( 14 Feb 2021 13:35 )  WBC Count : 7.79 K/uL  RBC Count : 2.95 M/uL  Hemoglobin : 9.3 g/dL  Hematocrit : 29.2 %  Platelet Count - Automated : 237 K/uL  Mean Cell Volume : 99.0 fl  Mean Cell Hemoglobin : 31.5 pg  Mean Cell Hemoglobin Concentration : 31.8 gm/dL  Auto Neutrophil # : x  Auto Lymphocyte # : x  Auto Monocyte # : x  Auto Eosinophil # : x  Auto Basophil # : x  Auto Neutrophil % : x  Auto Lymphocyte % : x  Auto Monocyte % : x  Auto Eosinophil % : x  Auto Basophil % : x    02-14    139  |  105  |  19  ----------------------------<  138<H>  4.0   |  23  |  0.76    Ca    8.5      14 Feb 2021 13:35    TPro  5.9<L>  /  Alb  3.3  /  TBili  0.3  /  DBili  x   /  AST  22  /  ALT  11  /  AlkPhos  72  02-13    LIVER FUNCTIONS - ( 13 Feb 2021 17:15 )  Alb: 3.3 g/dL / Pro: 5.9 g/dL / ALK PHOS: 72 U/L / ALT: 11 U/L / AST: 22 U/L / GGT: x           PT/INR - ( 14 Feb 2021 04:36 )   PT: 12.0 sec;   INR: 1.00 ratio         PTT - ( 14 Feb 2021 04:36 )  PTT:27.1 sec    CAPILLARY BLOOD GLUCOSE          RADIOLOGY & ADDITIONAL TESTS:

## 2021-02-14 NOTE — CONSULT NOTE ADULT - SUBJECTIVE AND OBJECTIVE BOX
HISTORY OF PRESENT ILLNESS:  89y Female hx of  R femur IMN (OSH), multiple falls, PE, afib (Breanna), dementia, hypothyroidism, HTN, [facility] pain and inability to ambulate.  Patient denies headstrike or LOC. Patient denies radiation of pain. Patient denies numbness/tingling/burning in the LLE. No other bone/joint complaints.       PAST MEDICAL & SURGICAL HISTORY:  Skin cancer  lower extremities    Hip fracture  Right hip- 1995    Osteoporosis    Chronic anal fissure    Anal spasm    mild Dementia    Dyslipidemia    Hypertension    H/O: Hypothyroidism    H/O gastroesophageal reflux (GERD)    Anterior Cervical discectomy    left eye Retinal tear repair    After-Cataract of Both Eyes    left Elbow surgey secondary to injury    History of Tonsillectomy    History of  Hip surgery with hardware      PREVIOUS DIAGNOSTIC TESTING:    [ ] Echocardiogram: < from: Transthoracic Echocardiogram (10.14.20 @ 07:06) >  ----------------------------------------------------------------  Conclusions:  1. Calcified trileaflet aortic valve with decreased  opening. Peak transaortic valve gradient equals 16 mm Hg,  mean transaortic valve gradient equals 7 mm Hg, estimated  aortic valve area equals 1.7 sqcm (by continuity equation),  aortic valve velocity time integral equals 40 cm,  consistent with mild aortic stenosis.  2. Normal left ventricular internal dimensions and wall  thicknesses.  3. Hyperdynamic left ventricular systolic function.  4. Estimated pulmonary artery systolic pressure equals 44  mm Hg, assuming right atrial pressure equals 8 mm Hg,  consistent with mild pulmonary pressures.  ------------------------------------------------------------------------  Confirmed on  10/14/2020 - 10:58:30 by PRATIMA Cerrato  ---------------------    < end of copied text >    [ ]  Catheterization:  [ ] Stress Test:  	    MEDICATIONS:  losartan 25 milliGRAM(s) Oral daily      buDESOnide    Inhalation Suspension 0.25 milliGRAM(s) Inhalation two times a day    acetaminophen   Tablet .. 975 milliGRAM(s) Oral every 8 hours  oxyCODONE    IR 2.5 milliGRAM(s) Oral every 4 hours PRN  oxyCODONE    IR 5 milliGRAM(s) Oral every 4 hours PRN  traMADol 25 milliGRAM(s) Oral every 8 hours PRN    loperamide 2 milliGRAM(s) Oral every 4 hours PRN  magnesium hydroxide Suspension 30 milliLiter(s) Oral daily PRN  pantoprazole    Tablet 40 milliGRAM(s) Oral before breakfast  senna 2 Tablet(s) Oral at bedtime    levothyroxine 75 MICROGram(s) Oral daily    fluticasone propionate 50 MICROgram(s)/spray Nasal Spray 1 Spray(s) Both Nostrils two times a day  sodium chloride 0.9%. 1000 milliLiter(s) IV Continuous <Continuous>      Allergies    penicillin (Rash)    Intolerances        FAMILY HISTORY:  No family history of cardiovascular disease        SOCIAL HISTORY:    [ ] Non-smoker  [ ] Smoker  [ ] Alcohol      REVIEW OF SYSTEMS:  [ ]chest pain  [  ]shortness of breath  [  ]palpitations  [  ]syncope  [ ]near syncope [  ]diplopia  [  ]altered mental status   [  ]fevers  [ ]chills [ ]nausea  [ ]vomitting  [ ]abdominal pain  [ ]melena  [ ]BRBPR  [  ]epistaxis  [  ]rash  [  ]lower extremity edema      CONSTITUTIONAL: No fever, weight loss, or fatigue  EYES: No eye pain, visual disturbances, or discharge  ENMT:  No difficulty hearing, tinnitus, vertigo; No sinus or throat pain  NECK: No pain or stiffness  RESPIRATORY: No cough, wheezing, chills or hemoptysis; No Shortness of Breath  CARDIOVASCULAR: No chest pain, palpitations, passing out, dizziness, or leg swelling  GASTROINTESTINAL: No abdominal or epigastric pain. No nausea, vomiting, or hematemesis; No diarrhea or constipation. No melena or hematochezia.  GENITOURINARY: No dysuria, frequency, hematuria, or incontinence  NEUROLOGICAL: No headaches, memory loss, loss of strength, numbness, or tremors  SKIN: No itching, burning, rashes, or lesions   LYMPH Nodes: No enlarged glands  ENDOCRINE: No heat or cold intolerance; No hair loss  MUSCULOSKELETAL: No joint pain or swelling; No muscle, back, or extremity pain  PSYCHIATRIC: No depression, anxiety, mood swings, or difficulty sleeping  HEME/LYMPH: No easy bruising, or bleeding gums  ALLERY AND IMMUNOLOGIC: No hives or eczema	    [x ] All others negative	  [ ] Unable to obtain    PHYSICAL EXAM:  T(C): 36.5 (02-14-21 @ 04:22), Max: 37.4 (02-13-21 @ 17:00)  HR: 70 (02-14-21 @ 04:22) (70 - 80)  BP: 153/74 (02-14-21 @ 04:22) (137/76 - 169/69)  RR: 18 (02-14-21 @ 04:22) (18 - 18)  SpO2: 96% (02-14-21 @ 04:22) (92% - 98%)  Wt(kg): --  I&O's Summary    13 Feb 2021 07:01  -  14 Feb 2021 07:00  --------------------------------------------------------  IN: 300 mL / OUT: 100 mL / NET: 200 mL        Appearance: Normal	  HEENT:   Normal oral mucosa, PERRL, EOMI	  Lymphatic: No lymphadenopathy  Cardiovascular: Normal S1 S2, No JVD, No murmurs, No edema  Respiratory: Lungs clear to auscultation	  Psychiatry: A & O x 3, Mood & affect appropriate  Gastrointestinal:  Soft, Non-tender, + BS	  Skin: No rashes, No ecchymoses, No cyanosis	  Neurologic: Non-focal  Extremities: Normal range of motion, No clubbing, cyanosis or edema  Vascular: Peripheral pulses palpable 2+ bilaterally    TELEMETRY: 	  none   ECG:  	NSR  70, no acute ischemia finding      RADIOLOGY: xr< from: Xray Femur 2 Views, Bilat (02.13.21 @ 18:52) >  IMPRESSION:    Acute comminuted intertrochanteric fracture of the left femur. There is foreshortening and varus angulation.    The patient is status post ORIF of the right femur.  There is noacute right periprosthetic fracture or dislocation.              CLAUDIO GAINES MD; Resident Radiology  This document has been electronically signed.  ANKITA HERNANDES MD; Attending Radiologist  This document     < end of copied text >    OTHER: 	  	  LABS:	 	    CARDIAC MARKERS:                                  10.0   6.53  )-----------( 276      ( 14 Feb 2021 04:36 )             31.8     02-14    140  |  106  |  21  ----------------------------<  118<H>  4.2   |  26  |  0.77    Ca    8.9      14 Feb 2021 04:36    TPro  5.9<L>  /  Alb  3.3  /  TBili  0.3  /  DBili  x   /  AST  22  /  ALT  11  /  AlkPhos  72  02-13    proBNP:   Lipid Profile:   HgA1c:   TSH:     ASSESSMENT/PLAN: 	89 female hx of osteoporosis, Anal spasms, chol, HTN, now with fx of left intertroch fx.     ECG with no ischemia finding  No CHF on physical exam   Pt echo noted   BP control with ace   Pt is at low cardiac risk with sx .   D/W Dr Dixon     
89 yo female PMhx multiple falls, PE, afib on eliquis, dementia, hypothyroidism, HTN, HLD, presents to the ED BIBEMS from nursing home for fall. Pt states she was getting out of her wheelchair to attempt to go to bathroom and fell onto floor and hit L back of her head on the floor, sat up and noted bleeding and was helped off floor by staff. Hx corroborated w/ staff over the phone, confirm pt had unwitnessed fall, staff came in and found her awake and sitting up on the floor c/o bleeding from head and leg pain. Per staff she was last seen sitting in wheelchair ~10 minutes beforehand. Currently c/o pain in both hips/thighs, worse with movement. Denies LOC, cp, sob, dizziness, headache, n/v, abd pain, neck pain, back pain. Patient was found to have a left hip fx. Patient is scheduled for an Open reduction and internal fixation of the right hip      PAST MEDICAL & SURGICAL HISTORY:  Skin cancer  lower extremities    Hip fracture  Right hip- 1995    Osteoporosis    Chronic anal fissure    Anal spasm    mild Dementia    Dyslipidemia    Hypertension    H/O: Hypothyroidism    H/O gastroesophageal reflux (GERD)    Anterior Cervical discectomy    left eye Retinal tear repair    After-Cataract of Both Eyes    left Elbow surgey secondary to injury    History of Tonsillectomy    History of  Hip surgery with hardware          MEDICATIONS  (STANDING):  eliquis  prolia  simvastatin        MEDICATIONS  (PRN):    Social Hx:  Tobacco: Neg  ETOH: Neg  Drugs: Neg      Family Hx:  As per my conversation with the patient, non contributory      ROS  CONSTITUTIONAL: No weakness, fevers or chills  EYES/ENT: No visual changes;  No vertigo or throat pain   NECK: No pain or stiffness  RESPIRATORY: No cough, wheezing, hemoptysis; No shortness of breath  CARDIOVASCULAR: No chest pain or palpitations  GASTROINTESTINAL: No abdominal or epigastric pain. No nausea, vomiting, or hematemesis; No diarrhea or constipation. No melena or hematochezia.  GENITOURINARY: No dysuria, frequency or hematuria  NEUROLOGICAL: No numbness or weakness  SKIN: No itching, burning, rashes, or lesions   MUSCULOSKELETAL: left leg pain    INTERVAL HPI/OVERNIGHT EVENTS:  T(C): 36.3 (02-13-21 @ 21:50), Max: 37.4 (02-13-21 @ 17:00)  HR: 77 (02-13-21 @ 21:50) (70 - 80)  BP: 153/99 (02-13-21 @ 21:50) (137/76 - 169/69)  RR: 18 (02-13-21 @ 21:50) (18 - 18)  SpO2: 98% (02-13-21 @ 21:50) (92% - 98%)  Wt(kg): --  I&O's Summary      PHYSICAL EXAM:  GENERAL: NAD, well-groomed, well-developed  HEAD:  Atraumatic, Normocephalic  EYES: EOMI, PERRLA, conjunctiva and sclera clear  ENMT: No tonsillar erythema, exudates, or enlargement; Moist mucous membranes, Good dentition, No lesions  NECK: Supple, No JVD, Normal thyroid  NERVOUS SYSTEM:  Alert & Oriented X3, Good concentration; Motor Strength 5/5 B/L upper and lower extremities; DTRs 2+ intact and symmetric  CHEST/LUNG: Clear to percussion bilaterally; No rales, rhonchi, wheezing, or rubs  HEART: Regular rate and rhythm; No murmurs, rubs, or gallops  ABDOMEN: Soft, Nontender, Nondistended; Bowel sounds present  EXTREMITIES: Shorting and eternal rotation of the left LE  LYMPH: No lymphadenopathy noted  SKIN: No rashes or lesions        LABS:                        10.7   9.28  )-----------( 269      ( 13 Feb 2021 17:15 )             34.1     02-13    140  |  105  |  24<H>  ----------------------------<  147<H>  4.2   |  25  |  0.91    Ca    8.6      13 Feb 2021 17:15    TPro  5.9<L>  /  Alb  3.3  /  TBili  0.3  /  DBili  x   /  AST  22  /  ALT  11  /  AlkPhos  72  02-13    PT/INR - ( 13 Feb 2021 17:15 )   PT: 11.9 sec;   INR: 0.99 ratio         PTT - ( 13 Feb 2021 17:15 )  PTT:25.8 sec    EKG NSR @ 72

## 2021-02-14 NOTE — H&P ADULT - HISTORY OF PRESENT ILLNESS
89y Female hx of  R femur IMN (OSH), multiple falls, PE, afib (Breanna), dementia, hypothyroidism, HTN, [facility] pain and inability to ambulate.  Patient denies headstrike or LOC. Patient denies radiation of pain. Patient denies numbness/tingling/burning in the LLE. No other bone/joint complaints.     PAST MEDICAL & SURGICAL HISTORY:  Skin cancer  lower extremities    Hip fracture  Right hip- 1995    Osteoporosis    Chronic anal fissure    Anal spasm    mild Dementia    Dyslipidemia    Hypertension    H/O: Hypothyroidism    H/O gastroesophageal reflux (GERD)    Anterior Cervical discectomy    left eye Retinal tear repair    After-Cataract of Both Eyes    left Elbow surgey secondary to injury    History of Tonsillectomy    History of  Hip surgery with hardware      MEDICATIONS  (STANDING):  acetaminophen   Tablet .. 975 milliGRAM(s) Oral every 8 hours  buDESOnide    Inhalation Suspension 0.25 milliGRAM(s) Inhalation two times a day  fluticasone propionate 50 MICROgram(s)/spray Nasal Spray 1 Spray(s) Both Nostrils two times a day  levothyroxine 75 MICROGram(s) Oral daily  losartan 25 milliGRAM(s) Oral daily  pantoprazole    Tablet 40 milliGRAM(s) Oral before breakfast  senna 2 Tablet(s) Oral at bedtime  sodium chloride 0.9%. 1000 milliLiter(s) (125 mL/Hr) IV Continuous <Continuous>    MEDICATIONS  (PRN):  loperamide 2 milliGRAM(s) Oral every 4 hours PRN Diarrhea  magnesium hydroxide Suspension 30 milliLiter(s) Oral daily PRN Constipation  oxyCODONE    IR 2.5 milliGRAM(s) Oral every 4 hours PRN Moderate Pain (4 - 6)  oxyCODONE    IR 5 milliGRAM(s) Oral every 4 hours PRN Severe Pain (7 - 10)  traMADol 25 milliGRAM(s) Oral every 8 hours PRN Mild Pain (1 - 3)    Allergies    penicillin (Rash)    Intolerances                              10.7   9.28  )-----------( 269      ( 13 Feb 2021 17:15 )             34.1     02-13    140  |  105  |  24<H>  ----------------------------<  147<H>  4.2   |  25  |  0.91    Ca    8.6      13 Feb 2021 17:15    TPro  5.9<L>  /  Alb  3.3  /  TBili  0.3  /  DBili  x   /  AST  22  /  ALT  11  /  AlkPhos  72  02-13    PT/INR - ( 13 Feb 2021 17:15 )   PT: 11.9 sec;   INR: 0.99 ratio         PTT - ( 13 Feb 2021 17:15 )  PTT:25.8 sec    T(C): 37.1 (02-14-21 @ 00:38), Max: 37.4 (02-13-21 @ 17:00)  HR: 76 (02-14-21 @ 00:38) (70 - 80)  BP: 153/99 (02-13-21 @ 21:50) (137/76 - 169/69)  RR: 18 (02-14-21 @ 00:38) (18 - 18)  SpO2: 97% (02-14-21 @ 00:38) (92% - 98%)  Wt(kg): --    PE   LLE:  Skin intact; No ecchymosis/soft tissue swelling  Compartments soft; + TTP about hip. No TTP to knee/leg/ankle/foot   ROM lmited 2/2 pain   Unable to SLR; + Log Roll/Heel Strike  Motor intact GS/TA/FHL/EHL  SILT L2-S1  DP/PT pulses 2+    RLE/BUE:   No bony TTP; Good ROM w/o pain; Exam Unremarkable    Imaging:  XR demonstrating L intertrochanteric femur fracture

## 2021-02-14 NOTE — CHART NOTE - NSCHARTNOTEFT_GEN_A_CORE
Resting without complaints.  No Chest Pain, SOB, N/V.    T(C): 36.6 (02-14-21 @ 14:40), Max: 37.4 (02-13-21 @ 17:00)  HR: 65 (02-14-21 @ 14:40) (61 - 80)  BP: 127/70 (02-14-21 @ 14:40) (127/70 - 169/69)  RR: 18 (02-14-21 @ 14:40) (18 - 20)  SpO2: 94% (02-14-21 @ 14:40) (92% - 98%)      Exam:  Alert and [place], No Acute Distress  Card: +S1/S2, RRR  Pulm: CTAB  Laterality: L Hip  Dressing: Gauze and surgical film x 3 c/d/i  Calves soft, non-tender bilaterally  +PF/DF/EHL/FHL  SILT  + DP pulse    Xray: Intra-op Fluorscopy: S/P L Hip IM Nail ORIF, prosthesis in place and intact with no signs of brice hardware Fx.                          9.3    7.79  )-----------( 237      ( 14 Feb 2021 13:35 )             29.2    02-14    139  |  105  |  19  ----------------------------<  138<H>  4.0   |  23  |  0.76    Ca    8.5      14 Feb 2021 13:35    TPro  5.9<L>  /  Alb  3.3  /  TBili  0.3  /  DBili  x   /  AST  22  /  ALT  11  /  AlkPhos  72  02-13      A/P: 89y Female S/p  L Femur Fx IM Nail ORIF and L Pubic Rami Fx non operative. VSS. NAD  -PT/OT-WBAT LLE  -IS  -DVT PPx: Apixaban 2.5mg PO BID and SCDs  -Pain Control  -Continue Current Tx  -Dispo planning pending PT recommendations    Ed Sky PA-C  Orthopedic Surgery Team  Team Pager #[phone]

## 2021-02-14 NOTE — PROGRESS NOTE ADULT - ASSESSMENT
89 yo woman with a hx of dementia presents after a fall. Patient brought to [facility] for further evaluation where she was found to have a Left hip fx. Patient is scheduled for an Open reduction and internal fixation of the left hip

## 2021-02-15 LAB
ANION GAP SERPL CALC-SCNC: 10 MMOL/L — SIGNIFICANT CHANGE UP (ref 5–17)
ANION GAP SERPL CALC-SCNC: 12 MMOL/L — SIGNIFICANT CHANGE UP (ref 5–17)
APPEARANCE UR: ABNORMAL
BILIRUB UR-MCNC: NEGATIVE — SIGNIFICANT CHANGE UP
BUN SERPL-MCNC: 20 MG/DL — SIGNIFICANT CHANGE UP (ref 7–23)
BUN SERPL-MCNC: 26 MG/DL — HIGH (ref 7–23)
CALCIUM SERPL-MCNC: 8.6 MG/DL — SIGNIFICANT CHANGE UP (ref 8.4–10.5)
CALCIUM SERPL-MCNC: 8.8 MG/DL — SIGNIFICANT CHANGE UP (ref 8.4–10.5)
CHLORIDE SERPL-SCNC: 106 MMOL/L — SIGNIFICANT CHANGE UP (ref 96–108)
CHLORIDE SERPL-SCNC: 108 MMOL/L — SIGNIFICANT CHANGE UP (ref 96–108)
CK MB BLD-MCNC: 2.3 % — SIGNIFICANT CHANGE UP (ref 0–3.5)
CK MB BLD-MCNC: 2.5 % — SIGNIFICANT CHANGE UP (ref 0–3.5)
CK MB CFR SERPL CALC: 4.3 NG/ML — HIGH (ref 0–3.8)
CK MB CFR SERPL CALC: 4.8 NG/ML — HIGH (ref 0–3.8)
CK SERPL-CCNC: 171 U/L — HIGH (ref 25–170)
CK SERPL-CCNC: 208 U/L — HIGH (ref 25–170)
CO2 SERPL-SCNC: 20 MMOL/L — LOW (ref 22–31)
CO2 SERPL-SCNC: 24 MMOL/L — SIGNIFICANT CHANGE UP (ref 22–31)
COLOR SPEC: YELLOW — SIGNIFICANT CHANGE UP
CREAT SERPL-MCNC: 0.75 MG/DL — SIGNIFICANT CHANGE UP (ref 0.5–1.3)
CREAT SERPL-MCNC: 1.14 MG/DL — SIGNIFICANT CHANGE UP (ref 0.5–1.3)
DIFF PNL FLD: ABNORMAL
GLUCOSE BLDC GLUCOMTR-MCNC: 203 MG/DL — HIGH (ref 70–99)
GLUCOSE SERPL-MCNC: 109 MG/DL — HIGH (ref 70–99)
GLUCOSE SERPL-MCNC: 116 MG/DL — HIGH (ref 70–99)
GLUCOSE UR QL: NEGATIVE — SIGNIFICANT CHANGE UP
HCT VFR BLD CALC: 25.6 % — LOW (ref 34.5–45)
HCT VFR BLD CALC: 29.5 % — LOW (ref 34.5–45)
HGB BLD-MCNC: 8.1 G/DL — LOW (ref 11.5–15.5)
HGB BLD-MCNC: 9.4 G/DL — LOW (ref 11.5–15.5)
KETONES UR-MCNC: NEGATIVE — SIGNIFICANT CHANGE UP
LACTATE SERPL-SCNC: 1 MMOL/L — SIGNIFICANT CHANGE UP (ref 0.7–2)
LACTATE SERPL-SCNC: 2.3 MMOL/L — HIGH (ref 0.7–2)
LEUKOCYTE ESTERASE UR-ACNC: ABNORMAL
MCHC RBC-ENTMCNC: 31.6 GM/DL — LOW (ref 32–36)
MCHC RBC-ENTMCNC: 31.6 PG — SIGNIFICANT CHANGE UP (ref 27–34)
MCHC RBC-ENTMCNC: 31.9 GM/DL — LOW (ref 32–36)
MCHC RBC-ENTMCNC: 32.2 PG — SIGNIFICANT CHANGE UP (ref 27–34)
MCV RBC AUTO: 100 FL — SIGNIFICANT CHANGE UP (ref 80–100)
MCV RBC AUTO: 101 FL — HIGH (ref 80–100)
NITRITE UR-MCNC: NEGATIVE — SIGNIFICANT CHANGE UP
NRBC # BLD: 0 /100 WBCS — SIGNIFICANT CHANGE UP (ref 0–0)
NRBC # BLD: 0 /100 WBCS — SIGNIFICANT CHANGE UP (ref 0–0)
PH UR: 5.5 — SIGNIFICANT CHANGE UP (ref 5–8)
PLATELET # BLD AUTO: 251 K/UL — SIGNIFICANT CHANGE UP (ref 150–400)
PLATELET # BLD AUTO: 268 K/UL — SIGNIFICANT CHANGE UP (ref 150–400)
POTASSIUM SERPL-MCNC: 4.2 MMOL/L — SIGNIFICANT CHANGE UP (ref 3.5–5.3)
POTASSIUM SERPL-MCNC: 4.5 MMOL/L — SIGNIFICANT CHANGE UP (ref 3.5–5.3)
POTASSIUM SERPL-SCNC: 4.2 MMOL/L — SIGNIFICANT CHANGE UP (ref 3.5–5.3)
POTASSIUM SERPL-SCNC: 4.5 MMOL/L — SIGNIFICANT CHANGE UP (ref 3.5–5.3)
PROT UR-MCNC: ABNORMAL
RBC # BLD: 2.56 M/UL — LOW (ref 3.8–5.2)
RBC # BLD: 2.92 M/UL — LOW (ref 3.8–5.2)
RBC # FLD: 13.7 % — SIGNIFICANT CHANGE UP (ref 10.3–14.5)
RBC # FLD: 13.9 % — SIGNIFICANT CHANGE UP (ref 10.3–14.5)
SODIUM SERPL-SCNC: 138 MMOL/L — SIGNIFICANT CHANGE UP (ref 135–145)
SODIUM SERPL-SCNC: 142 MMOL/L — SIGNIFICANT CHANGE UP (ref 135–145)
SP GR SPEC: 1.03 — HIGH (ref 1.01–1.02)
TROPONIN T, HIGH SENSITIVITY RESULT: 22 NG/L — SIGNIFICANT CHANGE UP (ref 0–51)
TROPONIN T, HIGH SENSITIVITY RESULT: 26 NG/L — SIGNIFICANT CHANGE UP (ref 0–51)
UROBILINOGEN FLD QL: NEGATIVE — SIGNIFICANT CHANGE UP
WBC # BLD: 6.62 K/UL — SIGNIFICANT CHANGE UP (ref 3.8–10.5)
WBC # BLD: 8.46 K/UL — SIGNIFICANT CHANGE UP (ref 3.8–10.5)
WBC # FLD AUTO: 6.62 K/UL — SIGNIFICANT CHANGE UP (ref 3.8–10.5)
WBC # FLD AUTO: 8.46 K/UL — SIGNIFICANT CHANGE UP (ref 3.8–10.5)

## 2021-02-15 PROCEDURE — 70450 CT HEAD/BRAIN W/O DYE: CPT | Mod: 26

## 2021-02-15 RX ORDER — SODIUM CHLORIDE 9 MG/ML
500 INJECTION, SOLUTION INTRAVENOUS ONCE
Refills: 0 | Status: COMPLETED | OUTPATIENT
Start: 2021-02-15 | End: 2021-02-15

## 2021-02-15 RX ADMIN — Medication 975 MILLIGRAM(S): at 20:44

## 2021-02-15 RX ADMIN — Medication 1 SPRAY(S): at 05:09

## 2021-02-15 RX ADMIN — SODIUM CHLORIDE 500 MILLILITER(S): 9 INJECTION, SOLUTION INTRAVENOUS at 16:36

## 2021-02-15 RX ADMIN — Medication 975 MILLIGRAM(S): at 05:09

## 2021-02-15 RX ADMIN — Medication 0.25 MILLIGRAM(S): at 05:09

## 2021-02-15 RX ADMIN — Medication 0.25 MILLIGRAM(S): at 18:06

## 2021-02-15 RX ADMIN — SODIUM CHLORIDE 60 MILLILITER(S): 9 INJECTION INTRAMUSCULAR; INTRAVENOUS; SUBCUTANEOUS at 11:25

## 2021-02-15 RX ADMIN — SODIUM CHLORIDE 60 MILLILITER(S): 9 INJECTION INTRAMUSCULAR; INTRAVENOUS; SUBCUTANEOUS at 18:06

## 2021-02-15 RX ADMIN — Medication 75 MICROGRAM(S): at 05:09

## 2021-02-15 RX ADMIN — Medication 975 MILLIGRAM(S): at 12:28

## 2021-02-15 RX ADMIN — Medication 1 SPRAY(S): at 18:05

## 2021-02-15 RX ADMIN — TRAMADOL HYDROCHLORIDE 25 MILLIGRAM(S): 50 TABLET ORAL at 15:48

## 2021-02-15 RX ADMIN — Medication 100 MILLIGRAM(S): at 05:28

## 2021-02-15 RX ADMIN — POLYETHYLENE GLYCOL 3350 17 GRAM(S): 17 POWDER, FOR SOLUTION ORAL at 11:23

## 2021-02-15 RX ADMIN — APIXABAN 2.5 MILLIGRAM(S): 2.5 TABLET, FILM COATED ORAL at 05:09

## 2021-02-15 RX ADMIN — PANTOPRAZOLE SODIUM 40 MILLIGRAM(S): 20 TABLET, DELAYED RELEASE ORAL at 05:09

## 2021-02-15 RX ADMIN — APIXABAN 2.5 MILLIGRAM(S): 2.5 TABLET, FILM COATED ORAL at 18:04

## 2021-02-15 RX ADMIN — SENNA PLUS 2 TABLET(S): 8.6 TABLET ORAL at 20:44

## 2021-02-15 RX ADMIN — LOSARTAN POTASSIUM 25 MILLIGRAM(S): 100 TABLET, FILM COATED ORAL at 05:08

## 2021-02-15 NOTE — PHYSICAL THERAPY INITIAL EVALUATION ADULT - PERTINENT HX OF CURRENT PROBLEM, REHAB EVAL
Pt is a 89y Female h/o  R femur IMN (OSH), multiple falls, PE, afib (Breanna), dementia, hypothyroidism, HTN, HLD presents s/p mech fall c/o severe L hip pain and inability to ambulate.  Patient denies headstrike or LOC. Patient denies radiation of pain. Patient denies numbness/tingling/burning in the LLE. No other bone/joint complaints. Pt is a 89y Female h/o  R femur IMN (OSH), multiple falls, PE, afib (Breanna), dementia, hypothyroidism, HTN, HLD presents s/p me[facility] fall c/o severe L hip pain and inability to ambulate.  Patient denies headstrike or LOC. Patient denies radiation of pain. Patient denies numbness/tingling/burning in the LLE. No other bone/joint complaints.  XR demonstrating L intertrochanteric femur fracture.

## 2021-02-15 NOTE — PROGRESS NOTE ADULT - SUBJECTIVE AND OBJECTIVE BOX
89 yo female PMhx multiple falls, PE, afib on eliquis, dementia, hypothyroidism, HTN, HLD, presents to the ED BIBEMS from nursing home for fall. Pt states she was getting out of her wheelchair to attempt to go to bathroom and fell onto floor and hit L back of her head on the floor, sat up and noted bleeding and was helped off floor by staff. Hx corroborated w/ staff over the phone, confirm pt had unwitnessed fall, staff came in and found her awake and sitting up on the floor c/o bleeding from head and leg pain. Per staff she was last seen sitting in wheelchair ~10 minutes beforehand. Currently c/o pain in both hips/thighs, worse with movement. Denies LOC, cp, sob, dizziness, headache, n/v, abd pain, neck pain, back pain. Patient was found to have a left hip fx. Patient is s/p an Open reduction and internal fixation of the right hip. Patient seen post op tolerated procedure well. Seen resting comfortably. Patient has been slightly more confused       MEDICATIONS  (STANDING):  acetaminophen   Tablet .. 975 milliGRAM(s) Oral every 8 hours  apixaban 2.5 milliGRAM(s) Oral every 12 hours  buDESOnide    Inhalation Suspension 0.25 milliGRAM(s) Inhalation two times a day  fluticasone propionate 50 MICROgram(s)/spray Nasal Spray 1 Spray(s) Both Nostrils two times a day  levothyroxine 75 MICROGram(s) Oral daily  losartan 25 milliGRAM(s) Oral daily  pantoprazole    Tablet 40 milliGRAM(s) Oral before breakfast  polyethylene glycol 3350 17 Gram(s) Oral daily  senna 2 Tablet(s) Oral at bedtime  sodium chloride 0.9%. 1000 milliLiter(s) (60 mL/Hr) IV Continuous <Continuous>    MEDICATIONS  (PRN):  loperamide 2 milliGRAM(s) Oral every 4 hours PRN Diarrhea  magnesium hydroxide Suspension 30 milliLiter(s) Oral daily PRN Constipation  oxyCODONE    IR 2.5 milliGRAM(s) Oral every 4 hours PRN Moderate Pain (4 - 6)  oxyCODONE    IR 5 milliGRAM(s) Oral every 4 hours PRN Severe Pain (7 - 10)  traMADol 25 milliGRAM(s) Oral every 8 hours PRN Mild Pain (1 - 3)          VITALS:   T(C): 36.7 (02-15-21 @ 13:05), Max: 36.7 (02-15-21 @ 09:20)  HR: 70 (02-15-21 @ 13:05) (63 - 79)  BP: 100/62 (02-15-21 @ 13:05) (100/62 - 131/83)  RR: 18 (02-15-21 @ 13:05) (16 - 18)  SpO2: 95% (02-15-21 @ 13:05) (95% - 99%)  Wt(kg): --      PHYSICAL EXAM:  GENERAL: NAD, well-groomed, well-developed  HEAD:  Atraumatic, Normocephalic  EYES: EOMI, PERRLA, conjunctiva and sclera clear  ENMT: No tonsillar erythema, exudates, or enlargement; Moist mucous membranes, Good dentition, No lesions  NECK: Supple, No JVD, Normal thyroid  NERVOUS SYSTEM:  Alert & Oriented X3, Good concentration; Motor Strength 5/5 B/L upper and lower extremities; DTRs 2+ intact and symmetric  CHEST/LUNG: Clear to percussion bilaterally; No rales, rhonchi, wheezing, or rubs  HEART: Regular rate and rhythm; No murmurs, rubs, or gallops  ABDOMEN: Soft, Nontender, Nondistended; Bowel sounds present  EXTREMITIES: Shorting and eternal rotation of the left LE  LYMPH: No lymphadenopathy noted  SKIN: No rashes or lesions  LABS:        CBC Full  -  ( 15 Feb 2021 07:44 )  WBC Count : 6.62 K/uL  RBC Count : 2.92 M/uL  Hemoglobin : 9.4 g/dL  Hematocrit : 29.5 %  Platelet Count - Automated : 251 K/uL  Mean Cell Volume : 101.0 fl  Mean Cell Hemoglobin : 32.2 pg  Mean Cell Hemoglobin Concentration : 31.9 gm/dL  Auto Neutrophil # : x  Auto Lymphocyte # : x  Auto Monocyte # : x  Auto Eosinophil # : x  Auto Basophil # : x  Auto Neutrophil % : x  Auto Lymphocyte % : x  Auto Monocyte % : x  Auto Eosinophil % : x  Auto Basophil % : x    02-15    142  |  108  |  20  ----------------------------<  109<H>  4.2   |  24  |  0.75    Ca    8.8      15 Feb 2021 07:44    TPro  5.9<L>  /  Alb  3.3  /  TBili  0.3  /  DBili  x   /  AST  22  /  ALT  11  /  AlkPhos  72  02-13    LIVER FUNCTIONS - ( 13 Feb 2021 17:15 )  Alb: 3.3 g/dL / Pro: 5.9 g/dL / ALK PHOS: 72 U/L / ALT: 11 U/L / AST: 22 U/L / GGT: x           PT/INR - ( 14 Feb 2021 04:36 )   PT: 12.0 sec;   INR: 1.00 ratio         PTT - ( 14 Feb 2021 04:36 )  PTT:27.1 sec    CAPILLARY BLOOD GLUCOSE      POCT Blood Glucose.: 203 mg/dL (15 Feb 2021 14:41)      RADIOLOGY & ADDITIONAL TESTS:

## 2021-02-15 NOTE — PHYSICAL THERAPY INITIAL EVALUATION ADULT - ACTIVE RANGE OF MOTION EXAMINATION, REHAB EVAL
R shoulder flexion 0~40 degrees, L shoulder flexion 0~90 degrees, limited L hip/knee AROM 2/2 pain/deficits as listed below

## 2021-02-15 NOTE — PHYSICAL THERAPY INITIAL EVALUATION ADULT - PRECAUTIONS/LIMITATIONS, REHAB EVAL
pertinent hx continued... Pt s/p L hip IMN 2/14/21 pertinent hx continued... Pt s/p L hip IMN 2/14/21/fall precautions

## 2021-02-15 NOTE — OCCUPATIONAL THERAPY INITIAL EVALUATION ADULT - STRENGTHENING, PT EVAL
GOAL: Pt will increase BUE/BLE strength by 1/2 grade in order to increase safety and independence with functional mobility/ADLs in 4 weeks

## 2021-02-15 NOTE — OCCUPATIONAL THERAPY INITIAL EVALUATION ADULT - TRANSFER TRAINING, PT EVAL
GOAL: Pt will perform toilet tx with CGAx1 in 4 weeks; GOAL: Pt will perform STS transfer with CGAx1 in 4 weeks

## 2021-02-15 NOTE — PROVIDER CONTACT NOTE (CHANGE IN STATUS NOTIFICATION) - SITUATION
RRT called for change in AMS. Pt with new onset of repeated babbling and unable to state her name or date of birth as she did just 10 mins ago.

## 2021-02-15 NOTE — CHART NOTE - NSCHARTNOTEFT_GEN_A_CORE
Called for RRT for AMS.  Arrived at bedside, Rapid team performing evaluation.  Per rapid team, patient has had episodes similar to this on previous hospitalizations.  Patient was confused and perseverating but awake and following commands.  Spoke to patient's son Damien, who confirmed patient has intermittent confusion at baseline, and suspects onset of dementia.  Left at end of RRT.  To follow up orders/recommendations of rapid team including labwork and CT head.    ALANNAH Mcwilliams PA-C  #[id number]

## 2021-02-15 NOTE — OCCUPATIONAL THERAPY INITIAL EVALUATION ADULT - RANGE OF MOTION EXAMINATION, UPPER EXTREMITY
Left UE Active ROM was WFL (within functional limits)/Right UE Active Assistive ROM was WNL (within normal limits)

## 2021-02-15 NOTE — PROGRESS NOTE ADULT - SUBJECTIVE AND OBJECTIVE BOX
S: Resting comfortably in chair, reported some neck pain otherwise denies chest pain, palpitations, or SOB. Review of systems otherwise (-)    Review of Systems:   Constitutional: [ ] fevers, [ ] chills.   Skin: [ ] dry skin. [ ] rashes.  Psychiatric: [ ] depression, [ ] anxiety.   Gastrointestinal: [ ] BRBPR, [ ] melena.   Neurological: [ ] confusion. [ ] seizures. [ ] shuffling gait.   Ears,Nose,Mouth and Throat: [ ] ear pain [ ] sore throat.   Eyes: [ ] diplopia.   Respiratory: [ ] hemoptysis. [ ] shortness of breath  Cardiovascular: See HPI above  Hematologic/Lymphatic: [ ] anemia. [ ] painful nodes. [ ] prolonged bleeding.   Genitourinary: [ ] hematuria. [ ] flank pain.   Endocrine: [ ] significant change in weight. [ ] intolerance to heat and cold.     Review of systems [x ] otherwise negative, [ ] otherwise unable to obtain    FH: no family history of sudden cardiac death in first degree relatives    SH: [ ] tobacco, [ ] alcohol, [ ] drugs    acetaminophen   Tablet .. 975 milliGRAM(s) Oral every 8 hours  apixaban 2.5 milliGRAM(s) Oral every 12 hours  buDESOnide    Inhalation Suspension 0.25 milliGRAM(s) Inhalation two times a day  fluticasone propionate 50 MICROgram(s)/spray Nasal Spray 1 Spray(s) Both Nostrils two times a day  levothyroxine 75 MICROGram(s) Oral daily  loperamide 2 milliGRAM(s) Oral every 4 hours PRN  losartan 25 milliGRAM(s) Oral daily  magnesium hydroxide Suspension 30 milliLiter(s) Oral daily PRN  oxyCODONE    IR 2.5 milliGRAM(s) Oral every 4 hours PRN  oxyCODONE    IR 5 milliGRAM(s) Oral every 4 hours PRN  pantoprazole    Tablet 40 milliGRAM(s) Oral before breakfast  polyethylene glycol 3350 17 Gram(s) Oral daily  senna 2 Tablet(s) Oral at bedtime  sodium chloride 0.9%. 1000 milliLiter(s) IV Continuous <Continuous>  traMADol 25 milliGRAM(s) Oral every 8 hours PRN                            9.4    6.62  )-----------( 251      ( 15 Feb 2021 07:44 )             29.5       02-15    142  |  108  |  20  ----------------------------<  109<H>  4.2   |  24  |  0.75    Ca    8.8      15 Feb 2021 07:44    TPro  5.9<L>  /  Alb  3.3  /  TBili  0.3  /  DBili  x   /  AST  22  /  ALT  11  /  AlkPhos  72  02-13            T(C): 36.7 (02-15-21 @ 13:05), Max: 36.7 (02-15-21 @ 09:20)  HR: 70 (02-15-21 @ 13:05) (61 - 79)  BP: 100/62 (02-15-21 @ 13:05) (100/62 - 138/69)  RR: 18 (02-15-21 @ 13:05) (16 - 20)  SpO2: 95% (02-15-21 @ 13:05) (94% - 99%)  Wt(kg): --    I&O's Summary    14 Feb 2021 07:01  -  15 Feb 2021 07:00  --------------------------------------------------------  IN: 1180 mL / OUT: 920 mL / NET: 260 mL    15 Feb 2021 07:01  -  15 Feb 2021 13:28  --------------------------------------------------------  IN: 580 mL / OUT: 25 mL / NET: 555 mL      General: Well nourished in no acute distress. Alert and Oriented * 3.   Head: Normocephalic and atraumatic.   Neck: No JVD. No bruits. Supple. Does not appear to be enlarged.   Cardiovascular: + S1,S2 ; RRR Soft systolic murmur at the left lower sternal border. No rubs noted.    Lungs: CTA b/l. No rhonchi, rales or wheezes.   Abdomen: + BS, soft. Non tender. Non distended. No rebound. No guarding.   Extremities: No clubbing/cyanosis/edema.   Neurologic: Moves all four extremities. Full range of motion.   Skin: Warm and moist. The patient's skin has normal elasticity and good skin turgor.   Psychiatric: Appropriate mood and affect.  Musculoskeletal: Normal range of motion, normal strength    ECG:  NSR  70, no acute ischemia    ASSESSMENT/PLAN: Patient is a 89 y/o Female well known to our office (Cardiologist - Dr. Mancera) with PMH of hypertension, hyperlipidemia, hypothyroidism, unexplained syncope s/p ILR which detected Afib now on Eliquis as well as for history of PE who presented with fx of left intertroch fx. Now s/p L Hip IMN.    - Tolerated procedure well from CV perspective  - ECG with no ischemia  - No evidence of clinical HF or anginal symptoms  - Eliquis resumed per ortho  - Post op care per ortho appreciated  - No further inpatient cardiac w/u planned    Florentin Juarez PA-C  Pager: [phone]

## 2021-02-15 NOTE — RAPID RESPONSE TEAM SUMMARY - NSSITUATIONBACKGROUNDRRT_GEN_ALL_CORE
89 yo female PMhx multiple falls, PE, afib on eliquis, dementia, hypothyroidism, HTN, HLD, presents to the ED BIBEMS from a nursing home for fall found to have Left hip fx s/p ORIF on 2/14.   RRT for acute change in mental status. Pt was A&Ox3 earlier in the day with confusion. Per family, patient has intermittent episodes of confusion.   Upon arrival, pt is A&O1-2 ([facility] and name) without evidence of focal deficits. Vital signs all stable, , afebrile. Pt has not urinated, pending bladder scan.   Plan is bladder scan with straight cath for resdiual and will send a UA, CTh to r/o bleed while on eliquis, CBC, CMP and cardiac enzymes, and EKG.   This is likely delerium in the setting of Dementia, recent OR, possible urinary retention, but will rule out organic cause.

## 2021-02-15 NOTE — PHYSICAL THERAPY INITIAL EVALUATION ADULT - TRANSFER TRAINING, PT EVAL
GOAL: Patient will transfer from sit to stand at rolling walker with CG assist WBAT LLE,  in 2 weeks.

## 2021-02-15 NOTE — PHYSICAL THERAPY INITIAL EVALUATION ADULT - IMPAIRMENTS CONTRIBUTING TO GAIT DEVIATIONS, PT EVAL
Pt with antalgic gait on LLe, anxious repeatedly stating 'I'm going to fall", required verbal cues for sequencing of gait and much encouragement to ambulate to chair./impaired balance/cognition/narrow base of support/pain/impaired postural control/decreased strength

## 2021-02-15 NOTE — PHYSICAL THERAPY INITIAL EVALUATION ADULT - GAIT DEVIATIONS NOTED, PT EVAL
decreased robert/increased time in double stance/decreased step length/decreased stride length/decreased weight-shifting ability

## 2021-02-15 NOTE — PHYSICAL THERAPY INITIAL EVALUATION ADULT - DIAGNOSIS, PT EVAL
Impaired mobility/function secondary to generalized weakness, decreased balance, post-op discomfort, decreased cognition/safety awareness.

## 2021-02-15 NOTE — PROGRESS NOTE ADULT - ASSESSMENT
89 yo woman with a hx of dementia presents after a fall. Patient brought to [facility] for further evaluation where she was found to have a Left hip fx. Patient is scheduled for an Open reduction and internal fixation of the left hip

## 2021-02-15 NOTE — PROGRESS NOTE ADULT - SUBJECTIVE AND OBJECTIVE BOX
Pt S&E. Pain controlled. No acute events overnight        Vital Signs Last 24 Hrs  T(C): 36.4 (15 Feb 2021 04:25), Max: 36.6 (14 Feb 2021 14:40)  T(F): 97.5 (15 Feb 2021 04:25), Max: 97.9 (14 Feb 2021 14:40)  HR: 73 (15 Feb 2021 04:25) (61 - 80)  BP: 129/71 (15 Feb 2021 04:25) (119/73 - 152/63)  BP(mean): 95 (14 Feb 2021 14:15) (87 - 104)  RR: 18 (15 Feb 2021 04:25) (16 - 20)  SpO2: 96% (15 Feb 2021 04:25) (94% - 99%)        PE:       AVSS  Gen: NAD  Left Lower Extremity:  Dsg c/d/i  SILT L2-S1  +EHL/FHL/TA/Gastroc  DP+  Soft compartments, - calf ttp

## 2021-02-15 NOTE — OCCUPATIONAL THERAPY INITIAL EVALUATION ADULT - PRECAUTIONS/LIMITATIONS, REHAB EVAL
Hx corroborated w/ staff over the phone, confirm pt had unwitnessed fall, staff came in and found her awake and sitting up on the floor c/o bleeding from head and leg pain. Per staff she was last seen sitting in wheelchair ~10 minutes beforehand. c/o severe L hip pain and inability to ambulate. Pt now s/p L Hip IMN on 2/14; WBAT/fall precautions

## 2021-02-15 NOTE — OCCUPATIONAL THERAPY INITIAL EVALUATION ADULT - ADDITIONAL COMMENTS
CTH (2/13) negative for acute findings. XRAY Hip/Pelvis (2/13) Acute intertrochanteric fracture of left hip with varus deformity but no dislocation.

## 2021-02-15 NOTE — OCCUPATIONAL THERAPY INITIAL EVALUATION ADULT - PERTINENT HX OF CURRENT PROBLEM, REHAB EVAL
89y Female hx of  R femur IMN (OSH), multiple falls, PE, afib (Breanna), dementia, hypothyroidism, HTN, HLD presents s/p mech fall from nursing home; Pt states she was getting out of her wheelchair to attempt to go to bathroom and fell onto floor and hit L back of her head on the floor, sat up and noted bleeding and was helped off floor by staff.

## 2021-02-15 NOTE — OCCUPATIONAL THERAPY INITIAL EVALUATION ADULT - LIVES WITH, PROFILE
Pt admitted from LTC/SNF. Pt confused at times; reports that she does require assistance for all ADLs

## 2021-02-16 LAB
ANION GAP SERPL CALC-SCNC: 9 MMOL/L — SIGNIFICANT CHANGE UP (ref 5–17)
BUN SERPL-MCNC: 26 MG/DL — HIGH (ref 7–23)
CALCIUM SERPL-MCNC: 8.6 MG/DL — SIGNIFICANT CHANGE UP (ref 8.4–10.5)
CHLORIDE SERPL-SCNC: 109 MMOL/L — HIGH (ref 96–108)
CO2 SERPL-SCNC: 21 MMOL/L — LOW (ref 22–31)
CREAT SERPL-MCNC: 0.93 MG/DL — SIGNIFICANT CHANGE UP (ref 0.5–1.3)
GLUCOSE SERPL-MCNC: 85 MG/DL — SIGNIFICANT CHANGE UP (ref 70–99)
HCT VFR BLD CALC: 22.4 % — LOW (ref 34.5–45)
HGB BLD-MCNC: 7 G/DL — CRITICAL LOW (ref 11.5–15.5)
MCHC RBC-ENTMCNC: 31.3 GM/DL — LOW (ref 32–36)
MCHC RBC-ENTMCNC: 31.5 PG — SIGNIFICANT CHANGE UP (ref 27–34)
MCV RBC AUTO: 100.9 FL — HIGH (ref 80–100)
NRBC # BLD: 0 /100 WBCS — SIGNIFICANT CHANGE UP (ref 0–0)
PLATELET # BLD AUTO: 225 K/UL — SIGNIFICANT CHANGE UP (ref 150–400)
POTASSIUM SERPL-MCNC: 3.9 MMOL/L — SIGNIFICANT CHANGE UP (ref 3.5–5.3)
POTASSIUM SERPL-SCNC: 3.9 MMOL/L — SIGNIFICANT CHANGE UP (ref 3.5–5.3)
RBC # BLD: 2.22 M/UL — LOW (ref 3.8–5.2)
RBC # FLD: 13.9 % — SIGNIFICANT CHANGE UP (ref 10.3–14.5)
SODIUM SERPL-SCNC: 139 MMOL/L — SIGNIFICANT CHANGE UP (ref 135–145)
WBC # BLD: 6.19 K/UL — SIGNIFICANT CHANGE UP (ref 3.8–10.5)
WBC # FLD AUTO: 6.19 K/UL — SIGNIFICANT CHANGE UP (ref 3.8–10.5)

## 2021-02-16 RX ORDER — SODIUM CHLORIDE 9 MG/ML
1000 INJECTION, SOLUTION INTRAVENOUS
Refills: 0 | Status: DISCONTINUED | OUTPATIENT
Start: 2021-02-16 | End: 2021-02-16

## 2021-02-16 RX ADMIN — Medication 0.25 MILLIGRAM(S): at 18:08

## 2021-02-16 RX ADMIN — Medication 975 MILLIGRAM(S): at 21:04

## 2021-02-16 RX ADMIN — POLYETHYLENE GLYCOL 3350 17 GRAM(S): 17 POWDER, FOR SOLUTION ORAL at 13:52

## 2021-02-16 RX ADMIN — APIXABAN 2.5 MILLIGRAM(S): 2.5 TABLET, FILM COATED ORAL at 05:04

## 2021-02-16 RX ADMIN — Medication 0.25 MILLIGRAM(S): at 05:04

## 2021-02-16 RX ADMIN — Medication 1 SPRAY(S): at 18:08

## 2021-02-16 RX ADMIN — SENNA PLUS 2 TABLET(S): 8.6 TABLET ORAL at 21:04

## 2021-02-16 RX ADMIN — Medication 975 MILLIGRAM(S): at 13:52

## 2021-02-16 RX ADMIN — Medication 975 MILLIGRAM(S): at 05:04

## 2021-02-16 RX ADMIN — Medication 75 MICROGRAM(S): at 05:04

## 2021-02-16 RX ADMIN — APIXABAN 2.5 MILLIGRAM(S): 2.5 TABLET, FILM COATED ORAL at 18:08

## 2021-02-16 RX ADMIN — LOSARTAN POTASSIUM 25 MILLIGRAM(S): 100 TABLET, FILM COATED ORAL at 05:04

## 2021-02-16 RX ADMIN — Medication 1 SPRAY(S): at 05:04

## 2021-02-16 RX ADMIN — PANTOPRAZOLE SODIUM 40 MILLIGRAM(S): 20 TABLET, DELAYED RELEASE ORAL at 05:04

## 2021-02-16 NOTE — PROGRESS NOTE ADULT - ASSESSMENT
89 yo woman with a hx of dementia presents after a fall. Patient brought to [facility] for further evaluation where she was found to have a Left hip fx. Patient is scheduled for an Open reduction and internal fixation of the left hip

## 2021-02-16 NOTE — PROGRESS NOTE ADULT - ASSESSMENT
A/P 89y year old female s/p Intramedullary nail fixation of left femur      Pain Control  DVT PPX  PT/OOB  WBAT   Dispo Planning

## 2021-02-16 NOTE — PROGRESS NOTE ADULT - SUBJECTIVE AND OBJECTIVE BOX
S: No distress, denies chest pain, palpitations, or SOB. Review of systems otherwise (-)    Review of Systems:   Constitutional: [ ] fevers, [ ] chills.   Skin: [ ] dry skin. [ ] rashes.  Psychiatric: [ ] depression, [ ] anxiety.   Gastrointestinal: [ ] BRBPR, [ ] melena.   Neurological: [ ] confusion. [ ] seizures. [ ] shuffling gait.   Ears,Nose,Mouth and Throat: [ ] ear pain [ ] sore throat.   Eyes: [ ] diplopia.   Respiratory: [ ] hemoptysis. [ ] shortness of breath  Cardiovascular: See HPI above  Hematologic/Lymphatic: [ ] anemia. [ ] painful nodes. [ ] prolonged bleeding.   Genitourinary: [ ] hematuria. [ ] flank pain.   Endocrine: [ ] significant change in weight. [ ] intolerance to heat and cold.     Review of systems [x ] otherwise negative, [ ] otherwise unable to obtain    FH: no family history of sudden cardiac death in first degree relatives    SH: [ ] tobacco, [ ] alcohol, [ ] drugs      MEDICATIONS  (STANDING):  acetaminophen   Tablet .. 975 milliGRAM(s) Oral every 8 hours  apixaban 2.5 milliGRAM(s) Oral every 12 hours  buDESOnide    Inhalation Suspension 0.25 milliGRAM(s) Inhalation two times a day  fluticasone propionate 50 MICROgram(s)/spray Nasal Spray 1 Spray(s) Both Nostrils two times a day  levothyroxine 75 MICROGram(s) Oral daily  losartan 25 milliGRAM(s) Oral daily  pantoprazole    Tablet 40 milliGRAM(s) Oral before breakfast  polyethylene glycol 3350 17 Gram(s) Oral daily  senna 2 Tablet(s) Oral at bedtime    MEDICATIONS  (PRN):  loperamide 2 milliGRAM(s) Oral every 4 hours PRN Diarrhea  magnesium hydroxide Suspension 30 milliLiter(s) Oral daily PRN Constipation  oxyCODONE    IR 2.5 milliGRAM(s) Oral every 4 hours PRN Moderate Pain (4 - 6)  oxyCODONE    IR 5 milliGRAM(s) Oral every 4 hours PRN Severe Pain (7 - 10)  traMADol 25 milliGRAM(s) Oral every 8 hours PRN Mild Pain (1 - 3)      LABS:                          7.0    6.19  )-----------( 225      ( 16 Feb 2021 07:10 )             22.4     Hemoglobin: 7.0 g/dL (02-16 @ 07:10)  Hemoglobin: 8.1 g/dL (02-15 @ 15:16)  Hemoglobin: 9.4 g/dL (02-15 @ 07:44)  Hemoglobin: 9.3 g/dL (02-14 @ 13:35)  Hemoglobin: 10.0 g/dL (02-14 @ 04:36)    02-16    139  |  109<H>  |  26<H>  ----------------------------<  85  3.9   |  21<L>  |  0.93    Ca    8.6      16 Feb 2021 07:10      Creatinine Trend: 0.93<--, 1.14<--, 0.75<--, 0.76<--, 0.77<--, 0.91<--     CARDIAC MARKERS ( 15 Feb 2021 21:52 )  x     / x     / 208 U/L / x     / 4.8 ng/mL  CARDIAC MARKERS ( 15 Feb 2021 15:16 )  x     / x     / 171 U/L / x     / 4.3 ng/mL          02-15-21 @ 07:01  -  02-16-21 @ 07:00  --------------------------------------------------------  IN: 1720 mL / OUT: 275 mL / NET: 1445 mL    02-16-21 @ 07:01  -  02-16-21 @ 12:44  --------------------------------------------------------  IN: 800 mL / OUT: 0 mL / NET: 800 mL        PHYSICAL EXAM  Vital Signs Last 24 Hrs  T(C): 36.5 (16 Feb 2021 11:30), Max: 36.7 (15 Feb 2021 13:05)  T(F): 97.7 (16 Feb 2021 11:30), Max: 98 (15 Feb 2021 13:05)  HR: 73 (16 Feb 2021 11:30) (69 - 78)  BP: 122/62 (16 Feb 2021 11:30) (100/62 - 137/71)  BP(mean): --  RR: 19 (16 Feb 2021 11:30) (18 - 19)  SpO2: 92% (16 Feb 2021 11:30) (92% - 96%)      General: Well nourished in no acute distress. Alert and Oriented * 3.   Head: Normocephalic and atraumatic.   Neck: No JVD. No bruits. Supple. Does not appear to be enlarged.   Cardiovascular: + S1,S2 ; RRR Soft systolic murmur at the left lower sternal border. No rubs noted.    Lungs: CTA b/l. No rhonchi, rales or wheezes.   Abdomen: + BS, soft. Non tender. Non distended. No rebound. No guarding.   Extremities: No clubbing/cyanosis/edema.   Neurologic: Moves all four extremities. Full range of motion.   Skin: Warm and moist. The patient's skin has normal elasticity and good skin turgor.   Psychiatric: Appropriate mood and affect.  Musculoskeletal: Normal range of motion, normal strength    ECG:  NSR  70, no acute ischemia    ASSESSMENT/PLAN: Patient is a 89 y/o Female well known to our office (Cardiologist - Dr. Mancera) with PMH of hypertension, hyperlipidemia, hypothyroidism, unexplained syncope s/p ILR which detected Afib now on Eliquis as well as for history of PE who presented with fx of left intertroch fx. Now s/p L Hip IMN.    - RRT events noted for AMS  - Tolerated procedure well from CV perspective  - ECG with no ischemia  - No evidence of clinical HF or anginal symptoms  - Continue AC with Eliquis for stroke prevention if okay with ortho  - Post op care per ortho appreciated  - No further inpatient cardiac w/u planned    Florentin Juarez PA-C  Pager: [phone]

## 2021-02-16 NOTE — PROGRESS NOTE ADULT - SUBJECTIVE AND OBJECTIVE BOX
Orthopedic Surgery Progress Note    Patient seen and examined this morning. No acute events overnight. Pain is well controlled. Denies f/c, chest pain, sob, dizziness.    O:  Vital Signs Last 24 Hrs  T(C): 36.4 (16 Feb 2021 05:22), Max: 36.7 (15 Feb 2021 09:20)  T(F): 97.6 (16 Feb 2021 05:22), Max: 98.1 (15 Feb 2021 09:20)  HR: 71 (16 Feb 2021 05:22) (63 - 79)  BP: 137/71 (16 Feb 2021 05:22) (100/62 - 137/71)  BP(mean): --  RR: 18 (16 Feb 2021 05:22) (18 - 18)  SpO2: 95% (16 Feb 2021 05:22) (95% - 96%)    Gen: NAD  LLE  Dressing C/D/I  EHL/FHL/TA/GS intact  SILT DP/SP/EATON/Sa  WWP distally    Labs:                        8.1    8.46  )-----------( 268      ( 15 Feb 2021 15:16 )             25.6                         9.4    6.62  )-----------( 251      ( 15 Feb 2021 07:44 )             29.5       02-15    138  |  106  |  26<H>  ----------------------------<  116<H>  4.5   |  20<L>  |  1.14

## 2021-02-16 NOTE — PROVIDER CONTACT NOTE (CHANGE IN STATUS NOTIFICATION) - ASSESSMENT
Pt with hx of confusion. A&Ox2-3 at baseline Decreased urine output.
pt suddenly confused AMS, Tachycardic to 160. Diaphoretic. Desatting to 88% on RA. Pt placed on o2NC. RRT called. See rapid response sheet.

## 2021-02-16 NOTE — PROGRESS NOTE ADULT - SUBJECTIVE AND OBJECTIVE BOX
89 yo female PMhx multiple falls, PE, afib on eliquis, dementia, hypothyroidism, HTN, HLD, presents to the ED BIBEMS from nursing home for fall. Pt states she was getting out of her wheelchair to attempt to go to bathroom and fell onto floor and hit L back of her head on the floor, sat up and noted bleeding and was helped off floor by staff. Hx corroborated w/ staff over the phone, confirm pt had unwitnessed fall, staff came in and found her awake and sitting up on the floor c/o bleeding from head and leg pain. Per staff she was last seen sitting in wheelchair ~10 minutes beforehand. Currently c/o pain in both hips/thighs, worse with movement. Denies LOC, cp, sob, dizziness, headache, n/v, abd pain, neck pain, back pain. Patient was found to have a left hip fx. Patient is s/p an Open reduction and internal fixation of the right hip. Patient seen post op tolerated procedure well. Seen resting comfortably. RRT called last night due to a change in mental status. w/u was negative. Patient seen today at baseline.       MEDICATIONS  (STANDING):  acetaminophen   Tablet .. 975 milliGRAM(s) Oral every 8 hours  apixaban 2.5 milliGRAM(s) Oral every 12 hours  buDESOnide    Inhalation Suspension 0.25 milliGRAM(s) Inhalation two times a day  fluticasone propionate 50 MICROgram(s)/spray Nasal Spray 1 Spray(s) Both Nostrils two times a day  levothyroxine 75 MICROGram(s) Oral daily  losartan 25 milliGRAM(s) Oral daily  pantoprazole    Tablet 40 milliGRAM(s) Oral before breakfast  polyethylene glycol 3350 17 Gram(s) Oral daily  senna 2 Tablet(s) Oral at bedtime    MEDICATIONS  (PRN):  loperamide 2 milliGRAM(s) Oral every 4 hours PRN Diarrhea  magnesium hydroxide Suspension 30 milliLiter(s) Oral daily PRN Constipation  oxyCODONE    IR 2.5 milliGRAM(s) Oral every 4 hours PRN Moderate Pain (4 - 6)  oxyCODONE    IR 5 milliGRAM(s) Oral every 4 hours PRN Severe Pain (7 - 10)  traMADol 25 milliGRAM(s) Oral every 8 hours PRN Mild Pain (1 - 3)          VITALS:   T(C): 36.2 (02-16-21 @ 09:53), Max: 36.7 (02-15-21 @ 13:05)  HR: 78 (21 @ 09:53) (70 - 78)  BP: 124/68 (21 @ 09:53) (100/62 - 137/71)  RR: 18 (21 @ 09:53) (18 - 18)  SpO2: 94% (21 @ 09:53) (94% - 96%)  Wt(kg): --    PHYSICAL EXAM:  GENERAL: NAD, well-groomed, well-developed  HEAD:  Atraumatic, Normocephalic  EYES: EOMI, PERRLA, conjunctiva and sclera clear  ENMT: No tonsillar erythema, exudates, or enlargement; Moist mucous membranes, Good dentition, No lesions  NECK: Supple, No JVD, Normal thyroid  NERVOUS SYSTEM:  Alert & Oriented X3, Good concentration; Motor Strength 5/5 B/L upper and lower extremities; DTRs 2+ intact and symmetric  CHEST/LUNG: Clear to percussion bilaterally; No rales, rhonchi, wheezing, or rubs  HEART: Regular rate and rhythm; No murmurs, rubs, or gallops  ABDOMEN: Soft, Nontender, Nondistended; Bowel sounds present  EXTREMITIES: Shorting and eternal rotation of the left LE  LYMPH: No lymphadenopathy noted  SKIN: No rashes or lesions    LABS:    CARDIAC MARKERS ( 15 Feb 2021 21:52 )  x     / x     / 208 U/L / x     / 4.8 ng/mL  CARDIAC MARKERS ( 15 Feb 2021 15:16 )  x     / x     / 171 U/L / x     / 4.3 ng/mL      CBC Full  -  ( 2021 07:10 )  WBC Count : 6.19 K/uL  RBC Count : 2.22 M/uL  Hemoglobin : 7.0 g/dL  Hematocrit : 22.4 %  Platelet Count - Automated : 225 K/uL  Mean Cell Volume : 100.9 fl  Mean Cell Hemoglobin : 31.5 pg  Mean Cell Hemoglobin Concentration : 31.3 gm/dL  Auto Neutrophil # : x  Auto Lymphocyte # : x  Auto Monocyte # : x  Auto Eosinophil # : x  Auto Basophil # : x  Auto Neutrophil % : x  Auto Lymphocyte % : x  Auto Monocyte % : x  Auto Eosinophil % : x  Auto Basophil % : x    -    139  |  109<H>  |  26<H>  ----------------------------<  85  3.9   |  21<L>  |  0.93    Ca    8.6      2021 07:10          Urinalysis Basic - ( 15 Feb 2021 16:21 )    Color: Yellow / Appearance: Slightly Turbid / S.026 / pH: x  Gluc: x / Ketone: Negative  / Bili: Negative / Urobili: Negative   Blood: x / Protein: Trace / Nitrite: Negative   Leuk Esterase: Moderate / RBC: 3 /hpf / WBC 3 /HPF   Sq Epi: x / Non Sq Epi: 8 /hpf / Bacteria: Negative      CAPILLARY BLOOD GLUCOSE      POCT Blood Glucose.: 203 mg/dL (15 Feb 2021 14:41)      RADIOLOGY & ADDITIONAL TESTS:

## 2021-02-17 LAB
ANION GAP SERPL CALC-SCNC: 9 MMOL/L — SIGNIFICANT CHANGE UP (ref 5–17)
BUN SERPL-MCNC: 22 MG/DL — SIGNIFICANT CHANGE UP (ref 7–23)
CALCIUM SERPL-MCNC: 9 MG/DL — SIGNIFICANT CHANGE UP (ref 8.4–10.5)
CHLORIDE SERPL-SCNC: 108 MMOL/L — SIGNIFICANT CHANGE UP (ref 96–108)
CO2 SERPL-SCNC: 23 MMOL/L — SIGNIFICANT CHANGE UP (ref 22–31)
CREAT SERPL-MCNC: 0.83 MG/DL — SIGNIFICANT CHANGE UP (ref 0.5–1.3)
GLUCOSE SERPL-MCNC: 79 MG/DL — SIGNIFICANT CHANGE UP (ref 70–99)
HCT VFR BLD CALC: 27.1 % — LOW (ref 34.5–45)
HGB BLD-MCNC: 8.6 G/DL — LOW (ref 11.5–15.5)
MCHC RBC-ENTMCNC: 31.2 PG — SIGNIFICANT CHANGE UP (ref 27–34)
MCHC RBC-ENTMCNC: 31.7 GM/DL — LOW (ref 32–36)
MCV RBC AUTO: 98.2 FL — SIGNIFICANT CHANGE UP (ref 80–100)
NRBC # BLD: 0 /100 WBCS — SIGNIFICANT CHANGE UP (ref 0–0)
PLATELET # BLD AUTO: 250 K/UL — SIGNIFICANT CHANGE UP (ref 150–400)
POTASSIUM SERPL-MCNC: 4.2 MMOL/L — SIGNIFICANT CHANGE UP (ref 3.5–5.3)
POTASSIUM SERPL-SCNC: 4.2 MMOL/L — SIGNIFICANT CHANGE UP (ref 3.5–5.3)
RBC # BLD: 2.76 M/UL — LOW (ref 3.8–5.2)
RBC # FLD: 16.1 % — HIGH (ref 10.3–14.5)
SARS-COV-2 RNA SPEC QL NAA+PROBE: SIGNIFICANT CHANGE UP
SODIUM SERPL-SCNC: 140 MMOL/L — SIGNIFICANT CHANGE UP (ref 135–145)
WBC # BLD: 6.32 K/UL — SIGNIFICANT CHANGE UP (ref 3.8–10.5)
WBC # FLD AUTO: 6.32 K/UL — SIGNIFICANT CHANGE UP (ref 3.8–10.5)

## 2021-02-17 RX ORDER — IPRATROPIUM/ALBUTEROL SULFATE 18-103MCG
3 AEROSOL WITH ADAPTER (GRAM) INHALATION ONCE
Refills: 0 | Status: COMPLETED | OUTPATIENT
Start: 2021-02-17 | End: 2021-02-17

## 2021-02-17 RX ADMIN — APIXABAN 2.5 MILLIGRAM(S): 2.5 TABLET, FILM COATED ORAL at 05:57

## 2021-02-17 RX ADMIN — Medication 1 SPRAY(S): at 05:58

## 2021-02-17 RX ADMIN — Medication 0.25 MILLIGRAM(S): at 17:49

## 2021-02-17 RX ADMIN — POLYETHYLENE GLYCOL 3350 17 GRAM(S): 17 POWDER, FOR SOLUTION ORAL at 07:24

## 2021-02-17 RX ADMIN — OXYCODONE HYDROCHLORIDE 5 MILLIGRAM(S): 5 TABLET ORAL at 03:28

## 2021-02-17 RX ADMIN — SENNA PLUS 2 TABLET(S): 8.6 TABLET ORAL at 21:59

## 2021-02-17 RX ADMIN — Medication 975 MILLIGRAM(S): at 05:57

## 2021-02-17 RX ADMIN — Medication 1 SPRAY(S): at 17:49

## 2021-02-17 RX ADMIN — APIXABAN 2.5 MILLIGRAM(S): 2.5 TABLET, FILM COATED ORAL at 17:49

## 2021-02-17 RX ADMIN — Medication 3 MILLILITER(S): at 02:45

## 2021-02-17 RX ADMIN — Medication 75 MICROGRAM(S): at 05:57

## 2021-02-17 RX ADMIN — MAGNESIUM HYDROXIDE 30 MILLILITER(S): 400 TABLET, CHEWABLE ORAL at 07:23

## 2021-02-17 RX ADMIN — PANTOPRAZOLE SODIUM 40 MILLIGRAM(S): 20 TABLET, DELAYED RELEASE ORAL at 05:57

## 2021-02-17 RX ADMIN — Medication 975 MILLIGRAM(S): at 21:59

## 2021-02-17 RX ADMIN — Medication 0.25 MILLIGRAM(S): at 06:05

## 2021-02-17 RX ADMIN — LOSARTAN POTASSIUM 25 MILLIGRAM(S): 100 TABLET, FILM COATED ORAL at 05:57

## 2021-02-17 RX ADMIN — Medication 975 MILLIGRAM(S): at 14:07

## 2021-02-17 NOTE — PROGRESS NOTE ADULT - SUBJECTIVE AND OBJECTIVE BOX
Orthopedics    Patient seen and examined at bedside, resting comfortably. No acute events overnight. Pain adequately controlled. Patient feeling well. Denies CP/SOB. No nausea or vomiting. No other acute complaints at this time    Vital Signs Last 24 Hrs  T(C): 36.3 (02-17-21 @ 05:52), Max: 36.6 (02-16-21 @ 21:24)  T(F): 97.3 (02-17-21 @ 05:52), Max: 97.8 (02-16-21 @ 21:24)  HR: 77 (02-17-21 @ 05:52) (69 - 78)  BP: 136/63 (02-17-21 @ 05:52) (122/62 - 157/82)  BP(mean): --  RR: 20 (02-17-21 @ 05:52) (18 - 20)  SpO2: 95% (02-17-21 @ 05:52) (92% - 95%)                        7.0    6.19  )-----------( 225      ( 16 Feb 2021 07:10 )             22.4     16 Feb 2021 07:10    139    |  109    |  26     ----------------------------<  85     3.9     |  21     |  0.93     Ca    8.6        16 Feb 2021 07:10    Exam:  Gen: NAD, Awake and alert  LLE  Dressing clean w/ mild spotting  +EHL/FHL/TA/GS  SILT L2-S1  +DP  Calf Soft NT  Compartments soft and compressible

## 2021-02-17 NOTE — PROGRESS NOTE ADULT - SUBJECTIVE AND OBJECTIVE BOX
S: No chest pain, palpitations, or SOB. Review of systems otherwise (-)    Review of Systems:   Constitutional: [ ] fevers, [ ] chills.   Skin: [ ] dry skin. [ ] rashes.  Psychiatric: [ ] depression, [ ] anxiety.   Gastrointestinal: [ ] BRBPR, [ ] melena.   Neurological: [ ] confusion. [ ] seizures. [ ] shuffling gait.   Ears,Nose,Mouth and Throat: [ ] ear pain [ ] sore throat.   Eyes: [ ] diplopia.   Respiratory: [ ] hemoptysis. [ ] shortness of breath  Cardiovascular: See HPI above  Hematologic/Lymphatic: [ ] anemia. [ ] painful nodes. [ ] prolonged bleeding.   Genitourinary: [ ] hematuria. [ ] flank pain.   Endocrine: [ ] significant change in weight. [ ] intolerance to heat and cold.     Review of systems [x ] otherwise negative, [ ] otherwise unable to obtain    FH: no family history of sudden cardiac death in first degree relatives    SH: [ ] tobacco, [ ] alcohol, [ ] drugs    acetaminophen   Tablet .. 975 milliGRAM(s) Oral every 8 hours  apixaban 2.5 milliGRAM(s) Oral every 12 hours  buDESOnide    Inhalation Suspension 0.25 milliGRAM(s) Inhalation two times a day  fluticasone propionate 50 MICROgram(s)/spray Nasal Spray 1 Spray(s) Both Nostrils two times a day  levothyroxine 75 MICROGram(s) Oral daily  loperamide 2 milliGRAM(s) Oral every 4 hours PRN  losartan 25 milliGRAM(s) Oral daily  magnesium hydroxide Suspension 30 milliLiter(s) Oral daily PRN  oxyCODONE    IR 2.5 milliGRAM(s) Oral every 4 hours PRN  oxyCODONE    IR 5 milliGRAM(s) Oral every 4 hours PRN  pantoprazole    Tablet 40 milliGRAM(s) Oral before breakfast  polyethylene glycol 3350 17 Gram(s) Oral daily  senna 2 Tablet(s) Oral at bedtime  traMADol 25 milliGRAM(s) Oral every 8 hours PRN                            8.6    6.32  )-----------( 250      ( 17 Feb 2021 07:46 )             27.1       02-17    140  |  108  |  22  ----------------------------<  79  4.2   |  23  |  0.83    Ca    9.0      17 Feb 2021 07:46        CARDIAC MARKERS ( 15 Feb 2021 21:52 )  x     / x     / 208 U/L / x     / 4.8 ng/mL  CARDIAC MARKERS ( 15 Feb 2021 15:16 )  x     / x     / 171 U/L / x     / 4.3 ng/mL        T(C): 36.4 (02-17-21 @ 09:22), Max: 36.6 (02-16-21 @ 21:24)  HR: 85 (02-17-21 @ 09:22) (69 - 85)  BP: 150/62 (02-17-21 @ 09:22) (122/62 - 157/82)  RR: 18 (02-17-21 @ 09:22) (18 - 20)  SpO2: 97% (02-17-21 @ 09:22) (92% - 97%)  Wt(kg): --    I&O's Summary    16 Feb 2021 07:01  -  17 Feb 2021 07:00  --------------------------------------------------------  IN: 1140 mL / OUT: 400 mL / NET: 740 mL    17 Feb 2021 07:01  -  17 Feb 2021 10:45  --------------------------------------------------------  IN: 0 mL / OUT: 100 mL / NET: -100 mL        General: Well nourished in no acute distress. Alert and Oriented * 3.   Head: Normocephalic and atraumatic.   Neck: No JVD. No bruits. Supple. Does not appear to be enlarged.   Cardiovascular: + S1,S2 ; RRR Soft systolic murmur at the left lower sternal border. No rubs noted.    Lungs: CTA b/l. No rhonchi, rales or wheezes.   Abdomen: + BS, soft. Non tender. Non distended. No rebound. No guarding.   Extremities: No clubbing/cyanosis/edema.   Neurologic: Moves all four extremities. Full range of motion.   Skin: Warm and moist. The patient's skin has normal elasticity and good skin turgor.       ECG:  NSR  70, no acute ischemia    ASSESSMENT/PLAN: Patient is a 89 y/o Female well known to our office (Cardiologist - Dr. Mancera) with PMH of hypertension, hyperlipidemia, hypothyroidism, unexplained syncope s/p ILR which detected Afib now on Eliquis as well as for history of PE who presented with fx of left intertroch fx. Now s/p L Hip IMN.    - RRT events noted for AMS - workup per primary team   - Tolerated procedure well from CV perspective  - ECG with no ischemia  - No evidence of clinical HF or anginal symptoms  - Continue AC with Eliquis for stroke prevention if okay with ortho and no contraindications   - Post op care per ortho appreciated  - No further inpatient cardiac w/u planned    Ron Quintanilla MD

## 2021-02-17 NOTE — PROGRESS NOTE ADULT - SUBJECTIVE AND OBJECTIVE BOX
89 yo female PMhx multiple falls, PE, afib on eliquis, dementia, hypothyroidism, HTN, HLD, presents to the ED BIBEMS from nursing home for fall. Pt states she was getting out of her wheelchair to attempt to go to bathroom and fell onto floor and hit L back of her head on the floor, sat up and noted bleeding and was helped off floor by staff. Hx corroborated w/ staff over the phone, confirm pt had unwitnessed fall, staff came in and found her awake and sitting up on the floor c/o bleeding from head and leg pain. Per staff she was last seen sitting in wheelchair ~10 minutes beforehand. Currently c/o pain in both hips/thighs, worse with movement. Denies LOC, cp, sob, dizziness, headache, n/v, abd pain, neck pain, back pain. Patient was found to have a left hip fx. Patient is s/p an Open reduction and internal fixation of the right hip. Patient seen post op tolerated procedure well. Seen resting comfortably. Patient s mental styatus is at baseline. Patient states pain has been well controlled.     MEDICATIONS  (STANDING):  acetaminophen   Tablet .. 975 milliGRAM(s) Oral every 8 hours  apixaban 2.5 milliGRAM(s) Oral every 12 hours  buDESOnide    Inhalation Suspension 0.25 milliGRAM(s) Inhalation two times a day  fluticasone propionate 50 MICROgram(s)/spray Nasal Spray 1 Spray(s) Both Nostrils two times a day  levothyroxine 75 MICROGram(s) Oral daily  losartan 25 milliGRAM(s) Oral daily  pantoprazole    Tablet 40 milliGRAM(s) Oral before breakfast  polyethylene glycol 3350 17 Gram(s) Oral daily  senna 2 Tablet(s) Oral at bedtime    MEDICATIONS  (PRN):  loperamide 2 milliGRAM(s) Oral every 4 hours PRN Diarrhea  magnesium hydroxide Suspension 30 milliLiter(s) Oral daily PRN Constipation  oxyCODONE    IR 2.5 milliGRAM(s) Oral every 4 hours PRN Moderate Pain (4 - 6)  oxyCODONE    IR 5 milliGRAM(s) Oral every 4 hours PRN Severe Pain (7 - 10)  traMADol 25 milliGRAM(s) Oral every 8 hours PRN Mild Pain (1 - 3)          VITALS:   T(C): 36.4 (02-17-21 @ 13:23), Max: 36.6 (21 @ 21:24)  HR: 66 (21 @ 13:23) (66 - 85)  BP: 158/70 (21 @ 13:23) (128/72 - 158/70)  RR: 18 (21 @ 13:23) (18 - 20)  SpO2: 96% (21 @ 13:23) (93% - 97%)  Wt(kg): --      PHYSICAL EXAM:  GENERAL: NAD, well-groomed, well-developed  HEAD:  Atraumatic, Normocephalic  EYES: EOMI, PERRLA, conjunctiva and sclera clear  ENMT: No tonsillar erythema, exudates, or enlargement; Moist mucous membranes, Good dentition, No lesions  NECK: Supple, No JVD, Normal thyroid  NERVOUS SYSTEM:  Alert & Oriented X3, Good concentration; Motor Strength 5/5 B/L upper and lower extremities; DTRs 2+ intact and symmetric  CHEST/LUNG: Clear to percussion bilaterally; No rales, rhonchi, wheezing, or rubs  HEART: Regular rate and rhythm; No murmurs, rubs, or gallops  ABDOMEN: Soft, Nontender, Nondistended; Bowel sounds present  EXTREMITIES: Shorting and eternal rotation of the left LE  LYMPH: No lymphadenopathy noted  SKIN: No rashes or lesions      LABS:    CARDIAC MARKERS ( 15 Feb 2021 21:52 )  x     / x     / 208 U/L / x     / 4.8 ng/mL      CBC Full  -  ( 2021 07:46 )  WBC Count : 6.32 K/uL  RBC Count : 2.76 M/uL  Hemoglobin : 8.6 g/dL  Hematocrit : 27.1 %  Platelet Count - Automated : 250 K/uL  Mean Cell Volume : 98.2 fl  Mean Cell Hemoglobin : 31.2 pg  Mean Cell Hemoglobin Concentration : 31.7 gm/dL  Auto Neutrophil # : x  Auto Lymphocyte # : x  Auto Monocyte # : x  Auto Eosinophil # : x  Auto Basophil # : x  Auto Neutrophil % : x  Auto Lymphocyte % : x  Auto Monocyte % : x  Auto Eosinophil % : x  Auto Basophil % : x        140  |  108  |  22  ----------------------------<  79  4.2   |  23  |  0.83    Ca    9.0      2021 07:46          Urinalysis Basic - ( 15 Feb 2021 16:21 )    Color: Yellow / Appearance: Slightly Turbid / S.026 / pH: x  Gluc: x / Ketone: Negative  / Bili: Negative / Urobili: Negative   Blood: x / Protein: Trace / Nitrite: Negative   Leuk Esterase: Moderate / RBC: 3 /hpf / WBC 3 /HPF   Sq Epi: x / Non Sq Epi: 8 /hpf / Bacteria: Negative      CAPILLARY BLOOD GLUCOSE          RADIOLOGY & ADDITIONAL TESTS:

## 2021-02-17 NOTE — PROGRESS NOTE ADULT - ASSESSMENT
A/P:  Patient is a 89y Female s/p L hip CRPP Stable POD 8      -Medical comanagement appreciated  -Ice/Elevate  -Incentive Spirometry  -Multimodal Analgesia  -DVT PE ppx  -SCDs  -PT/OT OOB  -WBAT  -Ortho stable for discharge  -FU outpatient 1-2 weeks. call office to schedule appointment  -Discharge planning - [facility] pending bed availability  -Will discuss w/ attending and advise if plan changes A/P:  Patient is a 89y Female s/p L hip IMN Stable POD 3    -Medical comanagement appreciated  -Ice/Elevate  -Incentive Spirometry  -Multimodal Analgesia  -DVT PE ppx  -SCDs  -PT/OT OOB  -WBAT  -Ortho stable for discharge  -FU outpatient 1-2 weeks. call office to schedule appointment  -Discharge planning - [facility] pending bed availability  -Will discuss w/ attending and advise if plan changes

## 2021-02-17 NOTE — PROGRESS NOTE ADULT - ASSESSMENT
89 yo woman with a hx of dementia presents after a fall. Patient brought to [facility] for further evaluation where she was found to have a Left hip fx. Patient is scheduled for an Open reduction and internal fixation of the left hip

## 2021-02-18 ENCOUNTER — TRANSCRIPTION ENCOUNTER (OUTPATIENT)
Age: 86
End: 2021-02-18

## 2021-02-18 VITALS
RESPIRATION RATE: 18 BRPM | TEMPERATURE: 97 F | HEART RATE: 68 BPM | SYSTOLIC BLOOD PRESSURE: 145 MMHG | DIASTOLIC BLOOD PRESSURE: 74 MMHG | OXYGEN SATURATION: 98 %

## 2021-02-18 LAB
HCT VFR BLD CALC: 27.5 % — LOW (ref 34.5–45)
HGB BLD-MCNC: 8.8 G/DL — LOW (ref 11.5–15.5)
MCHC RBC-ENTMCNC: 30.8 PG — SIGNIFICANT CHANGE UP (ref 27–34)
MCHC RBC-ENTMCNC: 32 GM/DL — SIGNIFICANT CHANGE UP (ref 32–36)
MCV RBC AUTO: 96.2 FL — SIGNIFICANT CHANGE UP (ref 80–100)
NRBC # BLD: 0 /100 WBCS — SIGNIFICANT CHANGE UP (ref 0–0)
PLATELET # BLD AUTO: 273 K/UL — SIGNIFICANT CHANGE UP (ref 150–400)
RBC # BLD: 2.86 M/UL — LOW (ref 3.8–5.2)
RBC # FLD: 15.9 % — HIGH (ref 10.3–14.5)
WBC # BLD: 6.08 K/UL — SIGNIFICANT CHANGE UP (ref 3.8–10.5)
WBC # FLD AUTO: 6.08 K/UL — SIGNIFICANT CHANGE UP (ref 3.8–10.5)

## 2021-02-18 RX ORDER — MAGNESIUM HYDROXIDE 400 MG/1
30 TABLET, CHEWABLE ORAL
Qty: 0 | Refills: 0 | DISCHARGE
Start: 2021-02-18

## 2021-02-18 RX ORDER — SENNA PLUS 8.6 MG/1
2 TABLET ORAL
Qty: 0 | Refills: 0 | DISCHARGE
Start: 2021-02-18

## 2021-02-18 RX ORDER — SENNA PLUS 8.6 MG/1
2 TABLET ORAL
Qty: 0 | Refills: 0 | DISCHARGE

## 2021-02-18 RX ORDER — FLUTICASONE PROPIONATE 50 MCG
1 SPRAY, SUSPENSION NASAL
Qty: 0 | Refills: 0 | DISCHARGE
Start: 2021-02-18

## 2021-02-18 RX ORDER — FLUTICASONE PROPIONATE 50 MCG
1 SPRAY, SUSPENSION NASAL
Qty: 1 | Refills: 0

## 2021-02-18 RX ORDER — APIXABAN 2.5 MG/1
1 TABLET, FILM COATED ORAL
Qty: 60 | Refills: 0

## 2021-02-18 RX ORDER — APIXABAN 2.5 MG/1
1 TABLET, FILM COATED ORAL
Qty: 0 | Refills: 0 | DISCHARGE
Start: 2021-02-18

## 2021-02-18 RX ORDER — TRAMADOL HYDROCHLORIDE 50 MG/1
0.5 TABLET ORAL
Qty: 0 | Refills: 0 | DISCHARGE
Start: 2021-02-18

## 2021-02-18 RX ADMIN — Medication 1 SPRAY(S): at 06:09

## 2021-02-18 RX ADMIN — TRAMADOL HYDROCHLORIDE 25 MILLIGRAM(S): 50 TABLET ORAL at 11:07

## 2021-02-18 RX ADMIN — APIXABAN 2.5 MILLIGRAM(S): 2.5 TABLET, FILM COATED ORAL at 06:09

## 2021-02-18 RX ADMIN — Medication 75 MICROGRAM(S): at 06:09

## 2021-02-18 RX ADMIN — PANTOPRAZOLE SODIUM 40 MILLIGRAM(S): 20 TABLET, DELAYED RELEASE ORAL at 06:09

## 2021-02-18 RX ADMIN — Medication 975 MILLIGRAM(S): at 06:09

## 2021-02-18 RX ADMIN — Medication 0.25 MILLIGRAM(S): at 06:09

## 2021-02-18 RX ADMIN — Medication 975 MILLIGRAM(S): at 13:09

## 2021-02-18 RX ADMIN — LOSARTAN POTASSIUM 25 MILLIGRAM(S): 100 TABLET, FILM COATED ORAL at 06:09

## 2021-02-18 NOTE — PROGRESS NOTE ADULT - ASSESSMENT
A/P 89y year old female s/p Intramedullary nail fixation of right and left femur      Pain Control  DVT PPX  PT/OOB  WBAT   Dispo Planning A/P 89y year old female s/p Intramedullary nail fixation of left femur      Pain Control  DVT PPX  PT/OOB  WBAT   Dispo Planning - [facility] pending authorization

## 2021-02-18 NOTE — PROGRESS NOTE ADULT - ATTENDING COMMENTS
Agree with above  Tolerated procedure well in terms of cv perspective  No further inpatient cardiac workup needed at this time.     Ron Quintanilla MD
Patient Dced to rehab  continue current meds   PO as tolerated  activity as tolerated  follow up with ortho  family aware of plan

## 2021-02-18 NOTE — DISCHARGE NOTE PROVIDER - NSDCMRMEDTOKEN_GEN_ALL_CORE_FT
acetaminophen 325 mg oral tablet: 2 tab(s) orally every 4 hours, As Needed  apixaban 2.5 mg oral tablet: 1 tab(s) orally every 12 hours  bisacodyl 10 mg rectal suppository: 1 suppository(ies) rectal   budesonide 180 mcg/inh inhalation powder: 2 puff(s) inhaled 2 times a day  fluticasone 50 mcg/inh nasal spray: 1 spray(s) nasal 2 times a day  levothyroxine 75 mcg (0.075 mg) oral tablet: 1 tab(s) orally once a day  loperamide 2 mg oral capsule: 1 cap(s) orally every 4 hours, As Needed  losartan 25 mg oral tablet: 1 tab(s) orally once a day  magnesium hydroxide 8% oral suspension: 30 milliliter(s) orally once a day, As needed, Constipation  omeprazole 40 mg oral delayed release capsule: 1 cap(s) orally once a day  polyethylene glycol 3350 oral powder for reconstitution: 17 gram(s) orally 2 times a day, As Needed  senna oral tablet: 2 tab(s) orally once a day (at bedtime)  traMADol 50 mg oral tablet: 0.5 tab(s) orally every 8 hours, As needed, Mild Pain (1 - 3)

## 2021-02-18 NOTE — PROVIDER CONTACT NOTE (OTHER) - ACTION/TREATMENT ORDERED:
MD to possibly increase fluids. Will encourage PO intake. Will continue to monitor.
MD made aware, losartan administered as scheduled, will continue to monitor.
MD made aware, to continue monitoring, to bladder scan at 0500.
MD made aware, will continue to monitor.

## 2021-02-18 NOTE — PROGRESS NOTE ADULT - PROBLEM SELECTOR PROBLEM 1
Closed fracture of left hip, initial encounter

## 2021-02-18 NOTE — PROGRESS NOTE ADULT - PROBLEM SELECTOR PROBLEM 4
Chronic anal fissure

## 2021-02-18 NOTE — DISCHARGE NOTE PROVIDER - CARE PROVIDER_API CALL
Nash Corley)  Orthopaedic Surgery  [address] [place] [address], [address]  [place] [address]  Phone: [phone]  Fax: [phone]  Follow Up Time:

## 2021-02-18 NOTE — PROVIDER CONTACT NOTE (OTHER) - ASSESSMENT
/78, pt denies headache, chest pain or dizziness.
Bladder scan displays 300mL of urine.
Pt denies headache, dizziness, or chest pain. Pt BP is 172/70 manual.
pt DTV at midnight after straight cath, pt voided 100 cc in total the last 8 hours. Bladder scan shows 13cc in bladder.

## 2021-02-18 NOTE — PROGRESS NOTE ADULT - PROBLEM SELECTOR PLAN 3
continue cholesterol lowering meds

## 2021-02-18 NOTE — PROGRESS NOTE ADULT - PROBLEM SELECTOR PLAN 1
Pain has been well controlled  continue physical therapy as tolerated  eventual DC to rehab
tolerated procedure well  Pain has been well controlled  physical therapy as tolerated
Pain has been well controlled  continue physical therapy as tolerated  tylenol for pain  awaiting DC to rehab
Pain has been well controlled  continue physical therapy as tolerated  tylenol for pain
Pain has been well controlled  continue physical therapy as tolerated  tylenol for pain  awaiting DC to rehab

## 2021-02-18 NOTE — PROGRESS NOTE ADULT - PROBLEM SELECTOR PLAN 2
continue losartan for BP management  will follow BP and adjust as needed
continue losartan for BP management  will follow BP and adjust as needed
continue losartan for BP management  BP has been well controlled

## 2021-02-18 NOTE — PROGRESS NOTE ADULT - PROVIDER SPECIALTY LIST ADULT
Internal Medicine
Orthopedics
Cardiology
Orthopedics
Internal Medicine

## 2021-02-18 NOTE — DISCHARGE NOTE PROVIDER - NSDCFUADDINST_GEN_ALL_CORE_FT
DEEP VEIN THROMBOSIS PROPHYLAXIS: Continue home does of Eliquis which is a home med indicated for your afib  WOUND CARE:  Keep incision clean, dry, and intact.  BATHING: Please do not submerge wound underwater. You may shower and/or sponge bathe. Do not scrub incisions or apply lotions.  ACTIVITY: Your weight bearing status is weight bearing as tolerated. If you are taking narcotic pain medication (such as Percocet), do NOT drive a car, operate machinery or make important decisions.  DIET: Return to your usual diet.  NOTIFY YOUR SURGEON IF: You have any bleeding that does not stop, any pus draining from your wound, any fever (over 100.4 F) or chills, persistent nausea/vomiting, persistent diarrhea, or if your pain is not controlled on your discharge pain medications.  PAIN CONTROL: Please take medication as prescribed. If you have been prescribed a narcotic (such as Percocet), please be aware that narcotic pain medicine can cause extreme nausea and constipation. Drink plenty of water and take diuretics (colace, Miralax) as needed. You can get them from your local pharmacy. If you have been prescribed a narcotic (such as Percocet), please take for severe pain only. You can take up to 8 Tylenol 325 mg a day while taking Percocet. Alternate between taking over the counter Ibuprofen and Tylenol so you are taking pain medication every 3-4 hours if your pain is severe.  FOLLOW-UP:  1. Follow-up with Dr. Corley within 1-2 weeks of discharge.  Please call office for appointment

## 2021-02-18 NOTE — DISCHARGE NOTE PROVIDER - HOSPITAL COURSE
Patient was admitted to undergo left femur IMN for left hip intertrochanteric femur fracture.  The patient tolerated the procedure well and was transferred to the floor in stable condition. Postoperatively, the patient's pain was well controlled with IV pain medications and later transitioned to PO pain meds, with adequate pain control. The patient participated well with PT starting POD1. Patient is stable for discharge to rehab at this time.  Patient and family in agreement with discharge plan.

## 2021-02-18 NOTE — PROGRESS NOTE ADULT - REASON FOR ADMISSION
left hip fracture

## 2021-02-18 NOTE — DISCHARGE NOTE NURSING/CASE MANAGEMENT/SOCIAL WORK - PATIENT PORTAL LINK FT
You can access the FollowMyHealth Patient Portal offered by [facility] by registering at the following website: [email or web address]. By joining [facility]’s FollowMyHealth portal, you will also be able to view your health information using other applications (apps) compatible with our system.

## 2021-02-18 NOTE — PROVIDER CONTACT NOTE (OTHER) - RECOMMENDATIONS
MD made aware
MD made aware, to continue monitoring and repeat bladder scan at 0500.
MD made aware, to continue monitoring, administer losartan as scheduled.
MD made aware, to continue monitoring.

## 2021-02-18 NOTE — PROGRESS NOTE ADULT - SUBJECTIVE AND OBJECTIVE BOX
S: Resting comfortably, No chest pain, palpitations, or SOB. Review of systems otherwise (-)    Review of Systems:   Constitutional: [ ] fevers, [ ] chills.   Skin: [ ] dry skin. [ ] rashes.  Psychiatric: [ ] depression, [ ] anxiety.   Gastrointestinal: [ ] BRBPR, [ ] melena.   Neurological: [ ] confusion. [ ] seizures. [ ] shuffling gait.   Ears,Nose,Mouth and Throat: [ ] ear pain [ ] sore throat.   Eyes: [ ] diplopia.   Respiratory: [ ] hemoptysis. [ ] shortness of breath  Cardiovascular: See HPI above  Hematologic/Lymphatic: [ ] anemia. [ ] painful nodes. [ ] prolonged bleeding.   Genitourinary: [ ] hematuria. [ ] flank pain.   Endocrine: [ ] significant change in weight. [ ] intolerance to heat and cold.     Review of systems [x ] otherwise negative, [ ] otherwise unable to obtain    FH: no family history of sudden cardiac death in first degree relatives    SH: [ ] tobacco, [ ] alcohol, [ ] drugs      MEDICATIONS  (STANDING):  acetaminophen   Tablet .. 975 milliGRAM(s) Oral every 8 hours  apixaban 2.5 milliGRAM(s) Oral every 12 hours  buDESOnide    Inhalation Suspension 0.25 milliGRAM(s) Inhalation two times a day  fluticasone propionate 50 MICROgram(s)/spray Nasal Spray 1 Spray(s) Both Nostrils two times a day  levothyroxine 75 MICROGram(s) Oral daily  losartan 25 milliGRAM(s) Oral daily  pantoprazole    Tablet 40 milliGRAM(s) Oral before breakfast  polyethylene glycol 3350 17 Gram(s) Oral daily  senna 2 Tablet(s) Oral at bedtime    MEDICATIONS  (PRN):  loperamide 2 milliGRAM(s) Oral every 4 hours PRN Diarrhea  magnesium hydroxide Suspension 30 milliLiter(s) Oral daily PRN Constipation  oxyCODONE    IR 2.5 milliGRAM(s) Oral every 4 hours PRN Moderate Pain (4 - 6)  oxyCODONE    IR 5 milliGRAM(s) Oral every 4 hours PRN Severe Pain (7 - 10)  traMADol 25 milliGRAM(s) Oral every 8 hours PRN Mild Pain (1 - 3)      LABS:                          8.8    6.08  )-----------( 273      ( 18 Feb 2021 06:41 )             27.5     Hemoglobin: 8.8 g/dL (02-18 @ 06:41)  Hemoglobin: 8.6 g/dL (02-17 @ 07:46)  Hemoglobin: 7.0 g/dL (02-16 @ 07:10)  Hemoglobin: 8.1 g/dL (02-15 @ 15:16)  Hemoglobin: 9.4 g/dL (02-15 @ 07:44)    02-17    140  |  108  |  22  ----------------------------<  79  4.2   |  23  |  0.83    Ca    9.0      17 Feb 2021 07:46      Creatinine Trend: 0.83<--, 0.93<--, 1.14<--, 0.75<--, 0.76<--, 0.77<--     CARDIAC MARKERS ( 15 Feb 2021 21:52 )  x     / x     / 208 U/L / x     / 4.8 ng/mL          02-17-21 @ 07:01  -  02-18-21 @ 07:00  --------------------------------------------------------  IN: 900 mL / OUT: 100 mL / NET: 800 mL    02-18-21 @ 07:01  -  02-18-21 @ 15:20  --------------------------------------------------------  IN: 500 mL / OUT: 0 mL / NET: 500 mL        PHYSICAL EXAM  Vital Signs Last 24 Hrs  T(C): 36.2 (18 Feb 2021 12:55), Max: 36.7 (18 Feb 2021 01:22)  T(F): 97.2 (18 Feb 2021 12:55), Max: 98 (18 Feb 2021 01:22)  HR: 68 (18 Feb 2021 12:55) (61 - 87)  BP: 145/74 (18 Feb 2021 12:55) (114/71 - 183/77)  BP(mean): --  RR: 18 (18 Feb 2021 12:55) (18 - 18)  SpO2: 98% (18 Feb 2021 12:55) (94% - 98%)      General: Well nourished in no acute distress. Alert and Oriented * 3.   Head: Normocephalic and atraumatic.   Neck: No JVD. No bruits. Supple. Does not appear to be enlarged.   Cardiovascular: + S1,S2 ; RRR Soft systolic murmur at the left lower sternal border. No rubs noted.    Lungs: CTA b/l. No rhonchi, rales or wheezes.   Abdomen: + BS, soft. Non tender. Non distended. No rebound. No guarding.   Extremities: No clubbing/cyanosis/edema.   Neurologic: Moves all four extremities. Full range of motion.   Skin: Warm and moist. The patient's skin has normal elasticity and good skin turgor.       ECG:  NSR  70, no acute ischemia    ASSESSMENT/PLAN: Patient is a 89 y/o Female well known to our office (Cardiologist - Dr. Mancera) with PMH of hypertension, hyperlipidemia, hypothyroidism, unexplained syncope s/p ILR which detected Afib now on Eliquis as well as for history of PE who presented with fx of left intertroch fx. Now s/p L Hip IMN.    - Tolerated procedure well from CV perspective  - ECG with no ischemia  - No evidence of clinical HF or anginal symptoms  - Continue AC with Eliquis for stroke prevention if okay with ortho and no contraindications   - Post op care per ortho appreciated  - No further inpatient cardiac w/u planned  - D/C planning per primary team    Florentin Juarez PA-C  Pager: [phone]

## 2021-02-18 NOTE — PROGRESS NOTE ADULT - SUBJECTIVE AND OBJECTIVE BOX
Orthopedic Surgery Progress Note    Patient seen and examined this morning. No acute events overnight. Pain is well controlled. Denies f/c, chest pain, sob, dizziness.    O:  Vital Signs Last 24 Hrs  T(C): 36.5 (18 Feb 2021 05:58), Max: 36.7 (18 Feb 2021 01:22)  T(F): 97.7 (18 Feb 2021 05:58), Max: 98 (18 Feb 2021 01:22)  HR: 72 (18 Feb 2021 05:58) (61 - 87)  BP: 172/70 (18 Feb 2021 06:06) (114/71 - 183/77)  BP(mean): --  RR: 18 (18 Feb 2021 05:58) (18 - 18)  SpO2: 98% (18 Feb 2021 05:58) (95% - 98%)    Gen: NAD  RLE & LLE  Dressing C/D/I  EHL/FHL/TA/GS intact  SILT DP/SP/EATON/Sa  WWP distally    Labs:                        8.6    6.32  )-----------( 250      ( 17 Feb 2021 07:46 )             27.1                         7.0    6.19  )-----------( 225      ( 16 Feb 2021 07:10 )             22.4       02-17    140  |  108  |  22  ----------------------------<  79  4.2   |  23  |  0.83                 Orthopedic Surgery Progress Note    Patient seen and examined this morning. No acute events overnight. Pain is well controlled. Denies f/c, chest pain, sob, dizziness.    O:  Vital Signs Last 24 Hrs  T(C): 36.5 (18 Feb 2021 05:58), Max: 36.7 (18 Feb 2021 01:22)  T(F): 97.7 (18 Feb 2021 05:58), Max: 98 (18 Feb 2021 01:22)  HR: 72 (18 Feb 2021 05:58) (61 - 87)  BP: 172/70 (18 Feb 2021 06:06) (114/71 - 183/77)  BP(mean): --  RR: 18 (18 Feb 2021 05:58) (18 - 18)  SpO2: 98% (18 Feb 2021 05:58) (95% - 98%)    Gen: NAD  LLE  Dressing C/D/I  EHL/FHL/TA/GS intact  SILT DP/SP/EATON/Sa  WWP distally    Labs:                        8.6    6.32  )-----------( 250      ( 17 Feb 2021 07:46 )             27.1                         7.0    6.19  )-----------( 225      ( 16 Feb 2021 07:10 )             22.4       02-17    140  |  108  |  22  ----------------------------<  79  4.2   |  23  |  0.83

## 2021-02-18 NOTE — PROGRESS NOTE ADULT - PROBLEM SELECTOR PLAN 5
Patient to continue prolia as an outpt

## 2021-02-18 NOTE — PROGRESS NOTE ADULT - ASSESSMENT
89 yo woman with a hx of dementia presents after a fall. Patient brought to [facility] for further evaluation where she was found to have a Left hip fx. Patient is scheduled for an Open reduction and internal fixation of the left hip

## 2021-02-18 NOTE — PROGRESS NOTE ADULT - PROBLEM SELECTOR PLAN 4
continue senna and consider starting miralax to avoid constipation

## 2021-02-22 ENCOUNTER — EMERGENCY (EMERGENCY)
Facility: HOSPITAL | Age: 86
LOS: 1 days | Discharge: HOME | End: 2021-02-22
Attending: EMERGENCY MEDICINE
Payer: MEDICARE

## 2021-02-22 VITALS
HEART RATE: 80 BPM | OXYGEN SATURATION: 100 % | TEMPERATURE: 98 F | RESPIRATION RATE: 16 BRPM | DIASTOLIC BLOOD PRESSURE: 76 MMHG | SYSTOLIC BLOOD PRESSURE: 152 MMHG

## 2021-02-22 VITALS
TEMPERATURE: 99 F | SYSTOLIC BLOOD PRESSURE: 130 MMHG | HEIGHT: 56 IN | RESPIRATION RATE: 18 BRPM | WEIGHT: 134.92 LBS | OXYGEN SATURATION: 95 % | DIASTOLIC BLOOD PRESSURE: 74 MMHG | HEART RATE: 76 BPM

## 2021-02-22 DIAGNOSIS — Z87.19 PERSONAL HISTORY OF OTHER DISEASES OF THE DIGESTIVE SYSTEM: Chronic | ICD-10-CM

## 2021-02-22 LAB
ALBUMIN SERPL ELPH-MCNC: 3 G/DL — LOW (ref 3.3–5)
ALP SERPL-CCNC: 79 U/L — SIGNIFICANT CHANGE UP (ref 40–120)
ALT FLD-CCNC: 17 U/L — SIGNIFICANT CHANGE UP (ref 10–45)
ANION GAP SERPL CALC-SCNC: 10 MMOL/L — SIGNIFICANT CHANGE UP (ref 5–17)
APPEARANCE UR: CLEAR — SIGNIFICANT CHANGE UP
AST SERPL-CCNC: 31 U/L — SIGNIFICANT CHANGE UP (ref 10–40)
BASOPHILS # BLD AUTO: 0.02 K/UL — SIGNIFICANT CHANGE UP (ref 0–0.2)
BASOPHILS NFR BLD AUTO: 0.3 % — SIGNIFICANT CHANGE UP (ref 0–2)
BILIRUB SERPL-MCNC: 1.1 MG/DL — SIGNIFICANT CHANGE UP (ref 0.2–1.2)
BILIRUB UR-MCNC: NEGATIVE — SIGNIFICANT CHANGE UP
BUN SERPL-MCNC: 18 MG/DL — SIGNIFICANT CHANGE UP (ref 7–23)
CALCIUM SERPL-MCNC: 9 MG/DL — SIGNIFICANT CHANGE UP (ref 8.4–10.5)
CHLORIDE SERPL-SCNC: 102 MMOL/L — SIGNIFICANT CHANGE UP (ref 96–108)
CO2 SERPL-SCNC: 26 MMOL/L — SIGNIFICANT CHANGE UP (ref 22–31)
COLOR SPEC: SIGNIFICANT CHANGE UP
CREAT SERPL-MCNC: 0.79 MG/DL — SIGNIFICANT CHANGE UP (ref 0.5–1.3)
DIFF PNL FLD: NEGATIVE — SIGNIFICANT CHANGE UP
EOSINOPHIL # BLD AUTO: 0.07 K/UL — SIGNIFICANT CHANGE UP (ref 0–0.5)
EOSINOPHIL NFR BLD AUTO: 1.1 % — SIGNIFICANT CHANGE UP (ref 0–6)
GLUCOSE SERPL-MCNC: 110 MG/DL — HIGH (ref 70–99)
GLUCOSE UR QL: NEGATIVE — SIGNIFICANT CHANGE UP
HCT VFR BLD CALC: 31.7 % — LOW (ref 34.5–45)
HGB BLD-MCNC: 10.2 G/DL — LOW (ref 11.5–15.5)
IMM GRANULOCYTES NFR BLD AUTO: 0.6 % — SIGNIFICANT CHANGE UP (ref 0–1.5)
KETONES UR-MCNC: NEGATIVE — SIGNIFICANT CHANGE UP
LEUKOCYTE ESTERASE UR-ACNC: NEGATIVE — SIGNIFICANT CHANGE UP
LYMPHOCYTES # BLD AUTO: 1.26 K/UL — SIGNIFICANT CHANGE UP (ref 1–3.3)
LYMPHOCYTES # BLD AUTO: 19.8 % — SIGNIFICANT CHANGE UP (ref 13–44)
MCHC RBC-ENTMCNC: 31.7 PG — SIGNIFICANT CHANGE UP (ref 27–34)
MCHC RBC-ENTMCNC: 32.2 GM/DL — SIGNIFICANT CHANGE UP (ref 32–36)
MCV RBC AUTO: 98.4 FL — SIGNIFICANT CHANGE UP (ref 80–100)
MONOCYTES # BLD AUTO: 0.74 K/UL — SIGNIFICANT CHANGE UP (ref 0–0.9)
MONOCYTES NFR BLD AUTO: 11.7 % — SIGNIFICANT CHANGE UP (ref 2–14)
NEUTROPHILS # BLD AUTO: 4.22 K/UL — SIGNIFICANT CHANGE UP (ref 1.8–7.4)
NEUTROPHILS NFR BLD AUTO: 66.5 % — SIGNIFICANT CHANGE UP (ref 43–77)
NITRITE UR-MCNC: NEGATIVE — SIGNIFICANT CHANGE UP
NRBC # BLD: 0 /100 WBCS — SIGNIFICANT CHANGE UP (ref 0–0)
PH UR: 8 — SIGNIFICANT CHANGE UP (ref 5–8)
PLATELET # BLD AUTO: 451 K/UL — HIGH (ref 150–400)
POTASSIUM SERPL-MCNC: 4.5 MMOL/L — SIGNIFICANT CHANGE UP (ref 3.5–5.3)
POTASSIUM SERPL-SCNC: 4.5 MMOL/L — SIGNIFICANT CHANGE UP (ref 3.5–5.3)
PROT SERPL-MCNC: 5.9 G/DL — LOW (ref 6–8.3)
PROT UR-MCNC: NEGATIVE — SIGNIFICANT CHANGE UP
RBC # BLD: 3.22 M/UL — LOW (ref 3.8–5.2)
RBC # FLD: 15.4 % — HIGH (ref 10.3–14.5)
SODIUM SERPL-SCNC: 138 MMOL/L — SIGNIFICANT CHANGE UP (ref 135–145)
SP GR SPEC: 1.01 — SIGNIFICANT CHANGE UP (ref 1.01–1.02)
UROBILINOGEN FLD QL: NEGATIVE — SIGNIFICANT CHANGE UP
WBC # BLD: 6.35 K/UL — SIGNIFICANT CHANGE UP (ref 3.8–10.5)
WBC # FLD AUTO: 6.35 K/UL — SIGNIFICANT CHANGE UP (ref 3.8–10.5)

## 2021-02-22 PROCEDURE — 76377 3D RENDER W/INTRP POSTPROCES: CPT | Mod: 26

## 2021-02-22 PROCEDURE — 70450 CT HEAD/BRAIN W/O DYE: CPT

## 2021-02-22 PROCEDURE — 85025 COMPLETE CBC W/AUTO DIFF WBC: CPT

## 2021-02-22 PROCEDURE — 80053 COMPREHEN METABOLIC PANEL: CPT

## 2021-02-22 PROCEDURE — 71045 X-RAY EXAM CHEST 1 VIEW: CPT

## 2021-02-22 PROCEDURE — 81003 URINALYSIS AUTO W/O SCOPE: CPT

## 2021-02-22 PROCEDURE — 73552 X-RAY EXAM OF FEMUR 2/>: CPT | Mod: 26,50

## 2021-02-22 PROCEDURE — 76377 3D RENDER W/INTRP POSTPROCES: CPT

## 2021-02-22 PROCEDURE — 71045 X-RAY EXAM CHEST 1 VIEW: CPT | Mod: 26

## 2021-02-22 PROCEDURE — 73552 X-RAY EXAM OF FEMUR 2/>: CPT

## 2021-02-22 PROCEDURE — 96374 THER/PROPH/DIAG INJ IV PUSH: CPT

## 2021-02-22 PROCEDURE — 99284 EMERGENCY DEPT VISIT MOD MDM: CPT | Mod: 25

## 2021-02-22 PROCEDURE — G1004: CPT

## 2021-02-22 PROCEDURE — 99285 EMERGENCY DEPT VISIT HI MDM: CPT | Mod: GC

## 2021-02-22 PROCEDURE — 87086 URINE CULTURE/COLONY COUNT: CPT

## 2021-02-22 PROCEDURE — 72192 CT PELVIS W/O DYE: CPT | Mod: 26,MA

## 2021-02-22 PROCEDURE — 72192 CT PELVIS W/O DYE: CPT

## 2021-02-22 PROCEDURE — 70450 CT HEAD/BRAIN W/O DYE: CPT | Mod: 26,MG

## 2021-02-22 RX ORDER — ACETAMINOPHEN 500 MG
650 TABLET ORAL ONCE
Refills: 0 | Status: DISCONTINUED | OUTPATIENT
Start: 2021-02-22 | End: 2021-02-22

## 2021-02-22 RX ORDER — MORPHINE SULFATE 50 MG/1
2 CAPSULE, EXTENDED RELEASE ORAL ONCE
Refills: 0 | Status: DISCONTINUED | OUTPATIENT
Start: 2021-02-22 | End: 2021-02-22

## 2021-02-22 RX ADMIN — MORPHINE SULFATE 2 MILLIGRAM(S): 50 CAPSULE, EXTENDED RELEASE ORAL at 14:44

## 2021-02-22 NOTE — ED ADULT NURSE NOTE - NSIMPLEMENTINTERV_GEN_ALL_ED
Implemented All Fall with Harm Risk Interventions:  [place] to call system. Call bell, personal items and telephone within reach. Instruct patient to call for assistance. Room bathroom lighting operational. Non-slip footwear when patient is off stretcher. Physically safe environment: no spills, clutter or unnecessary equipment. Stretcher in lowest position, wheels locked, appropriate side rails in place. Provide visual cue, wrist band, yellow gown, etc. Monitor gait and stability. Monitor for mental status changes and reorient to person, place, and time. Review medications for side effects contributing to fall risk. Reinforce activity limits and safety measures with patient and family. Provide visual clues: red socks.

## 2021-02-22 NOTE — ED PROVIDER NOTE - NSFOLLOWUPCLINICS_GEN_ALL_ED_FT
[facility] Orthopedic Surgery  Orthopedic Surgery  [address] & 4th [place] [address]  Phone: [phone]  Fax:   Follow Up Time:

## 2021-02-22 NOTE — ED PROVIDER NOTE - PROGRESS NOTE DETAILS
Dru Ratliff MD:  Patient reassessed post CT, pain at surgical site is stable, no vascular compromise, stable for DC

## 2021-02-22 NOTE — ED PROVIDER NOTE - PATIENT PORTAL LINK FT
You can access the FollowMyHealth Patient Portal offered by [facility] by registering at the following website: [email or web address]. By joining [facility]’s FollowMyHealth portal, you will also be able to view your health information using other applications (apps) compatible with our system.

## 2021-02-22 NOTE — ED ADULT TRIAGE NOTE - NURSING HOMES
[facility]

## 2021-02-22 NOTE — ED ADULT NURSE NOTE - OBJECTIVE STATEMENT
89 y/o F A&Ox3 (oriented to self & place) PMH anal fissure, hypothyroid, hip fracture, osteoporosis PSH cataract surgery, hip surgery w/ hardware presents to the ED via EMS s/p fall. Pt is poor historian. According to EMS came from rehab facility where she is recovering from femur surgery s/p fall with femur fracture. EMS states pt fell again Friday. Rehab facility called 911 today bc pt was "getting worse". EMS reports pt takes Eliquis. Pt denies hitting her head or LOC. Upon arrival to ED pt L/L/E is splinted. Pt has diffuse bruising to the R thigh. Lower half of B/L L/E appears discolored. Bump with abrasion noted to R shin. Bruise and skin tear noted to outside of R shin, wound is covered with clean dry & dated dressing. Pt has skin tear to the left chest, wound is covered with clean, dry & dated dressing. Pt surgical staples on the L thigh are covered with clean, dry & dated dressing. Staple site has not oozing, redness, swelling or pain. Pt has diffuse bruising to B/L U/E. Pt breathing is even and unlabored, satting 97% RA. Pt placed on CM- NSR. Pt placed on Prima-fit external catheter. Denies CP, SOB, N/V/D, HA, vision changes. IV access obtained. Call bell within reach, comfort & safety provided.

## 2021-02-22 NOTE — ED PROVIDER NOTE - CARE PLAN
Principal Discharge DX:	Fall   Principal Discharge DX:	Fall  Secondary Diagnosis:	Hip hematoma, left

## 2021-02-22 NOTE — ED ADULT NURSE REASSESSMENT NOTE - NS ED NURSE REASSESS COMMENT FT1
pt is awake and alert, resting comfortably in stretcher, speaking in full coherent sentences. Pt endorses L/L/E pain, pt given pain medication as per MD orders. Will continue to monitor. Call bell within reach, comfort & safety provided.

## 2021-02-22 NOTE — ED PROVIDER NOTE - CLINICAL SUMMARY MEDICAL DECISION MAKING FREE TEXT BOX
89 Y F H/O falls and L. intramedullary femur fracture presenting with fall and bruising. No pain at site of fall, low clinical 89 Y F H/O falls and L. intramedullary femur fracture presenting with fall and bruising. No pain at site of fall, low clinical concern for re-fracture. No obvious hematoma concerning for a post-surgical hemorrhage. CT L. femur/hip, CT head, common labs to assess for metabolic etiology

## 2021-02-22 NOTE — ED ADULT NURSE REASSESSMENT NOTE - NS ED NURSE REASSESS COMMENT FT1
Patient appears to be resting comfortably while waiting for seniorcare. Cardiac monitoring continued.

## 2021-02-22 NOTE — ED PROVIDER NOTE - PHYSICAL EXAMINATION
Physical Exam:  General: NAD, Conversive  Eyes: EOMI, Conjunctiva and sclera clear  Neck: No JVD, No tendernes over C-spine palpation, no pain with lateral head motion  Lungs: Clear to auscultation bilaterally, no wheeze, no rhonchi  Heart: Normal S1, S2, no murmurs  Abdomen: Soft, nontender, nondistended, no chest wall tenderness  Extremities: 2+ peripheral pulses, extensive bruising on posterior of L. leg, leg motion intact,   Psych: AAO X3  Neurologic: Non-focal, X4 extremity motion, no change in sensation.

## 2021-02-22 NOTE — ED ADULT NURSE REASSESSMENT NOTE - NS ED NURSE REASSESS COMMENT FT1
Pt straight cath as per MD orders. 2 RNs at bedside to ensure sterile procedure, sterility maintained throughout. Pt tolerated procedure well.

## 2021-02-22 NOTE — ED PROVIDER NOTE - ATTENDING CONTRIBUTION TO CARE
RGUJRAL 89yo f hx listed BIB EMS from facility for fall. Pt is s/p L IMN for left hip intertrochanteric femur fracture.  Pt denies any falls, has hx dementia.   On exam, Patient is awake, alert x 3.  GCS15. NCAT, PERRL.   Neck:  No Posterior midline cervical spine tenderness. Full ROM and neuro intact.  Chest is clear to auscultation. +S1S2.  Abdomen is soft nondistended/nontender +BS. No rebound or guarding.   Pelvis is stable. R hip full ROM. NV intact.   L hip + dressing in place. + ecchymosis to L hip and thigh, soft. 2+ DP, nml sensation, + ROM limited with pain.   Back non tender midline T/L spine.  Check labs, CT head/pelvis/hip to eval for hematoma. Pt HD stable, fall occurred on Friday. Labs and re eval.

## 2021-02-22 NOTE — ED ADULT NURSE REASSESSMENT NOTE - NS ED NURSE REASSESS COMMENT FT1
Report received from SURESH Sabillon. Pt is resting comfortably in bed asking to go home. Pt A&Ox4. Pt informed of wait for ambulette. Pt has no complaints at this time. Pt safety maintained. [facility] called. RANDY Mariscal given report and understands pt will be returning to the nursing home. Report received from SURESH Sabillon. Pt is resting comfortably in bed asking to go home. Pt A&Ox2. Pt informed of wait for ambulette. Pt has no complaints at this time. Pt safety maintained. [facility] called. RANDY Mariscal given report and understands pt will be returning to the nursing home. ETA for ambulette has been changed from 1800 to 2200.

## 2021-02-22 NOTE — ED PROVIDER NOTE - NS ED ROS FT
EMERGENCY DEPARTMENT HISTORY AND PHYSICAL EXAM    7:34 PM      Date: 10/17/2019  Patient Name: Renée Jones    History of Presenting Illness     Chief Complaint   Patient presents with    Exposure to STD         History Provided By: Patient    Chief Complaint: std       Additional History (Context): Renée Jones is a 27 y.o. male with No significant past medical history who presents for treatment of gonorrhea and chlamydia. He had a visit 3 weeks ago, and was called from the ED regarding positive test results. He is not having any symptoms. Denies dysuria, hematuria, penile discharge, testicular pain, fevers, abdominal pain. [facility] Prost PCP: None    Current Facility-Administered Medications   Medication Dose Route Frequency Provider Last Rate Last Dose    cefTRIAXone (ROCEPHIN) 250 mg in lidocaine (PF) (XYLOCAINE) 10 mg/mL (1 %) IM injection  250 mg IntraMUSCular NOW Carlos Brothers A, PA-C        azithromycin (ZITHROMAX) tablet 1,000 mg  1,000 mg Oral NOW ProfCarlos hansen A PA-C         Current Outpatient Medications   Medication Sig Dispense Refill    naproxen (NAPROSYN) 500 mg tablet Take 1 Tab by mouth two (2) times daily (with meals). 20 Tab 0    acetaminophen (TYLENOL EXTRA STRENGTH) 500 mg tablet Take 2 Tabs by mouth every six (6) hours as needed for Pain. 40 Tab 0       Past History     Past Medical History:  History reviewed. No pertinent past medical history. Past Surgical History:  History reviewed. No pertinent surgical history. Family History:  History reviewed. No pertinent family history. Social History:  Social History     Tobacco Use    Smoking status: Never Smoker    Smokeless tobacco: Never Used   Substance Use Topics    Alcohol use: Never     Frequency: Never    Drug use: Never       Allergies:  No Known Allergies      Review of Systems       Review of Systems   Constitutional: Negative for fever. HENT: Negative for facial swelling.     Eyes: Negative for visual GENERAL: No fever or chills  EYES: No change in vision  HEENT: No trouble swallowing or speaking  CARDIAC: No chest pain  PULMONARY: No cough or SOB  GI: No abdominal pain, no nausea or no vomiting, no diarrhea or constipation  : No changes in urination  SKIN: No rashes  NEURO: No headache, no numbness  MSK: No joint pain  Otherwise as HPI or negative. disturbance. Respiratory: Negative for shortness of breath. Cardiovascular: Negative for chest pain. Gastrointestinal: Negative for abdominal pain. Genitourinary: Negative for dysuria. Musculoskeletal: Negative for neck pain. Skin: Negative for rash. Neurological: Negative for dizziness. Psychiatric/Behavioral: Negative for confusion. All other systems reviewed and are negative. Physical Exam     Visit Vitals  BP (!) 148/99 (BP 1 Location: Right arm, BP Patient Position: At rest)   Pulse 81   Temp 97.8 °F (36.6 °C)   Resp 14   Wt 136.1 kg (300 lb)   SpO2 100%   BMI 35.57 kg/m²         Physical Exam   Constitutional: He appears well-developed and well-nourished. No distress. HENT:   Head: Normocephalic and atraumatic. Eyes: Conjunctivae are normal.   Neck: Normal range of motion. Neck supple. Cardiovascular: Normal rate. Pulmonary/Chest: Effort normal.   Abdominal: Soft. Musculoskeletal: Normal range of motion. Neurological: He is alert. Skin: Skin is warm and dry. He is not diaphoretic. Psychiatric: He has a normal mood and affect. Nursing note and vitals reviewed. Diagnostic Study Results     Labs -  No results found for this or any previous visit (from the past 12 hour(s)). Radiologic Studies -   No orders to display         Medical Decision Making   I am the first provider for this patient. I reviewed the vital signs, available nursing notes, past medical history, past surgical history, family history and social history. Vital Signs-Reviewed the patient's vital signs. Records Reviewed: Nursing Notes (Time of Review: 7:34 PM)    ED Course: Progress Notes, Reevaluation, and Consults:      Provider Notes (Medical Decision Making): MDM  Number of Diagnoses or Management Options  STD (male):   Diagnosis management comments: Treated for gc/chlamydia. Diagnosis     Clinical Impression:   1.  STD (male)        Disposition: Discharged      Follow-up Information     Follow up With Specialties Details Why Avani Mendez 53    [address] [facility]  [phone]           Patient's Medications   Start Taking    No medications on file   Continue Taking    ACETAMINOPHEN (TYLENOL EXTRA STRENGTH) 500 MG TABLET    Take 2 Tabs by mouth every six (6) hours as needed for Pain. NAPROXEN (NAPROSYN) 500 MG TABLET    Take 1 Tab by mouth two (2) times daily (with meals). These Medications have changed    No medications on file   Stop Taking    No medications on file     _______________________________    Attestations:  Madisyn White PA-C acting as a scribe for and in the presence of JEFFREY Solis      October 17, 2019 at 7:35 PM       Provider Attestation:      I personally performed the services described in the documentation, reviewed the documentation, as recorded by the scribe in my presence, and it accurately and completely records my words and actions.  October 17, 2019 at 7:35 PM - JEFFREY Solis  _______________________________

## 2021-02-22 NOTE — ED PROVIDER NOTE - NSFOLLOWUPINSTRUCTIONS_ED_ALL_ED_FT
Fracture    A fracture is a break in one of your bones. This can occur from a variety of injuries, especially traumatic ones. Symptoms include pain, bruising, or swelling. Do not use the injured limb. If a fracture is in one of the bones below your waist, do not put weight on that limb unless instructed to do so by your healthcare provider. Crutches or a cane may have been provided. A splint or cast may have been applied by your health care provider. Make sure to keep it dry and follow up with an orthopedist as instructed.    No signs of change in fracture post-surgery were noted on CT imaging.    Please followup with your orthopedist in the next 1-2 weeks for reassessment    SEEK IMMEDIATE MEDICAL CARE IF YOU HAVE ANY OF THE FOLLOWING SYMPTOMS: numbness, tingling, increasing pain, or weakness in any part of the injured limb.

## 2021-02-22 NOTE — ED PROVIDER NOTE - OBJECTIVE STATEMENT
89 Y F H/O falls, PE, Afib on eliquis, dementia, HTN, BIBEMS from nursing home for worsening skin rash post fall. Was admitted 1-2 weeks ago for intramedullary nail for L. femur, presenting with fall at [facility] on fr[facility] 19th. No change in mental status post-fall; however, L. hip at site of operation has had spreading bruising. No increased thigh tenseness, change in sensation, or motor function. AAOX2 at baseline, S/P open reduction or right hip. Denies any pain at site.

## 2021-02-23 LAB
CULTURE RESULTS: SIGNIFICANT CHANGE UP
SPECIMEN SOURCE: SIGNIFICANT CHANGE UP

## 2021-03-02 NOTE — PATIENT PROFILE ADULT - NSPROPTRIGHTNOTIFY_GEN_A_NUR
Counseled about diet and hydration and also advised to continue pantaprozole and also referred to see gastro   declines

## 2021-03-16 PROCEDURE — 86923 COMPATIBILITY TEST ELECTRIC: CPT

## 2021-03-16 PROCEDURE — C1713: CPT

## 2021-03-16 PROCEDURE — U0005: CPT

## 2021-03-16 PROCEDURE — 73552 X-RAY EXAM OF FEMUR 2/>: CPT

## 2021-03-16 PROCEDURE — 86850 RBC ANTIBODY SCREEN: CPT

## 2021-03-16 PROCEDURE — C1776: CPT

## 2021-03-16 PROCEDURE — 76000 FLUOROSCOPY <1 HR PHYS/QHP: CPT

## 2021-03-16 PROCEDURE — 71045 X-RAY EXAM CHEST 1 VIEW: CPT

## 2021-03-16 PROCEDURE — U0003: CPT

## 2021-03-16 PROCEDURE — 86900 BLOOD TYPING SEROLOGIC ABO: CPT

## 2021-03-16 PROCEDURE — 36430 TRANSFUSION BLD/BLD COMPNT: CPT

## 2021-03-16 PROCEDURE — 81001 URINALYSIS AUTO W/SCOPE: CPT

## 2021-03-16 PROCEDURE — 97162 PT EVAL MOD COMPLEX 30 MIN: CPT

## 2021-03-16 PROCEDURE — P9016: CPT

## 2021-03-16 PROCEDURE — 99285 EMERGENCY DEPT VISIT HI MDM: CPT | Mod: 25

## 2021-03-16 PROCEDURE — 80053 COMPREHEN METABOLIC PANEL: CPT

## 2021-03-16 PROCEDURE — 82550 ASSAY OF CK (CPK): CPT

## 2021-03-16 PROCEDURE — 85025 COMPLETE CBC W/AUTO DIFF WBC: CPT

## 2021-03-16 PROCEDURE — 97165 OT EVAL LOW COMPLEX 30 MIN: CPT

## 2021-03-16 PROCEDURE — 97116 GAIT TRAINING THERAPY: CPT

## 2021-03-16 PROCEDURE — 70450 CT HEAD/BRAIN W/O DYE: CPT

## 2021-03-16 PROCEDURE — 82962 GLUCOSE BLOOD TEST: CPT

## 2021-03-16 PROCEDURE — 84484 ASSAY OF TROPONIN QUANT: CPT

## 2021-03-16 PROCEDURE — 85610 PROTHROMBIN TIME: CPT

## 2021-03-16 PROCEDURE — 82553 CREATINE MB FRACTION: CPT

## 2021-03-16 PROCEDURE — 73502 X-RAY EXAM HIP UNI 2-3 VIEWS: CPT

## 2021-03-16 PROCEDURE — 96374 THER/PROPH/DIAG INJ IV PUSH: CPT

## 2021-03-16 PROCEDURE — 94640 AIRWAY INHALATION TREATMENT: CPT

## 2021-03-16 PROCEDURE — 85027 COMPLETE CBC AUTOMATED: CPT

## 2021-03-16 PROCEDURE — 80048 BASIC METABOLIC PNL TOTAL CA: CPT

## 2021-03-16 PROCEDURE — 93005 ELECTROCARDIOGRAM TRACING: CPT

## 2021-03-16 PROCEDURE — 86769 SARS-COV-2 COVID-19 ANTIBODY: CPT

## 2021-03-16 PROCEDURE — 83605 ASSAY OF LACTIC ACID: CPT

## 2021-03-16 PROCEDURE — 85730 THROMBOPLASTIN TIME PARTIAL: CPT

## 2021-03-16 PROCEDURE — 97530 THERAPEUTIC ACTIVITIES: CPT

## 2021-03-16 PROCEDURE — 86901 BLOOD TYPING SEROLOGIC RH(D): CPT

## 2021-05-11 NOTE — DISCHARGE NOTE NURSING/CASE MANAGEMENT/SOCIAL WORK - NSTRANSFERBELONGINGSDISPO_GEN_A_NUR
with patient Cheek Interpolation Flap Text: A decision was made to reconstruct the defect utilizing an interpolation axial flap and a staged reconstruction.  A telfa template was made of the defect.  This telfa template was then used to outline the Cheek Interpolation flap.  The donor area for the pedicle flap was then injected with anesthesia.  The flap was excised through the skin and subcutaneous tissue down to the layer of the underlying musculature.  The interpolation flap was carefully excised within this deep plane to maintain its blood supply.  The edges of the donor site were undermined.   The donor site was closed in a primary fashion.  The pedicle was then rotated into position and sutured.  Once the tube was sutured into place, adequate blood supply was confirmed with blanching and refill.  The pedicle was then wrapped with xeroform gauze and dressed appropriately with a telfa and gauze bandage to ensure continued blood supply and protect the attached pedicle.

## 2021-09-07 NOTE — ED ADULT NURSE REASSESSMENT NOTE - NS ED NURSE REASSESS COMMENT FT1
Pt called using .  Pt reminded to have his Rapa/Tacro levels checked this week.  [place] [facility]  Pt's daughter, Elisabeth, updated- she will bring pt to lab draws.  Pt/daughter state understanding plan of care and instructions.   Pt linens changed and repositioned. No new neuro deficits noted.

## 2021-09-22 NOTE — PATIENT PROFILE ADULT - NSTRANSFERBELONGINGSDISPO_GEN_A_NUR
Patient requests all Lab, Cardiology, and Radiology Results on their Discharge Instructions with patient

## 2022-02-03 NOTE — CONSULT NOTE ADULT - REASON FOR ADMISSION
Head,  normocephalic,  atraumatic,  Face,  Face within normal limits,  Ears,  External ears within normal limits,  Nose/Nasopharynx,  External nose  normal appearance,  nares patent,  no nasal discharge,  Mouth and Throat,  Oral cavity appearance normal,  Breath odor normal,  Lips,  Appearance normal , Head , normocephalic , atraumatic , Face , Face within normal limits , Ears , External ears within normal limits , Nose/Nasopharynx , External nose  normal appearance , Mouth and Throat , Oral cavity appearance normal
syncope
syncope

## 2022-03-23 NOTE — ED PROVIDER NOTE - CAS EDP CONSULT REGARDING 1
I spoke with pt. She will repeat T4 pre dose. Pt states she had an EGD with Dr. Ferdinand Badillo at [facility] today. She had some bleeding in her stomach which they cauterized and she also had a polyp which they bx. They did not make any changes in her meds. They have instructed her to resume her coumadin on 3/27/22.     Is the order t-4 or free t-4 consult

## 2022-04-03 ENCOUNTER — EMERGENCY (EMERGENCY)
Facility: HOSPITAL | Age: 87
LOS: 1 days | Discharge: NURSING, REHAB OR LONG-TERM CARE | End: 2022-04-03
Attending: EMERGENCY MEDICINE
Payer: MEDICARE

## 2022-04-03 VITALS
DIASTOLIC BLOOD PRESSURE: 81 MMHG | HEART RATE: 59 BPM | WEIGHT: 130.07 LBS | OXYGEN SATURATION: 93 % | RESPIRATION RATE: 20 BRPM | HEIGHT: 56 IN | SYSTOLIC BLOOD PRESSURE: 175 MMHG | TEMPERATURE: 97 F

## 2022-04-03 DIAGNOSIS — Z87.19 PERSONAL HISTORY OF OTHER DISEASES OF THE DIGESTIVE SYSTEM: Chronic | ICD-10-CM

## 2022-04-03 PROCEDURE — 99284 EMERGENCY DEPT VISIT MOD MDM: CPT

## 2022-04-03 PROCEDURE — 93010 ELECTROCARDIOGRAM REPORT: CPT

## 2022-04-03 NOTE — ED ADULT TRIAGE NOTE - CHIEF COMPLAINT QUOTE
fall OOB, unknown LOC, on eliquis, bruising noted to right cheek, lac under chin, right forearm and left knee.

## 2022-04-04 VITALS
TEMPERATURE: 98 F | RESPIRATION RATE: 17 BRPM | HEART RATE: 65 BPM | SYSTOLIC BLOOD PRESSURE: 170 MMHG | DIASTOLIC BLOOD PRESSURE: 81 MMHG | OXYGEN SATURATION: 95 %

## 2022-04-04 PROCEDURE — 70486 CT MAXILLOFACIAL W/O DYE: CPT | Mod: 26,MA

## 2022-04-04 PROCEDURE — 72170 X-RAY EXAM OF PELVIS: CPT | Mod: 26

## 2022-04-04 PROCEDURE — 70450 CT HEAD/BRAIN W/O DYE: CPT | Mod: MA

## 2022-04-04 PROCEDURE — 76377 3D RENDER W/INTRP POSTPROCES: CPT | Mod: 26

## 2022-04-04 PROCEDURE — 90715 TDAP VACCINE 7 YRS/> IM: CPT

## 2022-04-04 PROCEDURE — 71045 X-RAY EXAM CHEST 1 VIEW: CPT | Mod: 26

## 2022-04-04 PROCEDURE — 73030 X-RAY EXAM OF SHOULDER: CPT | Mod: 26,LT

## 2022-04-04 PROCEDURE — 99284 EMERGENCY DEPT VISIT MOD MDM: CPT | Mod: 25

## 2022-04-04 PROCEDURE — 72125 CT NECK SPINE W/O DYE: CPT | Mod: 26,MA

## 2022-04-04 PROCEDURE — 71045 X-RAY EXAM CHEST 1 VIEW: CPT

## 2022-04-04 PROCEDURE — 90471 IMMUNIZATION ADMIN: CPT

## 2022-04-04 PROCEDURE — 70450 CT HEAD/BRAIN W/O DYE: CPT | Mod: 26,MA

## 2022-04-04 PROCEDURE — 72125 CT NECK SPINE W/O DYE: CPT | Mod: MA

## 2022-04-04 PROCEDURE — 73030 X-RAY EXAM OF SHOULDER: CPT

## 2022-04-04 PROCEDURE — 76377 3D RENDER W/INTRP POSTPROCES: CPT

## 2022-04-04 PROCEDURE — 93005 ELECTROCARDIOGRAM TRACING: CPT

## 2022-04-04 PROCEDURE — 70486 CT MAXILLOFACIAL W/O DYE: CPT | Mod: MA

## 2022-04-04 PROCEDURE — 72170 X-RAY EXAM OF PELVIS: CPT

## 2022-04-04 RX ORDER — TETANUS TOXOID, REDUCED DIPHTHERIA TOXOID AND ACELLULAR PERTUSSIS VACCINE, ADSORBED 5; 2.5; 8; 8; 2.5 [IU]/.5ML; [IU]/.5ML; UG/.5ML; UG/.5ML; UG/.5ML
0.5 SUSPENSION INTRAMUSCULAR ONCE
Refills: 0 | Status: COMPLETED | OUTPATIENT
Start: 2022-04-04 | End: 2022-04-04

## 2022-04-04 RX ORDER — ACETAMINOPHEN 500 MG
975 TABLET ORAL ONCE
Refills: 0 | Status: COMPLETED | OUTPATIENT
Start: 2022-04-04 | End: 2022-04-04

## 2022-04-04 RX ADMIN — Medication 975 MILLIGRAM(S): at 05:38

## 2022-04-04 RX ADMIN — Medication 975 MILLIGRAM(S): at 02:08

## 2022-04-04 RX ADMIN — TETANUS TOXOID, REDUCED DIPHTHERIA TOXOID AND ACELLULAR PERTUSSIS VACCINE, ADSORBED 0.5 MILLILITER(S): 5; 2.5; 8; 8; 2.5 SUSPENSION INTRAMUSCULAR at 01:11

## 2022-04-04 NOTE — ED PROVIDER NOTE - OBJECTIVE STATEMENT
89yo F 89yo F dementia here for fall. Fall OOB, Was sleeping, no ha, neck pain, changes in vision, vomiting, or LOC. Does take eliquis. Complaining of some R shoulder pain, Pt w/ skin tears of the L upper arm and ecchymosis of the R face. Denies recent illness, cp, sob.

## 2022-04-04 NOTE — ED ADULT NURSE NOTE - CINV DISCH TEACH PARTICIP
Complex Repair And Flap Additional Text (Will Appearing After The Standard Complex Repair Text): The complex repair was not sufficient to completely close the primary defect. The remaining additional defect was repaired with the flap mentioned below. LPMARISA Gerber/Patient

## 2022-04-04 NOTE — ED PROVIDER NOTE - PHYSICAL EXAMINATION
General: NAD  HEENT: NCAT, PERRL, -cspine ttp +R maxillary hematoma  Cardiac: RRR, 2+ peripheral pulses  Chest: CTA  Abdomen: soft, non-distended, bowel sounds present, no ttp, no rebound or guarding  Extremities: no peripheral edema, calf tenderness, or leg size discrepancies +skin tear of the chin, +skin tear of L posterior arm  Skin: no rashes  Neuro: AAOx2, motor and sensory grossly intact  Psych: mood and affect appropriate

## 2022-04-04 NOTE — ED ADULT NURSE NOTE - INTERVENTIONS DEFINITIONS
[place] to call system/Call bell, personal items and telephone within reach/Instruct patient to call for assistance/Room bathroom lighting operational

## 2022-04-04 NOTE — ED PROVIDER NOTE - NSFOLLOWUPINSTRUCTIONS_ED_ALL_ED_FT
You were seen in the Emergency Department for fall. Your xrays and CT scan did not show any emergent medical problems that require hospitalization or inpatient treatment. Follow up with your primary care doctor as discussed.     1) Continue all previously prescribed medications as directed.    2) Follow up with your primary care physician - take copies of your results.    3) Return to the Emergency Department for worsening or persistent symptoms, and/or ANY NEW OR CONCERNING SYMPTOMS.

## 2022-04-04 NOTE — ED PROVIDER NOTE - ATTENDING CONTRIBUTION TO CARE
pt is a 89 y/o female here s/p fall out of bed from [facility] with skin tears noted to l arm, chin region, abrasions to face, on eliquis with no loc, gcs 15, maew, CT head and cspine, films, labs, no other obvious injuries. pt is a 89 y/o female   H/O falls, PE, Afib on eliquis, dementia, HTN, BIBEMS s/p fall out of bed from [facility] with skin tears noted to l arm, chin region, abrasions to face, on eliquis with no loc, gcs 15, maew, CT head and cspine, films, labs, no other obvious injuries.

## 2022-04-04 NOTE — ED ADULT NURSE REASSESSMENT NOTE - NS ED NURSE REASSESS COMMENT FT1
Pt to be d/c. Awaiting ambulette. Called RANDY Gerber at the [place] at [phone] and gave him report, LPN verbalized understanding.

## 2022-04-04 NOTE — ED ADULT NURSE NOTE - OBJECTIVE STATEMENT
Pt is a 89 y/o female pmhx of Afibb (on Eliquis), Dementia, osteoporosis & HTN presents to the ED BIBA from [facility] s/p unwitnessed fall. Pt rolled out of bed and hit her head on her night stand. Pt remembers her fall, denies LOC. Pt presents in a Cspine collar, she alert & oriented x 2 (disoriented to time), calm, able to follow commands, speech clear. Breathing spontaneous & nonlabored. On cardiac monitor, sinus ella. Abdomen soft & nondistended. Bruise noted to R cheek, laceration under chin, skin tear noted on R forearm & L knee. Strong peripheral pulses noted b/l, no edema noted. Denies chest pain, SOB, abdominal pain, back pain, n/v/d, fever, chills, changes in vision. Call bell within reach and pt instructed on how to use, bed in lowest position, side rails up, wheels locked.

## 2022-04-04 NOTE — ED PROVIDER NOTE - PATIENT PORTAL LINK FT
You can access the FollowMyHealth Patient Portal offered by [facility] by registering at the following website: [email or web address]. By joining [facility]’s FollowMyHealth portal, you will also be able to view your health information using other applications (apps) compatible with our system.

## 2022-07-20 NOTE — H&P PST ADULT - SYMPTOMS
Intubation    Date/Time: 7/20/2022 8:55 AM  Performed by: Judy Herrera CRNA  Authorized by: Mikel Gerard MD     Intubation:     Induction:  Intravenous    Intubated:  Postinduction    Mask Ventilation:  Easy mask    Attempts:  1    Attempted By:  CRNA    Difficult Airway Encountered?: No      Complications:  None    Airway Device:  Supraglottic airway/LMA    Airway Device Size:  3.5    Style/Cuff Inflation:  Cuffed    Secured at:  The lips    Placement Verified By:  Capnometry    Complicating Factors:  None    Findings Post-Intubation:  BS equal bilateral    
MCKEON

## 2022-09-13 NOTE — PHYSICAL THERAPY INITIAL EVALUATION ADULT - SITTING BALANCE: DYNAMIC
Check with patient if she is taking Lasix 20 mg daily  If she is taking it then increase Lasix to 2 tablets of 20 mg daily for 3 days. Then back to 20 mg daily. Keep the leg elevated. If it is not improving I can see her soon.
Patient advised of PCP's recommendations with expressed verbal understanding
Right leg seeping fluids- what should she do? She's very worried.
Tried to contact patient no answer
fair balance

## 2022-12-07 NOTE — ED ADULT TRIAGE NOTE - BANDS:
Fall Risk; Allergy; PAST MEDICAL HISTORY:  No pertinent past medical history     Seasonal allergies     Vitamin D deficiency

## 2022-12-16 NOTE — PROGRESS NOTE ADULT - ASSESSMENT
A/P: 89y F s/p L Hip IMN POD 1         Analgesia  DVT ppx, Eliquis   WBAT  PT/OT  DC planning, TBD   will d/w attending and advise if plan changes    No

## 2023-02-02 NOTE — PROVIDER CONTACT NOTE (OTHER) - SITUATION
Pt  with electronic and manual blood pressure.
pt bp elevated and pt having slurred and garbled speech
normal...

## 2023-02-27 NOTE — ED PROVIDER NOTE - DATA REVIEWED, MDM
Intermediate Repair Preamble Text (Leave Blank If You Do Not Want): Undermining was performed with blunt dissection. vital signs

## 2023-03-22 NOTE — ED PROVIDER NOTE - FAMILY HISTORY
Father  Still living? No  Family history of heart disease, Age at diagnosis: Age Unknown     Mother  Still living? Unknown  Family history of heart disease, Age at diagnosis: Age Unknown
Pre-op instructions given. Pt verbalized understanding  Chlorhexidine wash instructions given  Pt instructed to HOLD all NSAID, Herbal tea/supplements, 7 days before surgery  T&S ordered STAT for day of procedure   Pending: M/C  for abnormal ecg  Pending: Covid pcr test/results

## 2023-04-13 ENCOUNTER — INPATIENT (INPATIENT)
Facility: HOSPITAL | Age: 88
LOS: 3 days | Discharge: HOSPICE | DRG: 313 | End: 2023-04-17
Attending: INTERNAL MEDICINE | Admitting: INTERNAL MEDICINE
Payer: MEDICARE

## 2023-04-13 VITALS
TEMPERATURE: 98 F | DIASTOLIC BLOOD PRESSURE: 86 MMHG | HEART RATE: 71 BPM | SYSTOLIC BLOOD PRESSURE: 159 MMHG | RESPIRATION RATE: 15 BRPM | WEIGHT: 145.06 LBS | OXYGEN SATURATION: 90 % | HEIGHT: 64 IN

## 2023-04-13 DIAGNOSIS — R07.9 CHEST PAIN, UNSPECIFIED: ICD-10-CM

## 2023-04-13 DIAGNOSIS — Z87.19 PERSONAL HISTORY OF OTHER DISEASES OF THE DIGESTIVE SYSTEM: Chronic | ICD-10-CM

## 2023-04-13 LAB
ALBUMIN SERPL ELPH-MCNC: 3.7 G/DL — SIGNIFICANT CHANGE UP (ref 3.3–5)
ALP SERPL-CCNC: 67 U/L — SIGNIFICANT CHANGE UP (ref 40–120)
ALT FLD-CCNC: 20 U/L — SIGNIFICANT CHANGE UP (ref 10–45)
ANION GAP SERPL CALC-SCNC: 7 MMOL/L — SIGNIFICANT CHANGE UP (ref 5–17)
APTT BLD: 32.1 SEC — SIGNIFICANT CHANGE UP (ref 27.5–35.5)
AST SERPL-CCNC: 26 U/L — SIGNIFICANT CHANGE UP (ref 10–40)
BASE EXCESS BLDV CALC-SCNC: 7 MMOL/L — HIGH (ref -2–3)
BASOPHILS # BLD AUTO: 0.05 K/UL — SIGNIFICANT CHANGE UP (ref 0–0.2)
BASOPHILS NFR BLD AUTO: 0.9 % — SIGNIFICANT CHANGE UP (ref 0–2)
BILIRUB SERPL-MCNC: 0.3 MG/DL — SIGNIFICANT CHANGE UP (ref 0.2–1.2)
BUN SERPL-MCNC: 25 MG/DL — HIGH (ref 7–23)
CA-I SERPL-SCNC: 1.26 MMOL/L — SIGNIFICANT CHANGE UP (ref 1.15–1.33)
CALCIUM SERPL-MCNC: 9.2 MG/DL — SIGNIFICANT CHANGE UP (ref 8.4–10.5)
CHLORIDE BLDV-SCNC: 104 MMOL/L — SIGNIFICANT CHANGE UP (ref 96–108)
CHLORIDE SERPL-SCNC: 104 MMOL/L — SIGNIFICANT CHANGE UP (ref 96–108)
CO2 BLDV-SCNC: 36 MMOL/L — HIGH (ref 22–26)
CO2 SERPL-SCNC: 30 MMOL/L — SIGNIFICANT CHANGE UP (ref 22–31)
CREAT SERPL-MCNC: 1.17 MG/DL — SIGNIFICANT CHANGE UP (ref 0.5–1.3)
EGFR: 43 ML/MIN/1.73M2 — LOW
EOSINOPHIL # BLD AUTO: 0.21 K/UL — SIGNIFICANT CHANGE UP (ref 0–0.5)
EOSINOPHIL NFR BLD AUTO: 3.9 % — SIGNIFICANT CHANGE UP (ref 0–6)
FLUAV AG NPH QL: SIGNIFICANT CHANGE UP
FLUBV AG NPH QL: SIGNIFICANT CHANGE UP
GAS PNL BLDV: 139 MMOL/L — SIGNIFICANT CHANGE UP (ref 136–145)
GAS PNL BLDV: SIGNIFICANT CHANGE UP
GLUCOSE BLDV-MCNC: 91 MG/DL — SIGNIFICANT CHANGE UP (ref 70–99)
GLUCOSE SERPL-MCNC: 100 MG/DL — HIGH (ref 70–99)
HCO3 BLDV-SCNC: 34 MMOL/L — HIGH (ref 22–29)
HCT VFR BLD CALC: 33.6 % — LOW (ref 34.5–45)
HCT VFR BLDA CALC: 32 % — LOW (ref 34.5–46.5)
HGB BLD CALC-MCNC: 10.6 G/DL — LOW (ref 11.7–16.1)
HGB BLD-MCNC: 10.3 G/DL — LOW (ref 11.5–15.5)
IMM GRANULOCYTES NFR BLD AUTO: 0.2 % — SIGNIFICANT CHANGE UP (ref 0–0.9)
INR BLD: 1.18 RATIO — HIGH (ref 0.88–1.16)
LACTATE BLDV-MCNC: 0.9 MMOL/L — SIGNIFICANT CHANGE UP (ref 0.5–2)
LYMPHOCYTES # BLD AUTO: 1.42 K/UL — SIGNIFICANT CHANGE UP (ref 1–3.3)
LYMPHOCYTES # BLD AUTO: 26.5 % — SIGNIFICANT CHANGE UP (ref 13–44)
MAGNESIUM SERPL-MCNC: 2.2 MG/DL — SIGNIFICANT CHANGE UP (ref 1.6–2.6)
MCHC RBC-ENTMCNC: 30.7 GM/DL — LOW (ref 32–36)
MCHC RBC-ENTMCNC: 32 PG — SIGNIFICANT CHANGE UP (ref 27–34)
MCV RBC AUTO: 104.3 FL — HIGH (ref 80–100)
MONOCYTES # BLD AUTO: 0.73 K/UL — SIGNIFICANT CHANGE UP (ref 0–0.9)
MONOCYTES NFR BLD AUTO: 13.6 % — SIGNIFICANT CHANGE UP (ref 2–14)
NEUTROPHILS # BLD AUTO: 2.94 K/UL — SIGNIFICANT CHANGE UP (ref 1.8–7.4)
NEUTROPHILS NFR BLD AUTO: 54.9 % — SIGNIFICANT CHANGE UP (ref 43–77)
NRBC # BLD: 0 /100 WBCS — SIGNIFICANT CHANGE UP (ref 0–0)
NT-PROBNP SERPL-SCNC: 598 PG/ML — HIGH (ref 0–300)
PCO2 BLDV: 62 MMHG — HIGH (ref 39–42)
PH BLDV: 7.35 — SIGNIFICANT CHANGE UP (ref 7.32–7.43)
PLATELET # BLD AUTO: 301 K/UL — SIGNIFICANT CHANGE UP (ref 150–400)
PO2 BLDV: 29 MMHG — SIGNIFICANT CHANGE UP (ref 25–45)
POTASSIUM BLDV-SCNC: 5.2 MMOL/L — HIGH (ref 3.5–5.1)
POTASSIUM SERPL-MCNC: 4.8 MMOL/L — SIGNIFICANT CHANGE UP (ref 3.5–5.3)
POTASSIUM SERPL-SCNC: 4.8 MMOL/L — SIGNIFICANT CHANGE UP (ref 3.5–5.3)
PROT SERPL-MCNC: 6.5 G/DL — SIGNIFICANT CHANGE UP (ref 6–8.3)
PROTHROM AB SERPL-ACNC: 13.7 SEC — HIGH (ref 10.5–13.4)
RBC # BLD: 3.22 M/UL — LOW (ref 3.8–5.2)
RBC # FLD: 14 % — SIGNIFICANT CHANGE UP (ref 10.3–14.5)
RSV RNA NPH QL NAA+NON-PROBE: SIGNIFICANT CHANGE UP
SAO2 % BLDV: 42.9 % — LOW (ref 67–88)
SARS-COV-2 RNA SPEC QL NAA+PROBE: SIGNIFICANT CHANGE UP
SODIUM SERPL-SCNC: 141 MMOL/L — SIGNIFICANT CHANGE UP (ref 135–145)
TROPONIN T, HIGH SENSITIVITY RESULT: 22 NG/L — SIGNIFICANT CHANGE UP (ref 0–51)
TROPONIN T, HIGH SENSITIVITY RESULT: 22 NG/L — SIGNIFICANT CHANGE UP (ref 0–51)
WBC # BLD: 5.36 K/UL — SIGNIFICANT CHANGE UP (ref 3.8–10.5)
WBC # FLD AUTO: 5.36 K/UL — SIGNIFICANT CHANGE UP (ref 3.8–10.5)

## 2023-04-13 PROCEDURE — 73610 X-RAY EXAM OF ANKLE: CPT | Mod: 26,LT

## 2023-04-13 PROCEDURE — 71275 CT ANGIOGRAPHY CHEST: CPT | Mod: 26,MA

## 2023-04-13 PROCEDURE — 93971 EXTREMITY STUDY: CPT | Mod: 26,LT

## 2023-04-13 PROCEDURE — 71045 X-RAY EXAM CHEST 1 VIEW: CPT | Mod: 26

## 2023-04-13 PROCEDURE — 99285 EMERGENCY DEPT VISIT HI MDM: CPT | Mod: FS,CS

## 2023-04-13 RX ORDER — ACETAMINOPHEN 500 MG
650 TABLET ORAL EVERY 6 HOURS
Refills: 0 | Status: ACTIVE | OUTPATIENT
Start: 2023-04-13 | End: 2024-03-11

## 2023-04-13 RX ORDER — LOSARTAN POTASSIUM 100 MG/1
100 TABLET, FILM COATED ORAL DAILY
Refills: 0 | Status: ACTIVE | OUTPATIENT
Start: 2023-04-13 | End: 2024-03-11

## 2023-04-13 RX ORDER — BUDESONIDE, MICRONIZED 100 %
0.5 POWDER (GRAM) MISCELLANEOUS
Refills: 0 | Status: ACTIVE | OUTPATIENT
Start: 2023-04-13 | End: 2024-03-11

## 2023-04-13 RX ORDER — LOPERAMIDE HCL 2 MG
1 TABLET ORAL
Qty: 0 | Refills: 0 | DISCHARGE

## 2023-04-13 RX ORDER — LEVOTHYROXINE SODIUM 125 MCG
75 TABLET ORAL DAILY
Refills: 0 | Status: ACTIVE | OUTPATIENT
Start: 2023-04-13 | End: 2024-03-11

## 2023-04-13 RX ORDER — QUETIAPINE FUMARATE 200 MG/1
100 TABLET, FILM COATED ORAL AT BEDTIME
Refills: 0 | Status: ACTIVE | OUTPATIENT
Start: 2023-04-13 | End: 2024-03-11

## 2023-04-13 RX ORDER — ALBUTEROL 90 UG/1
2 AEROSOL, METERED ORAL EVERY 6 HOURS
Refills: 0 | Status: ACTIVE | OUTPATIENT
Start: 2023-04-13 | End: 2024-03-11

## 2023-04-13 RX ORDER — FAMOTIDINE 10 MG/ML
20 INJECTION INTRAVENOUS DAILY
Refills: 0 | Status: ACTIVE | OUTPATIENT
Start: 2023-04-13 | End: 2024-03-11

## 2023-04-13 RX ORDER — POLYETHYLENE GLYCOL 3350 17 G/17G
17 POWDER, FOR SOLUTION ORAL
Qty: 0 | Refills: 0 | DISCHARGE

## 2023-04-13 RX ORDER — AMLODIPINE BESYLATE 2.5 MG/1
2.5 TABLET ORAL DAILY
Refills: 0 | Status: ACTIVE | OUTPATIENT
Start: 2023-04-13 | End: 2024-03-11

## 2023-04-13 RX ORDER — QUETIAPINE FUMARATE 200 MG/1
25 TABLET, FILM COATED ORAL AT BEDTIME
Refills: 0 | Status: ACTIVE | OUTPATIENT
Start: 2023-04-13 | End: 2024-03-11

## 2023-04-13 RX ORDER — LEVOTHYROXINE SODIUM 125 MCG
1 TABLET ORAL
Qty: 30 | Refills: 0

## 2023-04-13 RX ORDER — SENNA PLUS 8.6 MG/1
2 TABLET ORAL AT BEDTIME
Refills: 0 | Status: ACTIVE | OUTPATIENT
Start: 2023-04-13 | End: 2024-03-11

## 2023-04-13 RX ORDER — LOSARTAN POTASSIUM 100 MG/1
1 TABLET, FILM COATED ORAL
Qty: 0 | Refills: 0 | DISCHARGE

## 2023-04-13 RX ORDER — ACETAMINOPHEN 500 MG
2 TABLET ORAL
Qty: 0 | Refills: 0 | DISCHARGE

## 2023-04-13 RX ORDER — POLYETHYLENE GLYCOL 3350 17 G/17G
17 POWDER, FOR SOLUTION ORAL
Refills: 0 | Status: ACTIVE | OUTPATIENT
Start: 2023-04-13 | End: 2024-03-11

## 2023-04-13 RX ORDER — APIXABAN 2.5 MG/1
2.5 TABLET, FILM COATED ORAL
Refills: 0 | Status: ACTIVE | OUTPATIENT
Start: 2023-04-13 | End: 2024-03-11

## 2023-04-13 RX ORDER — BUDESONIDE, MICRONIZED 100 %
2 POWDER (GRAM) MISCELLANEOUS
Qty: 0 | Refills: 0 | DISCHARGE

## 2023-04-13 RX ORDER — OMEPRAZOLE 10 MG/1
1 CAPSULE, DELAYED RELEASE ORAL
Qty: 0 | Refills: 0 | DISCHARGE

## 2023-04-13 NOTE — ED PROVIDER NOTE - PHYSICAL EXAMINATION
CONSTITUTIONAL: Well appearing and in no apparent distress.  ENT: Airway patent, moist mucous membranes.   EYES: Pupils equal, round and reactive to light. EOMI. Conjunctiva normal appearing.   CARDIAC: Normal rate, regular rhythm.  Heart sounds S1, S2.    RESPIRATORY: Breath sounds clear and equal bilaterally. O2 sat 90% on RA, improved to 95% on 2L NC. No tachypnea or resp distress.   GASTROINTESTINAL: Abdomen soft, non-tender, not distended.  MUSCULOSKELETAL: Spine appears normal. +BLE edema, left worse than right. Chronic venous stasis skin changes. DP pulses palpable. Sensation intact.   NEUROLOGICAL: Alert and oriented x3, no focal deficits, no motor or sensory deficits. 5/5 muscle strength throughout.  SKIN: Skin normal color, warm, dry and intact.   PSYCHIATRIC: Normal mood and affect.

## 2023-04-13 NOTE — ED PROVIDER NOTE - OBJECTIVE STATEMENT
89 yo F with a PMH of dementia, HTN, HLD, hypothyroidism, Afib now on Eliquis, COPD on supp O2 as needed but never uses it  from [facility] p/w chest pain and SOB. Reported sxs to staff today and they called covering MD who recommended she be evaluated in ED. Pt now stating she never c/o this and doesn't know why they sent her to the hospital. When asked if she has any complaints at present she states her left ankle hurts and her legs are swollen. No fever, cough, abd pain, nausea, vomiting, headache, dizziness. Unsure if pt is compliant with her meds.  Pt is unreliable historian.

## 2023-04-13 NOTE — ED ADULT TRIAGE NOTE - HEIGHT IN CM
L Inj/Asp: bilateral knee on 6/20/2019 10:08 AM  Indications: (osteoarthritis)  Details: 22 G needle, ultrasound-guided anterolateral approach  Medications (Right): 20 mg sodium hyaluronATE 20 MG/2ML  Medications (Left): 20 mg sodium hyaluronATE 20 MG/2ML  Procedure, treatment alternatives, risks and benefits explained, specific risks discussed. Consent was given by the patient. Immediately prior to procedure a time out was called to verify the correct patient, procedure, equipment, support staff and site/side marked as required. Patient was prepped and draped in the usual sterile fashion.            Visit Vitals  /76   Pulse 78   Ht 5' 9\" (1.753 m)   Wt 78.5 kg (173 lb)   BMI 25.55 kg/m²         After discussing risks, benefits, alternatives, and possible adverse outcomes with the patient informed consent was gained. A sterile field was installed. Local anesthesia was achieved with ethyl chloride spray. Under aseptic conditions and under ultrasound guidance  we injected 1 vial of Euflexxa, Lot number [id number]  into the bilateral knees each.    The patient tolerated the procedure well and was instructed to ice immediately for 20 minutes, before starting the regular icing regimen.         Electronically Signed by:    Daron Vyas MD , 6/20/2019              
162.56

## 2023-04-13 NOTE — ED PROVIDER NOTE - ATTENDING SHARED VISIT SELECTORS
1. Diarrhea, unspecified type  Basic Metabolic Panel   2. Dizziness  Basic Metabolic Panel         ASSESSMENT/PLAN:     The following high BMI interventions were performed this visit: encouragement to exercise and weight monitoring    1. Diarrhea, unspecified type    - Basic Metabolic Panel    2. Dizziness    - Basic Metabolic Panel      There are no Patient Instructions on file for this visit.  There are no discontinued medications.  Return in about 2 months (around 5/29/2019) for diabetes.        Nohemi Lezama NP          SUBJECTIVE:  Lester Shi is a 71 y.o. male once for evaluation of his diarrhea and dizziness.  Patient was recently started on Victoza for his diabetes management by endocrinology.  After increasing his dose up to 1.8 daily, he developed severe diarrhea with dizziness and headache.  He did come in for evaluation for the diarrhea symptoms, stool testing  for C. difficile and infection were negative.  He did decrease his dose down to 1.2 daily of the Victoza and noticed that his diarrheal symptoms improved.  He then called the clinic and I advised him to notify the endocrinologist or his nurse about his symptoms due to the Victoza so he can have the medication discontinued and have something else started.  Currently, he is taking NovoLog sliding scale 5 units before each meal 3 times daily and Lantus 28 units at night.  In the last 48 hours since his medications were adjusted by the endocrinology team, his blood sugars have been ranging from 104 up to 163.  He continues to check his blood sugars 4 times a day.  He is taking 1 Imodium daily which has been helpful for hardening his stool.  Stools are now soft to formed and are brown in color.  He does have intermittent bouts of dizziness still when he goes from sitting to standing.  Blood pressure is normotensive today, however, patient has never had a blood pressure in the 100s over 60s and was a little concerned with this today.  I did  History/Exam/Medical Decision Making reassure him that this is a great number for his blood pressure and that he should not change any other of his medications for now.  We will check some lab work today in regards to his electrolyte status and his kidney function due to his persistent diarrhea over the last 2 weeks. VSS.  Chief Complaint   Patient presents with     Diarrhea     x 2 weeks - diarrhea and dizzy spells         Patient Active Problem List   Diagnosis     Eczema     Anemia     Otitis Media     Chest Pain     Type 2 diabetes mellitus (H)     Essential Hypercholesterolemia     Essential Hypertension     Joint Pain, Localized In The Knee     Frostbite     Obesity     Allergic Rhinitis     Klinefelter's Syndrome     Shortness Of Breath     Varicose vein     Right foot pain     Nasal congestion     Mild persistent asthma with acute exacerbation     Wheezing     Cough     Influenza B     Pneumonia     Acute respiratory failure, unspecified whether with hypoxia or hypercapnia (H)     Morbid obesity (H)     Hyperglycemia     Acute kidney injury (H)     Uncontrolled type 1 diabetes mellitus with hyperglycemia (H)     Uncontrolled type 2 diabetes mellitus with hyperglycemia (H)     Encounter for examination following treatment at hospital     Diarrhea, unspecified type     Dizziness       Current Outpatient Medications   Medication Sig Dispense Refill     ACCU-CHEK FASTCLIX LANCET DRUM U TO TEST TID  0     albuterol (PROAIR HFA;PROVENTIL HFA;VENTOLIN HFA) 90 mcg/actuation inhaler Inhale 2 puffs every 6 (six) hours as needed for wheezing. 8.5 g 11     albuterol (PROVENTIL) 2.5 mg /3 mL (0.083 %) nebulizer solution Take 3 mL (2.5 mg total) by nebulization every 4 (four) hours as needed for wheezing. 40 vial 0     amLODIPine (NORVASC) 5 MG tablet Take 1 tablet (5 mg total) by mouth daily. 90 tablet 1     ascorbic acid, vitamin C, (ASCORBIC ACID WITH DOROTHY HIPS) 500 MG tablet Take 500 mg by mouth daily.       aspirin 81 MG EC tablet Take 1 tablet (81 mg  total) by mouth daily.  0     atenolol (TENORMIN) 50 MG tablet Take 1 tablet (50 mg total) by mouth 2 (two) times a day. 60 tablet 0     blood glucose test strips Use 5 each As Directed 4 (four) times a day. Dispense brand per patient's insurance at pharmacy discretion. 200 strip 2     FLOVENT  mcg/actuation inhaler INHALE 2 PUFFS BY MOUTH TWICE DAILY 1 Inhaler 3     fluticasone (FLONASE) 50 mcg/actuation nasal spray Apply 1 spray into each nostril daily as needed for rhinitis.       hydroCHLOROthiazide (HYDRODIURIL) 25 MG tablet Take 1 tablet (25 mg total) by mouth daily. 90 tablet 1     hydroCHLOROthiazide (HYDRODIURIL) 25 MG tablet TAKE 1 TABLET(25 MG) BY MOUTH DAILY 90 tablet 3     LANTUS U-100 INSULIN 100 unit/mL injection 40 unit daily am for 3 day then 28 unit daily (Patient taking differently: Inject 28 unit daily in the morning.      ) 10 mL PRN     liraglutide (VICTOZA) 0.6 mg/0.1 mL (18 mg/3 mL) PnIj injection Inject 0.6 mg daily for 1 week then increase to 1.2 mg once a day for a week then increase to, 1.8 mg daily.With or without food. 3 Syringe 1     losartan (COZAAR) 25 MG tablet Take 1 tablet (25 mg total) by mouth daily. 90 tablet 1     metFORMIN (GLUCOPHAGE XR) 500 MG 24 hr tablet take 500 mg with supper add extra pill at weekly until you are taking 2 in the morning and 2 with supper. 360 tablet 1     nebulizers Misc Use four times a day for 5 day then as  needed 1 each 0     NOVOLOG FLEXPEN U-100 INSULIN 100 unit/mL injection pen        No current facility-administered medications for this visit.        Social History     Tobacco Use   Smoking Status Former Smoker     Last attempt to quit: 1979     Years since quittin.2   Smokeless Tobacco Never Used       REVIEW OF SYSTEMS: Denies abdominal pain, constipation, nausea, vomiting, rectal bleeding, melena, new/unusual/ uncooked foods, no sick contacts or recent travel.       TOBACCO USE:  Social History     Tobacco Use   Smoking Status  Former Smoker     Last attempt to quit:      Years since quittin.2   Smokeless Tobacco Never Used     Social History     Socioeconomic History     Marital status:      Spouse name: Not on file     Number of children: Not on file     Years of education: Not on file     Highest education level: Not on file   Occupational History     Not on file   Social Needs     Financial resource strain: Not on file     Food insecurity:     Worry: Not on file     Inability: Not on file     Transportation needs:     Medical: Not on file     Non-medical: Not on file   Tobacco Use     Smoking status: Former Smoker     Last attempt to quit:      Years since quittin.2     Smokeless tobacco: Never Used   Substance and Sexual Activity     Alcohol use: Yes     Comment: rare     Drug use: No     Sexual activity: No     Partners: Female   Lifestyle     Physical activity:     Days per week: Not on file     Minutes per session: Not on file     Stress: Not on file   Relationships     Social connections:     Talks on phone: Not on file     Gets together: Not on file     Attends Zoroastrianism service: Not on file     Active member of club or organization: Not on file     Attends meetings of clubs or organizations: Not on file     Relationship status: Not on file     Intimate partner violence:     Fear of current or ex partner: Not on file     Emotionally abused: Not on file     Physically abused: Not on file     Forced sexual activity: Not on file   Other Topics Concern     Not on file   Social History Narrative     Not on file         OBJECTIVE:            Vitals:    19 0853   BP: 108/60   Pulse:    Resp:    Temp:    SpO2:      Weight: (!) 233 lb (105.7 kg)    Wt Readings from Last 3 Encounters:   19 (!) 233 lb (105.7 kg)   19 (!) 236 lb 1 oz (107.1 kg)   19 (!) 238 lb 4 oz (108.1 kg)     Body mass index is 31.6 kg/m .        Physical Exam:  GENERAL APPEARANCE: A&A, NAD, well hydrated, well  nourished  HEAD: atraumatic, no deformity  EYES: PERRL, EOM's intact, no redness or swelling  NECK: Supple, without lymphadenopathy, no thyroid mass, no JVD, no bruit  CV: RRR, no M/G/R   LUNGS: CTAB, normal respiratory effort  ABDOMEN: S&NT, no masses, no organomegaly, BS present x4   SKIN:  Normal skin turgor, no lesions/rashes, warm and dry   NEURO: no gross deficits

## 2023-04-13 NOTE — ED ADULT NURSE NOTE - CAS EDN DISCHARGE ASSESSMENT
Alert and oriented to person, place and time A&Ox1-2, confused at times/Patient baseline mental status

## 2023-04-13 NOTE — ED PROVIDER NOTE - CLINICAL SUMMARY MEDICAL DECISION MAKING FREE TEXT BOX
89 yo F with dementia, HTN, HLD, hypothyroidism, Afib now on Eliquis, COPD on supp O2 as needed from [facility] p/w chest pain and SOB x today. Also c/o left ankle pain and swelling. Pt ambulates with walker. Denies symptoms at present. No fever, cough, nausea, vomiting, headache, dizziness. Pt noted to be hypoxic to 90% on RA, improved with supplemental oxygen via NC. No tachypnea. BLE edema, left worse than right. No calf TTP. Will obtain labs, EKG, RVP and CTA to r/o ACS, viral syndrome, PNA and PE. Plan for admission to the hospital. AMIRAH

## 2023-04-13 NOTE — H&P ADULT - HISTORY OF PRESENT ILLNESS
89 yo F with a PMH of dementia, HTN, HLD, hypothyroidism, Afib now on Eliquis, COPD on supp O2 as needed but never uses it  from [facility] p/w chest pain and SOB. Reported sxs to staff today and they called covering MD who recommended she be evaluated in ED. Pt now stating she never c/o this and doesn't know why they sent her to the hospital. When asked if she has any complaints at present she states her left ankle hurts and her legs are swollen. No fever, cough, abd pain, nausea, vomiting, headache, dizziness. Unsure if pt is compliant with her meds.  Pt is unreliable historian. 89 yo F with a PMH of dementia, HTN, HLD, hypothyroidism, Afib now on Eliquis, COPD on supp O2 as needed but never uses it  from [facility] p/w chest pain and SOB. Reported sxs to staff today and they called covering MD who recommended she be evaluated in ED. Pt now stating she never c/o this and doesn't know why they sent her to the hospital. When asked if she has any complaints at present she states her left ankle hurts and her legs are swollen. No fever, cough, abd pain, nausea, vomiting, headache, dizziness. Unsure if pt is compliant with her meds.  Pt is an unreliable historian.

## 2023-04-13 NOTE — ED PROVIDER NOTE - NS ED ATTENDING STATEMENT MOD
This was a shared visit with the ANJU. I reviewed and verified the documentation and independently performed the documented:

## 2023-04-13 NOTE — H&P ADULT - NSHPPHYSICALEXAM_GEN_ALL_CORE
T(C): 36.6 (04-13-23 @ 20:00), Max: 36.8 (04-13-23 @ 17:30)  HR: 64 (04-13-23 @ 20:00) (64 - 71)  BP: 157/74 (04-13-23 @ 20:00) (157/74 - 159/86)  RR: 16 (04-13-23 @ 20:00) (15 - 20)  SpO2: 100% (04-13-23 @ 20:00) (90% - 100%)    PHYSICAL EXAM:  GENERAL: NAD, well-developed  HEAD:  Atraumatic, Normocephalic  EYES: EOMI, PERRLA, conjunctiva and sclera clear  NECK: Supple, No JVD  CHEST/LUNG: Clear to auscultation bilaterally; No wheeze  HEART: Regular rate and rhythm; No murmurs, rubs, or gallops  ABDOMEN: Soft, Nontender, Nondistended; Bowel sounds present  EXTREMITIES:  2+ Peripheral Pulses, No clubbing, cyanosis, or edema  PSYCH: AAOx3  NEUROLOGY: non-focal  SKIN: No rashes or lesions

## 2023-04-13 NOTE — H&P ADULT - ASSESSMENT
89 yo F with a PMH of dementia, HTN, HLD, hypothyroidism, Afib now on Eliquis, COPD on supp O2 as needed but never uses it  from [facility] p/w chest pain and SOB. Reported sxs to staff today and they called covering MD who recommended she be evaluated in ED. Pt now stating she never c/o this and doesn't know why they sent her to the hospital. When asked if she has any complaints at present she states her left ankle hurts and her legs are swollen. No fever, cough, abd pain, nausea, vomiting, headache, dizziness. Unsure if pt is compliant with her meds.  Pt is an unreliable historian.  cardiology consult called  check tte  LLE ankle xrays w no acute fractures  dvt ppx not necessary

## 2023-04-13 NOTE — ED ADULT NURSE NOTE - OBJECTIVE STATEMENT
Pt is a 89 yr old female with pmh of Afib on Eliquis and COPD coming from assisted living for chest pain. Pt is unable to provide much history as she is a/ox 1-2. Pt is sent to the ED for apparent chest pain, sob, and ankle swelling. Pt states she feels like her breathing is "juan difficult". Pt is tachypneic and was found in the ED to have a saturation of only 84%- pt placed on 2L NC. Pt has bilateral leg swelling  2+ with associated leg redness. Pt other vitals wnl.

## 2023-04-14 ENCOUNTER — TRANSCRIPTION ENCOUNTER (OUTPATIENT)
Age: 88
End: 2023-04-14

## 2023-04-14 PROCEDURE — 99223 1ST HOSP IP/OBS HIGH 75: CPT

## 2023-04-14 PROCEDURE — 93306 TTE W/DOPPLER COMPLETE: CPT | Mod: 26

## 2023-04-14 RX ORDER — IPRATROPIUM/ALBUTEROL SULFATE 18-103MCG
3 AEROSOL WITH ADAPTER (GRAM) INHALATION EVERY 6 HOURS
Refills: 0 | Status: ACTIVE | OUTPATIENT
Start: 2023-04-14 | End: 2024-03-12

## 2023-04-14 RX ADMIN — Medication 75 MICROGRAM(S): at 05:43

## 2023-04-14 RX ADMIN — Medication 650 MILLIGRAM(S): at 03:28

## 2023-04-14 RX ADMIN — Medication 1 TABLET(S): at 13:51

## 2023-04-14 RX ADMIN — QUETIAPINE FUMARATE 100 MILLIGRAM(S): 200 TABLET, FILM COATED ORAL at 21:58

## 2023-04-14 RX ADMIN — ALBUTEROL 2 PUFF(S): 90 AEROSOL, METERED ORAL at 13:56

## 2023-04-14 RX ADMIN — FAMOTIDINE 20 MILLIGRAM(S): 10 INJECTION INTRAVENOUS at 13:51

## 2023-04-14 RX ADMIN — Medication 3 MILLILITER(S): at 17:30

## 2023-04-14 RX ADMIN — Medication 650 MILLIGRAM(S): at 16:01

## 2023-04-14 RX ADMIN — POLYETHYLENE GLYCOL 3350 17 GRAM(S): 17 POWDER, FOR SOLUTION ORAL at 05:42

## 2023-04-14 RX ADMIN — POLYETHYLENE GLYCOL 3350 17 GRAM(S): 17 POWDER, FOR SOLUTION ORAL at 17:15

## 2023-04-14 RX ADMIN — SENNA PLUS 2 TABLET(S): 8.6 TABLET ORAL at 21:58

## 2023-04-14 RX ADMIN — QUETIAPINE FUMARATE 100 MILLIGRAM(S): 200 TABLET, FILM COATED ORAL at 01:04

## 2023-04-14 RX ADMIN — APIXABAN 2.5 MILLIGRAM(S): 2.5 TABLET, FILM COATED ORAL at 05:42

## 2023-04-14 RX ADMIN — LOSARTAN POTASSIUM 100 MILLIGRAM(S): 100 TABLET, FILM COATED ORAL at 05:43

## 2023-04-14 RX ADMIN — Medication 650 MILLIGRAM(S): at 04:00

## 2023-04-14 RX ADMIN — Medication 0.5 MILLIGRAM(S): at 17:15

## 2023-04-14 RX ADMIN — QUETIAPINE FUMARATE 25 MILLIGRAM(S): 200 TABLET, FILM COATED ORAL at 21:58

## 2023-04-14 RX ADMIN — AMLODIPINE BESYLATE 2.5 MILLIGRAM(S): 2.5 TABLET ORAL at 05:42

## 2023-04-14 RX ADMIN — APIXABAN 2.5 MILLIGRAM(S): 2.5 TABLET, FILM COATED ORAL at 17:15

## 2023-04-14 RX ADMIN — Medication 0.5 MILLIGRAM(S): at 05:42

## 2023-04-14 NOTE — PATIENT PROFILE ADULT - FUNCTIONAL ASSESSMENT - BASIC MOBILITY 6.
2-calculated by average/Not able to assess (calculate score using [facility] averaging method)

## 2023-04-14 NOTE — CONSULT NOTE ADULT - ASSESSMENT
Echo 10/14/20: Nml LV fxn EF 75%, mild AS    A/P  89 yo F with a PMH of dementia, HTN, HLD, hypothyroidism, Afib now on Eliquis, COPD on supp O2 as needed but never uses it  from [facility] p/w chest pain and SOB.    #Chest pain  -No longer endorsing CP  -HST negative x2  -CTA chest no PE, effusion, edema  -EKG NSR with 1st degree av block, no e/o ischemia  -Old echo with nml lv fxn  -Can repeat echo  -Would not pursue ischemic eval given her dementia and age    #pAfib on Eliquis  -Stable  -Continue apixaban  -Not on rate control meds    #HTN  -Bp acceptable  -Continue amlodipine   -Continue losartan    #R Breast Nodule  -As noted on CT chest, c/f malignancy  -Onc eval    #LLE Pain  -LE duplex w/o evidence of DVT  -XR ankle w/o evidence of fx      Please call/text me with any questions/concerns between 8am-4pm  [phone]

## 2023-04-14 NOTE — CONSULT NOTE ADULT - SUBJECTIVE AND OBJECTIVE BOX
HPI: 89 yo F with a PMH of dementia, L femur IM nail, HTN, HLD, hypothyroidism, Afib now on Eliquis, COPD on supp O2 as needed but never uses it  from [facility] p/w chest pain and SOB. Reported sxs to staff today and they called covering MD who recommended she be evaluated in ED. Pt now stating she never c/o this and doesn't know why they sent her to the hospital.     Surgical Oncology is consulted i/s/o new breast mass finding. CTA PE was ordered to evaluate for PE as cause of breathe. Incidental finding of R side 2.5cm breast mass noted. Chart reviewed, normal mammograms with BIRADS-1 classification extending back to early 2000's. Discussed with family (son in law, HCP) that patient is DNR/DNI and her living will states she does not want interventions that reduce quality of life, including a surgery. Family endorsed that a biopsy done in a minimally invasive way (possibly U/S guided localization) is a possibility however the results of the biopsy may not change the plans they have for their mother.     Physical exam:    /79 HR 60 SpO2 99 T 97.4 RR 18    General- Older lady, mildly altered but responsive to some questions.  Abdomen- No surgical scars noted.   Lungs- Comfortable on 2L NC  Extremities-       HPI: 89 yo F with a PMH of dementia, L femur IM nail, HTN, HLD, hypothyroidism, Afib now on Eliquis, COPD on supp O2 as needed but never uses it  from [facility] p/w chest pain and SOB. Reported sxs to staff today and they called covering MD who recommended she be evaluated in ED. Pt now stating she never c/o this and doesn't know why they sent her to the hospital.     Surgical Oncology is consulted i/s/o new breast mass finding. CTA PE was ordered to evaluate for PE as cause of breathe. Incidental finding of R side 2.5cm breast mass noted. Chart reviewed, normal mammograms with BIRADS-1 classification extending back to early 2000's. Discussed with family (son in law, HCP) that patient is DNR/DNI and her living will states she does not want interventions that reduce quality of life, including a surgery. Family endorsed that a biopsy done in a minimally invasive way (possibly U/S guided localization) is a possibility however the results of the biopsy may not change the plans they have for their mother.     Physical exam:    /79 HR 60 SpO2 99 T 97.4 RR 18    General- Older lady, mildly altered but responsive to some questions.  Abdomen- No surgical scars noted.   Lungs- Comfortable on 2L NC  Extremities- Well healed incision on R foot. No deformities of lower extremities. Symmetric  Chest- Palpable firm, non mobile growth noted near inframammary fold at 6oclock, 6cm from nipple on R breast. No axillary adenopathy noted.     Imaging:    ACC: [id number] EXAM: CT ANGIO CHEST PULM [facility] ORDERED BY: FESTUS MATUTE    PROCEDURE DATE: 04/13/2023        INTERPRETATION: CLINICAL INFORMATION: Shortness of breath, hypoxia. Evaluate for pulmonary embolism.    COMPARISON: CT chest 11/5/2020    CONTRAST/COMPLICATIONS:  IV Contrast: Omnipaque 350 70 cc administered 30 cc discarded  Oral Contrast: NONE  Complications: None reported at time of study completion    PROCEDURE:  CT Angiography of the Chest.  Sagittal and coronal reformats were performed as well as 3D (MIP) reconstructions.    FINDINGS: No CT evidence for pulmonary embolism.    LUNGS AND AIRWAYS: Patent central airways. Innumerable calcified nodules noted within the bilateral lung parenchyma, most consistent with prior granulomatous disease. No evidence for focal infiltrate or lobar consolidation.  PLEURA: No pleural effusion. No pneumothorax.  MEDIASTINUM AND MIGUEL A: No lymphadenopathy.  VESSELS: Extensive coronary arterial calcifications. Atherosclerotic change of the thoracic aorta noted.  HEART: Heart size is normal. No pericardial effusion.  CHEST WALL AND LOWER NECK: There is a 2.5 cm irregular soft tissue mass identified within the lateral aspect of the right breast at posterior depth, suspicious for breast parenchymal neoplasm/carcinoma.  VISUALIZED UPPER ABDOMEN: Visualized bilateral adrenal glands appear unremarkable. Hyperdensity within the gallbladder lumen, likely related to gallstones. Elevation of the right hemidiaphragm.  BONES: Degenerative changes. Chronic appearing deformity of the distal left clavicle.    IMPRESSION:  1. No CT evidence for pulmonary embolism.  2. There is a 2.5 cm irregular soft tissue mass identified within the lateral aspect of the right breast at posterior depth, suspicious for breast parenchymal neoplasm/carcinoma. Further evaluation with mammography/ultrasonography is recommended.          --- End of Report ---            ANKITA GARZA MD; Attending Radiologist  This document has been electronically signed. Apr 13 2023 8:06PM     HPI: 89 yo F with a PMH of dementia, L femur IM nail, HTN, HLD, hypothyroidism, Afib now on Eliquis, COPD on supp O2 as needed but never uses it  from [facility] p/w chest pain and SOB. Reported sxs to staff today and they called covering MD who recommended she be evaluated in ED. Pt now stating she never c/o this and doesn't know why they sent her to the hospital.     Surgical Oncology is consulted i/s/o new breast mass finding. CTA PE was ordered to evaluate for PE as cause of shortness of breathe. Incidental finding of R side 2.5cm breast mass noted, no bone mets to ribs noted. No suspicious adenopathy. Chart reviewed, normal mammograms with BIRADS-1 classification extending back to early 2000's. Discussed with family (son in law, HCP) that patient is DNR/DNI and her living will states she does not want any kind of  intervention that may reduce quality of life, including a surgery. Family endorsed that a biopsy done in a minimally invasive way (possibly U/S guided localization) is a possibility however the results of the biopsy may not change the plans they have for their mother. They also noted an incident in the past which has made them weary of biopsies.     Physical exam:    /79 HR 60 SpO2 99 T 97.4 RR 18    General- Older lady, confused but responsive to basic questions. Well nourished   Abdomen- No surgical scars noted. Soft, non tender to palpation  Lungs- Comfortable on 2L NC  Extremities- Well healed incision on R foot. No deformities of lower extremities. Symmetric   Chest- Palpable firm, non mobile growth noted near inframammary fold at 6oclock, 6cm from nipple on R breast. No axillary adenopathy palpated.     Imaging:    ACC: [id number] EXAM: CT ANGIO CHEST PULM [facility] ORDERED BY: FESTUS MATUTE    PROCEDURE DATE: 04/13/2023        INTERPRETATION: CLINICAL INFORMATION: Shortness of breath, hypoxia. Evaluate for pulmonary embolism.    COMPARISON: CT chest 11/5/2020    CONTRAST/COMPLICATIONS:  IV Contrast: Omnipaque 350 70 cc administered 30 cc discarded  Oral Contrast: NONE  Complications: None reported at time of study completion    PROCEDURE:  CT Angiography of the Chest.  Sagittal and coronal reformats were performed as well as 3D (MIP) reconstructions.    FINDINGS: No CT evidence for pulmonary embolism.    LUNGS AND AIRWAYS: Patent central airways. Innumerable calcified nodules noted within the bilateral lung parenchyma, most consistent with prior granulomatous disease. No evidence for focal infiltrate or lobar consolidation.  PLEURA: No pleural effusion. No pneumothorax.  MEDIASTINUM AND MIGUEL A: No lymphadenopathy.  VESSELS: Extensive coronary arterial calcifications. Atherosclerotic change of the thoracic aorta noted.  HEART: Heart size is normal. No pericardial effusion.  CHEST WALL AND LOWER NECK: There is a 2.5 cm irregular soft tissue mass identified within the lateral aspect of the right breast at posterior depth, suspicious for breast parenchymal neoplasm/carcinoma.  VISUALIZED UPPER ABDOMEN: Visualized bilateral adrenal glands appear unremarkable. Hyperdensity within the gallbladder lumen, likely related to gallstones. Elevation of the right hemidiaphragm.  BONES: Degenerative changes. Chronic appearing deformity of the distal left clavicle.    IMPRESSION:  1. No CT evidence for pulmonary embolism.  2. There is a 2.5 cm irregular soft tissue mass identified within the lateral aspect of the right breast at posterior depth, suspicious for breast parenchymal neoplasm/carcinoma. Further evaluation with mammography/ultrasonography is recommended.          --- End of Report ---            ANKITA GARZA MD; Attending Radiologist  This document has been electronically signed. Apr 13 2023 8:06PM

## 2023-04-14 NOTE — CONSULT NOTE ADULT - ATTENDING COMMENTS
Right breast mass.  Can consider mammography/breast ultrasound with biopsy if aligned with HCP wishes.  Family is not interested in pursuing any surgical treatment.

## 2023-04-14 NOTE — CONSULT NOTE ADULT - ASSESSMENT
89 y/o F p/w possible malignant breast mass.    -No acute surgical intervention  -Family is not amenable to surgery, even if biopsy is positive for IDC (would likely require breast conservation therapy vs. simple mastectomy)  -Rest of care per primary    D/w Dr. Tom Watson MD  PGY-2  Red Team Surgery 89 y/o F p/w possible malignant breast mass.    -No acute surgical intervention  -Family is not amenable to surgery, even if biopsy is positive for IDC/ILC (would likely require BCT vs. simple mastectomy +/- SNLB)  -Rest of care per primary    D/w Dr. Tom Watson MD  PGY-2  Red Team Surgery

## 2023-04-14 NOTE — CONSULT NOTE ADULT - SUBJECTIVE AND OBJECTIVE BOX
04-14-23 @ 16:57    Patient is a 89y old  Female who presents with a chief complaint of     HPI:  89 yo F with a PMH of dementia, HTN, HLD, hypothyroidism, Afib now on Eliquis, COPD on supp O2 as needed but never uses it  from [facility] p/w chest pain and SOB. Reported sxs to staff today and they called covering MD who recommended she be evaluated in ED. Pt now stating she never c/o this and doesn't know why they sent her to the hospital. When asked if she has any complaints at present she states her left ankle hurts and her legs are swollen. No fever, cough, abd pain, nausea, vomiting, headache, dizziness. Unsure if pt is compliant with her meds.  Pt is an unreliable historian. (13 Apr 2023 21:06)     she is a very poor historian : she says she is mildly sob:  says she used to smoke before:  ; Does says she has copd:     ?FOLLOWING PRESENT  [x ] Hx of PE/DVT, [y ] Hx COPD, x[ ] Hx of Asthma, [x ] Hx of Hospitalization, [x ]  Hx of BiPAP/CPAP use, [x ] Hx of MERLINE    Allergies    penicillin (Rash)    Intolerances        PAST MEDICAL & SURGICAL HISTORY:  H/O: Hypothyroidism      Hypertension      Dyslipidemia      mild Dementia      Anal spasm      Chronic anal fissure      Osteoporosis   COPD    Hip fracture  Right hip- 1995      Skin cancer  lower extremities      History of  Hip surgery with hardware      History of Tonsillectomy      left Elbow surgey secondary to injury      After-Cataract of Both Eyes      left eye Retinal tear repair      Anterior Cervical discectomy      H/O gastroesophageal reflux (GERD)          FAMILY HISTORY:  No family history of cardiovascular disease        Social History: [former smoker  ] TOBACCO                  [ x ] ETOH                                 [x  ] IVDA/DRUGS    REVIEW OF SYSTEMS      General:	x    Skin/Breast:x  	  Ophthalmologic:x  	  ENMT:	x    Respiratory and Thorax:  sob,   	  Cardiovascular:	x    Gastrointestinal:	x    Genitourinary:	x    Musculoskeletal:	x    Neurological:	x    Psychiatric:	x    Hematology/Lymphatics:	x    Endocrine:	x    Allergic/Immunologic:	x    MEDICATIONS  (STANDING):  amLODIPine   Tablet 2.5 milliGRAM(s) Oral daily  apixaban 2.5 milliGRAM(s) Oral two times a day  buDESOnide    Inhalation Suspension 0.5 milliGRAM(s) Inhalation two times a day  famotidine    Tablet 20 milliGRAM(s) Oral daily  levothyroxine 75 MICROGram(s) Oral daily  losartan 100 milliGRAM(s) Oral daily  multivitamin 1 Tablet(s) Oral daily  polyethylene glycol 3350 17 Gram(s) Oral two times a day  QUEtiapine 100 milliGRAM(s) Oral at bedtime  QUEtiapine 25 milliGRAM(s) Oral at bedtime  senna 2 Tablet(s) Oral at bedtime    MEDICATIONS  (PRN):  acetaminophen     Tablet .. 650 milliGRAM(s) Oral every 6 hours PRN Temp greater or equal to 38C (100.4F), Mild Pain (1 - 3)  albuterol    90 MICROgram(s) HFA Inhaler 2 Puff(s) Inhalation every 6 hours PRN Bronchospasm       Vital Signs Last 24 Hrs  T(C): 36.3 (14 Apr 2023 12:24), Max: 36.9 (14 Apr 2023 00:05)  T(F): 97.4 (14 Apr 2023 12:24), Max: 98.4 (14 Apr 2023 00:05)  HR: 60 (14 Apr 2023 12:24) (58 - 66)  BP: 146/79 (14 Apr 2023 12:24) (134/76 - 174/70)  BP(mean): 100 (13 Apr 2023 17:30) (100 - 100)  RR: 18 (14 Apr 2023 12:24) (16 - 20)  SpO2: 99% (14 Apr 2023 12:24) (94% - 100%)    Parameters below as of 14 Apr 2023 12:24  Patient On (Oxygen Delivery Method): nasal cannula    Orthostatic VS          I&O's Summary    14 Apr 2023 07:01  -  14 Apr 2023 16:57  --------------------------------------------------------  IN: 0 mL / OUT: 0 mL / NET: 0 mL        Physical Exam:   GENERAL: calm   HEENT: WON/   Atraumatic, Normocephalic  ENMT: No tonsillar erythema, exudates, or enlargement; Moist mucous membranes, Good dentition, No lesions  NECK: Supple, No JVD, Normal thyroid  CHEST/LUNG: no ext wheezing  CVS: Regular rate and rhythm; No murmurs, rubs, or gallops  GI: : Soft, Nontender, Nondistended; Bowel sounds present  NERVOUS SYSTEM:  Alert & Oriented X1-2   EXTREMITIES:  minimal  edema  LYMPH: No lymphadenopathy noted  SKIN: No rashes or lesions  ENDOCRINOLOGY: No Thyromegaly  PSYCH: Appropriate    Labs:  7.0<62<4>>29<<7.355>>7.0<<3><<4><<5<<299>>                            10.3   5.36  )-----------( 301      ( 13 Apr 2023 17:48 )             33.6     04-13    141  |  104  |  25<H>  ----------------------------<  100<H>  4.8   |  30  |  1.17    Ca    9.2      13 Apr 2023 17:48  Mg     2.2     04-13    TPro  6.5  /  Alb  3.7  /  TBili  0.3  /  DBili  x   /  AST  26  /  ALT  20  /  AlkPhos  67  04-13    CAPILLARY BLOOD GLUCOSE        LIVER FUNCTIONS - ( 13 Apr 2023 17:48 )  Alb: 3.7 g/dL / Pro: 6.5 g/dL / ALK PHOS: 67 U/L / ALT: 20 U/L / AST: 26 U/L / GGT: x           PT/INR - ( 13 Apr 2023 17:48 )   PT: 13.7 sec;   INR: 1.18 ratio         PTT - ( 13 Apr 2023 17:48 )  PTT:32.1 sec    D DImer      Studies  Chest X-RAY  CT SCAN Chest   CT Abdomen  Venous Dopplers: LE:   Others      rad< from: VA Duplex Lower Ext Vein Scan, Left (04.13.23 @ 22:22) >  and spectral Doppler, with and without compression.    FINDINGS:    There is normal compressibility of the left common femoral, femoral and   popliteal veins.  The contralateral common femoral vein is patent.  Doppler examination shows normal spontaneous and phasic flow.    No calf vein thrombosis is detected.    IMPRESSION:  No evidence of left lower extremity deep venous thrombosis.          < end of copied text >  < from: CT Angio Chest PE Protocol w/ IV Cont (04.13.23 @ 19:43) >  unremarkable. Hyperdensity within the gallbladder lumen, likely related   to gallstones. Elevation of the right hemidiaphragm.  BONES: Degenerative changes. Chronic appearing deformity of the distal   left clavicle.    IMPRESSION:  1. No CT evidence for pulmonary embolism.  2. There is a 2.5 cm irregular soft tissue mass identified within the   lateral aspect of the right breast at posterior depth, suspicious for   breast parenchymal neoplasm/carcinoma. Further evaluation with   mammography/ultrasonography is recommended.          --- End of Report ---    < end of copied text >  < from: CT Angio Chest PE Protocol w/ IV Cont (04.13.23 @ 19:43) >  Patent central airways.  Innumerable calcified nodules   noted within the bilateral lung parenchyma, most consistent with prior   granulomatous disease. No evidence for focal infiltrate or lobar   consolidation.    < end of copied text >          RAD< from: Transthoracic Echocardiogram (10.14.20 @ 07:06) >  onsistent with mild pulmonary hypertension.  ------------------------------------------------------------------------  Conclusions:  1. Calcified trileaflet aortic valve with decreased  opening. Peak transaortic valve gradient equals 16 mm Hg,  mean transaortic valve gradient equals 7 mm Hg, estimated  aortic valve area equals 1.7 sqcm (by continuity equation),  aortic valve velocity time integral equals 40 cm,  consistent with mild aortic stenosis.  2. Normal left ventricular internal dimensions and wall  thicknesses.  3. Hyperdynamic left ventricular systolic function.  4. Estimated pulmonary artery systolic pressure equals 44  mm Hg, assuming right atrial pressure equals 8 mm Hg,  consistent with mild pulmonary pressures.  ------------------------------------------------------------------------  Confirmed on  10/14/2020 - 10:58:30 by PRATIMA Cerrato    < end of copied text >

## 2023-04-14 NOTE — DISCHARGE NOTE PROVIDER - NSDCCPCAREPLAN_GEN_ALL_CORE_FT
PRINCIPAL DISCHARGE DIAGNOSIS  Diagnosis: Chest pain  Assessment and Plan of Treatment: improved. trops negative, cta neg for pe. echo with normal lvef but moderate as but pt is not a candidate for ischemic eval.      SECONDARY DISCHARGE DIAGNOSES  Diagnosis: Breast mass  Assessment and Plan of Treatment: Offered surg onc eval, pt family refused intervention. pt dnr/dni. hospice referral sent for f/u with [facility]     PRINCIPAL DISCHARGE DIAGNOSIS  Diagnosis: Chest pain  Assessment and Plan of Treatment: improved. trops negative, cta neg for pe. echo with normal lvef but moderate as but pt is not a candidate for ischemic eval.  HOME CARE INSTRUCTIONS  For the next few days, avoid physical activities that bring on chest pain. Continue physical activities as directed.  Do not Informed pt of the various negative side effects of smoking including risk of COPD, Lung Ca etc  Strongly recommended that pt stops smoking and pt given various options of smoking cessasion tools such as NRT's and other pharmacotherapies.  Avoid drinking alcohol.   Only take over-the-counter or prescription medicine for pain, discomfort, or fever as directed by your caregiver.  Follow your caregiver's suggestions for further testing if your chest pain does not go away.  Keep any follow-up appointments you made. If you do not go to an appointment, you could develop lasting (chronic) problems with pain. If there is any problem keeping an appointment, you must call to reschedule.   SEEK MEDICAL CARE IF:  You think you are having problems from the medicine you are taking. Read your medicine instructions carefully.  Your chest pain does not go away, even after treatment.  You develop a rash with blisters on your chest.  SEEK IMMEDIATE MEDICAL CARE IF:  You have increased chest pain or pain that spreads to your arm, neck, jaw, back, or abdomen.   You develop shortness of breath, an increasing cough, or you are coughing up blood.  You have severe back or abdominal pain, feel nauseous, or vomit.  You develop severe weakness, fainting, or chills.  You have a fever.  THIS IS AN EMERGENCY. Do not wait to see if the pain will go away. Get medical help at once. Call your local emergency services (_____________________). Do not drive yourself to the hospital.      SECONDARY DISCHARGE DIAGNOSES  Diagnosis: Breast mass  Assessment and Plan of Treatment: Offered surg onc eval, pt family refused intervention. pt dnr/dni. hospice referral sent for f/u with [facility]     PRINCIPAL DISCHARGE DIAGNOSIS  Diagnosis: Chest pain  Assessment and Plan of Treatment: improved. trops negative, cta neg for pe. echo with normal lvef but moderate as but pt is not a candidate for ischemic eval.  HOME CARE INSTRUCTIONS  For the next few days, avoid physical activities that bring on chest pain. Continue physical activities as directed.  Do not Informed pt of the various negative side effects of smoking including risk of COPD, Lung Ca etc  Strongly recommended that pt stops smoking and pt given various options of smoking cessasion tools such as NRT's and other pharmacotherapies.  Avoid drinking alcohol.   Only take over-the-counter or prescription medicine for pain, discomfort, or fever as directed by your caregiver.  Follow your caregiver's suggestions for further testing if your chest pain does not go away.  Keep any follow-up appointments you made. If you do not go to an appointment, you could develop lasting (chronic) problems with pain. If there is any problem keeping an appointment, you must call to reschedule.   SEEK MEDICAL CARE IF:  You think you are having problems from the medicine you are taking. Read your medicine instructions carefully.  Your chest pain does not go away, even after treatment.  You develop a rash with blisters on your chest.  SEEK IMMEDIATE MEDICAL CARE IF:  You have increased chest pain or pain that spreads to your arm, neck, jaw, back, or abdomen.   You develop shortness of breath, an increasing cough, or you are coughing up blood.  You have severe back or abdominal pain, feel nauseous, or vomit.  You develop severe weakness, fainting, or chills.  You have a fever.  THIS IS AN EMERGENCY. Do not wait to see if the pain will go away. Get medical help at once. Call your local emergency services (_____________________). Do not drive yourself to the hospital.      SECONDARY DISCHARGE DIAGNOSES  Diagnosis: Breast mass  Assessment and Plan of Treatment: Offered surg onc eval, pt family refused intervention. pt dnr/dni. hospice referral sent for f/u with [facility]    Diagnosis: HTN (hypertension)  Assessment and Plan of Treatment: Low salt diet  Activity as tolerated.  Take all medication as prescribed.  Follow up with your medical doctor for routine blood pressure monitoring at your next visit.  Notify your doctor if you have any of the following symptoms:   Dizziness, Lightheadedness, Blurry vision, Headache, Chest pain, Shortness of breath

## 2023-04-14 NOTE — DISCHARGE NOTE PROVIDER - CARE PROVIDER_API CALL
Joann Smallwood)  Internal Medicine  [address] [place] [address]  Phone: [phone]  Fax: [phone]  Follow Up Time:

## 2023-04-14 NOTE — PATIENT PROFILE ADULT - FALL HARM RISK - HARM RISK INTERVENTIONS

## 2023-04-14 NOTE — DISCHARGE NOTE PROVIDER - HOSPITAL COURSE
89 yo F with a PMH of dementia, HTN, HLD, hypothyroidism, Afib now on Eliquis, COPD on supp O2 as needed but never uses it  from [facility] p/w chest pain and SOB. Reported sxs to staff and they called covering MD who recommended she be evaluated in ED. Pt now stating she never c/o this and doesn't know why they sent her to the hospital. When asked if she has any complaints at present she states her left ankle hurts and her legs are swollen. No fever, cough, abd pain, nausea, vomiting, headache, dizziness. Unsure if pt is compliant with her meds.  Pt is an unreliable historian.     ptn has nonspecific aches and pain, ptn has a breast mass, seen by surgical onc. son refusing intervention. GOC d/w son. ptn is DNR/DNI. he requested hospice consult. he wants her DC back to [facility].  all testing cancelled as per son's request     cardiology, surgical onc, pulm  consults  reviewed  LLE ankle xrays w no acute fractures, duplex neg for dvt  dvt ppx not necessary    SOB:/ copd: cont pulmicort:  add duoneb q 6 hours:  ct chest with innumerable calcified granuloma: no acute infiltrates: ABG seems reasonable:  compensated hypercarbic resp failure     Chest pain  -No longer endorsing CP  -HST negative x2  -CTA chest no PE, effusion, edema  -EKG NSR with 1st degree av block, no e/o ischemia  -Old echo with nml lv fxn  -echo w normal LVEF and possible moderate AS  -Would not pursue ischemic eval given her dementia and age    #pAfib on Eliquis  -Stable  -Continue apixaban  -Not on rate control meds    #HTN  -Bp acceptable  -Continue amlodipine   -Continue losartan   89 yo F with a PMH of dementia, HTN, HLD, hypothyroidism, Afib now on Eliquis, COPD on supp O2 as needed but never uses it  from [facility] p/w chest pain and SOB. Reported sxs to staff and they called covering MD who recommended she be evaluated in ED. Pt now stating she never c/o this and doesn't know why they sent her to the hospital. When asked if she has any complaints at present she states her left ankle hurts and her legs are swollen. No fever, cough, abd pain, nausea, vomiting, headache, dizziness. Unsure if pt is compliant with her meds.  Pt is an unreliable historian.     ptn has nonspecific aches and pain, ptn has a breast mass, seen by surgical onc. son refusing intervention. GOC d/w son. ptn is DNR/DNI. he requested hospice consult. he wants her DC back to [facility].  all testing cancelled as per son's request     cardiology, surgical onc, pulm  consults  reviewed  LLE ankle xrays w no acute fractures, duplex neg for dvt  dvt ppx not necessary    SOB:/ copd: cont pulmicort:  add duoneb q 6 hours:  ct chest with innumerable calcified granuloma: no acute infiltrates: ABG seems reasonable:  compensated hypercarbic resp failure     Chest pain  -No longer endorsing CP  -HST negative x2  -CTA chest no PE, effusion, edema  -EKG NSR with 1st degree av block, no e/o ischemia  -Old echo with nml lv fxn  -echo w normal LVEF and possible moderate AS  -Would not pursue ischemic eval given her dementia and age    #pAfib on Eliquis  -Stable  -Continue apixaban  -Not on rate control meds    #HTN  -Bp acceptable  -Continue amlodipine   -Continue losartan    Acute issues resolved.  DC to assisted living with hospice. 89 yo F with a PMH of dementia, HTN, HLD, hypothyroidism, Afib now on Eliquis, COPD on supp O2 as needed but never uses it  from [facility] p/w chest pain and SOB. Reported sxs to staff and they called covering MD who recommended she be evaluated in ED. Pt now stating she never c/o this and doesn't know why they sent her to the hospital. When asked if she has any complaints at present she states her left ankle hurts and her legs are swollen. No fever, cough, abd pain, nausea, vomiting, headache, dizziness. Unsure if pt is compliant with her meds.  Pt is an unreliable historian.     ptn has nonspecific aches and pain, ptn has a breast mass, seen by surgical onc. son refusing intervention. GOC d/w son. ptn is DNR/DNI. he requested hospice consult. he wants her DC back to [facility].  all testing cancelled as per son's request     cardiology, surgical onc, pulm  consults  reviewed  LLE ankle xrays w no acute fractures, duplex neg for dvt  dvt ppx not necessary    SOB:/ copd: cont pulmicort:  add duoneb q 6 hours:  ct chest with innumerable calcified granuloma: no acute infiltrates: ABG seems reasonable:  compensated hypercarbic resp failure     Chest pain  -No longer endorsing CP  -HST negative x2  -CTA chest no PE, effusion, edema  -EKG NSR with 1st degree av block, no e/o ischemia  -Old echo with nml lv fxn  -echo w normal LVEF and possible moderate AS  -Would not pursue ischemic eval given her dementia and age    #pAfib on Eliquis  -Stable  -Continue apixaban  -Not on rate control meds    #HTN  -Bp acceptable  -Continue amlodipine   -Continue losartan    Acute issues resolved.  DC to assisted living with hospice on supplemental oxygen

## 2023-04-14 NOTE — DISCHARGE NOTE PROVIDER - NSDCMRMEDTOKEN_GEN_ALL_CORE_FT
acetaminophen 325 mg oral tablet: 2 tab(s) orally every 6 hours as needed  amLODIPine 2.5 mg oral tablet: 1 tab(s) orally once a day  budesonide 0.25 mg/2 mL inhalation suspension: 2 milliliter(s) by nebulizer 2 times a day  DuoNeb 0.5 mg-2.5 mg/3 mL inhalation solution: 3 milliliter(s) by nebulizer 4 times a day as needed  Eliquis 2.5 mg oral tablet: 1 tab(s) orally 2 times a day  famotidine 20 mg oral tablet: 1 tab(s) orally once a day (at bedtime)  levothyroxine 75 mcg (0.075 mg) oral tablet: 1 tab(s) orally once a day  losartan 100 mg oral tablet: 1 tab(s) orally once a day  MiraLax oral powder for reconstitution: 17 gram(s) orally 2 times a day as needed  QUEtiapine 100 mg oral tablet: 1 tab(s) orally once a day (at bedtime) - total daily dose 125mg  QUEtiapine 25 mg oral tablet: 1 tab(s) orally once a day (at bedtime) - total daily dose 125mg  Senna Plus 50 mg-8.6 mg oral tablet: 2 tab(s) orally once a day (at bedtime)  Therems oral tablet: 1 tab(s) orally once a day   acetaminophen 325 mg oral tablet: 2 tab(s) orally every 6 hours as needed  albuterol 90 mcg/inh inhalation aerosol: 2 puff(s) inhaled every 6 hours As needed Bronchospasm  amLODIPine 2.5 mg oral tablet: 1 tab(s) orally once a day  budesonide 0.25 mg/2 mL inhalation suspension: 2 milliliter(s) by nebulizer 2 times a day  Eliquis 2.5 mg oral tablet: 1 tab(s) orally 2 times a day  famotidine 20 mg oral tablet: 1 tab(s) orally once a day  ipratropium-albuterol 0.5 mg-2.5 mg/3 mL inhalation solution: 3 milliliter(s) inhaled every 6 hours  levothyroxine 75 mcg (0.075 mg) oral tablet: 1 tab(s) orally once a day  losartan 100 mg oral tablet: 1 tab(s) orally once a day  MiraLax oral powder for reconstitution: 17 gram(s) orally 2 times a day as needed  QUEtiapine 100 mg oral tablet: 1 tab(s) orally once a day (at bedtime) - total daily dose 125mg  QUEtiapine 25 mg oral tablet: 1 tab(s) orally once a day (at bedtime) - total daily dose 125mg  Senna Plus 50 mg-8.6 mg oral tablet: 2 tab(s) orally once a day (at bedtime)  Therems oral tablet: 1 tab(s) orally once a day

## 2023-04-14 NOTE — CONSULT NOTE ADULT - TIME BILLING
Agree with above PA note.  A/P  89 yo F with a PMH of dementia, HTN, HLD, hypothyroidism, Afib now on Eliquis, COPD on supp O2 as needed but never uses it  from [facility] p/w chest pain and SOB.    #Chest pain  -resolved  -CTA with no PE  -f/u echo   -Would not pursue ischemic eval given her dementia and age and resolution of sx    #pAfib on Eliquis  -Stable  -Continue apixaban  -Not on rate control meds    #HTN  -Continue amlodipine   -Continue losartan

## 2023-04-14 NOTE — DISCHARGE NOTE PROVIDER - NSDCFUADDAPPT_GEN_ALL_CORE_FT
Follow up with Dr Barrera in 2weeks Follow up with Dr Barrera in 2weeks  [facility]

## 2023-04-14 NOTE — CONSULT NOTE ADULT - ASSESSMENT
89 yo F with a PMH of dementia, HTN, HLD, hypothyroidism, Afib now on Eliquis, COPD on supp O2 as needed but never uses it  from [facility] p/w chest pain and SOB. Reported sxs to staff today and they called covering MD who recommended she be evaluated in ED. Pt now stating she never c/o this and doesn't know why they sent her to the hospital. When asked if she has any complaints at present she states her left ankle hurts and her legs are swollen. No fever, cough, abd pain, nausea, vomiting, headache, dizziness. Unsure if pt is compliant with her meds.  Pt is an unreliable historian.  cardiology consult called  check tte  LLE ankle xrays w no acute fractures  dvt ppx not necessary        SOB:/ copd:   Breast Mass:   Aortic stenosis   A FIBRILLATION:   HTN  Hypothyridism      4/14:  SOB:/ copd: cont pulmicort:  add duoneb q 6 hours:  ct chest with innumerable calcified granuloma: no acute infiltrates: do ABG in am   Breast Mass:  NOTED ON CT CHEST  : DEFER TO PRIMARY TEAM   Aortic stenosis  ; she had mild AS before;  ? repeat echo ; no isch workup    A FIBRILLATION: on eliquis  HTN : controlled  Hypothyroidism : on synthroid     DW ACP

## 2023-04-14 NOTE — CONSULT NOTE ADULT - SUBJECTIVE AND OBJECTIVE BOX
CARDIOLOGY CONSULT - Dr. Francisco     HPI:  89 yo F with a PMH of dementia, HTN, HLD, hypothyroidism, Afib now on Eliquis, COPD on supp O2 as needed but never uses it  from [facility] p/w chest pain and SOB. Reported sxs to staff today and they called covering MD who recommended she be evaluated in ED. Pt now stating she never c/o this and doesn't know why they sent her to the hospital. When asked if she has any complaints at present she states her left ankle hurts and her legs are swollen. No fever, cough, abd pain, nausea, vomiting, headache, dizziness. Unsure if pt is compliant with her meds.  Pt is an unreliable historian. (13 Apr 2023 21:06)    Pt examined at bedside, notably confused. However, she is not endorsing any chest pain at this time. She is c/o b/l LE pain. Difficult to obtain history from patient.    PAST MEDICAL & SURGICAL HISTORY:  H/O: Hypothyroidism      Hypertension      Dyslipidemia      mild Dementia      Anal spasm      Chronic anal fissure      Osteoporosis      Hip fracture  Right hip- 1995      Skin cancer  lower extremities      History of  Hip surgery with hardware      History of Tonsillectomy      left Elbow surgey secondary to injury      After-Cataract of Both Eyes      left eye Retinal tear repair      Anterior Cervical discectomy      H/O gastroesophageal reflux (GERD)              PREVIOUS DIAGNOSTIC TESTING:    [x] Echocardiogram:  < from: Transthoracic Echocardiogram (10.14.20 @ 07:06) >  Conclusions:  1. Calcified trileaflet aortic valve with decreased  opening. Peak transaortic valve gradient equals 16 mm Hg,  mean transaortic valve gradient equals 7 mm Hg, estimated  aortic valve area equals 1.7 sqcm (by continuity equation),  aortic valve velocity time integral equals 40 cm,  consistent with mild aortic stenosis.  2. Normal left ventricular internal dimensions and wall  thicknesses.  3. Hyperdynamic left ventricular systolic function.  4. Estimated pulmonary artery systolic pressure equals 44  mm Hg, assuming right atrial pressure equals 8 mm Hg,  consistent with mild pulmonary pressures.    < end of copied text >    [ ]  Catheterization:  [ ] Stress Test:  	    MEDICATIONS:  Home Medications:  acetaminophen 325 mg oral tablet: 2 tab(s) orally every 6 hours as needed (13 Apr 2023 21:05)  amLODIPine 2.5 mg oral tablet: 1 tab(s) orally once a day (13 Apr 2023 21:05)  budesonide 0.25 mg/2 mL inhalation suspension: 2 milliliter(s) by nebulizer 2 times a day (13 Apr 2023 21:06)  DuoNeb 0.5 mg-2.5 mg/3 mL inhalation solution: 3 milliliter(s) by nebulizer 4 times a day as needed (13 Apr 2023 21:06)  Eliquis 2.5 mg oral tablet: 1 tab(s) orally 2 times a day (13 Apr 2023 21:06)  famotidine 20 mg oral tablet: 1 tab(s) orally once a day (at bedtime) (13 Apr 2023 21:06)  levothyroxine 75 mcg (0.075 mg) oral tablet: 1 tab(s) orally once a day (13 Apr 2023 21:06)  losartan 100 mg oral tablet: 1 tab(s) orally once a day (13 Apr 2023 21:06)  MiraLax oral powder for reconstitution: 17 gram(s) orally 2 times a day as needed (13 Apr 2023 21:06)  QUEtiapine 100 mg oral tablet: 1 tab(s) orally once a day (at bedtime) - total daily dose 125mg (13 Apr 2023 21:10)  QUEtiapine 25 mg oral tablet: 1 tab(s) orally once a day (at bedtime) - total daily dose 125mg (13 Apr 2023 21:06)  Senna Plus 50 mg-8.6 mg oral tablet: 2 tab(s) orally once a day (at bedtime) (13 Apr 2023 21:06)  Therems oral tablet: 1 tab(s) orally once a day (13 Apr 2023 21:10)      MEDICATIONS  (STANDING):  amLODIPine   Tablet 2.5 milliGRAM(s) Oral daily  apixaban 2.5 milliGRAM(s) Oral two times a day  buDESOnide    Inhalation Suspension 0.5 milliGRAM(s) Inhalation two times a day  famotidine    Tablet 20 milliGRAM(s) Oral daily  levothyroxine 75 MICROGram(s) Oral daily  losartan 100 milliGRAM(s) Oral daily  multivitamin 1 Tablet(s) Oral daily  polyethylene glycol 3350 17 Gram(s) Oral two times a day  QUEtiapine 100 milliGRAM(s) Oral at bedtime  QUEtiapine 25 milliGRAM(s) Oral at bedtime  senna 2 Tablet(s) Oral at bedtime      FAMILY HISTORY:  No family history of cardiovascular disease        SOCIAL HISTORY:    [x] Non-smoker  [ ] Smoker  [ ] Alcohol    Allergies    penicillin (Rash)    Intolerances    	    REVIEW OF SYSTEMS:  CONSTITUTIONAL: No fever, weight loss, or fatigue  EYES: No eye pain, visual disturbances, or discharge  ENMT:  No difficulty hearing, tinnitus, vertigo; No sinus or throat pain  NECK: No pain or stiffness  RESPIRATORY: No cough, wheezing, chills or hemoptysis; No Shortness of Breath  CARDIOVASCULAR: No chest pain, palpitations, passing out, dizziness, or leg swelling  GASTROINTESTINAL: No abdominal or epigastric pain. No nausea, vomiting, or hematemesis; No diarrhea or constipation. No melena or hematochezia.  GENITOURINARY: No dysuria, frequency, hematuria, or incontinence  NEUROLOGICAL: No headaches, memory loss, loss of strength, numbness, or tremors  SKIN: No itching, burning, rashes, or lesions  EXT: +B/l LE pain   	    [ ] All others negative	  [x] Unable to obtain    PHYSICAL EXAM:  T(C): 36.3 (04-14-23 @ 08:10), Max: 36.9 (04-14-23 @ 00:05)  HR: 58 (04-14-23 @ 08:10) (58 - 71)  BP: 134/76 (04-14-23 @ 08:10) (134/76 - 174/70)  RR: 17 (04-14-23 @ 08:10) (15 - 20)  SpO2: 97% (04-14-23 @ 08:10) (90% - 100%)  Wt(kg): --  I&O's Summary      Appearance: Elderly female	  Psychiatry: A & O x 3, Mood & affect appropriate  HEENT:   Normal oral mucosa, PERRL, EOMI	  Lymphatic: No lymphadenopathy  Cardiovascular: Normal S1 S2,RRR, No JVD, No murmurs  Respiratory: Lungs clear to auscultation b/l  Gastrointestinal:  Soft, Non-tender, + BS	  Skin: No rashes, No ecchymoses, No cyanosis	  Neurologic: Non-focal  Extremities: Normal range of motion, No clubbing, cyanosis or edema  Vascular: Peripheral pulses palpable 2+ bilaterally    TELEMETRY: SR 60-70s with brief ella cardia 48-50s	    ECG:  NSR with 1st degree AV block 	  RADIOLOGY:  < from: Xray Chest 1 View AP/PA (04.13.23 @ 17:02) >    IMPRESSION:  No focal lung consolidation.    --- End of Report ---    < end of copied text >    < from: Xray Ankle Complete 3 Views, Left (04.13.23 @ 20:31) >  IMPRESSION:    No acute fracture or dislocation.    --- End of Report ---    < end of copied text >    < from: VA Duplex Lower Ext Vein Scan, Left (04.13.23 @ 22:22) >  IMPRESSION:  No evidence of left lower extremity deep venous thrombosis.      < from: CT Angio Chest PE Protocol w/ IV Cont (04.13.23 @ 19:43) >  IMPRESSION:  1. No CT evidence for pulmonary embolism.  2. There is a 2.5 cm irregular soft tissue mass identified within the   lateral aspect of the right breast at posterior depth, suspicious for   breast parenchymal neoplasm/carcinoma. Further evaluation with   mammography/ultrasonography is recommended.          --- End of Report ---    < end of copied text >        --- End of Report ---    < end of copied text >      OTHER: 	  	  LABS:	 	    CARDIAC MARKERS:  Troponin T, High Sensitivity Result: 22 ng/L (04-13 @ 21:57)  Troponin T, High Sensitivity Result: 22 ng/L (04-13 @ 17:48)                                  10.3   5.36  )-----------( 301      ( 13 Apr 2023 17:48 )             33.6     04-13    141  |  104  |  25<H>  ----------------------------<  100<H>  4.8   |  30  |  1.17    Ca    9.2      13 Apr 2023 17:48  Mg     2.2     04-13    TPro  6.5  /  Alb  3.7  /  TBili  0.3  /  DBili  x   /  AST  26  /  ALT  20  /  AlkPhos  67  04-13    PT/INR - ( 13 Apr 2023 17:48 )   PT: 13.7 sec;   INR: 1.18 ratio         PTT - ( 13 Apr 2023 17:48 )  PTT:32.1 sec  proBNP:   Lipid Profile:   HgA1c:   TSH:

## 2023-04-15 LAB
BASE EXCESS BLDA CALC-SCNC: 4.2 MMOL/L — HIGH (ref -2–3)
CO2 BLDA-SCNC: 33 MMOL/L — HIGH (ref 19–24)
GAS PNL BLDA: SIGNIFICANT CHANGE UP
HCO3 BLDA-SCNC: 31 MMOL/L — HIGH (ref 21–28)
PCO2 BLDA: 55 MMHG — HIGH (ref 32–45)
PH BLDA: 7.36 — SIGNIFICANT CHANGE UP (ref 7.35–7.45)
PO2 BLDA: 78 MMHG — LOW (ref 83–108)
SAO2 % BLDA: 97.3 % — SIGNIFICANT CHANGE UP (ref 94–98)

## 2023-04-15 RX ADMIN — Medication 650 MILLIGRAM(S): at 01:56

## 2023-04-15 RX ADMIN — POLYETHYLENE GLYCOL 3350 17 GRAM(S): 17 POWDER, FOR SOLUTION ORAL at 17:31

## 2023-04-15 RX ADMIN — Medication 3 MILLILITER(S): at 00:14

## 2023-04-15 RX ADMIN — Medication 0.5 MILLIGRAM(S): at 17:30

## 2023-04-15 RX ADMIN — QUETIAPINE FUMARATE 100 MILLIGRAM(S): 200 TABLET, FILM COATED ORAL at 22:12

## 2023-04-15 RX ADMIN — ALBUTEROL 2 PUFF(S): 90 AEROSOL, METERED ORAL at 13:24

## 2023-04-15 RX ADMIN — QUETIAPINE FUMARATE 25 MILLIGRAM(S): 200 TABLET, FILM COATED ORAL at 22:13

## 2023-04-15 RX ADMIN — Medication 75 MICROGRAM(S): at 05:41

## 2023-04-15 RX ADMIN — Medication 3 MILLILITER(S): at 05:42

## 2023-04-15 RX ADMIN — Medication 0.5 MILLIGRAM(S): at 05:42

## 2023-04-15 RX ADMIN — FAMOTIDINE 20 MILLIGRAM(S): 10 INJECTION INTRAVENOUS at 13:24

## 2023-04-15 RX ADMIN — Medication 3 MILLILITER(S): at 17:30

## 2023-04-15 RX ADMIN — Medication 650 MILLIGRAM(S): at 22:13

## 2023-04-15 RX ADMIN — APIXABAN 2.5 MILLIGRAM(S): 2.5 TABLET, FILM COATED ORAL at 05:42

## 2023-04-15 RX ADMIN — Medication 3 MILLILITER(S): at 12:28

## 2023-04-15 RX ADMIN — Medication 650 MILLIGRAM(S): at 01:32

## 2023-04-15 RX ADMIN — APIXABAN 2.5 MILLIGRAM(S): 2.5 TABLET, FILM COATED ORAL at 17:31

## 2023-04-15 RX ADMIN — POLYETHYLENE GLYCOL 3350 17 GRAM(S): 17 POWDER, FOR SOLUTION ORAL at 05:41

## 2023-04-15 RX ADMIN — SENNA PLUS 2 TABLET(S): 8.6 TABLET ORAL at 22:14

## 2023-04-15 RX ADMIN — AMLODIPINE BESYLATE 2.5 MILLIGRAM(S): 2.5 TABLET ORAL at 05:42

## 2023-04-15 RX ADMIN — Medication 1 TABLET(S): at 13:24

## 2023-04-15 RX ADMIN — Medication 3 MILLILITER(S): at 23:26

## 2023-04-15 RX ADMIN — LOSARTAN POTASSIUM 100 MILLIGRAM(S): 100 TABLET, FILM COATED ORAL at 05:41

## 2023-04-15 NOTE — PHYSICAL THERAPY INITIAL EVALUATION ADULT - ACTIVE RANGE OF MOTION EXAMINATION, REHAB EVAL
except R shoulder flex AAROM WFL/bilateral upper extremity Active ROM was WFL (within functional limits)/bilateral  lower extremity Active ROM was WFL (within functional limits)

## 2023-04-15 NOTE — CONSULT NOTE ADULT - SUBJECTIVE AND OBJECTIVE BOX
[facility]), [facility]        Patient is a 89y old  Female who presents with a chief complaint of chest pain (14 Apr 2023 19:14)    Excerpt from H&P,"     89 yo F with a PMH of dementia, HTN, HLD, hypothyroidism, Afib now on Eliquis, COPD on supp O2 as needed but never uses it  from [facility] p/w chest pain and SOB. Reported sxs to staff today and they called covering MD who recommended she be evaluated in ED. Pt now stating she never c/o this and doesn't know why they sent her to the hospital. When asked if she has any complaints at present she states her left ankle hurts and her legs are swollen. No fever, cough, abd pain, nausea, vomiting, headache, dizziness. Unsure if pt is compliant with her meds.  Pt is an unreliable historian.            *****PAST MEDICAL / Surgical  HISTORY:  PAST MEDICAL & SURGICAL HISTORY:  H/O: Hypothyroidism      Hypertension      Dyslipidemia      mild Dementia      Anal spasm      Chronic anal fissure      Osteoporosis      Hip fracture  Right hip- 1995      Skin cancer  lower extremities      History of  Hip surgery with hardware      History of Tonsillectomy      left Elbow surgey secondary to injury      After-Cataract of Both Eyes      left eye Retinal tear repair      Anterior Cervical discectomy      H/O gastroesophageal reflux (GERD)               *****FAMILY HISTORY:  FAMILY HISTORY:  No family history of cardiovascular disease             *****SOCIAL HISTORY:  Alcohol: None  Smoking: None         *****ALLERGIES:   Allergies    penicillin (Rash)    Intolerances             *****MEDICATIONS: current medication reviewed and documented.   MEDICATIONS  (STANDING):  albuterol/ipratropium for Nebulization 3 milliLiter(s) Nebulizer every 6 hours  amLODIPine   Tablet 2.5 milliGRAM(s) Oral daily  apixaban 2.5 milliGRAM(s) Oral two times a day  buDESOnide    Inhalation Suspension 0.5 milliGRAM(s) Inhalation two times a day  famotidine    Tablet 20 milliGRAM(s) Oral daily  levothyroxine 75 MICROGram(s) Oral daily  losartan 100 milliGRAM(s) Oral daily  multivitamin 1 Tablet(s) Oral daily  polyethylene glycol 3350 17 Gram(s) Oral two times a day  QUEtiapine 100 milliGRAM(s) Oral at bedtime  QUEtiapine 25 milliGRAM(s) Oral at bedtime  senna 2 Tablet(s) Oral at bedtime    MEDICATIONS  (PRN):  acetaminophen     Tablet .. 650 milliGRAM(s) Oral every 6 hours PRN Temp greater or equal to 38C (100.4F), Mild Pain (1 - 3)  albuterol    90 MICROgram(s) HFA Inhaler 2 Puff(s) Inhalation every 6 hours PRN Bronchospasm  bisacodyl Suppository 10 milliGRAM(s) Rectal daily PRN Constipation           *****REVIEW OF SYSTEM:  GEN: no fever, no chills, no pain  RESP: no SOB, no cough, no sputum  CVS: no chest pain, no palpitations, no edema  GI: no abdominal pain, no nausea, no vomiting, no constipation, no diarrhea  : no dysurea, no frequency, no hematurea  Neuro: no headache, no dizziness  PSYCH: no anxiety, no depression  Derm : no itching, no rash         *****VITAL SIGNS:  VSS           *****PHYSICAL EXAM:   Alert oriented x 2   Attention comprehension are fair. Able to name, repeat, read without any difficulty.   Able to follow 1  step commands.     EOMI fundi not visualized, blinks to threat   No facial asymmetry   Tongue is midline    Moving all 4 ext symmetrically no pronator drift      sensation is grossly symmetric  Gait : not assessed.  B/L down going toes               *****LAB AND IMAGING:    < from: CT Maxillofacial No Cont (04.04.22 @ 00:47) >  CT BRAIN:    No acute intracranial hemorrhage or suspicious extra-axial fluid   collection. Moderate to severe atrophy and moderate chronic microvascular   ischemic changes. 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 No   hydrocephalus or central herniation. Basilar cisterns remain clear.    CT CERVICAL SPINE:    Exaggerated cervical lordosis. Craniovertebral alignment is stable   compared to the prior. Anterior cervical discectomy and fusion at C5-6.    No acute fractureor traumatic basis. Articular facet joints are normally   aligned. Extensive multilevel degenerative changes, grossly stable.    Calcified granulomata in the lung apices.      CT FACE    Right premaxillary soft tissue contusion. No acute facial or orbital   fracture. The globes are intact without retrobulbar hematoma. The lenses   are located bilaterally.    The mandibular condyles are located without fracture. The   temporomandibular joints are normally located, there is severe   degenerative change of the right.    Scattered minimal paranasal sinus disease. Mastoid air cells and middle   ear cavities are clear. The nasopharyngeal contours are unremarkable.    IMPRESSION:    CT BRAIN: No acute intracranial CT abnormality.    CT CERVICAL SPINE: No acute fracture or traumatic malalignment.    CT FACE: Right premaxillary soft tissue contusion without acute fracture.    < end of copied text >                     [All pertinent recent Imaging reports reviewed]         *****A S S E S S M E N T   A N D   P L A N :  Excerpt from H&P,"     89 yo F with a PMH of dementia, HTN, HLD, hypothyroidism, Afib now on Eliquis, COPD on supp O2 as needed but never uses it  from [facility] p/w chest pain and SOB. Reported sxs to staff today and they called covering MD who recommended she be evaluated in ED. Pt now stating she never c/o this and doesn't know why they sent her to the hospital. When asked if she has any complaints at present she states her left ankle hurts and her legs are swollen. No fever, cough, abd pain, nausea, vomiting, headache, dizziness. Unsure if pt is compliant with her meds.  Pt is an unreliable historian.           Problem/Recommendations 1:  ams likely multifactorial related to fall, hospitalization related delirium worsening underlying dementia   regulate sleep wake cycle disruption   encourage po intake         Problem/Recommendations 2: htn   bp goal normotensive            pt at risk for developing delirium, therefore please institute the following preventative measures if possible          - initiating early mobilization          -minimizing "tethers" - IV, oxygen, catheters, etc          -avoiding   sedatives          -maintaining hydration/nutrition          -avoid anticholinergics - diphenhydramine, etc          -pain control          -sleep wake cycle regulation; avoid day time somnolence           -supportive environment    ___________________________  Will follow with you.  Thank you,  Diana Zamarriap MD  Diplomate of the American Board of Neurology and Psychiatry.  Diplomate of the American Board of Vascular Neurology.   [facility] ([facility]), [facility]   Ph: [phone]    Differential diagnosis and plan of care discussed with patient after the evaluation.   Advanced care planning options discussed.   Pain assessed and judicious use of narcotics when appropriate was discussed.  Importance of Fall prevention discussed.  Counseling on Smoking and Alcohol cessation was offered when appropriate.  Counseling on Diet, exercise, and medication compliance was done.   83 minutes spent on the total encounter;  more than 50 % of the visit was spent on counseling  and or coordinating care by the attending physician.    Thank you for allowing me to participate in the care of this rajinder patient. Please do not hesitate to call me if you have any questions.     This and subsequent notes  will  inherently be subject to errors including those of syntax and substitutions which may escape proofreading. In such instances original meaning may be extrapolated by contextual derivation.

## 2023-04-15 NOTE — PHYSICAL THERAPY INITIAL EVALUATION ADULT - TRANSFER TRAINING, PT EVAL
GOAL: Pt will perform ALL transfers with supervision, w/use of appropriate assistive device as needed, in 4 weeks.

## 2023-04-15 NOTE — PHYSICAL THERAPY INITIAL EVALUATION ADULT - PERTINENT HX OF CURRENT PROBLEM, REHAB EVAL
89yoF PMHx of dementia, HTN, HLD, hypothyroidism, Afib on Eliquis, COPD - supp O2 PRN but never uses it,  from [facility] living facility p/w chest pain and SOB. Pt denying these complaints in ED however c/o left ankle pain. LLE duplex: No evidence of left lower extremity deep venous thrombosis. Incidental finding of R side 2.5cm breast mass noted, no bone mets to ribs noted. No suspicious adenopathy.   LLE duplex: No evidence of left lower extremity deep venous thrombosis.  XR left ankle: No acute fracture or dislocation.  CTA chest: 1. No CT evidence for pulmonary embolism. 2. There is a 2.5 cm irregular soft tissue mass identified within the lateral aspect of the right breast at posterior depth, suspicious for breast parenchymal neoplasm/carcinoma. Further evaluation with mammography/ultrasonography is recommended.

## 2023-04-15 NOTE — PHYSICAL THERAPY INITIAL EVALUATION ADULT - ADDITIONAL COMMENTS
Pt lives alone at assisted living facility. Prior to admission pt required assisted for functional mobility, inlcuding ambulation, and ADL's. Pt reportedly with 24/7 HHA assist.

## 2023-04-16 RX ORDER — FAMOTIDINE 10 MG/ML
1 INJECTION INTRAVENOUS
Refills: 0 | DISCHARGE

## 2023-04-16 RX ORDER — ALBUTEROL 90 UG/1
2 AEROSOL, METERED ORAL
Qty: 0 | Refills: 0 | DISCHARGE
Start: 2023-04-16

## 2023-04-16 RX ORDER — LANOLIN ALCOHOL/MO/W.PET/CERES
3 CREAM (GRAM) TOPICAL ONCE
Refills: 0 | Status: COMPLETED | OUTPATIENT
Start: 2023-04-16 | End: 2023-04-16

## 2023-04-16 RX ORDER — FAMOTIDINE 10 MG/ML
1 INJECTION INTRAVENOUS
Qty: 0 | Refills: 0 | DISCHARGE
Start: 2023-04-16

## 2023-04-16 RX ORDER — IPRATROPIUM/ALBUTEROL SULFATE 18-103MCG
3 AEROSOL WITH ADAPTER (GRAM) INHALATION
Qty: 0 | Refills: 0 | DISCHARGE
Start: 2023-04-16

## 2023-04-16 RX ORDER — IPRATROPIUM/ALBUTEROL SULFATE 18-103MCG
3 AEROSOL WITH ADAPTER (GRAM) INHALATION
Refills: 0 | DISCHARGE

## 2023-04-16 RX ADMIN — Medication 10 MILLIGRAM(S): at 17:12

## 2023-04-16 RX ADMIN — Medication 1 TABLET(S): at 13:19

## 2023-04-16 RX ADMIN — Medication 75 MICROGRAM(S): at 05:56

## 2023-04-16 RX ADMIN — POLYETHYLENE GLYCOL 3350 17 GRAM(S): 17 POWDER, FOR SOLUTION ORAL at 17:12

## 2023-04-16 RX ADMIN — APIXABAN 2.5 MILLIGRAM(S): 2.5 TABLET, FILM COATED ORAL at 17:13

## 2023-04-16 RX ADMIN — LOSARTAN POTASSIUM 100 MILLIGRAM(S): 100 TABLET, FILM COATED ORAL at 05:57

## 2023-04-16 RX ADMIN — APIXABAN 2.5 MILLIGRAM(S): 2.5 TABLET, FILM COATED ORAL at 05:57

## 2023-04-16 RX ADMIN — ALBUTEROL 2 PUFF(S): 90 AEROSOL, METERED ORAL at 17:13

## 2023-04-16 RX ADMIN — AMLODIPINE BESYLATE 2.5 MILLIGRAM(S): 2.5 TABLET ORAL at 05:57

## 2023-04-16 RX ADMIN — Medication 3 MILLILITER(S): at 17:12

## 2023-04-16 RX ADMIN — QUETIAPINE FUMARATE 25 MILLIGRAM(S): 200 TABLET, FILM COATED ORAL at 21:13

## 2023-04-16 RX ADMIN — SENNA PLUS 2 TABLET(S): 8.6 TABLET ORAL at 21:13

## 2023-04-16 RX ADMIN — FAMOTIDINE 20 MILLIGRAM(S): 10 INJECTION INTRAVENOUS at 13:20

## 2023-04-16 RX ADMIN — Medication 3 MILLILITER(S): at 12:49

## 2023-04-16 RX ADMIN — Medication 3 MILLILITER(S): at 05:58

## 2023-04-16 RX ADMIN — Medication 3 MILLIGRAM(S): at 00:52

## 2023-04-16 RX ADMIN — Medication 0.5 MILLIGRAM(S): at 06:18

## 2023-04-16 RX ADMIN — POLYETHYLENE GLYCOL 3350 17 GRAM(S): 17 POWDER, FOR SOLUTION ORAL at 05:58

## 2023-04-16 RX ADMIN — QUETIAPINE FUMARATE 100 MILLIGRAM(S): 200 TABLET, FILM COATED ORAL at 21:13

## 2023-04-16 NOTE — CONSULT NOTE ADULT - ASSESSMENT
89-year-old female admitted with chest pain and now better. CT done of the chest and she has a right breast mass concerning for a primary malignancy. Family has opted to not have a biopsy/surgery.    Macrocytic anemia - in an elderly patient MDS is a possibility. The anemia is mild and there are no other cytopenias. Will order B12/folate levels. Monitor the CBC. Can also check iron studies, LDH, and haptoglobin.

## 2023-04-16 NOTE — PROVIDER CONTACT NOTE (OTHER) - BACKGROUND
89yoF PMH of dementia, HTN, HLD, hypothyroidism, Afib on Eliquis, COPD
pt admitted for chest pain and SOB

## 2023-04-16 NOTE — PROVIDER CONTACT NOTE (OTHER) - SITUATION
called by tele, patient had 2 PAT for 9 seconds and second for 15 seconds up to 110
pt heart rate 48 briefly, 1st degree HB

## 2023-04-16 NOTE — PROVIDER CONTACT NOTE (OTHER) - ASSESSMENT
pt denies any chest pain or palpitations, VSS,
pt is a+ox2, pt asymptomatic and resting in Bed. bp 134/76, hr 58, oral temp 97.4, pox 97% on 2 L, resps 17

## 2023-04-16 NOTE — CHART NOTE - NSCHARTNOTEFT_GEN_A_CORE
Letter of Medical Necessity  KEL DALTON, 89y years old Female admitted to the hospital with the diagnosis of Chest pain    COPD: the patient remains hypoxic and needs home oxygen therapy. Room air SpO2 at rest is 87%, SpO2 on O2 at 2 lpm via nasal cannula is 94-97.    Lilian ARTEAGAP-BC

## 2023-04-16 NOTE — CONSULT NOTE ADULT - SUBJECTIVE AND OBJECTIVE BOX
Chief Complaint:  Patient is a 89y old  Female who presents with a chief complaint of chest pain (14 Apr 2023 19:14)      HPI: Asked to see patient for a right breast mass. She had a CTPA and it reported the mass. radiographically it is suspicious for a neoplasm. She has been seen by surgery and the family did not want to pursue biopsy/surgery. CVC reports a macrocytic anemia. She is seen and aide at bedside. She says that she feels okay.    Medications:  acetaminophen     Tablet .. 650 milliGRAM(s) Oral every 6 hours PRN  albuterol    90 MICROgram(s) HFA Inhaler 2 Puff(s) Inhalation every 6 hours PRN  albuterol/ipratropium for Nebulization 3 milliLiter(s) Nebulizer every 6 hours  amLODIPine   Tablet 2.5 milliGRAM(s) Oral daily  apixaban 2.5 milliGRAM(s) Oral two times a day  bisacodyl Suppository 10 milliGRAM(s) Rectal daily PRN  buDESOnide    Inhalation Suspension 0.5 milliGRAM(s) Inhalation two times a day  famotidine    Tablet 20 milliGRAM(s) Oral daily  levothyroxine 75 MICROGram(s) Oral daily  losartan 100 milliGRAM(s) Oral daily  multivitamin 1 Tablet(s) Oral daily  polyethylene glycol 3350 17 Gram(s) Oral two times a day  QUEtiapine 100 milliGRAM(s) Oral at bedtime  QUEtiapine 25 milliGRAM(s) Oral at bedtime  senna 2 Tablet(s) Oral at bedtime        Allergies:  penicillin (Rash)    FAMILY HISTORY:  No family history of cardiovascular disease    PAST MEDICAL & SURGICAL HISTORY:  H/O: Hypothyroidism      Hypertension      Dyslipidemia      mild Dementia      Anal spasm      Chronic anal fissure      Osteoporosis      Hip fracture  Right hip- 1995      Skin cancer  lower extremities      History of  Hip surgery with hardware      History of Tonsillectomy      left Elbow surgey secondary to injury      After-Cataract of Both Eyes      left eye Retinal tear repair      Anterior Cervical discectomy      H/O gastroesophageal reflux (GERD)    REVIEW OF SYSTEMS  (limited)    General: Eating okay. Has fatigue. No fever    Skin/Breast: No itch  	  	  ENMT:	No nosebleed    Respiratory and Thorax: No cough of SOB  	  Cardiovascular:	CP resolved    Gastrointestinal:	No N/V/D/C, abd pain    Genitourinary:	No dysuria or hematuria    Musculoskeletal:	 No back pain, leg pain    Neurological:	No HA or dizziness    Vitals:  Vital Signs Last 24 Hrs  T(C): 36.4 (16 Apr 2023 11:53), Max: 36.5 (16 Apr 2023 04:01)  T(F): 97.6 (16 Apr 2023 11:53), Max: 97.7 (16 Apr 2023 04:01)  HR: 65 (16 Apr 2023 11:53) (65 - 70)  BP: 113/72 (16 Apr 2023 11:53) (113/72 - 130/63)  BP(mean): --  RR: 18 (16 Apr 2023 11:53) (18 - 20)  SpO2: 97% (16 Apr 2023 11:53) (87% - 97%)    Parameters below as of 16 Apr 2023 11:53  Patient On (Oxygen Delivery Method): nasal cannula        Pex:  alert NAD  EOMI anicteric sclera  Neck No LNA  Cv s1 S2 RRR  Lungs clear B/L anteriorly  abd soft NT ND +BS  No LE edema or tenderness    Labs: 4/13/23    WBC  5.36  Hgb 10.3   MCV   104    Plt  301      [phone]

## 2023-04-17 ENCOUNTER — TRANSCRIPTION ENCOUNTER (OUTPATIENT)
Age: 88
End: 2023-04-17

## 2023-04-17 VITALS
TEMPERATURE: 98 F | HEART RATE: 94 BPM | OXYGEN SATURATION: 100 % | SYSTOLIC BLOOD PRESSURE: 150 MMHG | DIASTOLIC BLOOD PRESSURE: 53 MMHG | RESPIRATION RATE: 18 BRPM

## 2023-04-17 LAB
FERRITIN SERPL-MCNC: 135 NG/ML — SIGNIFICANT CHANGE UP (ref 15–150)
FOLATE SERPL-MCNC: >20 NG/ML — SIGNIFICANT CHANGE UP
HAPTOGLOB SERPL-MCNC: 238 MG/DL — HIGH (ref 34–200)
IRON SATN MFR SERPL: 22 % — SIGNIFICANT CHANGE UP (ref 14–50)
IRON SATN MFR SERPL: 45 UG/DL — SIGNIFICANT CHANGE UP (ref 30–160)
LDH SERPL L TO P-CCNC: 213 U/L — SIGNIFICANT CHANGE UP (ref 50–242)
TIBC SERPL-MCNC: 208 UG/DL — LOW (ref 220–430)
UIBC SERPL-MCNC: 163 UG/DL — SIGNIFICANT CHANGE UP (ref 110–370)
VIT B12 SERPL-MCNC: 932 PG/ML — SIGNIFICANT CHANGE UP (ref 232–1245)

## 2023-04-17 RX ORDER — BUDESONIDE, MICRONIZED 100 %
2 POWDER (GRAM) MISCELLANEOUS
Qty: 30 | Refills: 0
Start: 2023-04-17

## 2023-04-17 RX ORDER — QUETIAPINE FUMARATE 200 MG/1
1 TABLET, FILM COATED ORAL
Qty: 30 | Refills: 0
Start: 2023-04-17

## 2023-04-17 RX ORDER — ACETAMINOPHEN 500 MG
2 TABLET ORAL
Refills: 0 | DISCHARGE

## 2023-04-17 RX ORDER — FAMOTIDINE 10 MG/ML
1 INJECTION INTRAVENOUS
Qty: 30 | Refills: 0
Start: 2023-04-17

## 2023-04-17 RX ORDER — APIXABAN 2.5 MG/1
1 TABLET, FILM COATED ORAL
Refills: 0 | DISCHARGE

## 2023-04-17 RX ORDER — QUETIAPINE FUMARATE 200 MG/1
1 TABLET, FILM COATED ORAL
Refills: 0 | DISCHARGE

## 2023-04-17 RX ORDER — POLYETHYLENE GLYCOL 3350 17 G/17G
17 POWDER, FOR SOLUTION ORAL
Qty: 30 | Refills: 0
Start: 2023-04-17

## 2023-04-17 RX ORDER — LEVOTHYROXINE SODIUM 125 MCG
1 TABLET ORAL
Qty: 30 | Refills: 0
Start: 2023-04-17

## 2023-04-17 RX ORDER — SENNOSIDES/DOCUSATE SODIUM 8.6MG-50MG
2 TABLET ORAL
Qty: 30 | Refills: 0
Start: 2023-04-17

## 2023-04-17 RX ORDER — LOSARTAN POTASSIUM 100 MG/1
1 TABLET, FILM COATED ORAL
Refills: 0 | DISCHARGE

## 2023-04-17 RX ORDER — BUDESONIDE, MICRONIZED 100 %
2 POWDER (GRAM) MISCELLANEOUS
Refills: 0 | DISCHARGE

## 2023-04-17 RX ORDER — AMLODIPINE BESYLATE 2.5 MG/1
1 TABLET ORAL
Qty: 30 | Refills: 0
Start: 2023-04-17

## 2023-04-17 RX ORDER — POLYETHYLENE GLYCOL 3350 17 G/17G
17 POWDER, FOR SOLUTION ORAL
Refills: 0 | DISCHARGE

## 2023-04-17 RX ORDER — SENNOSIDES/DOCUSATE SODIUM 8.6MG-50MG
2 TABLET ORAL
Refills: 0 | DISCHARGE

## 2023-04-17 RX ORDER — IPRATROPIUM/ALBUTEROL SULFATE 18-103MCG
3 AEROSOL WITH ADAPTER (GRAM) INHALATION
Qty: 30 | Refills: 0
Start: 2023-04-17

## 2023-04-17 RX ORDER — LEVOTHYROXINE SODIUM 125 MCG
1 TABLET ORAL
Refills: 0 | DISCHARGE

## 2023-04-17 RX ORDER — LOSARTAN POTASSIUM 100 MG/1
1 TABLET, FILM COATED ORAL
Qty: 30 | Refills: 0
Start: 2023-04-17

## 2023-04-17 RX ORDER — ACETAMINOPHEN 500 MG
2 TABLET ORAL
Qty: 60 | Refills: 0
Start: 2023-04-17

## 2023-04-17 RX ORDER — AMLODIPINE BESYLATE 2.5 MG/1
1 TABLET ORAL
Refills: 0 | DISCHARGE

## 2023-04-17 RX ORDER — APIXABAN 2.5 MG/1
1 TABLET, FILM COATED ORAL
Qty: 30 | Refills: 0
Start: 2023-04-17

## 2023-04-17 RX ADMIN — Medication 3 MILLILITER(S): at 11:54

## 2023-04-17 RX ADMIN — FAMOTIDINE 20 MILLIGRAM(S): 10 INJECTION INTRAVENOUS at 11:52

## 2023-04-17 RX ADMIN — Medication 3 MILLILITER(S): at 17:25

## 2023-04-17 RX ADMIN — AMLODIPINE BESYLATE 2.5 MILLIGRAM(S): 2.5 TABLET ORAL at 05:36

## 2023-04-17 RX ADMIN — APIXABAN 2.5 MILLIGRAM(S): 2.5 TABLET, FILM COATED ORAL at 17:25

## 2023-04-17 RX ADMIN — Medication 75 MICROGRAM(S): at 05:35

## 2023-04-17 RX ADMIN — LOSARTAN POTASSIUM 100 MILLIGRAM(S): 100 TABLET, FILM COATED ORAL at 05:35

## 2023-04-17 RX ADMIN — POLYETHYLENE GLYCOL 3350 17 GRAM(S): 17 POWDER, FOR SOLUTION ORAL at 17:25

## 2023-04-17 RX ADMIN — Medication 3 MILLILITER(S): at 05:36

## 2023-04-17 RX ADMIN — APIXABAN 2.5 MILLIGRAM(S): 2.5 TABLET, FILM COATED ORAL at 05:35

## 2023-04-17 RX ADMIN — POLYETHYLENE GLYCOL 3350 17 GRAM(S): 17 POWDER, FOR SOLUTION ORAL at 05:36

## 2023-04-17 RX ADMIN — Medication 1 TABLET(S): at 11:53

## 2023-04-17 RX ADMIN — Medication 0.5 MILLIGRAM(S): at 11:54

## 2023-04-17 NOTE — PROGRESS NOTE ADULT - TIME BILLING
Agree with above PA note.  cv stable  resolved chest pain  echo with normal lv fxn  cont med tx  no plans for ischemic eval   med f/u

## 2023-04-17 NOTE — DISCHARGE NOTE NURSING/CASE MANAGEMENT/SOCIAL WORK - PATIENT PORTAL LINK FT
You can access the FollowMyHealth Patient Portal offered by [facility] by registering at the following website: [email or web address]. By joining [facility]’s FollowMyHealth portal, you will also be able to view your health information using other applications (apps) compatible with our system.

## 2023-04-17 NOTE — DISCHARGE NOTE NURSING/CASE MANAGEMENT/SOCIAL WORK - NSDCVIVACCINE_GEN_ALL_CORE_FT
Tdap; 22-Jul-2017 14:07; Diana Nicolas (RN); [employer]; [id number]; IntraMuscular; Deltoid Right.; 0.5 milliLiter(s); VIS (VIS Published: 09-May-2013, VIS Presented: 22-Jul-2017);   Tdap; 04-Apr-2022 01:11; Hilda Johnson); [employer]; [id number] (Exp. Date: 29-Jul-2023); IntraMuscular; Deltoid Right.; 0.5 milliLiter(s); VIS (VIS Published: 09-May-2013, VIS Presented: 04-Apr-2022);

## 2023-04-17 NOTE — PROGRESS NOTE ADULT - SUBJECTIVE AND OBJECTIVE BOX
CARDIOLOGY FOLLOW UP - Dr. Francisco  Date of Service: 4/15/2023  CC: no events    Review of Systems:  Constitutional: No fever, weight loss, or fatigue  Respiratory: No cough, wheezing, or hemoptysis, no shortness of breath  Cardiovascular: No chest pain, palpitations, passing out, dizziness, or leg swelling  Gastrointestinal: No abd or epigastric pain. No nausea, vomiting, or hematemesis; no diarrhea or consiptaiton, no melena or hematochezia  Vascular: No edema     TELEMETRY:    PHYSICAL EXAM:  T(C): 36.3 (04-15-23 @ 04:46), Max: 36.6 (04-14-23 @ 21:00)  HR: 60 (04-15-23 @ 04:46) (60 - 63)  BP: 147/69 (04-15-23 @ 04:46) (146/79 - 148/76)  RR: 18 (04-15-23 @ 04:46) (18 - 18)  SpO2: 92% (04-15-23 @ 04:46) (92% - 99%)  Wt(kg): --  I&O's Summary    14 Apr 2023 07:01  -  15 Apr 2023 07:00  --------------------------------------------------------  IN: 0 mL / OUT: 0 mL / NET: 0 mL        Appearance: Normal	  Cardiovascular: Normal S1 S2,RRR, No JVD, No murmurs  Respiratory: Lungs clear to auscultation	  Gastrointestinal:  Soft, Non-tender, + BS	  Extremities: Normal range of motion, No clubbing, cyanosis or edema  Vascular: Peripheral pulses palpable 2+ bilaterally       Home Medications:  acetaminophen 325 mg oral tablet: 2 tab(s) orally every 6 hours as needed (13 Apr 2023 21:05)  amLODIPine 2.5 mg oral tablet: 1 tab(s) orally once a day (13 Apr 2023 21:05)  budesonide 0.25 mg/2 mL inhalation suspension: 2 milliliter(s) by nebulizer 2 times a day (13 Apr 2023 21:06)  DuoNeb 0.5 mg-2.5 mg/3 mL inhalation solution: 3 milliliter(s) by nebulizer 4 times a day as needed (13 Apr 2023 21:06)  Eliquis 2.5 mg oral tablet: 1 tab(s) orally 2 times a day (13 Apr 2023 21:06)  famotidine 20 mg oral tablet: 1 tab(s) orally once a day (at bedtime) (13 Apr 2023 21:06)  levothyroxine 75 mcg (0.075 mg) oral tablet: 1 tab(s) orally once a day (13 Apr 2023 21:06)  losartan 100 mg oral tablet: 1 tab(s) orally once a day (13 Apr 2023 21:06)  MiraLax oral powder for reconstitution: 17 gram(s) orally 2 times a day as needed (13 Apr 2023 21:06)  QUEtiapine 100 mg oral tablet: 1 tab(s) orally once a day (at bedtime) - total daily dose 125mg (13 Apr 2023 21:10)  QUEtiapine 25 mg oral tablet: 1 tab(s) orally once a day (at bedtime) - total daily dose 125mg (13 Apr 2023 21:06)  Senna Plus 50 mg-8.6 mg oral tablet: 2 tab(s) orally once a day (at bedtime) (13 Apr 2023 21:06)  Therems oral tablet: 1 tab(s) orally once a day (13 Apr 2023 21:10)        MEDICATIONS  (STANDING):  albuterol/ipratropium for Nebulization 3 milliLiter(s) Nebulizer every 6 hours  amLODIPine   Tablet 2.5 milliGRAM(s) Oral daily  apixaban 2.5 milliGRAM(s) Oral two times a day  buDESOnide    Inhalation Suspension 0.5 milliGRAM(s) Inhalation two times a day  famotidine    Tablet 20 milliGRAM(s) Oral daily  levothyroxine 75 MICROGram(s) Oral daily  losartan 100 milliGRAM(s) Oral daily  multivitamin 1 Tablet(s) Oral daily  polyethylene glycol 3350 17 Gram(s) Oral two times a day  QUEtiapine 100 milliGRAM(s) Oral at bedtime  QUEtiapine 25 milliGRAM(s) Oral at bedtime  senna 2 Tablet(s) Oral at bedtime        EKG:  RADIOLOGY:  DIAGNOSTIC TESTING:  [ ] Echocardiogram:  [ ] Catherterization:  [ ] Stress Test:  OTHER:     LABS:	 	                          10.3   5.36  )-----------( 301      ( 13 Apr 2023 17:48 )             33.6     04-13    141  |  104  |  25<H>  ----------------------------<  100<H>  4.8   |  30  |  1.17    Ca    9.2      13 Apr 2023 17:48  Mg     2.2     04-13    TPro  6.5  /  Alb  3.7  /  TBili  0.3  /  DBili  x   /  AST  26  /  ALT  20  /  AlkPhos  67  04-13      PT/INR - ( 13 Apr 2023 17:48 )   PT: 13.7 sec;   INR: 1.18 ratio         PTT - ( 13 Apr 2023 17:48 )  PTT:32.1 sec    CARDIAC MARKERS:                  
Date of Service: 04-17-23 @ 12:15    Patient is a 89y old  Female who presents with a chief complaint of chest pain (14 Apr 2023 19:14)      Any change in ROS: She is in enhanced observation room now::  overnight she ws restless and agitated:     MEDICATIONS  (STANDING):  albuterol/ipratropium for Nebulization 3 milliLiter(s) Nebulizer every 6 hours  amLODIPine   Tablet 2.5 milliGRAM(s) Oral daily  apixaban 2.5 milliGRAM(s) Oral two times a day  buDESOnide    Inhalation Suspension 0.5 milliGRAM(s) Inhalation two times a day  famotidine    Tablet 20 milliGRAM(s) Oral daily  levothyroxine 75 MICROGram(s) Oral daily  losartan 100 milliGRAM(s) Oral daily  multivitamin 1 Tablet(s) Oral daily  polyethylene glycol 3350 17 Gram(s) Oral two times a day  QUEtiapine 100 milliGRAM(s) Oral at bedtime  QUEtiapine 25 milliGRAM(s) Oral at bedtime  senna 2 Tablet(s) Oral at bedtime    MEDICATIONS  (PRN):  acetaminophen     Tablet .. 650 milliGRAM(s) Oral every 6 hours PRN Temp greater or equal to 38C (100.4F), Mild Pain (1 - 3)  albuterol    90 MICROgram(s) HFA Inhaler 2 Puff(s) Inhalation every 6 hours PRN Bronchospasm  bisacodyl Suppository 10 milliGRAM(s) Rectal daily PRN Constipation    Vital Signs Last 24 Hrs  T(C): 36.3 (17 Apr 2023 11:11), Max: 36.4 (17 Apr 2023 04:24)  T(F): 97.3 (17 Apr 2023 11:11), Max: 97.6 (17 Apr 2023 04:24)  HR: 64 (17 Apr 2023 11:11) (64 - 91)  BP: 131/73 (17 Apr 2023 11:11) (131/73 - 165/77)  BP(mean): --  RR: 18 (17 Apr 2023 11:11) (18 - 18)  SpO2: 97% (17 Apr 2023 11:11) (92% - 97%)    Parameters below as of 17 Apr 2023 11:11  Patient On (Oxygen Delivery Method): nasal cannula  O2 Flow (L/min): 2      I&O's Summary    16 Apr 2023 07:01  -  17 Apr 2023 07:00  --------------------------------------------------------  IN: 510 mL / OUT: 0 mL / NET: 510 mL    17 Apr 2023 07:01  -  17 Apr 2023 12:15  --------------------------------------------------------  IN: 240 mL / OUT: 0 mL / NET: 240 mL          Physical Exam:   GENERAL: NAD, well-groomed, well-developed  HEENT: WON/   Atraumatic, Normocephalic  ENMT: No tonsillar erythema, exudates, or enlargement; Moist mucous membranes, Good dentition, No lesions  NECK: Supple, No JVD, Normal thyroid  CHEST/LUNG: Clear to auscultaion  CVS: Regular rate and rhythm; No murmurs, rubs, or gallops  GI: : Soft, Nontender, Nondistended; Bowel sounds present  NERVOUS SYSTEM:  sleeping : not in any resp idistress : on 2 L of oxygen    EXTREMITIES:  - edema  LYMPH: No lymphadenopathy noted  SKIN: No rashes or lesions  ENDOCRINOLOGY: No Thyromegaly  PSYCH: calm +     Labs:  34                            10.3   5.36  )-----------( 301      ( 13 Apr 2023 17:48 )             33.6     04-13    141  |  104  |  25<H>  ----------------------------<  100<H>  4.8   |  30  |  1.17      TPro  6.5  /  Alb  3.7  /  TBili  0.3  /  DBili  x   /  AST  26  /  ALT  20  /  AlkPhos  67  04-13    CAPILLARY BLOOD GLUCOSE                rad< from: [facility] Duplex Lower Ext Vein Scan, Left (04.13.23 @ 22:22) >    FINDINGS:    There is normal compressibility of the left common femoral, femoral and   popliteal veins.  The contralateral common femoral vein is patent.  Doppler examination shows normal spontaneous and phasic flow.    No calf vein thrombosis is detected.    IMPRESSION:  No evidence of left lower extremity deep venous thrombosis.          < end of copied text >  < from: CT Angio Chest PE Protocol w/ IV Cont (04.13.23 @ 19:43) >  BONES: Degenerative changes. Chronic appearing deformity of the distal   left clavicle.    IMPRESSION:  1. No CT evidence for pulmonary embolism.  2. There is a 2.5 cm irregular soft tissue mass identified within the   lateral aspect of the right breast at posterior depth, suspicious for   breast parenchymal neoplasm/carcinoma. Further evaluation with   mammography/ultrasonography is recommended.          --- End of Report ---            ANKITA GARZA MD; Attending Radiologist    < end of copied text >        RECENT CULTURES:        RESPIRATORY CULTURES:          Studies  Chest X-RAY  CT SCAN Chest   Venous Dopplers: LE:   CT Abdomen  Others              
Patient is a 89y old  Female who presents with a chief complaint of SOB (14 Apr 2023 16:56)      SUBJECTIVE / OVERNIGHT EVENTS: ptn has nonspecific aches and pain, ptn has a breast mass, seen by surgical onc. son refusing intervention. had a long convo w son re GOC x 45 min. ptn is DNR/DNI. he requested hospice consult. he wants her DC back to [facility] asap.     MEDICATIONS  (STANDING):  albuterol/ipratropium for Nebulization 3 milliLiter(s) Nebulizer every 6 hours  amLODIPine   Tablet 2.5 milliGRAM(s) Oral daily  apixaban 2.5 milliGRAM(s) Oral two times a day  buDESOnide    Inhalation Suspension 0.5 milliGRAM(s) Inhalation two times a day  famotidine    Tablet 20 milliGRAM(s) Oral daily  levothyroxine 75 MICROGram(s) Oral daily  losartan 100 milliGRAM(s) Oral daily  multivitamin 1 Tablet(s) Oral daily  polyethylene glycol 3350 17 Gram(s) Oral two times a day  QUEtiapine 100 milliGRAM(s) Oral at bedtime  QUEtiapine 25 milliGRAM(s) Oral at bedtime  senna 2 Tablet(s) Oral at bedtime    MEDICATIONS  (PRN):  acetaminophen     Tablet .. 650 milliGRAM(s) Oral every 6 hours PRN Temp greater or equal to 38C (100.4F), Mild Pain (1 - 3)  albuterol    90 MICROgram(s) HFA Inhaler 2 Puff(s) Inhalation every 6 hours PRN Bronchospasm      Vital Signs Last 24 Hrs  T(F): 97.4 (04-14-23 @ 12:24), Max: 98.4 (04-14-23 @ 00:05)  HR: 60 (04-14-23 @ 12:24) (58 - 64)  BP: 146/79 (04-14-23 @ 12:24) (134/76 - 174/70)  RR: 18 (04-14-23 @ 12:24) (17 - 18)  SpO2: 99% (04-14-23 @ 12:24) (95% - 99%)  Telemetry:   CAPILLARY BLOOD GLUCOSE        I&O's Summary    14 Apr 2023 07:01  -  14 Apr 2023 21:19  --------------------------------------------------------  IN: 0 mL / OUT: 0 mL / NET: 0 mL        PHYSICAL EXAM:  GENERAL: NAD, well-developed  HEAD:  Atraumatic, Normocephalic  EYES: EOMI, PERRLA, conjunctiva and sclera clear  NECK: Supple, No JVD  CHEST/LUNG: Clear to auscultation bilaterally; No wheeze  HEART: Regular rate and rhythm; No murmurs, rubs, or gallops  ABDOMEN: Soft, Nontender, Nondistended; Bowel sounds present  EXTREMITIES:  2+ Peripheral Pulses, No clubbing, cyanosis, or edema  PSYCH: AAOx3  NEUROLOGY: non-focal  SKIN: No rashes or lesions    LABS:                        10.3   5.36  )-----------( 301      ( 13 Apr 2023 17:48 )             33.6     04-13    141  |  104  |  25<H>  ----------------------------<  100<H>  4.8   |  30  |  1.17    Ca    9.2      13 Apr 2023 17:48  Mg     2.2     04-13    TPro  6.5  /  Alb  3.7  /  TBili  0.3  /  DBili  x   /  AST  26  /  ALT  20  /  AlkPhos  67  04-13    PT/INR - ( 13 Apr 2023 17:48 )   PT: 13.7 sec;   INR: 1.18 ratio         PTT - ( 13 Apr 2023 17:48 )  PTT:32.1 sec          RADIOLOGY & ADDITIONAL TESTS:    Imaging Personally Reviewed:    Consultant(s) Notes Reviewed:      Care Discussed with Consultants/Other Providers:  
Date of Service: 04-15-23 @ 15:48    Patient is a 89y old  Female who presents with a chief complaint of SOB (14 Apr 2023 17:27)      Any change in ROS:  doing ok : no resp distrss:    MEDICATIONS  (STANDING):  albuterol/ipratropium for Nebulization 3 milliLiter(s) Nebulizer every 6 hours  amLODIPine   Tablet 2.5 milliGRAM(s) Oral daily  apixaban 2.5 milliGRAM(s) Oral two times a day  buDESOnide    Inhalation Suspension 0.5 milliGRAM(s) Inhalation two times a day  famotidine    Tablet 20 milliGRAM(s) Oral daily  levothyroxine 75 MICROGram(s) Oral daily  losartan 100 milliGRAM(s) Oral daily  multivitamin 1 Tablet(s) Oral daily  polyethylene glycol 3350 17 Gram(s) Oral two times a day  QUEtiapine 100 milliGRAM(s) Oral at bedtime  QUEtiapine 25 milliGRAM(s) Oral at bedtime  senna 2 Tablet(s) Oral at bedtime    MEDICATIONS  (PRN):  acetaminophen     Tablet .. 650 milliGRAM(s) Oral every 6 hours PRN Temp greater or equal to 38C (100.4F), Mild Pain (1 - 3)  albuterol    90 MICROgram(s) HFA Inhaler 2 Puff(s) Inhalation every 6 hours PRN Bronchospasm    Vital Signs Last 24 Hrs  T(C): 36.4 (15 Apr 2023 12:28), Max: 36.6 (14 Apr 2023 21:00)  T(F): 97.5 (15 Apr 2023 12:28), Max: 97.9 (14 Apr 2023 21:00)  HR: 54 (15 Apr 2023 12:28) (54 - 63)  BP: 158/81 (15 Apr 2023 12:28) (147/69 - 158/81)  BP(mean): --  RR: 18 (15 Apr 2023 12:28) (18 - 18)  SpO2: 97% (15 Apr 2023 12:28) (92% - 98%)    Parameters below as of 15 Apr 2023 12:28  Patient On (Oxygen Delivery Method): nasal cannula        I&O's Summary    14 Apr 2023 07:01  -  15 Apr 2023 07:00  --------------------------------------------------------  IN: 0 mL / OUT: 0 mL / NET: 0 mL          Physical Exam:   GENERAL: NAD, well-groomed, well-developed  HEENT: WON/   Atraumatic, Normocephalic  ENMT: No tonsillar erythema, exudates, or enlargement; Moist mucous membranes, Good dentition, No lesions  NECK: Supple, No JVD, Normal thyroid  CHEST/LUNG: scattered crackles no wheezing  CVS: Regular rate and rhythm; No murmurs, rubs, or gallops  GI: : Soft, Nontender, Nondistended; Bowel sounds present  NERVOUS SYSTEM:  Alert & Oriented X3  EXTREMITIES:  - edema  LYMPH: No lymphadenopathy noted  SKIN: No rashes or lesions  ENDOCRINOLOGY: No Thyromegaly  PSYCH: Appropriate    Labs:  ABG - ( 15 Apr 2023 05:22 )  pH, Arterial: 7.36  pH, Blood: x     /  pCO2: 55    /  pO2: 78    / HCO3: 31    / Base Excess: 4.2   /  SaO2: 97.3            34                            10.3   5.36  )-----------( 301      ( 13 Apr 2023 17:48 )             33.6     04-13    141  |  104  |  25<H>  ----------------------------<  100<H>  4.8   |  30  |  1.17    Ca    9.2      13 Apr 2023 17:48  Mg     2.2     04-13    TPro  6.5  /  Alb  3.7  /  TBili  0.3  /  DBili  x   /  AST  26  /  ALT  20  /  AlkPhos  67  04-13    CAPILLARY BLOOD GLUCOSE          LIVER FUNCTIONS - ( 13 Apr 2023 17:48 )  Alb: 3.7 g/dL / Pro: 6.5 g/dL / ALK PHOS: 67 U/L / ALT: 20 U/L / AST: 26 U/L / GGT: x           PT/INR - ( 13 Apr 2023 17:48 )   PT: 13.7 sec;   INR: 1.18 ratio         PTT - ( 13 Apr 2023 17:48 )  PTT:32.1 sec      rad< from: [facility] Duplex Lower Ext Vein Scan, Left (04.13.23 @ 22:22) >  swelling.    COMPARISON: 6/12/2020.    TECHNIQUE: Duplex sonography of the LEFT LOWER extremity veins with color   and spectral Doppler, with and without compression.    FINDINGS:    There is normal compressibility of the left common femoral, femoral and   popliteal veins.  The contralateral common femoral vein is patent.  Doppler examination shows normal spontaneous and phasic flow.    No calf vein thrombosis is detected.    IMPRESSION:  No evidence of left lower extremity deep venous thrombosis.          --- End of Report ---      < end of copied text >  < from: CT Angio Chest PE Protocol w/ IV Cont (04.13.23 @ 19:43) >  BONES: Degenerative changes. Chronic appearing deformity of the distal   left clavicle.    IMPRESSION:  1. No CT evidence for pulmonary embolism.  2. There is a 2.5 cm irregular soft tissue mass identified within the   lateral aspect of the right breast at posterior depth, suspicious for   breast parenchymal neoplasm/carcinoma. Further evaluation with   mammography/ultrasonography is recommended.          --- End of Report ---    < end of copied text >      RECENT CULTURES:        RESPIRATORY CULTURES:          Studies  Chest X-RAY  CT SCAN Chest   Venous Dopplers: LE:   CT Abdomen  Others              
Patient is a 89y old  Female who presents with a chief complaint of SOB (14 Apr 2023 17:27)      SUBJECTIVE / OVERNIGHT EVENTS: ptn is comfortable, aide at bedside, ambulated to the bathroom, awaiting clearance from assisted living in order to DC back    MEDICATIONS  (STANDING):  albuterol/ipratropium for Nebulization 3 milliLiter(s) Nebulizer every 6 hours  amLODIPine   Tablet 2.5 milliGRAM(s) Oral daily  apixaban 2.5 milliGRAM(s) Oral two times a day  buDESOnide    Inhalation Suspension 0.5 milliGRAM(s) Inhalation two times a day  famotidine    Tablet 20 milliGRAM(s) Oral daily  levothyroxine 75 MICROGram(s) Oral daily  losartan 100 milliGRAM(s) Oral daily  multivitamin 1 Tablet(s) Oral daily  polyethylene glycol 3350 17 Gram(s) Oral two times a day  QUEtiapine 100 milliGRAM(s) Oral at bedtime  QUEtiapine 25 milliGRAM(s) Oral at bedtime  senna 2 Tablet(s) Oral at bedtime    MEDICATIONS  (PRN):  acetaminophen     Tablet .. 650 milliGRAM(s) Oral every 6 hours PRN Temp greater or equal to 38C (100.4F), Mild Pain (1 - 3)  albuterol    90 MICROgram(s) HFA Inhaler 2 Puff(s) Inhalation every 6 hours PRN Bronchospasm      Vital Signs Last 24 Hrs  T(F): 97.5 (04-15-23 @ 12:28), Max: 97.9 (04-14-23 @ 21:00)  HR: 54 (04-15-23 @ 12:28) (54 - 63)  BP: 158/81 (04-15-23 @ 12:28) (147/69 - 158/81)  RR: 18 (04-15-23 @ 12:28) (18 - 18)  SpO2: 97% (04-15-23 @ 12:28) (92% - 98%)  Telemetry:   CAPILLARY BLOOD GLUCOSE        I&O's Summary    14 Apr 2023 07:01  -  15 Apr 2023 07:00  --------------------------------------------------------  IN: 0 mL / OUT: 0 mL / NET: 0 mL        PHYSICAL EXAM:  GENERAL: NAD, well-developed  HEAD:  Atraumatic, Normocephalic  EYES: EOMI, PERRLA, conjunctiva and sclera clear  NECK: Supple, No JVD  CHEST/LUNG: Clear to auscultation bilaterally; No wheeze  HEART: Regular rate and rhythm; No murmurs, rubs, or gallops  ABDOMEN: Soft, Nontender, Nondistended; Bowel sounds present  EXTREMITIES:  2+ Peripheral Pulses, No clubbing, cyanosis, or edema  PSYCH: AAOx3  NEUROLOGY: non-focal  SKIN: No rashes or lesions    LABS:                    RADIOLOGY & ADDITIONAL TESTS:    Imaging Personally Reviewed:    Consultant(s) Notes Reviewed:      Care Discussed with Consultants/Other Providers:  
Patient is a 89y old  Female who presents with a chief complaint of chest pain (14 Apr 2023 19:14)      SUBJECTIVE / OVERNIGHT EVENTS:    MEDICATIONS  (STANDING):  albuterol/ipratropium for Nebulization 3 milliLiter(s) Nebulizer every 6 hours  amLODIPine   Tablet 2.5 milliGRAM(s) Oral daily  apixaban 2.5 milliGRAM(s) Oral two times a day  buDESOnide    Inhalation Suspension 0.5 milliGRAM(s) Inhalation two times a day  famotidine    Tablet 20 milliGRAM(s) Oral daily  levothyroxine 75 MICROGram(s) Oral daily  losartan 100 milliGRAM(s) Oral daily  multivitamin 1 Tablet(s) Oral daily  polyethylene glycol 3350 17 Gram(s) Oral two times a day  QUEtiapine 100 milliGRAM(s) Oral at bedtime  QUEtiapine 25 milliGRAM(s) Oral at bedtime  senna 2 Tablet(s) Oral at bedtime    MEDICATIONS  (PRN):  acetaminophen     Tablet .. 650 milliGRAM(s) Oral every 6 hours PRN Temp greater or equal to 38C (100.4F), Mild Pain (1 - 3)  albuterol    90 MICROgram(s) HFA Inhaler 2 Puff(s) Inhalation every 6 hours PRN Bronchospasm  bisacodyl Suppository 10 milliGRAM(s) Rectal daily PRN Constipation      Vital Signs Last 24 Hrs  T(F): 97.6 (04-17-23 @ 20:45), Max: 97.6 (04-17-23 @ 04:24)  HR: 94 (04-17-23 @ 20:45) (64 - 94)  BP: 150/53 (04-17-23 @ 20:45) (131/73 - 165/77)  RR: 18 (04-17-23 @ 20:45) (18 - 18)  SpO2: 100% (04-17-23 @ 20:45) (92% - 100%)  Telemetry:   CAPILLARY BLOOD GLUCOSE        I&O's Summary    16 Apr 2023 07:01  -  17 Apr 2023 07:00  --------------------------------------------------------  IN: 510 mL / OUT: 0 mL / NET: 510 mL    17 Apr 2023 07:01  -  17 Apr 2023 22:52  --------------------------------------------------------  IN: 240 mL / OUT: 0 mL / NET: 240 mL        PHYSICAL EXAM:  GENERAL: NAD, well-developed  HEAD:  Atraumatic, Normocephalic  EYES: EOMI, PERRLA, conjunctiva and sclera clear  NECK: Supple, No JVD  CHEST/LUNG: Clear to auscultation bilaterally; No wheeze  HEART: Regular rate and rhythm; No murmurs, rubs, or gallops  ABDOMEN: Soft, Nontender, Nondistended; Bowel sounds present  EXTREMITIES:  2+ Peripheral Pulses, No clubbing, cyanosis, or edema  PSYCH: AAOx3  NEUROLOGY: non-focal  SKIN: No rashes or lesions    LABS:                    RADIOLOGY & ADDITIONAL TESTS:    Imaging Personally Reviewed:    Consultant(s) Notes Reviewed:      Care Discussed with Consultants/Other Providers:  
[facility]       Date of service 04-17-23 No new neurological symptoms reported. Remains stable neurologically.   - Today, patient is without complaints.       *****MEDICATIONS: Current medication reviewed and documented.    MEDICATIONS  (STANDING):  albuterol/ipratropium for Nebulization 3 milliLiter(s) Nebulizer every 6 hours  amLODIPine   Tablet 2.5 milliGRAM(s) Oral daily  apixaban 2.5 milliGRAM(s) Oral two times a day  buDESOnide    Inhalation Suspension 0.5 milliGRAM(s) Inhalation two times a day  famotidine    Tablet 20 milliGRAM(s) Oral daily  levothyroxine 75 MICROGram(s) Oral daily  losartan 100 milliGRAM(s) Oral daily  multivitamin 1 Tablet(s) Oral daily  polyethylene glycol 3350 17 Gram(s) Oral two times a day  QUEtiapine 100 milliGRAM(s) Oral at bedtime  QUEtiapine 25 milliGRAM(s) Oral at bedtime  senna 2 Tablet(s) Oral at bedtime    MEDICATIONS  (PRN):  acetaminophen     Tablet .. 650 milliGRAM(s) Oral every 6 hours PRN Temp greater or equal to 38C (100.4F), Mild Pain (1 - 3)  albuterol    90 MICROgram(s) HFA Inhaler 2 Puff(s) Inhalation every 6 hours PRN Bronchospasm  bisacodyl Suppository 10 milliGRAM(s) Rectal daily PRN Constipation             *****PHYSICAL EXAM:   alert oriented x2 attention comprehension are intact   Able to name, repeat.   EOmi fundi not visualized   no nystagmus VFF to confrontation  Tongue is midline  Moving all 4 ext spontaneously no drift appreciated    Gait not assessed.                  *****A S S E S S M E N T   A N D   P L A N :    89 yo F with a PMH of dementia, HTN, HLD, hypothyroidism, Afib now on Eliquis, COPD on supp O2 as needed but never uses it  from [facility] p/w chest pain and SOB. Reported sxs to staff today and they called covering MD who recommended she be evaluated in ED. Pt now stating she never c/o this and doesn't know why they sent her to the hospital. When asked if she has any complaints at present she states her left ankle hurts and her legs are swollen. No fever, cough, abd pain, nausea, vomiting, headache, dizziness. Unsure if pt is compliant with her meds.  Pt is an unreliable historian.     ams likely dementia related delirium   waxing and waning mental status  regulate sleep wake cycle   avoid daytime somnolence  melatonin for sleep augmentation       Thank you for allowing me to participate in the care of this patient. Will continue to follow patient periodically. Please do not hesitate to call me if you have any  questions or if there has been a change in patients neurological status     ________________  Diana Zamarripa MD  [facility] (PN)[facility]  [phone]      33 minutes spent on total encounter; more than 50 % of the visit was  spent counseling about plan of care, compliance to diet/exercise and medication regimen and or  coordinating care by the attending physician.      It is advised that stroke patients follow up with AI Rodriguez @ [phone] in 1- 2 weeks.   Others please follow up with Dr. Michael Nissenbaum [phone]
  KEL DALTON  MRN-[id number]    Patient is a 89y old  Female who presents with a chief complaint of chest pain (14 Apr 2023 19:14)      Review of System    Pt is resting comfortably     Current Meds  MEDICATIONS  (STANDING):  albuterol/ipratropium for Nebulization 3 milliLiter(s) Nebulizer every 6 hours  amLODIPine   Tablet 2.5 milliGRAM(s) Oral daily  apixaban 2.5 milliGRAM(s) Oral two times a day  buDESOnide    Inhalation Suspension 0.5 milliGRAM(s) Inhalation two times a day  famotidine    Tablet 20 milliGRAM(s) Oral daily  levothyroxine 75 MICROGram(s) Oral daily  losartan 100 milliGRAM(s) Oral daily  multivitamin 1 Tablet(s) Oral daily  polyethylene glycol 3350 17 Gram(s) Oral two times a day  QUEtiapine 100 milliGRAM(s) Oral at bedtime  QUEtiapine 25 milliGRAM(s) Oral at bedtime  senna 2 Tablet(s) Oral at bedtime    MEDICATIONS  (PRN):  acetaminophen     Tablet .. 650 milliGRAM(s) Oral every 6 hours PRN Temp greater or equal to 38C (100.4F), Mild Pain (1 - 3)  albuterol    90 MICROgram(s) HFA Inhaler 2 Puff(s) Inhalation every 6 hours PRN Bronchospasm  bisacodyl Suppository 10 milliGRAM(s) Rectal daily PRN Constipation    Vital Signs Last 24 Hrs  T(C): 36.4 (17 Apr 2023 04:24), Max: 36.4 (16 Apr 2023 11:53)  T(F): 97.6 (17 Apr 2023 04:24), Max: 97.6 (16 Apr 2023 11:53)  HR: 91 (17 Apr 2023 04:24) (65 - 91)  BP: 165/77 (17 Apr 2023 04:24) (113/72 - 165/77)  BP(mean): --  RR: 18 (17 Apr 2023 04:24) (18 - 20)  SpO2: 92% (17 Apr 2023 04:24) (87% - 97%)    Parameters below as of 17 Apr 2023 04:24  Patient On (Oxygen Delivery Method): nasal cannula  O2 Flow (L/min): 2      PHYSICAL EXAM:    NAD     CTA b/l    s1s2, no m/g/r            Lab              Rad:    Assessment/Plan
CARDIOLOGY FOLLOW UP - Dr. Francisco  DATE OF SERVICE: 4/17/23    CC  Denies CP, sob    REVIEW OF SYSTEMS:  CONSTITUTIONAL: No fever, weight loss, or fatigue  RESPIRATORY: No cough, wheezing, chills or hemoptysis; No Shortness of Breath  CARDIOVASCULAR: No chest pain, palpitations, passing out, dizziness, or leg swelling  GASTROINTESTINAL: No abdominal or epigastric pain. No nausea, vomiting, or hematemesis; No diarrhea or constipation. No melena or hematochezia.  VASCULAR: No edema     PHYSICAL EXAM:  T(C): 36.4 (04-17-23 @ 04:24), Max: 36.4 (04-16-23 @ 11:53)  HR: 91 (04-17-23 @ 04:24) (65 - 91)  BP: 165/77 (04-17-23 @ 04:24) (113/72 - 165/77)  RR: 18 (04-17-23 @ 04:24) (18 - 20)  SpO2: 92% (04-17-23 @ 04:24) (87% - 97%)  Wt(kg): --  I&O's Summary    16 Apr 2023 07:01  -  17 Apr 2023 07:00  --------------------------------------------------------  IN: 510 mL / OUT: 0 mL / NET: 510 mL        Appearance: Elderly female	  Cardiovascular: Normal S1 S2,RRR, No JVD, No murmurs  Respiratory: Diminished breath sounds b/l  Gastrointestinal:  Soft, Non-tender, + BS	  Extremities: Normal range of motion, No clubbing, cyanosis or edema. +B/l ecchymosis      Home Medications:  acetaminophen 325 mg oral tablet: 2 tab(s) orally every 6 hours as needed (13 Apr 2023 21:05)  albuterol 90 mcg/inh inhalation aerosol: 2 puff(s) inhaled every 6 hours As needed Bronchospasm (16 Apr 2023 12:30)  amLODIPine 2.5 mg oral tablet: 1 tab(s) orally once a day (13 Apr 2023 21:05)  budesonide 0.25 mg/2 mL inhalation suspension: 2 milliliter(s) by nebulizer 2 times a day (13 Apr 2023 21:06)  Eliquis 2.5 mg oral tablet: 1 tab(s) orally 2 times a day (13 Apr 2023 21:06)  famotidine 20 mg oral tablet: 1 tab(s) orally once a day (16 Apr 2023 12:30)  ipratropium-albuterol 0.5 mg-2.5 mg/3 mL inhalation solution: 3 milliliter(s) inhaled every 6 hours (16 Apr 2023 12:30)  levothyroxine 75 mcg (0.075 mg) oral tablet: 1 tab(s) orally once a day (13 Apr 2023 21:06)  losartan 100 mg oral tablet: 1 tab(s) orally once a day (13 Apr 2023 21:06)  MiraLax oral powder for reconstitution: 17 gram(s) orally 2 times a day as needed (13 Apr 2023 21:06)  QUEtiapine 100 mg oral tablet: 1 tab(s) orally once a day (at bedtime) - total daily dose 125mg (13 Apr 2023 21:10)  QUEtiapine 25 mg oral tablet: 1 tab(s) orally once a day (at bedtime) - total daily dose 125mg (13 Apr 2023 21:06)  Senna Plus 50 mg-8.6 mg oral tablet: 2 tab(s) orally once a day (at bedtime) (13 Apr 2023 21:06)  Therems oral tablet: 1 tab(s) orally once a day (13 Apr 2023 21:10)      MEDICATIONS  (STANDING):  albuterol/ipratropium for Nebulization 3 milliLiter(s) Nebulizer every 6 hours  amLODIPine   Tablet 2.5 milliGRAM(s) Oral daily  apixaban 2.5 milliGRAM(s) Oral two times a day  buDESOnide    Inhalation Suspension 0.5 milliGRAM(s) Inhalation two times a day  famotidine    Tablet 20 milliGRAM(s) Oral daily  levothyroxine 75 MICROGram(s) Oral daily  losartan 100 milliGRAM(s) Oral daily  multivitamin 1 Tablet(s) Oral daily  polyethylene glycol 3350 17 Gram(s) Oral two times a day  QUEtiapine 100 milliGRAM(s) Oral at bedtime  QUEtiapine 25 milliGRAM(s) Oral at bedtime  senna 2 Tablet(s) Oral at bedtime      TELEMETRY: SR 50-70s, PACs  ECG:  	  RADIOLOGY:   DIAGNOSTIC TESTING:  [ ] Echocardiogram:  [ ]  Catheterization:  [ ] Stress Test:    OTHER: 	    LABS:	 	    Troponin T, High Sensitivity Result: 22 ng/L [0 - 51] (04-13 @ 21:57)  Troponin T, High Sensitivity Result: 22 ng/L [0 - 51] (04-13 @ 17:48)                    
Date of Service: 04-16-23 @ 17:39    Patient is a 89y old  Female who presents with a chief complaint of chest pain (14 Apr 2023 19:14)      Any change in ROS:  she looks same to me:  no sob:     MEDICATIONS  (STANDING):  albuterol/ipratropium for Nebulization 3 milliLiter(s) Nebulizer every 6 hours  amLODIPine   Tablet 2.5 milliGRAM(s) Oral daily  apixaban 2.5 milliGRAM(s) Oral two times a day  buDESOnide    Inhalation Suspension 0.5 milliGRAM(s) Inhalation two times a day  famotidine    Tablet 20 milliGRAM(s) Oral daily  levothyroxine 75 MICROGram(s) Oral daily  losartan 100 milliGRAM(s) Oral daily  multivitamin 1 Tablet(s) Oral daily  polyethylene glycol 3350 17 Gram(s) Oral two times a day  QUEtiapine 100 milliGRAM(s) Oral at bedtime  QUEtiapine 25 milliGRAM(s) Oral at bedtime  senna 2 Tablet(s) Oral at bedtime    MEDICATIONS  (PRN):  acetaminophen     Tablet .. 650 milliGRAM(s) Oral every 6 hours PRN Temp greater or equal to 38C (100.4F), Mild Pain (1 - 3)  albuterol    90 MICROgram(s) HFA Inhaler 2 Puff(s) Inhalation every 6 hours PRN Bronchospasm  bisacodyl Suppository 10 milliGRAM(s) Rectal daily PRN Constipation    Vital Signs Last 24 Hrs  T(C): 36.4 (16 Apr 2023 11:53), Max: 36.5 (16 Apr 2023 04:01)  T(F): 97.6 (16 Apr 2023 11:53), Max: 97.7 (16 Apr 2023 04:01)  HR: 65 (16 Apr 2023 11:53) (65 - 70)  BP: 113/72 (16 Apr 2023 11:53) (113/72 - 130/63)  BP(mean): --  RR: 18 (16 Apr 2023 11:53) (18 - 20)  SpO2: 97% (16 Apr 2023 11:53) (87% - 97%)    Parameters below as of 16 Apr 2023 11:53  Patient On (Oxygen Delivery Method): nasal cannula        I&O's Summary    15 Apr 2023 07:01  -  16 Apr 2023 07:00  --------------------------------------------------------  IN: 240 mL / OUT: 550 mL / NET: -310 mL    16 Apr 2023 07:01  -  16 Apr 2023 17:39  --------------------------------------------------------  IN: 120 mL / OUT: 0 mL / NET: 120 mL          Physical Exam:   GENERAL: NAD, well-groomed, well-developed  HEENT: WON/   Atraumatic, Normocephalic  ENMT: No tonsillar erythema, exudates, or enlargement; Moist mucous membranes, Good dentition, No lesions  NECK: Supple, No JVD, Normal thyroid  CHEST/LUNG: Clear to auscultaion  CVS: Regular rate and rhythm; No murmurs, rubs, or gallops  GI: : Soft, Nontender, Nondistended; Bowel sounds present  NERVOUS SYSTEM:  Alert & Oriented X3  EXTREMITIES: - edema  LYMPH: No lymphadenopathy noted  SKIN: No rashes or lesions  ENDOCRINOLOGY: No Thyromegaly  PSYCH: Appropriate    Labs:  ABG - ( 15 Apr 2023 05:22 )  pH, Arterial: 7.36  pH, Blood: x     /  pCO2: 55    /  pO2: 78    / HCO3: 31    / Base Excess: 4.2   /  SaO2: 97.3            34                            10.3   5.36  )-----------( 301      ( 13 Apr 2023 17:48 )             33.6     04-13    141  |  104  |  25<H>  ----------------------------<  100<H>  4.8   |  30  |  1.17      TPro  6.5  /  Alb  3.7  /  TBili  0.3  /  DBili  x   /  AST  26  /  ALT  20  /  AlkPhos  67  04-13    CAPILLARY BLOOD GLUCOSE                rad< from: [facility] Duplex Lower Ext Vein Scan, Left (04.13.23 @ 22:22) >    TECHNIQUE: Duplex sonography of the LEFT LOWER extremity veins with color   and spectral Doppler, with and without compression.    FINDINGS:    There is normal compressibility of the left common femoral, femoral and   popliteal veins.  The contralateral common femoral vein is patent.  Doppler examination shows normal spontaneous and phasic flow.    No calf vein thrombosis is detected.    IMPRESSION:  No evidence of left lower extremity deep venous thrombosis.          --- End of Report ---            FARHAT NGUYEN DO; Attending Radiologist  This document has been electronically signed. Apr 13 2023 10:25PM    < end of copied text >  < from: CT Angio Chest PE Protocol w/ IV Cont (04.13.23 @ 19:43) >  unremarkable. Hyperdensity within the gallbladder lumen, likely related   to gallstones. Elevation of the right hemidiaphragm.  BONES: Degenerative changes. Chronic appearing deformity of the distal   left clavicle.    IMPRESSION:  1. No CT evidence for pulmonary embolism.  2. There is a 2.5 cm irregular soft tissue mass identified within the   lateral aspect of the right breast at posterior depth, suspicious for   breast parenchymal neoplasm/carcinoma. Further evaluation with   mammography/ultrasonography is recommended.          --- End of Report ---    < end of copied text >        RECENT CULTURES:        RESPIRATORY CULTURES:          Studies  Chest X-RAY  CT SCAN Chest   Venous Dopplers: LE:   CT Abdomen  Others              
Patient is a 89y old  Female who presents with a chief complaint of chest pain (14 Apr 2023 19:14)      SUBJECTIVE / OVERNIGHT EVENTS: ptn is comfortable, she will need home O2    MEDICATIONS  (STANDING):  albuterol/ipratropium for Nebulization 3 milliLiter(s) Nebulizer every 6 hours  amLODIPine   Tablet 2.5 milliGRAM(s) Oral daily  apixaban 2.5 milliGRAM(s) Oral two times a day  buDESOnide    Inhalation Suspension 0.5 milliGRAM(s) Inhalation two times a day  famotidine    Tablet 20 milliGRAM(s) Oral daily  levothyroxine 75 MICROGram(s) Oral daily  losartan 100 milliGRAM(s) Oral daily  multivitamin 1 Tablet(s) Oral daily  polyethylene glycol 3350 17 Gram(s) Oral two times a day  QUEtiapine 100 milliGRAM(s) Oral at bedtime  QUEtiapine 25 milliGRAM(s) Oral at bedtime  senna 2 Tablet(s) Oral at bedtime    MEDICATIONS  (PRN):  acetaminophen     Tablet .. 650 milliGRAM(s) Oral every 6 hours PRN Temp greater or equal to 38C (100.4F), Mild Pain (1 - 3)  albuterol    90 MICROgram(s) HFA Inhaler 2 Puff(s) Inhalation every 6 hours PRN Bronchospasm  bisacodyl Suppository 10 milliGRAM(s) Rectal daily PRN Constipation      Vital Signs Last 24 Hrs  T(F): 97.6 (04-16-23 @ 11:53), Max: 97.7 (04-16-23 @ 04:01)  HR: 65 (04-16-23 @ 11:53) (65 - 70)  BP: 113/72 (04-16-23 @ 11:53) (113/72 - 130/63)  RR: 18 (04-16-23 @ 11:53) (18 - 20)  SpO2: 97% (04-16-23 @ 11:53) (87% - 97%)  Telemetry:   CAPILLARY BLOOD GLUCOSE        I&O's Summary    15 Apr 2023 07:01  -  16 Apr 2023 07:00  --------------------------------------------------------  IN: 240 mL / OUT: 550 mL / NET: -310 mL    16 Apr 2023 07:01  -  16 Apr 2023 20:20  --------------------------------------------------------  IN: 120 mL / OUT: 0 mL / NET: 120 mL        PHYSICAL EXAM:  GENERAL: NAD, well-developed  HEAD:  Atraumatic, Normocephalic  EYES: EOMI, PERRLA, conjunctiva and sclera clear  NECK: Supple, No JVD  CHEST/LUNG: Clear to auscultation bilaterally; No wheeze  HEART: Regular rate and rhythm; No murmurs, rubs, or gallops  ABDOMEN: Soft, Nontender, Nondistended; Bowel sounds present  EXTREMITIES:  2+ Peripheral Pulses, No clubbing, cyanosis, or edema  PSYCH: AAOx3  NEUROLOGY: non-focal  SKIN: No rashes or lesions    LABS:                    RADIOLOGY & ADDITIONAL TESTS:    Imaging Personally Reviewed:    Consultant(s) Notes Reviewed:      Care Discussed with Consultants/Other Providers:

## 2023-04-17 NOTE — PROGRESS NOTE ADULT - ASSESSMENT
89 yo F with a PMH of dementia, HTN, HLD, hypothyroidism, Afib now on Eliquis, COPD on supp O2 as needed but never uses it  from [facility] p/w chest pain and SOB. Reported sxs to staff today and they called covering MD who recommended she be evaluated in ED. Pt now stating she never c/o this and doesn't know why they sent her to the hospital. When asked if she has any complaints at present she states her left ankle hurts and her legs are swollen. No fever, cough, abd pain, nausea, vomiting, headache, dizziness. Unsure if pt is compliant with her meds.  Pt is an unreliable historian.  cardiology consult called  check tte  LLE ankle xrays w no acute fractures  dvt ppx not necessary        SOB:/ copd:   Breast Mass:   Aortic stenosis   A FIBRILLATION:   HTN  Hypothyridism      4/14:  SOB:/ copd: cont pulmicort:  add duoneb q 6 hours:  ct chest with innumerable calcified granuloma: no acute infiltrates: do ABG in am   Breast Mass:  NOTED ON CT CHEST  : DEFER TO PRIMARY TEAM   Aortic stenosis  ; she had mild AS before;  ? repeat echo ; no isch workup    A FIBRILLATION: on eliquis  HTN : controlled  Hypothyroidism : on synthroid     DW ACP     4/15:    SOB:/ copd: cont pulmicort:  add duoneb q 6 hours:  ct chest with innumerable calcified granuloma: no acute infiltrates: ABG seems reasonable:  compensated hypercarbic resp failure   Breast Mass:  NOTED ON CT CHEST  : DEFER TO PRIMARY TEAM   Aortic stenosis  ; she had mild AS before; defer to primary pt   A FIBRILLATION: on eliquis  HTN : controlled  Hypothyroidism : on synthroid     DW ACP     4/16:  SOB:/ copd: cont pulmicort:  add duoneb q 6 hours:  ct chest with innumerable calcified granuloma: no acute infiltrates: ABG seems reasonable:  compensated hypercarbic resp failure : no new intervention: awaiting oxygen for dc to NH   Breast Mass:  NOTED ON CT CHEST  : DEFER TO PRIMARY TEAM   Aortic stenosis  ; she had mild AS before; defer to primary pt   A FIBRILLATION: on eliquis  HTN : controlled  Hypothyroidism : on synthroid     DW ACP     
89 yo F with a PMH of dementia, HTN, HLD, hypothyroidism, Afib now on Eliquis, COPD on supp O2 as needed but never uses it  from [facility] p/w chest pain and SOB. Reported sxs to staff today and they called covering MD who recommended she be evaluated in ED. Pt now stating she never c/o this and doesn't know why they sent her to the hospital. When asked if she has any complaints at present she states her left ankle hurts and her legs are swollen. No fever, cough, abd pain, nausea, vomiting, headache, dizziness. Unsure if pt is compliant with her meds.  Pt is an unreliable historian.  cardiology consult called  check tte  LLE ankle xrays w no acute fractures  dvt ppx not necessary        SOB:/ copd:   Breast Mass:   Aortic stenosis   A FIBRILLATION:   HTN  Hypothyridism      4/14:  SOB:/ copd: cont pulmicort:  add duoneb q 6 hours:  ct chest with innumerable calcified granuloma: no acute infiltrates: do ABG in am   Breast Mass:  NOTED ON CT CHEST  : DEFER TO PRIMARY TEAM   Aortic stenosis  ; she had mild AS before;  ? repeat echo ; no isch workup    A FIBRILLATION: on eliquis  HTN : controlled  Hypothyroidism : on synthroid     DW ACP     4/15:    SOB:/ copd: cont pulmicort:  add duoneb q 6 hours:  ct chest with innumerable calcified granuloma: no acute infiltrates: ABG seems reasonable:  compensated hypercarbic resp failure   Breast Mass:  NOTED ON CT CHEST  : DEFER TO PRIMARY TEAM   Aortic stenosis  ; she had mild AS before; defer to primary pt   A FIBRILLATION: on eliquis  HTN : controlled  Hypothyroidism : on synthroid     DW ACP   
Echo 10/14/20: Nml LV fxn EF 75%, mild AS    A/P  89 yo F with a PMH of dementia, HTN, HLD, hypothyroidism, Afib now on Eliquis, COPD on supp O2 as needed but never uses it  from [facility] p/w chest pain and SOB.    #Chest pain  -No longer endorsing CP  -HST negative x2  -CTA chest no PE, effusion, edema  -EKG NSR with 1st degree av block, no e/o ischemia  -Old echo with nml lv fxn  -echo w normal LVEF and possible moderate AS  -Would not pursue ischemic eval given her dementia and age    #pAfib on Eliquis  -Stable  -Continue apixaban  -Not on rate control meds    #HTN  -Bp acceptable  -Continue amlodipine   -Continue losartan    #R Breast Nodule  -As noted on CT chest, c/f malignancy  -Onc eval appreciated  -Family is deferring bx at this time    #LLE Pain  -LE duplex w/o evidence of DVT  -XR ankle w/o evidence of fx      dcp  Please call/text me with questions/concerns between 8am-4pm  [phone]
89 yo F with a PMH of dementia, HTN, HLD, hypothyroidism, Afib now on Eliquis, COPD on supp O2 as needed but never uses it  from [facility] p/w chest pain and SOB. Reported sxs to staff today and they called covering MD who recommended she be evaluated in ED. Pt now stating she never c/o this and doesn't know why they sent her to the hospital. When asked if she has any complaints at present she states her left ankle hurts and her legs are swollen. No fever, cough, abd pain, nausea, vomiting, headache, dizziness. Unsure if pt is compliant with her meds.  Pt is an unreliable historian.     ptn has nonspecific aches and pain, ptn has a breast mass, seen by surgical onc. son refusing intervention. had a long convo w son re GOC x 45 min. ptn is DNR/DNI. he requested hospice consult. he wants her DC back to [facility].  all testing cancelled as per son's request         cardiology, surgical onc, pulm  consults  reviewed  LLE ankle xrays w no acute fractures  dvt ppx not necessary
89-year-old female admitted with chest pain and now better. CT done of the chest and she has a right breast mass concerning for a primary malignancy.   -Family has opted to not have a biopsy/surgery.    Macrocytic anemia - in an elderly patient MDS is a possibility. The anemia is mild and there are no other cytopenias.   - Await  B12/folate levels. Monitor the CBC. Can also check iron studies, LDH, and haptoglobin. 
89 yo F with a PMH of dementia, HTN, HLD, hypothyroidism, Afib now on Eliquis, COPD on supp O2 as needed but never uses it  from [facility] p/w chest pain and SOB. Reported sxs to staff today and they called covering MD who recommended she be evaluated in ED. Pt now stating she never c/o this and doesn't know why they sent her to the hospital. When asked if she has any complaints at present she states her left ankle hurts and her legs are swollen. No fever, cough, abd pain, nausea, vomiting, headache, dizziness. Unsure if pt is compliant with her meds.  Pt is an unreliable historian.     4/14: ptn has nonspecific aches and pain, ptn has a breast mass, seen by surgical onc. son refusing intervention. had a long convo w son re GOC x 45 min. ptn is DNR/DNI. he requested hospice consult. he wants her DC back to [facility].  all testing cancelled as per son's request   4/15: ptn is comfortable, aide at bedside, ambulated to the bathroom, awaiting clearance from assisted living in order to DC back  4/16: ptn will need home O2 prior to DC          cardiology, surgical onc, pulm  consults  reviewed  LLE ankle xrays w no acute fractures  dvt ppx not necessary
Echo 10/14/20: Nml LV fxn EF 75%, mild AS    A/P  89 yo F with a PMH of dementia, HTN, HLD, hypothyroidism, Afib now on Eliquis, COPD on supp O2 as needed but never uses it  from [facility] p/w chest pain and SOB.    #Chest pain  -No longer endorsing CP  -HST negative x2  -CTA chest no PE, effusion, edema  -EKG NSR with 1st degree av block, no e/o ischemia  -Old echo with nml lv fxn  -echo w normal LVEF and possible moderate AS  -Would not pursue ischemic eval given her dementia and age    #pAfib on Eliquis  -Stable  -Continue apixaban  -Not on rate control meds    #HTN  -Bp acceptable  -Continue amlodipine   -Continue losartan    #R Breast Nodule  -As noted on CT chest, c/f malignancy  -Onc eval    #LLE Pain  -LE duplex w/o evidence of DVT  -XR ankle w/o evidence of fx      35 minutes spent on total encounter; more than 50% of the visit was spent counseling and/or coordinating care by the attending physician.  
89 yo F with a PMH of dementia, HTN, HLD, hypothyroidism, Afib now on Eliquis, COPD on supp O2 as needed but never uses it  from [facility] p/w chest pain and SOB. Reported sxs to staff today and they called covering MD who recommended she be evaluated in ED. Pt now stating she never c/o this and doesn't know why they sent her to the hospital. When asked if she has any complaints at present she states her left ankle hurts and her legs are swollen. No fever, cough, abd pain, nausea, vomiting, headache, dizziness. Unsure if pt is compliant with her meds.  Pt is an unreliable historian.  cardiology consult called  check tte  LLE ankle xrays w no acute fractures  dvt ppx not necessary        SOB:/ copd:   Breast Mass:   Aortic stenosis   A FIBRILLATION:   HTN  Hypothyridism      4/14:  SOB:/ copd: cont pulmicort:  add duoneb q 6 hours:  ct chest with innumerable calcified granuloma: no acute infiltrates: do ABG in am   Breast Mass:  NOTED ON CT CHEST  : DEFER TO PRIMARY TEAM   Aortic stenosis  ; she had mild AS before;  ? repeat echo ; no isch workup    A FIBRILLATION: on eliquis  HTN : controlled  Hypothyroidism : on synthroid     DW ACP     4/15:    SOB:/ copd: cont pulmicort:  add duoneb q 6 hours:  ct chest with innumerable calcified granuloma: no acute infiltrates: ABG seems reasonable:  compensated hypercarbic resp failure   Breast Mass:  NOTED ON CT CHEST  : DEFER TO PRIMARY TEAM   Aortic stenosis  ; she had mild AS before; defer to primary pt   A FIBRILLATION: on eliquis  HTN : controlled  Hypothyroidism : on synthroid     DW ACP     4/16:  SOB:/ copd: cont pulmicort:  add duoneb q 6 hours:  ct chest with innumerable calcified granuloma: no acute infiltrates: ABG seems reasonable:  compensated hypercarbic resp failure : no new intervention: awaiting oxygen for dc to NH   Breast Mass:  NOTED ON CT CHEST  : DEFER TO PRIMARY TEAM   Aortic stenosis  ; she had mild AS before; defer to primary pt   A FIBRILLATION: on eliquis  HTN : controlled  Hypothyroidism : on synthroid     DW ACP       4/17:    SOB:/ copd: cont pulmicort:  add duoneb q 6 hours:  ct chest with innumerable calcified granuloma: no acute infiltrates: ABG seems reasonable:  compensated hypercarbic resp failure : no new intervention: awaiting oxygen for dc to NH with hospice:   Breast Mass:  NOTED ON CT CHEST  : DEFER TO PRIMARY TEAM   Aortic stenosis  ; she had mild AS before; defer to primary pt   A FIBRILLATION: on eliquis  HTN : controlled  Hypothyroidism : on synthroid     DW ACP   
89 yo F with a PMH of dementia, HTN, HLD, hypothyroidism, Afib now on Eliquis, COPD on supp O2 as needed but never uses it  from [facility] p/w chest pain and SOB. Reported sxs to staff today and they called covering MD who recommended she be evaluated in ED. Pt now stating she never c/o this and doesn't know why they sent her to the hospital. When asked if she has any complaints at present she states her left ankle hurts and her legs are swollen. No fever, cough, abd pain, nausea, vomiting, headache, dizziness. Unsure if pt is compliant with her meds.  Pt is an unreliable historian.     4/14: ptn has nonspecific aches and pain, ptn has a breast mass, seen by surgical onc. son refusing intervention. had a long convo w son re GOC x 45 min. ptn is DNR/DNI. he requested hospice consult. he wants her DC back to [facility].  all testing cancelled as per son's request   4/15: ptn is comfortable, aide at bedside, ambulated to the bathroom, awaiting clearance from assisted living in order to DC back          cardiology, surgical onc, pulm  consults  reviewed  LLE ankle xrays w no acute fractures  dvt ppx not necessary
89 yo F with a PMH of dementia, HTN, HLD, hypothyroidism, Afib now on Eliquis, COPD on supp O2 as needed but never uses it  from [facility] p/w chest pain and SOB. Reported sxs to staff today and they called covering MD who recommended she be evaluated in ED. Pt now stating she never c/o this and doesn't know why they sent her to the hospital. When asked if she has any complaints at present she states her left ankle hurts and her legs are swollen. No fever, cough, abd pain, nausea, vomiting, headache, dizziness. Unsure if pt is compliant with her meds.  Pt is an unreliable historian.     4/14: ptn has nonspecific aches and pain, ptn has a breast mass, seen by surgical onc. son refusing intervention. had a long convo w son re GOC x 45 min. ptn is DNR/DNI. he requested hospice consult. he wants her DC back to [facility].  all testing cancelled as per son's request   4/15: ptn is comfortable, aide at bedside, ambulated to the bathroom, awaiting clearance from assisted living in order to DC back  4/16: ptn will need home O2 prior to DC  4/17: ptn was accepted by home hospice. dc home today          cardiology, surgical onc, pulm  consults  reviewed  LLE ankle xrays w no acute fractures  dvt ppx not necessary

## 2023-04-17 NOTE — DISCHARGE NOTE NURSING/CASE MANAGEMENT/SOCIAL WORK - NSDCPEFALRISK_GEN_ALL_CORE
For information on Fall & Injury Prevention, visit: [email or web address] OR  [email or web address] OR  https://www.cdc.gov/steadi/patient.html

## 2023-04-17 NOTE — PROGRESS NOTE ADULT - PROVIDER SPECIALTY LIST ADULT
Heme/Onc
Internal Medicine
Neurology
Cardiology
Pulmonology
Pulmonology
Cardiology
Internal Medicine
Pulmonology

## 2023-04-20 PROCEDURE — 93306 TTE W/DOPPLER COMPLETE: CPT

## 2023-04-20 PROCEDURE — 85730 THROMBOPLASTIN TIME PARTIAL: CPT

## 2023-04-20 PROCEDURE — 83735 ASSAY OF MAGNESIUM: CPT

## 2023-04-20 PROCEDURE — 82330 ASSAY OF CALCIUM: CPT

## 2023-04-20 PROCEDURE — 36415 COLL VENOUS BLD VENIPUNCTURE: CPT

## 2023-04-20 PROCEDURE — 87637 SARSCOV2&INF A&B&RSV AMP PRB: CPT

## 2023-04-20 PROCEDURE — 83550 IRON BINDING TEST: CPT

## 2023-04-20 PROCEDURE — 82607 VITAMIN B-12: CPT

## 2023-04-20 PROCEDURE — 82803 BLOOD GASES ANY COMBINATION: CPT

## 2023-04-20 PROCEDURE — 85025 COMPLETE CBC W/AUTO DIFF WBC: CPT

## 2023-04-20 PROCEDURE — 82435 ASSAY OF BLOOD CHLORIDE: CPT

## 2023-04-20 PROCEDURE — 84295 ASSAY OF SERUM SODIUM: CPT

## 2023-04-20 PROCEDURE — 83540 ASSAY OF IRON: CPT

## 2023-04-20 PROCEDURE — 97162 PT EVAL MOD COMPLEX 30 MIN: CPT

## 2023-04-20 PROCEDURE — 83010 ASSAY OF HAPTOGLOBIN QUANT: CPT

## 2023-04-20 PROCEDURE — 83605 ASSAY OF LACTIC ACID: CPT

## 2023-04-20 PROCEDURE — 93971 EXTREMITY STUDY: CPT

## 2023-04-20 PROCEDURE — 85014 HEMATOCRIT: CPT

## 2023-04-20 PROCEDURE — 94640 AIRWAY INHALATION TREATMENT: CPT

## 2023-04-20 PROCEDURE — 84484 ASSAY OF TROPONIN QUANT: CPT

## 2023-04-20 PROCEDURE — 80053 COMPREHEN METABOLIC PANEL: CPT

## 2023-04-20 PROCEDURE — 82746 ASSAY OF FOLIC ACID SERUM: CPT

## 2023-04-20 PROCEDURE — 85610 PROTHROMBIN TIME: CPT

## 2023-04-20 PROCEDURE — 85018 HEMOGLOBIN: CPT

## 2023-04-20 PROCEDURE — 83880 ASSAY OF NATRIURETIC PEPTIDE: CPT

## 2023-04-20 PROCEDURE — 71045 X-RAY EXAM CHEST 1 VIEW: CPT

## 2023-04-20 PROCEDURE — 73610 X-RAY EXAM OF ANKLE: CPT

## 2023-04-20 PROCEDURE — 36600 WITHDRAWAL OF ARTERIAL BLOOD: CPT

## 2023-04-20 PROCEDURE — 82728 ASSAY OF FERRITIN: CPT

## 2023-04-20 PROCEDURE — 82947 ASSAY GLUCOSE BLOOD QUANT: CPT

## 2023-04-20 PROCEDURE — 99285 EMERGENCY DEPT VISIT HI MDM: CPT

## 2023-04-20 PROCEDURE — 71275 CT ANGIOGRAPHY CHEST: CPT | Mod: MA

## 2023-04-20 PROCEDURE — 84132 ASSAY OF SERUM POTASSIUM: CPT

## 2023-04-20 PROCEDURE — 83615 LACTATE (LD) (LDH) ENZYME: CPT

## 2023-05-01 NOTE — DISCHARGE NOTE PROVIDER - NSDCHHNEEDSERVICE_GEN_ALL_CORE
Lovenox on hold for LP scheduled for 5/2     Contact information: [phone] Rehabilitation services/Teaching and training

## 2023-05-19 NOTE — PROGRESS NOTE ADULT - ASSESSMENT
89 yr old female with mild dementia, HTN, HLD, PE (on a/c with eliquis) presents from home for concern of no BM x 4-5 days s/p recent fall with clavicular fracture - no reported opiod/narcotic analgesics  Admitted 11/5     +frequent falls +rib fractures  +large fecal stool burden with associated minimal surrounding inflammatory changes on CT 11/5  - ADDI 11/10 without fecal impaction    RECS;  -Continue Miralax and  Dulcolax supp Q other day to help passage of stool given history of anal fissure and decreased mobility given acute fractures.   -PO diet as tolerated  -No GI objection to AC; defer to primary team    Discussed with pt   no GI objection to d/c planning    Andrey Emanuel PA-C    [facility]  [phone]  After hours and weekend coverage [phone]   Yes

## 2023-06-14 NOTE — PATIENT PROFILE ADULT - NSPROPTRIGHTREPNAME_GEN_A__NUR
Damien [Patient Intake Form Reviewed] : Patient intake form was reviewed [Nasal Congestion] : nasal congestion [As Noted in HPI] : as noted in HPI [Shortness Of Breath] : shortness of breath [Negative] : Heme/Lymph [de-identified] : Bilateral anosmia

## 2023-06-26 NOTE — ASU PATIENT PROFILE, ADULT - NS TRANSFER EYEGLASSES PAIRS
Event Note
Follow up/Off Service Note
R3 Chart Note
SSE/Event Note
Event Note
Event Note
Fetal Monitoring Discussion
itchy hands/feet/Event Note
1 pair/locker 22

## 2023-07-26 NOTE — H&P PST ADULT - ENMT
Health Maintenance Due   Topic Date Due   • Shingles Vaccine (2 of 3) 01/15/2014   • COVID-19 Vaccine (3 - Moderna series) 07/10/2021   • Traditional Medicare- Medicare Wellness Visit  Never done       Patient is due for topics as listed above but is not proceeding with Immunization(s) COVID-19 and Shingles and MWV (Medicare Wellness Visit) at this time.    negative No oral lesions; no gross abnormalities

## 2023-09-08 NOTE — ED ADULT TRIAGE NOTE - PRO INTERPRETER NEED 2
Is she taking her cyclobenzaprine for the muscle spasm?  There is not much more can try for the pain.  She can use voltaren gel OTC as a topcial anti-inflammatory as well and continue with heating pad.    English

## 2023-09-18 NOTE — PROGRESS NOTE ADULT - SUBJECTIVE AND OBJECTIVE BOX
Patient is a 89y old  Female who presents with a chief complaint of Abdominal pain, acute Rib fracture (10 Nov 2020 13:07)      SUBJECTIVE / OVERNIGHT EVENTS: calmer, No new complaints.   Review of Systems  chest pain no  palpitations no  sob no  nausea no  headache no    MEDICATIONS  (STANDING):  acetaminophen   Tablet .. 650 milliGRAM(s) Oral every 6 hours  acetaminophen   Tablet .. 650 milliGRAM(s) Oral once  apixaban 2.5 milliGRAM(s) Oral every 12 hours  bisacodyl Suppository 10 milliGRAM(s) Rectal <User Schedule>  chlorhexidine 2% Cloths 1 Application(s) Topical daily  influenza   Vaccine 0.5 milliLiter(s) IntraMuscular once  levothyroxine 50 MICROGram(s) Oral daily  lidocaine   Patch 1 Patch Transdermal every 24 hours  losartan 25 milliGRAM(s) Oral daily  polyethylene glycol 3350 17 Gram(s) Oral daily  senna 1 Tablet(s) Oral every 12 hours    MEDICATIONS  (PRN):  LORazepam   Injectable 0.5 milliGRAM(s) IV Push two times a day PRN Agitation  oxycodone    5 mG/acetaminophen 325 mG 0.5 Tablet(s) Oral every 4 hours PRN Severe Pain (7 - 10)      Vital Signs Last 24 Hrs  T(C): 36.9 (10 Nov 2020 15:59), Max: 36.9 (10 Nov 2020 15:59)  T(F): 98.4 (10 Nov 2020 15:59), Max: 98.4 (10 Nov 2020 15:59)  HR: 73 (10 Nov 2020 15:59) (64 - 73)  BP: 122/80 (10 Nov 2020 15:59) (122/80 - 138/78)  BP(mean): --  RR: 18 (10 Nov 2020 15:59) (18 - 18)  SpO2: 96% (10 Nov 2020 15:59) (93% - 100%)    PHYSICAL EXAM:  GENERAL: NAD, frail  HEAD:  Atraumatic, Normocephalic  EYES: EOMI, PERRLA, conjunctiva and sclera clear  NECK: Supple, No JVD  CHEST/LUNG: Clear to auscultation bilaterally; No wheeze  HEART: Regular rate and rhythm; No murmurs, rubs, or gallops  ABDOMEN: Soft, Nontender, Nondistended; Bowel sounds present  EXTREMITIES:  2+ Peripheral Pulses, No clubbing, cyanosis, or edema  PSYCH: confused  NEUROLOGY: non-focal  SKIN: No rashes or lesions    LABS:                        11.7   7.25  )-----------( 451      ( 10 Nov 2020 07:14 )             37.3     11-10    141  |  101  |  26<H>  ----------------------------<  86  3.6   |  27  |  1.10    Ca    9.8      10 Nov 2020 07:15                Culture - Blood (collected 08 Nov 2020 09:56)  Source: .Blood Blood-Peripheral  Preliminary Report (09 Nov 2020 10:01):    No growth to date.    Culture - Urine (collected 07 Nov 2020 23:01)  Source: .Urine Catheterized  Preliminary Report (09 Nov 2020 18:26):    >100,000 CFU/ml Gram positive organisms    <10,000 CFU/ml Normal Urogenital issa present    Culture - Blood (collected 07 Nov 2020 22:59)  Source: .Blood Blood-Peripheral  Preliminary Report (08 Nov 2020 23:01):    No growth to date.        RADIOLOGY & ADDITIONAL TESTS:    Imaging Personally Reviewed:    Consultant(s) Notes Reviewed:      Care Discussed with Consultants/Other Providers:   Acitretin Counseling:  I discussed with the patient the risks of acitretin including but not limited to hair loss, dry lips/skin/eyes, liver damage, hyperlipidemia, depression/suicidal ideation, photosensitivity.  Serious rare side effects can include but are not limited to pancreatitis, pseudotumor cerebri, bony changes, clot formation/stroke/heart attack.  Patient understands that alcohol is contraindicated since it can result in liver toxicity and significantly prolong the elimination of the drug by many years.

## 2023-11-16 NOTE — PROGRESS NOTE ADULT - I WAS PHYSICALLY PRESENT FOR THE KEY PORTIONS OF THE EVALUATION AND MANAGEMENT (E/M) SERVICE PROVIDED.  I AGREE WITH THE ABOVE HISTORY, PHYSICAL, AND PLAN WHICH I HAVE REVIEWED AND EDITED WHERE APPROPRIATE
Blessing Kwan  : 1947  Primary: Medicare Part A And B  Secondary: [address] @ [place] [address]  Phone: [phone]  Fax: [phone] Plan Frequency: twice a week for 12 weeks    Plan of Care/Certification Expiration Date: 01/15/24      PT Visit Info: Total # of Visits to Date: 5  Progress Note Counter: 5  Progress Note Due Date: 23 (or 8th visit)  Canceled Appointment: 4         OUTPATIENT PHYSICAL THERAPY 2023     Appt Desk   Episode   MyChart      Ms. Katie Arredondo was a same-day cancellation for today's appointment. Left VM to cx with no reason provided.     Cancellation Number: [id number]       Ezell Gottron, PT, DPT    Future Appointments   Date Time Provider [address]   2023  1:45 PM Ezell Gottron, [facility]   2024  9:50 AM PST LAB PST [facility]   5/15/2024  2:20 PM Katherine Encinas MD PST [facility] Statement Selected

## 2024-04-12 NOTE — ED PROVIDER NOTE - MUSCULOSKELETAL NEGATIVE STATEMENT, MLM
The patient is scheduled for a colonoscopy on 4-22 and is on Plavix.  Need order to hold medication.   no back pain, no gout, no musculoskeletal pain, no neck pain, and no weakness.

## 2024-06-04 NOTE — ED PROVIDER NOTE - OBJECTIVE STATEMENT
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 reports she was discharged on 6/13 and since then feeling worse. shortness of breath occuring all the time reports compliance with her elliquis.  has 24/7 aide. Denies LE edema, orthopnea, recent trauma, fevers, chills, headache, dizziness, nausea, vomiting, dysuria, freq, hematuria, diarrhea, constipation, chest pain, cough.
Resident

## 2024-07-09 NOTE — PHYSICAL THERAPY INITIAL EVALUATION ADULT - TRANSFER TRAINING, PT EVAL
----- Message from Beba Murray MA sent at 7/8/2024  4:38 PM EDT -----  Regarding: FW: [facility] Appointment Request    ----- Message -----  From: Julieta Morel  Sent: 7/8/2024   3:49 PM EDT  To: Rodney Ta Clinical Staff  Subject: [facility] Appointment Request                          [facility] Appointment Request    Patient needs appointment for [facility] Appointment Type: Acute care    Patient Requested Dates(s): anyday available  Patient Requested Time: after 10 in the morning  Provider Name: with ISAIAS MOLINA    Reason for Appointment Request: Established Patient - No appointments available during search  --------------------------------------------------------------------------------------------------------------------------    Relationship to Patient: Self     Call Back Information: OK to leave message on voicemail  Preferred Call Back Number: Phone [phone]     PATIENT HAVING PAIN ON HER RIGHT KNEE /             Pt will perform ALL transfers with independence, w/ RW as needed, in 2 weeks
